# Patient Record
Sex: MALE | Race: BLACK OR AFRICAN AMERICAN | NOT HISPANIC OR LATINO | Employment: FULL TIME | URBAN - METROPOLITAN AREA
[De-identification: names, ages, dates, MRNs, and addresses within clinical notes are randomized per-mention and may not be internally consistent; named-entity substitution may affect disease eponyms.]

---

## 2023-11-02 ENCOUNTER — TELEPHONE (OUTPATIENT)
Dept: PRIMARY CARE | Facility: CLINIC | Age: 63
End: 2023-11-02
Payer: COMMERCIAL

## 2023-11-03 DIAGNOSIS — F41.9 ANXIETY: Primary | ICD-10-CM

## 2023-11-27 ENCOUNTER — APPOINTMENT (OUTPATIENT)
Dept: BEHAVIORAL HEALTH | Facility: CLINIC | Age: 63
End: 2023-11-27
Payer: COMMERCIAL

## 2023-12-18 ENCOUNTER — OFFICE VISIT (OUTPATIENT)
Dept: BEHAVIORAL HEALTH | Facility: CLINIC | Age: 63
End: 2023-12-18
Payer: COMMERCIAL

## 2023-12-18 DIAGNOSIS — F43.23 ADJUSTMENT DISORDER WITH MIXED ANXIETY AND DEPRESSED MOOD: ICD-10-CM

## 2023-12-18 DIAGNOSIS — F41.9 ANXIETY: ICD-10-CM

## 2023-12-18 PROBLEM — H53.8 BLURRING OF VISUAL IMAGE: Status: ACTIVE | Noted: 2023-12-18

## 2023-12-18 PROBLEM — H52.223 REGULAR ASTIGMATISM OF BOTH EYES WITH PRESBYOPIA: Status: ACTIVE | Noted: 2023-12-18

## 2023-12-18 PROBLEM — I10 ESSENTIAL HYPERTENSION: Status: ACTIVE | Noted: 2023-12-18

## 2023-12-18 PROBLEM — H52.4 REGULAR ASTIGMATISM OF BOTH EYES WITH PRESBYOPIA: Status: ACTIVE | Noted: 2023-12-18

## 2023-12-18 PROBLEM — H25.13 CATARACT, NUCLEAR SCLEROTIC, BOTH EYES: Status: ACTIVE | Noted: 2023-12-18

## 2023-12-18 PROBLEM — M46.1 SACROILIITIS (CMS-HCC): Status: ACTIVE | Noted: 2023-12-18

## 2023-12-18 PROBLEM — K29.70 HELICOBACTER POSITIVE GASTRITIS: Status: ACTIVE | Noted: 2023-12-18

## 2023-12-18 PROBLEM — K62.89 RECTAL MASS: Status: ACTIVE | Noted: 2023-12-18

## 2023-12-18 PROBLEM — K40.90 RIGHT GROIN HERNIA: Status: ACTIVE | Noted: 2023-12-18

## 2023-12-18 PROBLEM — J02.9 ACUTE PHARYNGITIS: Status: ACTIVE | Noted: 2023-12-18

## 2023-12-18 PROBLEM — H15.102 EPISCLERITIS OF LEFT EYE: Status: ACTIVE | Noted: 2023-12-18

## 2023-12-18 PROBLEM — N52.9 MALE ERECTILE DISORDER OF ORGANIC ORIGIN: Status: ACTIVE | Noted: 2023-12-18

## 2023-12-18 PROBLEM — H25.012 CORTICAL AGE-RELATED CATARACT OF LEFT EYE: Status: ACTIVE | Noted: 2023-12-18

## 2023-12-18 PROBLEM — B96.81 HELICOBACTER POSITIVE GASTRITIS: Status: ACTIVE | Noted: 2023-12-18

## 2023-12-18 PROBLEM — L02.211 ABDOMINAL WALL ABSCESS: Status: ACTIVE | Noted: 2023-12-18

## 2023-12-18 PROBLEM — R10.9 ABDOMINAL PAIN: Status: ACTIVE | Noted: 2023-12-18

## 2023-12-18 PROBLEM — H53.9 CHANGE IN VISION: Status: ACTIVE | Noted: 2023-12-18

## 2023-12-18 PROBLEM — R91.8 LUNG NODULES: Status: ACTIVE | Noted: 2023-12-18

## 2023-12-18 PROBLEM — M54.50 LOW BACK PAIN: Status: ACTIVE | Noted: 2023-12-18

## 2023-12-18 PROBLEM — M47.816 LUMBAR SPONDYLOSIS: Status: ACTIVE | Noted: 2023-12-18

## 2023-12-18 PROBLEM — N34.2 URETHRITIS: Status: ACTIVE | Noted: 2023-12-18

## 2023-12-18 PROBLEM — K40.20 BILATERAL INGUINAL HERNIA: Status: ACTIVE | Noted: 2023-12-18

## 2023-12-18 PROBLEM — H00.014 HORDEOLUM EXTERNUM OF LEFT UPPER EYELID: Status: ACTIVE | Noted: 2023-12-18

## 2023-12-18 PROBLEM — K52.9 GASTROENTERITIS: Status: ACTIVE | Noted: 2023-12-18

## 2023-12-18 PROBLEM — M10.9 GOUT OF RIGHT FOOT: Status: ACTIVE | Noted: 2023-12-18

## 2023-12-18 PROCEDURE — 99205 OFFICE O/P NEW HI 60 MIN: CPT | Performed by: PSYCHIATRY & NEUROLOGY

## 2023-12-18 RX ORDER — LOPERAMIDE HYDROCHLORIDE 2 MG/1
CAPSULE ORAL
COMMUNITY
Start: 2023-08-11 | End: 2024-01-03 | Stop reason: ENTERED-IN-ERROR

## 2023-12-18 RX ORDER — OFLOXACIN 3 MG/ML
SOLUTION AURICULAR (OTIC)
COMMUNITY
Start: 2023-03-21 | End: 2024-01-03 | Stop reason: ENTERED-IN-ERROR

## 2023-12-18 RX ORDER — BENZONATATE 100 MG/1
CAPSULE ORAL
COMMUNITY
Start: 2023-07-28 | End: 2024-01-03 | Stop reason: ENTERED-IN-ERROR

## 2023-12-18 RX ORDER — ALBUTEROL SULFATE 90 UG/1
AEROSOL, METERED RESPIRATORY (INHALATION)
COMMUNITY
Start: 2023-07-28 | End: 2024-01-03 | Stop reason: ENTERED-IN-ERROR

## 2023-12-18 RX ORDER — AZITHROMYCIN 250 MG/1
TABLET, FILM COATED ORAL
COMMUNITY
Start: 2023-06-27 | End: 2023-12-18 | Stop reason: WASHOUT

## 2023-12-18 RX ORDER — DICYCLOMINE HYDROCHLORIDE 20 MG/1
TABLET ORAL
COMMUNITY
Start: 2023-03-14 | End: 2024-01-03 | Stop reason: ENTERED-IN-ERROR

## 2023-12-18 RX ORDER — CARBAMIDE PEROXIDE 6.5 %
DROPS OTIC (EAR)
COMMUNITY
Start: 2023-03-19 | End: 2024-01-03 | Stop reason: ENTERED-IN-ERROR

## 2023-12-18 RX ORDER — DOXYCYCLINE HYCLATE 100 MG
TABLET ORAL
COMMUNITY
Start: 2023-07-28 | End: 2023-12-18 | Stop reason: WASHOUT

## 2023-12-18 RX ORDER — OMEPRAZOLE 40 MG/1
40 CAPSULE, DELAYED RELEASE ORAL DAILY
COMMUNITY
Start: 2023-03-14 | End: 2024-01-03 | Stop reason: ENTERED-IN-ERROR

## 2023-12-18 RX ORDER — PREDNISONE 20 MG/1
TABLET ORAL
COMMUNITY
Start: 2023-07-28 | End: 2024-01-03 | Stop reason: ENTERED-IN-ERROR

## 2023-12-18 RX ORDER — MOXIFLOXACIN 5 MG/ML
SOLUTION/ DROPS OPHTHALMIC
Status: ON HOLD | COMMUNITY
Start: 2021-10-11 | End: 2024-01-03 | Stop reason: ALTCHOICE

## 2023-12-18 SDOH — ECONOMIC STABILITY: TRANSPORTATION INSECURITY
IN THE PAST 12 MONTHS, HAS LACK OF TRANSPORTATION KEPT YOU FROM MEETINGS, WORK, OR FROM GETTING THINGS NEEDED FOR DAILY LIVING?: NO

## 2023-12-18 SDOH — ECONOMIC STABILITY: HOUSING INSECURITY
IN THE LAST 12 MONTHS, WAS THERE A TIME WHEN YOU DID NOT HAVE A STEADY PLACE TO SLEEP OR SLEPT IN A SHELTER (INCLUDING NOW)?: NO

## 2023-12-18 SDOH — ECONOMIC STABILITY: GENERAL
WHICH OF THE FOLLOWING WOULD YOU LIKE TO GET CONNECTED TO IN ORDER TO RECEIVE A DISCOUNT OR FOR FREE? (CHOOSE ALL THAT APPLY): NONE OF THESE

## 2023-12-18 SDOH — ECONOMIC STABILITY: FOOD INSECURITY: WITHIN THE PAST 12 MONTHS, YOU WORRIED THAT YOUR FOOD WOULD RUN OUT BEFORE YOU GOT MONEY TO BUY MORE.: NEVER TRUE

## 2023-12-18 SDOH — ECONOMIC STABILITY: INCOME INSECURITY: IN THE LAST 12 MONTHS, WAS THERE A TIME WHEN YOU WERE NOT ABLE TO PAY THE MORTGAGE OR RENT ON TIME?: NO

## 2023-12-18 SDOH — HEALTH STABILITY: PHYSICAL HEALTH: ON AVERAGE, HOW MANY MINUTES DO YOU ENGAGE IN EXERCISE AT THIS LEVEL?: 30 MIN

## 2023-12-18 SDOH — ECONOMIC STABILITY: TRANSPORTATION INSECURITY
IN THE PAST 12 MONTHS, HAS THE LACK OF TRANSPORTATION KEPT YOU FROM MEDICAL APPOINTMENTS OR FROM GETTING MEDICATIONS?: NO

## 2023-12-18 SDOH — ECONOMIC STABILITY: FOOD INSECURITY: WITHIN THE PAST 12 MONTHS, THE FOOD YOU BOUGHT JUST DIDN'T LAST AND YOU DIDN'T HAVE MONEY TO GET MORE.: NEVER TRUE

## 2023-12-18 SDOH — ECONOMIC STABILITY: GENERAL
WHICH OF THE FOLLOWING DO YOU KNOW HOW TO USE AND HAVE ACCESS TO EVERY DAY? (CHOOSE ALL THAT APPLY): DESKTOP COMPUTER, LAPTOP COMPUTER, OR TABLET WITH BROADBAND INTERNET CONNECTION

## 2023-12-18 SDOH — HEALTH STABILITY: PHYSICAL HEALTH: ON AVERAGE, HOW MANY DAYS PER WEEK DO YOU ENGAGE IN MODERATE TO STRENUOUS EXERCISE (LIKE A BRISK WALK)?: 7 DAYS

## 2023-12-18 SDOH — ECONOMIC STABILITY: HOUSING INSECURITY: IN THE LAST 12 MONTHS, HOW MANY PLACES HAVE YOU LIVED?: 1

## 2023-12-18 ASSESSMENT — SOCIAL DETERMINANTS OF HEALTH (SDOH)
WITHIN THE LAST YEAR, HAVE YOU BEEN KICKED, HIT, SLAPPED, OR OTHERWISE PHYSICALLY HURT BY YOUR PARTNER OR EX-PARTNER?: NO
HOW OFTEN DO YOU ATTENT MEETINGS OF THE CLUB OR ORGANIZATION YOU BELONG TO?: 1 TO 4 TIMES PER YEAR
WITHIN THE LAST YEAR, HAVE TO BEEN RAPED OR FORCED TO HAVE ANY KIND OF SEXUAL ACTIVITY BY YOUR PARTNER OR EX-PARTNER?: NO
HOW HARD IS IT FOR YOU TO PAY FOR THE VERY BASICS LIKE FOOD, HOUSING, MEDICAL CARE, AND HEATING?: SOMEWHAT HARD
ARE YOU MARRIED, WIDOWED, DIVORCED, SEPARATED, NEVER MARRIED, OR LIVING WITH A PARTNER?: LIVING WITH PARTNER
DO YOU BELONG TO ANY CLUBS OR ORGANIZATIONS SUCH AS CHURCH GROUPS UNIONS, FRATERNAL OR ATHLETIC GROUPS, OR SCHOOL GROUPS?: YES
WITHIN THE LAST YEAR, HAVE YOU BEEN AFRAID OF YOUR PARTNER OR EX-PARTNER?: NO
IN A TYPICAL WEEK, HOW MANY TIMES DO YOU TALK ON THE PHONE WITH FAMILY, FRIENDS, OR NEIGHBORS?: MORE THAN THREE TIMES A WEEK
WITHIN THE LAST YEAR, HAVE YOU BEEN HUMILIATED OR EMOTIONALLY ABUSED IN OTHER WAYS BY YOUR PARTNER OR EX-PARTNER?: NO
HOW OFTEN DO YOU GET TOGETHER WITH FRIENDS OR RELATIVES?: MORE THAN THREE TIMES A WEEK
HOW OFTEN DO YOU GET TOGETHER WITH FRIENDS OR RELATIVES?: MORE THAN THREE TIMES A WEEK
HOW OFTEN DO YOU ATTEND CHURCH OR RELIGIOUS SERVICES?: 1 TO 4 TIMES PER YEAR
ARE YOU MARRIED, WIDOWED, DIVORCED, SEPARATED, NEVER MARRIED, OR LIVING WITH A PARTNER?: LIVING WITH PARTNER
IN THE PAST 12 MONTHS, HAS THE ELECTRIC, GAS, OIL, OR WATER COMPANY THREATENED TO SHUT OFF SERVICE IN YOUR HOME?: NO
IN A TYPICAL WEEK, HOW MANY TIMES DO YOU TALK ON THE PHONE WITH FAMILY, FRIENDS, OR NEIGHBORS?: MORE THAN THREE TIMES A WEEK

## 2023-12-18 ASSESSMENT — LIFESTYLE VARIABLES
AUDIT-C TOTAL SCORE: 0
HOW OFTEN DO YOU HAVE A DRINK CONTAINING ALCOHOL: NEVER
HOW MANY STANDARD DRINKS CONTAINING ALCOHOL DO YOU HAVE ON A TYPICAL DAY: PATIENT DOES NOT DRINK
HOW OFTEN DO YOU HAVE SIX OR MORE DRINKS ON ONE OCCASION: NEVER
SKIP TO QUESTIONS 9-10: 1

## 2023-12-18 ASSESSMENT — PATIENT HEALTH QUESTIONNAIRE - PHQ9
2. FEELING DOWN, DEPRESSED OR HOPELESS: NOT AT ALL
1. LITTLE INTEREST OR PLEASURE IN DOING THINGS: NOT AT ALL
SUM OF ALL RESPONSES TO PHQ9 QUESTIONS 1 & 2: 0

## 2023-12-18 ASSESSMENT — SLIT LAMP EXAM - LIDS
COMMENTS: GOOD POSITION
COMMENTS: GOOD POSITION

## 2023-12-18 ASSESSMENT — ENCOUNTER SYMPTOMS
DYSPHORIC MOOD: 1
NEUROLOGICAL NEGATIVE: 1
NERVOUS/ANXIOUS: 1

## 2023-12-18 ASSESSMENT — CUP TO DISC RATIO
OD_RATIO: .3
OS_RATIO: .3

## 2023-12-18 ASSESSMENT — EXTERNAL EXAM - LEFT EYE: OS_EXAM: NORMAL

## 2023-12-18 ASSESSMENT — EXTERNAL EXAM - RIGHT EYE: OD_EXAM: NORMAL
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
## 2023-12-18 NOTE — PROGRESS NOTES
Combined form of age-related cataract, right eyeH25.811  Combined form of age-related cataract, left eyeH25.812  -Mildly visually significant, but no impact on ADL`s - vision stable compared to last visit. Monitor for now. F/u 1 year to re-evaluate -- recheck refraction, glare/BAT, dilation. Plan for Lenstar/Pentacam/OCT macula only if cataract(s) visually significant and if patient would like to proceed with cataract surgery. Advised to call/return sooner if any change in vision before next appt.     OaovdverwH39.00  FlrevhybkcfX26.209  QomahfjtweZ94.4  -New Rx given per patient request - optional to fill.         No history of intraocular surgery/refractive surgery.   No FH of AMD/glaucoma

## 2023-12-19 ENCOUNTER — OFFICE VISIT (OUTPATIENT)
Dept: PRIMARY CARE | Facility: CLINIC | Age: 63
End: 2023-12-19
Payer: COMMERCIAL

## 2023-12-19 ENCOUNTER — OFFICE VISIT (OUTPATIENT)
Dept: OPHTHALMOLOGY | Facility: CLINIC | Age: 63
End: 2023-12-19
Payer: COMMERCIAL

## 2023-12-19 VITALS
WEIGHT: 142 LBS | DIASTOLIC BLOOD PRESSURE: 76 MMHG | HEIGHT: 65 IN | BODY MASS INDEX: 23.66 KG/M2 | HEART RATE: 77 BPM | SYSTOLIC BLOOD PRESSURE: 152 MMHG

## 2023-12-19 DIAGNOSIS — F41.9 ANXIETY: Primary | ICD-10-CM

## 2023-12-19 DIAGNOSIS — H52.03 HYPEROPIA, BILATERAL: ICD-10-CM

## 2023-12-19 DIAGNOSIS — H25.812 COMBINED FORM OF AGE-RELATED CATARACT, LEFT EYE: ICD-10-CM

## 2023-12-19 DIAGNOSIS — H52.4 PRESBYOPIA: ICD-10-CM

## 2023-12-19 DIAGNOSIS — M54.50 LOW BACK PAIN, UNSPECIFIED BACK PAIN LATERALITY, UNSPECIFIED CHRONICITY, UNSPECIFIED WHETHER SCIATICA PRESENT: ICD-10-CM

## 2023-12-19 DIAGNOSIS — H52.203 ASTIGMATISM OF BOTH EYES, UNSPECIFIED TYPE: ICD-10-CM

## 2023-12-19 DIAGNOSIS — H25.811 COMBINED FORM OF AGE-RELATED CATARACT, RIGHT EYE: Primary | ICD-10-CM

## 2023-12-19 PROCEDURE — 99213 OFFICE O/P EST LOW 20 MIN: CPT | Performed by: FAMILY MEDICINE

## 2023-12-19 PROCEDURE — 92014 COMPRE OPH EXAM EST PT 1/>: CPT | Performed by: OPHTHALMOLOGY

## 2023-12-19 PROCEDURE — 3077F SYST BP >= 140 MM HG: CPT | Performed by: FAMILY MEDICINE

## 2023-12-19 PROCEDURE — 92015 DETERMINE REFRACTIVE STATE: CPT | Performed by: OPHTHALMOLOGY

## 2023-12-19 PROCEDURE — 3078F DIAST BP <80 MM HG: CPT | Performed by: FAMILY MEDICINE

## 2023-12-19 RX ORDER — IBUPROFEN 800 MG/1
800 TABLET ORAL 3 TIMES DAILY PRN
Qty: 60 TABLET | Refills: 0 | Status: SHIPPED | OUTPATIENT
Start: 2023-12-19 | End: 2024-01-05 | Stop reason: HOSPADM

## 2023-12-19 ASSESSMENT — VISUAL ACUITY
OD_BAT_MED: 20/20
OS_BAT_MED: 20/25
METHOD: SNELLEN - LINEAR
CORRECTION_TYPE: GLASSES
OS_CC: 20/25
OD_CC: 20/20

## 2023-12-19 ASSESSMENT — REFRACTION_MANIFEST
OS_ADD: +2.50
OD_SPHERE: +2.25
OS_AXIS: 080
OD_CYLINDER: -1.25
OS_CYLINDER: -1.50
OD_ADD: +2.50
OS_SPHERE: +2.00
OD_AXIS: 095

## 2023-12-19 ASSESSMENT — REFRACTION_WEARINGRX
OD_SPHERE: +2.25
OS_CYLINDER: -1.50
OD_ADD: +2.50
OS_SPHERE: +2.00
OD_AXIS: 095
OS_AXIS: 080
OS_ADD: +2.50
OD_CYLINDER: -1.25

## 2023-12-19 ASSESSMENT — ENCOUNTER SYMPTOMS
HEMATOLOGIC/LYMPHATIC NEGATIVE: 0
GASTROINTESTINAL NEGATIVE: 0
RESPIRATORY NEGATIVE: 0
ALLERGIC/IMMUNOLOGIC NEGATIVE: 0
PSYCHIATRIC NEGATIVE: 0
CARDIOVASCULAR NEGATIVE: 0
EYES NEGATIVE: 1
ENDOCRINE NEGATIVE: 0
NEUROLOGICAL NEGATIVE: 0
MUSCULOSKELETAL NEGATIVE: 0
CONSTITUTIONAL NEGATIVE: 0

## 2023-12-19 ASSESSMENT — TONOMETRY
OS_IOP_MMHG: 13
OD_IOP_MMHG: 13
IOP_METHOD: GOLDMANN APPLANATION
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
## 2024-01-02 ENCOUNTER — HOSPITAL ENCOUNTER (OUTPATIENT)
Facility: HOSPITAL | Age: 64
Setting detail: OBSERVATION
Discharge: HOME | End: 2024-01-05
Attending: EMERGENCY MEDICINE | Admitting: INTERNAL MEDICINE
Payer: COMMERCIAL

## 2024-01-02 ENCOUNTER — APPOINTMENT (OUTPATIENT)
Dept: RADIOLOGY | Facility: HOSPITAL | Age: 64
End: 2024-01-02
Payer: COMMERCIAL

## 2024-01-02 DIAGNOSIS — R10.84 GENERALIZED ABDOMINAL PAIN: Primary | ICD-10-CM

## 2024-01-02 DIAGNOSIS — N48.1 BALANITIS: ICD-10-CM

## 2024-01-02 DIAGNOSIS — R11.10 INTRACTABLE VOMITING: ICD-10-CM

## 2024-01-02 DIAGNOSIS — K52.9 ENTERITIS: ICD-10-CM

## 2024-01-02 DIAGNOSIS — I10 PRIMARY HYPERTENSION: ICD-10-CM

## 2024-01-02 DIAGNOSIS — R11.2 NAUSEA AND VOMITING, UNSPECIFIED VOMITING TYPE: ICD-10-CM

## 2024-01-02 LAB
ALBUMIN SERPL BCP-MCNC: 4.3 G/DL (ref 3.4–5)
ALP SERPL-CCNC: 102 U/L (ref 33–136)
ALT SERPL W P-5'-P-CCNC: 14 U/L (ref 10–52)
ANION GAP BLDV CALCULATED.4IONS-SCNC: 12 MMOL/L (ref 10–25)
ANION GAP SERPL CALC-SCNC: 14 MMOL/L (ref 10–20)
APPEARANCE UR: CLEAR
AST SERPL W P-5'-P-CCNC: 16 U/L (ref 9–39)
BASE EXCESS BLDV CALC-SCNC: 0.7 MMOL/L (ref -2–3)
BASOPHILS # BLD AUTO: 0.05 X10*3/UL (ref 0–0.1)
BASOPHILS NFR BLD AUTO: 0.4 %
BILIRUB DIRECT SERPL-MCNC: 0.2 MG/DL (ref 0–0.3)
BILIRUB SERPL-MCNC: 1 MG/DL (ref 0–1.2)
BILIRUB UR STRIP.AUTO-MCNC: NEGATIVE MG/DL
BODY TEMPERATURE: 37 DEGREES CELSIUS
BUN SERPL-MCNC: 11 MG/DL (ref 6–23)
CA-I BLDV-SCNC: 1.21 MMOL/L (ref 1.1–1.33)
CALCIUM SERPL-MCNC: 9.4 MG/DL (ref 8.6–10.3)
CHLORIDE BLDV-SCNC: 102 MMOL/L (ref 98–107)
CHLORIDE SERPL-SCNC: 102 MMOL/L (ref 98–107)
CO2 SERPL-SCNC: 24 MMOL/L (ref 21–32)
COLOR UR: YELLOW
CREAT SERPL-MCNC: 0.92 MG/DL (ref 0.5–1.3)
EOSINOPHIL # BLD AUTO: 0.15 X10*3/UL (ref 0–0.7)
EOSINOPHIL NFR BLD AUTO: 1.2 %
ERYTHROCYTE [DISTWIDTH] IN BLOOD BY AUTOMATED COUNT: 14.1 % (ref 11.5–14.5)
FLUAV RNA RESP QL NAA+PROBE: NOT DETECTED
FLUBV RNA RESP QL NAA+PROBE: NOT DETECTED
GFR SERPL CREATININE-BSD FRML MDRD: >90 ML/MIN/1.73M*2
GLUCOSE BLDV-MCNC: 91 MG/DL (ref 74–99)
GLUCOSE SERPL-MCNC: 81 MG/DL (ref 74–99)
GLUCOSE UR STRIP.AUTO-MCNC: NEGATIVE MG/DL
HCO3 BLDV-SCNC: 25.4 MMOL/L (ref 22–26)
HCT VFR BLD AUTO: 41.9 % (ref 41–52)
HCT VFR BLD EST: 43 % (ref 41–52)
HGB BLD-MCNC: 13.9 G/DL (ref 13.5–17.5)
HGB BLDV-MCNC: 14.4 G/DL (ref 13.5–17.5)
IMM GRANULOCYTES # BLD AUTO: 0.03 X10*3/UL (ref 0–0.7)
IMM GRANULOCYTES NFR BLD AUTO: 0.2 % (ref 0–0.9)
INHALED O2 CONCENTRATION: 21 %
KETONES UR STRIP.AUTO-MCNC: ABNORMAL MG/DL
LACTATE BLDV-SCNC: 1.1 MMOL/L (ref 0.4–2)
LEUKOCYTE ESTERASE UR QL STRIP.AUTO: NEGATIVE
LIPASE SERPL-CCNC: 4 U/L (ref 9–82)
LYMPHOCYTES # BLD AUTO: 1.92 X10*3/UL (ref 1.2–4.8)
LYMPHOCYTES NFR BLD AUTO: 15.5 %
MCH RBC QN AUTO: 30.9 PG (ref 26–34)
MCHC RBC AUTO-ENTMCNC: 33.2 G/DL (ref 32–36)
MCV RBC AUTO: 93 FL (ref 80–100)
MONOCYTES # BLD AUTO: 1.54 X10*3/UL (ref 0.1–1)
MONOCYTES NFR BLD AUTO: 12.4 %
MUCOUS THREADS #/AREA URNS AUTO: NORMAL /LPF
NEUTROPHILS # BLD AUTO: 8.68 X10*3/UL (ref 1.2–7.7)
NEUTROPHILS NFR BLD AUTO: 70.3 %
NITRITE UR QL STRIP.AUTO: NEGATIVE
NRBC BLD-RTO: 0 /100 WBCS (ref 0–0)
OXYHGB MFR BLDV: 70.9 % (ref 45–75)
PCO2 BLDV: 40 MM HG (ref 41–51)
PH BLDV: 7.41 PH (ref 7.33–7.43)
PH UR STRIP.AUTO: 5 [PH]
PLATELET # BLD AUTO: 386 X10*3/UL (ref 150–450)
PO2 BLDV: 48 MM HG (ref 35–45)
POTASSIUM BLDV-SCNC: 4.1 MMOL/L (ref 3.5–5.3)
POTASSIUM SERPL-SCNC: 3.9 MMOL/L (ref 3.5–5.3)
PROT SERPL-MCNC: 7.6 G/DL (ref 6.4–8.2)
PROT UR STRIP.AUTO-MCNC: NEGATIVE MG/DL
RBC # BLD AUTO: 4.5 X10*6/UL (ref 4.5–5.9)
RBC # UR STRIP.AUTO: ABNORMAL /UL
RBC #/AREA URNS AUTO: NORMAL /HPF
RSV RNA RESP QL NAA+PROBE: NOT DETECTED
SAO2 % BLDV: 74 % (ref 45–75)
SARS-COV-2 RNA RESP QL NAA+PROBE: NOT DETECTED
SODIUM BLDV-SCNC: 135 MMOL/L (ref 136–145)
SODIUM SERPL-SCNC: 136 MMOL/L (ref 136–145)
SP GR UR STRIP.AUTO: >1.06
UROBILINOGEN UR STRIP.AUTO-MCNC: <2 MG/DL
WBC # BLD AUTO: 12.4 X10*3/UL (ref 4.4–11.3)
WBC #/AREA URNS AUTO: NORMAL /HPF

## 2024-01-02 PROCEDURE — 99285 EMERGENCY DEPT VISIT HI MDM: CPT | Performed by: EMERGENCY MEDICINE

## 2024-01-02 PROCEDURE — 80053 COMPREHEN METABOLIC PANEL: CPT | Performed by: EMERGENCY MEDICINE

## 2024-01-02 PROCEDURE — 87637 SARSCOV2&INF A&B&RSV AMP PRB: CPT | Performed by: EMERGENCY MEDICINE

## 2024-01-02 PROCEDURE — 74177 CT ABD & PELVIS W/CONTRAST: CPT | Performed by: RADIOLOGY

## 2024-01-02 PROCEDURE — 2550000001 HC RX 255 CONTRASTS: Performed by: EMERGENCY MEDICINE

## 2024-01-02 PROCEDURE — 85025 COMPLETE CBC W/AUTO DIFF WBC: CPT | Performed by: EMERGENCY MEDICINE

## 2024-01-02 PROCEDURE — 74177 CT ABD & PELVIS W/CONTRAST: CPT

## 2024-01-02 PROCEDURE — 80307 DRUG TEST PRSMV CHEM ANLYZR: CPT | Performed by: EMERGENCY MEDICINE

## 2024-01-02 PROCEDURE — 84075 ASSAY ALKALINE PHOSPHATASE: CPT | Performed by: EMERGENCY MEDICINE

## 2024-01-02 PROCEDURE — 82248 BILIRUBIN DIRECT: CPT | Performed by: EMERGENCY MEDICINE

## 2024-01-02 PROCEDURE — 83690 ASSAY OF LIPASE: CPT | Performed by: EMERGENCY MEDICINE

## 2024-01-02 PROCEDURE — 84132 ASSAY OF SERUM POTASSIUM: CPT | Performed by: EMERGENCY MEDICINE

## 2024-01-02 PROCEDURE — 36415 COLL VENOUS BLD VENIPUNCTURE: CPT | Performed by: EMERGENCY MEDICINE

## 2024-01-02 PROCEDURE — 96375 TX/PRO/DX INJ NEW DRUG ADDON: CPT

## 2024-01-02 PROCEDURE — 2500000004 HC RX 250 GENERAL PHARMACY W/ HCPCS (ALT 636 FOR OP/ED): Performed by: EMERGENCY MEDICINE

## 2024-01-02 PROCEDURE — 71045 X-RAY EXAM CHEST 1 VIEW: CPT

## 2024-01-02 PROCEDURE — 81001 URINALYSIS AUTO W/SCOPE: CPT | Mod: 59 | Performed by: EMERGENCY MEDICINE

## 2024-01-02 PROCEDURE — 96361 HYDRATE IV INFUSION ADD-ON: CPT

## 2024-01-02 RX ORDER — KETOROLAC TROMETHAMINE 30 MG/ML
30 INJECTION, SOLUTION INTRAMUSCULAR; INTRAVENOUS ONCE
Status: COMPLETED | OUTPATIENT
Start: 2024-01-02 | End: 2024-01-02

## 2024-01-02 RX ORDER — ONDANSETRON HYDROCHLORIDE 2 MG/ML
4 INJECTION, SOLUTION INTRAVENOUS ONCE
Status: COMPLETED | OUTPATIENT
Start: 2024-01-02 | End: 2024-01-02

## 2024-01-02 RX ORDER — ACETAMINOPHEN 325 MG/1
975 TABLET ORAL ONCE
Status: COMPLETED | OUTPATIENT
Start: 2024-01-02 | End: 2024-01-02

## 2024-01-02 RX ADMIN — ACETAMINOPHEN 975 MG: 325 TABLET ORAL at 16:10

## 2024-01-02 RX ADMIN — ONDANSETRON 4 MG: 2 INJECTION INTRAMUSCULAR; INTRAVENOUS at 16:04

## 2024-01-02 RX ADMIN — IOHEXOL 75 ML: 350 INJECTION, SOLUTION INTRAVENOUS at 19:42

## 2024-01-02 RX ADMIN — SODIUM CHLORIDE 1000 ML: 9 INJECTION, SOLUTION INTRAVENOUS at 16:05

## 2024-01-02 RX ADMIN — KETOROLAC TROMETHAMINE 30 MG: 30 INJECTION, SOLUTION INTRAMUSCULAR; INTRAVENOUS at 21:44

## 2024-01-02 ASSESSMENT — COLUMBIA-SUICIDE SEVERITY RATING SCALE - C-SSRS
1. IN THE PAST MONTH, HAVE YOU WISHED YOU WERE DEAD OR WISHED YOU COULD GO TO SLEEP AND NOT WAKE UP?: NO
2. HAVE YOU ACTUALLY HAD ANY THOUGHTS OF KILLING YOURSELF?: NO
6. HAVE YOU EVER DONE ANYTHING, STARTED TO DO ANYTHING, OR PREPARED TO DO ANYTHING TO END YOUR LIFE?: NO

## 2024-01-02 ASSESSMENT — PAIN SCALES - GENERAL
PAINLEVEL_OUTOF10: 6
PAINLEVEL_OUTOF10: 10 - WORST POSSIBLE PAIN
PAINLEVEL_OUTOF10: 9
PAINLEVEL_OUTOF10: 9

## 2024-01-02 ASSESSMENT — PAIN - FUNCTIONAL ASSESSMENT
PAIN_FUNCTIONAL_ASSESSMENT: 0-10
PAIN_FUNCTIONAL_ASSESSMENT: 0-10

## 2024-01-02 NOTE — ED TRIAGE NOTES
Pt arrived via EMS from  with chief c/o of flu like symptoms, including abd pain. Pt reports mid abd pain, nausea, vomiting, diarrhea, body aches, congestion. Pt received EKG and vital signs at  and was sent here. He was hypertensive on arrival.

## 2024-01-03 PROBLEM — R11.2 NAUSEA AND VOMITING: Status: ACTIVE | Noted: 2024-01-03

## 2024-01-03 LAB
AMPHETAMINES UR QL SCN: ABNORMAL
ANION GAP SERPL CALC-SCNC: 16 MMOL/L (ref 10–20)
BARBITURATES UR QL SCN: ABNORMAL
BENZODIAZ UR QL SCN: ABNORMAL
BUN SERPL-MCNC: 13 MG/DL (ref 6–23)
BZE UR QL SCN: ABNORMAL
CALCIUM SERPL-MCNC: 8.6 MG/DL (ref 8.6–10.3)
CANNABINOIDS UR QL SCN: ABNORMAL
CHLORIDE SERPL-SCNC: 104 MMOL/L (ref 98–107)
CO2 SERPL-SCNC: 20 MMOL/L (ref 21–32)
CREAT SERPL-MCNC: 0.69 MG/DL (ref 0.5–1.3)
ERYTHROCYTE [DISTWIDTH] IN BLOOD BY AUTOMATED COUNT: 14.1 % (ref 11.5–14.5)
EST. AVERAGE GLUCOSE BLD GHB EST-MCNC: 103 MG/DL
FENTANYL+NORFENTANYL UR QL SCN: ABNORMAL
GFR SERPL CREATININE-BSD FRML MDRD: >90 ML/MIN/1.73M*2
GLUCOSE SERPL-MCNC: 94 MG/DL (ref 74–99)
HBA1C MFR BLD: 5.2 %
HCT VFR BLD AUTO: 44.9 % (ref 41–52)
HGB BLD-MCNC: 14.6 G/DL (ref 13.5–17.5)
MAGNESIUM SERPL-MCNC: 2 MG/DL (ref 1.6–2.4)
MCH RBC QN AUTO: 31.1 PG (ref 26–34)
MCHC RBC AUTO-ENTMCNC: 32.5 G/DL (ref 32–36)
MCV RBC AUTO: 96 FL (ref 80–100)
NRBC BLD-RTO: 0 /100 WBCS (ref 0–0)
OPIATES UR QL SCN: ABNORMAL
OXYCODONE+OXYMORPHONE UR QL SCN: ABNORMAL
PCP UR QL SCN: ABNORMAL
PLATELET # BLD AUTO: 227 X10*3/UL (ref 150–450)
POTASSIUM SERPL-SCNC: 5 MMOL/L (ref 3.5–5.3)
RBC # BLD AUTO: 4.7 X10*6/UL (ref 4.5–5.9)
SODIUM SERPL-SCNC: 135 MMOL/L (ref 136–145)
WBC # BLD AUTO: 13.1 X10*3/UL (ref 4.4–11.3)

## 2024-01-03 PROCEDURE — 2500000001 HC RX 250 WO HCPCS SELF ADMINISTERED DRUGS (ALT 637 FOR MEDICARE OP): Performed by: PHYSICIAN ASSISTANT

## 2024-01-03 PROCEDURE — 99222 1ST HOSP IP/OBS MODERATE 55: CPT | Performed by: PHYSICIAN ASSISTANT

## 2024-01-03 PROCEDURE — 2500000004 HC RX 250 GENERAL PHARMACY W/ HCPCS (ALT 636 FOR OP/ED): Performed by: PHYSICIAN ASSISTANT

## 2024-01-03 PROCEDURE — 83036 HEMOGLOBIN GLYCOSYLATED A1C: CPT | Mod: AHULAB | Performed by: PHYSICIAN ASSISTANT

## 2024-01-03 PROCEDURE — 99221 1ST HOSP IP/OBS SF/LOW 40: CPT | Performed by: NURSE PRACTITIONER

## 2024-01-03 PROCEDURE — C9113 INJ PANTOPRAZOLE SODIUM, VIA: HCPCS | Performed by: PHYSICIAN ASSISTANT

## 2024-01-03 PROCEDURE — 96366 THER/PROPH/DIAG IV INF ADDON: CPT

## 2024-01-03 PROCEDURE — G0378 HOSPITAL OBSERVATION PER HR: HCPCS

## 2024-01-03 PROCEDURE — 96372 THER/PROPH/DIAG INJ SC/IM: CPT

## 2024-01-03 PROCEDURE — 96365 THER/PROPH/DIAG IV INF INIT: CPT | Mod: 59

## 2024-01-03 PROCEDURE — 80048 BASIC METABOLIC PNL TOTAL CA: CPT | Performed by: PHYSICIAN ASSISTANT

## 2024-01-03 PROCEDURE — 96375 TX/PRO/DX INJ NEW DRUG ADDON: CPT

## 2024-01-03 PROCEDURE — 83735 ASSAY OF MAGNESIUM: CPT | Performed by: PHYSICIAN ASSISTANT

## 2024-01-03 PROCEDURE — 36415 COLL VENOUS BLD VENIPUNCTURE: CPT | Performed by: PHYSICIAN ASSISTANT

## 2024-01-03 PROCEDURE — 96376 TX/PRO/DX INJ SAME DRUG ADON: CPT

## 2024-01-03 PROCEDURE — 85027 COMPLETE CBC AUTOMATED: CPT | Performed by: PHYSICIAN ASSISTANT

## 2024-01-03 PROCEDURE — 96368 THER/DIAG CONCURRENT INF: CPT

## 2024-01-03 PROCEDURE — 99223 1ST HOSP IP/OBS HIGH 75: CPT | Performed by: PHYSICIAN ASSISTANT

## 2024-01-03 PROCEDURE — 2500000004 HC RX 250 GENERAL PHARMACY W/ HCPCS (ALT 636 FOR OP/ED): Performed by: EMERGENCY MEDICINE

## 2024-01-03 RX ORDER — DEXTROSE 50 % IN WATER (D50W) INTRAVENOUS SYRINGE
25
Status: DISCONTINUED | OUTPATIENT
Start: 2024-01-03 | End: 2024-01-05 | Stop reason: HOSPADM

## 2024-01-03 RX ORDER — DEXTROSE MONOHYDRATE 100 MG/ML
0.3 INJECTION, SOLUTION INTRAVENOUS ONCE AS NEEDED
Status: DISCONTINUED | OUTPATIENT
Start: 2024-01-03 | End: 2024-01-05 | Stop reason: HOSPADM

## 2024-01-03 RX ORDER — DICYCLOMINE HYDROCHLORIDE 10 MG/1
10 CAPSULE ORAL 3 TIMES DAILY
Status: DISCONTINUED | OUTPATIENT
Start: 2024-01-03 | End: 2024-01-05 | Stop reason: HOSPADM

## 2024-01-03 RX ORDER — METOCLOPRAMIDE HYDROCHLORIDE 5 MG/ML
10 INJECTION INTRAMUSCULAR; INTRAVENOUS ONCE
Status: COMPLETED | OUTPATIENT
Start: 2024-01-03 | End: 2024-01-03

## 2024-01-03 RX ORDER — FAMOTIDINE 10 MG/ML
20 INJECTION INTRAVENOUS EVERY 12 HOURS SCHEDULED
Status: DISCONTINUED | OUTPATIENT
Start: 2024-01-03 | End: 2024-01-05 | Stop reason: HOSPADM

## 2024-01-03 RX ORDER — ACETAMINOPHEN 650 MG/1
650 SUPPOSITORY RECTAL EVERY 4 HOURS PRN
Status: DISCONTINUED | OUTPATIENT
Start: 2024-01-03 | End: 2024-01-05 | Stop reason: HOSPADM

## 2024-01-03 RX ORDER — PANTOPRAZOLE SODIUM 40 MG/10ML
40 INJECTION, POWDER, LYOPHILIZED, FOR SOLUTION INTRAVENOUS
Status: DISCONTINUED | OUTPATIENT
Start: 2024-01-03 | End: 2024-01-03

## 2024-01-03 RX ORDER — PROCHLORPERAZINE EDISYLATE 5 MG/ML
10 INJECTION INTRAMUSCULAR; INTRAVENOUS EVERY 6 HOURS PRN
Status: DISCONTINUED | OUTPATIENT
Start: 2024-01-03 | End: 2024-01-05 | Stop reason: HOSPADM

## 2024-01-03 RX ORDER — PROCHLORPERAZINE 25 MG/1
25 SUPPOSITORY RECTAL EVERY 12 HOURS PRN
Status: DISCONTINUED | OUTPATIENT
Start: 2024-01-03 | End: 2024-01-05 | Stop reason: HOSPADM

## 2024-01-03 RX ORDER — KETOROLAC TROMETHAMINE 30 MG/ML
15 INJECTION, SOLUTION INTRAMUSCULAR; INTRAVENOUS EVERY 6 HOURS PRN
Status: DISCONTINUED | OUTPATIENT
Start: 2024-01-03 | End: 2024-01-05 | Stop reason: HOSPADM

## 2024-01-03 RX ORDER — TALC
3 POWDER (GRAM) TOPICAL
Status: DISCONTINUED | OUTPATIENT
Start: 2024-01-03 | End: 2024-01-05 | Stop reason: HOSPADM

## 2024-01-03 RX ORDER — DOCUSATE SODIUM 100 MG/1
100 CAPSULE, LIQUID FILLED ORAL 2 TIMES DAILY PRN
Status: DISCONTINUED | OUTPATIENT
Start: 2024-01-03 | End: 2024-01-05 | Stop reason: HOSPADM

## 2024-01-03 RX ORDER — LOSARTAN POTASSIUM 25 MG/1
25 TABLET ORAL DAILY
Status: DISCONTINUED | OUTPATIENT
Start: 2024-01-03 | End: 2024-01-05 | Stop reason: HOSPADM

## 2024-01-03 RX ORDER — ACETAMINOPHEN 325 MG/1
950 TABLET ORAL EVERY 6 HOURS PRN
Status: DISCONTINUED | OUTPATIENT
Start: 2024-01-03 | End: 2024-01-03 | Stop reason: SDUPTHER

## 2024-01-03 RX ORDER — ALUMINUM HYDROXIDE, MAGNESIUM HYDROXIDE, AND SIMETHICONE 1200; 120; 1200 MG/30ML; MG/30ML; MG/30ML
10 SUSPENSION ORAL 4 TIMES DAILY PRN
Status: DISCONTINUED | OUTPATIENT
Start: 2024-01-03 | End: 2024-01-05 | Stop reason: HOSPADM

## 2024-01-03 RX ORDER — CIPROFLOXACIN 2 MG/ML
400 INJECTION, SOLUTION INTRAVENOUS EVERY 12 HOURS
Status: DISCONTINUED | OUTPATIENT
Start: 2024-01-03 | End: 2024-01-05

## 2024-01-03 RX ORDER — METRONIDAZOLE 500 MG/100ML
500 INJECTION, SOLUTION INTRAVENOUS EVERY 8 HOURS
Status: DISCONTINUED | OUTPATIENT
Start: 2024-01-03 | End: 2024-01-05

## 2024-01-03 RX ORDER — ALBUTEROL SULFATE 0.83 MG/ML
2.5 SOLUTION RESPIRATORY (INHALATION) EVERY 4 HOURS PRN
Status: DISCONTINUED | OUTPATIENT
Start: 2024-01-03 | End: 2024-01-05 | Stop reason: HOSPADM

## 2024-01-03 RX ORDER — PANTOPRAZOLE SODIUM 40 MG/10ML
40 INJECTION, POWDER, LYOPHILIZED, FOR SOLUTION INTRAVENOUS
Status: DISCONTINUED | OUTPATIENT
Start: 2024-01-03 | End: 2024-01-05 | Stop reason: HOSPADM

## 2024-01-03 RX ORDER — DEXTROSE 50 % IN WATER (D50W) INTRAVENOUS SYRINGE
25
Status: DISCONTINUED | OUTPATIENT
Start: 2024-01-03 | End: 2024-01-03

## 2024-01-03 RX ORDER — PROCHLORPERAZINE MALEATE 5 MG
10 TABLET ORAL EVERY 6 HOURS PRN
Status: DISCONTINUED | OUTPATIENT
Start: 2024-01-03 | End: 2024-01-05 | Stop reason: HOSPADM

## 2024-01-03 RX ORDER — ENOXAPARIN SODIUM 100 MG/ML
40 INJECTION SUBCUTANEOUS EVERY 24 HOURS
Status: DISCONTINUED | OUTPATIENT
Start: 2024-01-03 | End: 2024-01-05 | Stop reason: HOSPADM

## 2024-01-03 RX ORDER — POLYETHYLENE GLYCOL 3350 17 G/17G
17 POWDER, FOR SOLUTION ORAL DAILY
Status: DISCONTINUED | OUTPATIENT
Start: 2024-01-03 | End: 2024-01-05 | Stop reason: HOSPADM

## 2024-01-03 RX ORDER — PANTOPRAZOLE SODIUM 40 MG/1
40 TABLET, DELAYED RELEASE ORAL
Status: DISCONTINUED | OUTPATIENT
Start: 2024-01-03 | End: 2024-01-05 | Stop reason: HOSPADM

## 2024-01-03 RX ORDER — PANTOPRAZOLE SODIUM 40 MG/1
40 TABLET, DELAYED RELEASE ORAL
Status: DISCONTINUED | OUTPATIENT
Start: 2024-01-03 | End: 2024-01-03

## 2024-01-03 RX ORDER — AMLODIPINE BESYLATE 5 MG/1
5 TABLET ORAL DAILY
Status: DISCONTINUED | OUTPATIENT
Start: 2024-01-03 | End: 2024-01-05 | Stop reason: HOSPADM

## 2024-01-03 RX ORDER — ACETAMINOPHEN 325 MG/1
650 TABLET ORAL EVERY 4 HOURS PRN
Status: DISCONTINUED | OUTPATIENT
Start: 2024-01-03 | End: 2024-01-05 | Stop reason: HOSPADM

## 2024-01-03 RX ORDER — ONDANSETRON 4 MG/1
4 TABLET, ORALLY DISINTEGRATING ORAL EVERY 8 HOURS PRN
Status: DISCONTINUED | OUTPATIENT
Start: 2024-01-03 | End: 2024-01-05 | Stop reason: HOSPADM

## 2024-01-03 RX ORDER — BISMUTH SUBSALICYLATE 262 MG/1
524 TABLET ORAL 4 TIMES DAILY
COMMUNITY
Start: 2021-03-17 | End: 2024-01-03 | Stop reason: ENTERED-IN-ERROR

## 2024-01-03 RX ORDER — DEXTROSE MONOHYDRATE 100 MG/ML
0.3 INJECTION, SOLUTION INTRAVENOUS ONCE AS NEEDED
Status: DISCONTINUED | OUTPATIENT
Start: 2024-01-03 | End: 2024-01-03

## 2024-01-03 RX ORDER — HYDRALAZINE HYDROCHLORIDE 20 MG/ML
5 INJECTION INTRAMUSCULAR; INTRAVENOUS EVERY 8 HOURS PRN
Status: DISCONTINUED | OUTPATIENT
Start: 2024-01-03 | End: 2024-01-05 | Stop reason: HOSPADM

## 2024-01-03 RX ORDER — ONDANSETRON HYDROCHLORIDE 2 MG/ML
4 INJECTION, SOLUTION INTRAVENOUS EVERY 8 HOURS PRN
Status: DISCONTINUED | OUTPATIENT
Start: 2024-01-03 | End: 2024-01-05 | Stop reason: HOSPADM

## 2024-01-03 RX ORDER — ACETAMINOPHEN 160 MG/5ML
650 SOLUTION ORAL EVERY 4 HOURS PRN
Status: DISCONTINUED | OUTPATIENT
Start: 2024-01-03 | End: 2024-01-05 | Stop reason: HOSPADM

## 2024-01-03 RX ADMIN — Medication 3 MG: at 19:00

## 2024-01-03 RX ADMIN — DICYCLOMINE HYDROCHLORIDE 10 MG: 10 CAPSULE ORAL at 08:30

## 2024-01-03 RX ADMIN — CIPROFLOXACIN 400 MG: 400 INJECTION, SOLUTION INTRAVENOUS at 08:31

## 2024-01-03 RX ADMIN — KETOROLAC TROMETHAMINE 15 MG: 30 INJECTION, SOLUTION INTRAMUSCULAR; INTRAVENOUS at 05:45

## 2024-01-03 RX ADMIN — AMLODIPINE BESYLATE 5 MG: 5 TABLET ORAL at 08:30

## 2024-01-03 RX ADMIN — HYDROMORPHONE HYDROCHLORIDE 0.2 MG: 0.2 INJECTION, SOLUTION INTRAMUSCULAR; INTRAVENOUS; SUBCUTANEOUS at 11:01

## 2024-01-03 RX ADMIN — ONDANSETRON 4 MG: 2 INJECTION INTRAMUSCULAR; INTRAVENOUS at 05:45

## 2024-01-03 RX ADMIN — LOSARTAN POTASSIUM 25 MG: 50 TABLET, FILM COATED ORAL at 18:50

## 2024-01-03 RX ADMIN — SODIUM CHLORIDE 500 ML: 9 INJECTION, SOLUTION INTRAVENOUS at 08:31

## 2024-01-03 RX ADMIN — PANTOPRAZOLE SODIUM 40 MG: 40 TABLET, DELAYED RELEASE ORAL at 08:30

## 2024-01-03 RX ADMIN — HYDROMORPHONE HYDROCHLORIDE 0.2 MG: 0.2 INJECTION, SOLUTION INTRAMUSCULAR; INTRAVENOUS; SUBCUTANEOUS at 14:10

## 2024-01-03 RX ADMIN — CIPROFLOXACIN 400 MG: 400 INJECTION, SOLUTION INTRAVENOUS at 23:11

## 2024-01-03 RX ADMIN — HYDROMORPHONE HYDROCHLORIDE 0.2 MG: 0.2 INJECTION, SOLUTION INTRAMUSCULAR; INTRAVENOUS; SUBCUTANEOUS at 22:48

## 2024-01-03 RX ADMIN — PANTOPRAZOLE SODIUM 40 MG: 40 INJECTION, POWDER, FOR SOLUTION INTRAVENOUS at 06:50

## 2024-01-03 RX ADMIN — METRONIDAZOLE 500 MG: 500 INJECTION, SOLUTION INTRAVENOUS at 17:40

## 2024-01-03 RX ADMIN — PANTOPRAZOLE SODIUM 40 MG: 40 TABLET, DELAYED RELEASE ORAL at 17:40

## 2024-01-03 RX ADMIN — METOCLOPRAMIDE 10 MG: 5 INJECTION, SOLUTION INTRAMUSCULAR; INTRAVENOUS at 00:49

## 2024-01-03 RX ADMIN — FAMOTIDINE 20 MG: 10 INJECTION, SOLUTION INTRAVENOUS at 08:31

## 2024-01-03 RX ADMIN — FAMOTIDINE 20 MG: 10 INJECTION, SOLUTION INTRAVENOUS at 22:48

## 2024-01-03 RX ADMIN — METRONIDAZOLE 500 MG: 500 INJECTION, SOLUTION INTRAVENOUS at 08:31

## 2024-01-03 RX ADMIN — ENOXAPARIN SODIUM 40 MG: 40 INJECTION SUBCUTANEOUS at 08:31

## 2024-01-03 RX ADMIN — DICYCLOMINE HYDROCHLORIDE 10 MG: 10 CAPSULE ORAL at 14:09

## 2024-01-03 RX ADMIN — SODIUM CHLORIDE 1000 ML: 9 INJECTION, SOLUTION INTRAVENOUS at 00:53

## 2024-01-03 RX ADMIN — POLYETHYLENE GLYCOL 3350 17 G: 17 POWDER, FOR SOLUTION ORAL at 08:30

## 2024-01-03 RX ADMIN — HYDROMORPHONE HYDROCHLORIDE 0.2 MG: 0.2 INJECTION, SOLUTION INTRAMUSCULAR; INTRAVENOUS; SUBCUTANEOUS at 17:40

## 2024-01-03 RX ADMIN — DICYCLOMINE HYDROCHLORIDE 10 MG: 10 CAPSULE ORAL at 22:48

## 2024-01-03 SDOH — SOCIAL STABILITY: SOCIAL INSECURITY: WERE YOU ABLE TO COMPLETE ALL THE BEHAVIORAL HEALTH SCREENINGS?: YES

## 2024-01-03 SDOH — SOCIAL STABILITY: SOCIAL INSECURITY: DO YOU FEEL ANYONE HAS EXPLOITED OR TAKEN ADVANTAGE OF YOU FINANCIALLY OR OF YOUR PERSONAL PROPERTY?: NO

## 2024-01-03 SDOH — SOCIAL STABILITY: SOCIAL INSECURITY: ARE YOU OR HAVE YOU BEEN THREATENED OR ABUSED PHYSICALLY, EMOTIONALLY, OR SEXUALLY BY ANYONE?: NO

## 2024-01-03 SDOH — SOCIAL STABILITY: SOCIAL INSECURITY: HAVE YOU HAD THOUGHTS OF HARMING ANYONE ELSE?: NO

## 2024-01-03 SDOH — SOCIAL STABILITY: SOCIAL INSECURITY: ABUSE: ADULT

## 2024-01-03 SDOH — SOCIAL STABILITY: SOCIAL INSECURITY: DOES ANYONE TRY TO KEEP YOU FROM HAVING/CONTACTING OTHER FRIENDS OR DOING THINGS OUTSIDE YOUR HOME?: NO

## 2024-01-03 SDOH — SOCIAL STABILITY: SOCIAL INSECURITY: DO YOU FEEL UNSAFE GOING BACK TO THE PLACE WHERE YOU ARE LIVING?: NO

## 2024-01-03 SDOH — SOCIAL STABILITY: SOCIAL INSECURITY: ARE THERE ANY APPARENT SIGNS OF INJURIES/BEHAVIORS THAT COULD BE RELATED TO ABUSE/NEGLECT?: NO

## 2024-01-03 SDOH — SOCIAL STABILITY: SOCIAL INSECURITY: HAS ANYONE EVER THREATENED TO HURT YOUR FAMILY OR YOUR PETS?: NO

## 2024-01-03 ASSESSMENT — PATIENT HEALTH QUESTIONNAIRE - PHQ9
1. LITTLE INTEREST OR PLEASURE IN DOING THINGS: NOT AT ALL
2. FEELING DOWN, DEPRESSED OR HOPELESS: NOT AT ALL
SUM OF ALL RESPONSES TO PHQ9 QUESTIONS 1 & 2: 0

## 2024-01-03 ASSESSMENT — PAIN DESCRIPTION - LOCATION
LOCATION: ABDOMEN
LOCATION: ABDOMEN

## 2024-01-03 ASSESSMENT — ENCOUNTER SYMPTOMS
COUGH: 1
DIZZINESS: 0
CONSTIPATION: 0
PALPITATIONS: 0
SHORTNESS OF BREATH: 0
FATIGUE: 1
WHEEZING: 0
CHILLS: 0
NAUSEA: 1
DYSURIA: 0
APPETITE CHANGE: 1
BLOOD IN STOOL: 0
FEVER: 0
DIARRHEA: 1
VOMITING: 1
TROUBLE SWALLOWING: 0
ABDOMINAL PAIN: 1
ANAL BLEEDING: 0

## 2024-01-03 ASSESSMENT — ACTIVITIES OF DAILY LIVING (ADL)
ADEQUATE_TO_COMPLETE_ADL: YES
BATHING: INDEPENDENT
PATIENT'S MEMORY ADEQUATE TO SAFELY COMPLETE DAILY ACTIVITIES?: YES
GROOMING: INDEPENDENT
FEEDING YOURSELF: INDEPENDENT
HEARING - LEFT EAR: FUNCTIONAL
LACK_OF_TRANSPORTATION: NO
DRESSING YOURSELF: INDEPENDENT
WALKS IN HOME: INDEPENDENT
JUDGMENT_ADEQUATE_SAFELY_COMPLETE_DAILY_ACTIVITIES: YES
TOILETING: INDEPENDENT
HEARING - RIGHT EAR: FUNCTIONAL

## 2024-01-03 ASSESSMENT — COGNITIVE AND FUNCTIONAL STATUS - GENERAL
MOBILITY SCORE: 24
DAILY ACTIVITIY SCORE: 24
PATIENT BASELINE BEDBOUND: NO

## 2024-01-03 ASSESSMENT — PAIN - FUNCTIONAL ASSESSMENT
PAIN_FUNCTIONAL_ASSESSMENT: 0-10

## 2024-01-03 ASSESSMENT — LIFESTYLE VARIABLES
HOW OFTEN DO YOU HAVE A DRINK CONTAINING ALCOHOL: NEVER
SKIP TO QUESTIONS 9-10: 1
AUDIT-C TOTAL SCORE: 0
HOW MANY STANDARD DRINKS CONTAINING ALCOHOL DO YOU HAVE ON A TYPICAL DAY: PATIENT DOES NOT DRINK
AUDIT-C TOTAL SCORE: 0
HOW OFTEN DO YOU HAVE 6 OR MORE DRINKS ON ONE OCCASION: NEVER

## 2024-01-03 ASSESSMENT — PAIN SCALES - GENERAL
PAINLEVEL_OUTOF10: 5 - MODERATE PAIN
PAINLEVEL_OUTOF10: 7
PAINLEVEL_OUTOF10: 7
PAINLEVEL_OUTOF10: 10 - WORST POSSIBLE PAIN
PAINLEVEL_OUTOF10: 8
PAINLEVEL_OUTOF10: 7
PAINLEVEL_OUTOF10: 7
PAINLEVEL_OUTOF10: 5 - MODERATE PAIN

## 2024-01-03 NOTE — PROGRESS NOTES
Pharmacy Medication History Review    Matthieu Solis is a 63 y.o. male admitted for Nausea and vomiting. Pharmacy reviewed the patient's iykmu-da-kxnxexxku medications and allergies for accuracy.    The list below reflectives the updated PTA list. Please review each medication in order reconciliation for additional clarification and justification.  Prior to Admission Medications   Prescriptions Last Dose Informant Patient Reported? Taking?                                 ibuprofen 800 mg tablet   No No   Sig: Take 1 tablet (800 mg) by mouth 3 times a day as needed for mild pain (1 - 3) (pain).                       multivitamin with minerals tablet   Yes No   Sig: Take 1 tablet by mouth once daily. Animal marcus vitamins      Yes No         Yes No                   Facility-Administered Medications: None           The list below reflectives the updated allergy list. Please review each documented allergy for additional clarification and justification.  Allergies  Reviewed by Meg Ambriz RN on 1/2/2024        Severity Reactions Comments    Cinnamon Not Specified Unknown Cinnamon    Penicillins Not Specified Unknown             Below are additional concerns with the patient's PTA list.  Patient verified all medications and doses.    Milla Mcconnell CPhT

## 2024-01-03 NOTE — PROGRESS NOTES
Transitional Care Coordination Progress Note:  Plan per Medical/Surgical team: treatment of abd pain, N/V with IV ATB, IV flagyl, toradol, reglan, zofran, compazine, protonix, NPO, IV fluids, GI consult  Status: observation  Payor source: [facility]  Discharge disposition: Home with sign other  Potential Barriers: /117 to 151/92  ADOD: 1/4/2024  GLO Verde RN, BSN Transitional Care Coordinator ED# [phone]      01/03/24 0805   Discharge Planning   Living Arrangements Spouse/significant other   Support Systems Spouse/significant other   Assistance Needed GI work up   Type of Residence Private residence   Number of Stairs to Enter Residence 6   Number of Stairs Within Residence 14   Home or Post Acute Services None   Patient expects to be discharged to: Home with sign other   Does the patient need discharge transport arranged? Yes   RoundTrip coordination needed? Yes   Has discharge transport been arranged? No   Financial Resource Strain   How hard is it for you to pay for the very basics like food, housing, medical care, and heating? Not hard   Housing Stability   In the last 12 months, was there a time when you were not able to pay the mortgage or rent on time? N   In the last 12 months, how many places have you lived? 1   In the last 12 months, was there a time when you did not have a steady place to sleep or slept in a shelter (including now)? N   Transportation Needs   In the past 12 months, has lack of transportation kept you from medical appointments or from getting medications? no   In the past 12 months, has lack of transportation kept you from meetings, work, or from getting things needed for daily living? No

## 2024-01-03 NOTE — CONSULTS
Reason For Consult  Nausea and vomiting     History Of Present Illness  Matthieu Solis is a 63 y.o. male presenting with c/o abdominal pain that started 48 hours ago. It is constant pain , associated with nausea, vomiting and  non bloody diarrhea,  as well as generalized bodyaches, myalgias; denies sick contacts, travel, antibiotics. He doesn't; remember what he ate prior to onset of his symptoms. His PMH include HTN, h pylori gastritis, gout, chronic pain, emphysema, diverticulosis, current tobacco use. Work up in ED showed  CBC with leukocytosis, chemistry is unremarkable LFTs and lipase are normal; UA was positive cannabis.  CT a/p shows ileus/enteritis pattern, chest x-ray negative; influenza, RSV and COVID-negative. He stated that he had similar symptoms in the past and wants a complete work up.   He was treated for H pylori gastritis in 2021, not confirmed if it was eradicated.    Colonoscopy 2/19/21  - The distal rectum and anal verge are normal on                          retroflexion view.                         - Diverticulosis in the sigmoid colon, in the                          descending colon and in the transverse colon.                         - Four 2 to 4 mm polyps in the rectum, removed with a                          cold snare. Resected and retrieved. Pathology: hyperplastic polyps  Colonoscopy 9/23/20    - Hemorrhoids found on perianal exam.                         - Multiple 4 to 6 mm polyps in the rectum, in the                          sigmoid colon and in the descending colon, removed                          with a cold biopsy forceps. Resected and retrieved. Path: hyperplastic polyps  EGD 9/23/20   - Z-line irregular, 38 cm from the incisors.                         - Gastritis. Biopsied.                         - Normal examined duodenum. Path + H pylori  Past Medical History  He has a past medical history of Cataract, Dorsalgia, unspecified (11/12/2015), and Pain in leg, unspecified  (11/12/2015).    Surgical History  He has a past surgical history that includes Other surgical history (11/12/2015).     Social History  He reports that he has been smoking cigarettes. He has been smoking an average of 1 pack per day. He has never used smokeless tobacco. He reports that he does not drink alcohol and does not use drugs.    Family History  Family History   Problem Relation Name Age of Onset    Other (CVA) Mother      Hypertension Mother      Other (CVA) Father      Hypertension Father     Denies any known GI malignancies or IBD     Allergies  Cinnamon and Penicillins    Review of Systems  10 systems reviewed and negative other than HPI      Physical Exam  Physical Exam  Constitutional:       General: He is not in acute distress.     Appearance: Normal appearance. He is not ill-appearing, toxic-appearing or diaphoretic.   HENT:      Head: Normocephalic and atraumatic.      Nose: Nose normal.      Mouth/Throat:      Mouth: Mucous membranes are moist.      Pharynx: Oropharynx is clear.   Cardiovascular:      Rate and Rhythm: Normal rate and regular rhythm.      Pulses: Normal pulses.      Heart sounds: Normal heart sounds.   Pulmonary:      Effort: Pulmonary effort is normal.      Breath sounds: Normal breath sounds.   Abdominal:      General: Bowel sounds are normal. There is no distension.      Palpations: Abdomen is soft. There is no mass.      Tenderness: There is abdominal tenderness. There is guarding. There is no rebound.      Hernia: No hernia is present.      Comments: The pain appears out of proportion   Musculoskeletal:         General: Normal range of motion.      Cervical back: Neck supple.   Skin:     General: Skin is warm and dry.   Neurological:      General: No focal deficit present.      Mental Status: He is alert and oriented to person, place, and time.   Psychiatric:         Mood and Affect: Mood normal.         Behavior: Behavior normal.            Last Recorded Vitals  Blood  "pressure (!) 155/105, pulse 63, temperature 36.2 °C (97.2 °F), temperature source Temporal, resp. rate 16, height 1.651 m (5' 5\"), weight 68 kg (150 lb), SpO2 98 %.    Relevant Results  Scheduled medications  amLODIPine, 5 mg, oral, Daily  ciprofloxacin, 400 mg, intravenous, q12h  dicyclomine, 10 mg, oral, TID  enoxaparin, 40 mg, subcutaneous, q24h  famotidine, 20 mg, intravenous, q12h MIKEY  melatonin, 3 mg, oral, Daily  metroNIDAZOLE, 500 mg, intravenous, q8h  pantoprazole, 40 mg, oral, BID AC   Or  pantoprazole, 40 mg, intravenous, BID AC  polyethylene glycol, 17 g, oral, Daily      Continuous medications     PRN medications  PRN medications: acetaminophen **OR** acetaminophen **OR** acetaminophen, albuterol, alum-mag hydroxide-simeth, dextrose 10 % in water (D10W), dextrose, docusate sodium, glucagon, HYDROmorphone, HYDROmorphone, [Held by provider] ketorolac, ondansetron ODT **OR** ondansetron, prochlorperazine **OR** prochlorperazine **OR** prochlorperazine  CT abdomen pelvis w IV contrast    Result Date: 1/2/2024  Interpreted By:  Sarah Hernandez, STUDY: CT ABDOMEN PELVIS W IV CONTRAST;  1/2/2024 7:53 pm   INDICATION: Signs/Symptoms:abd pain vomiting.   COMPARISON: 03/13/2023   ACCESSION NUMBER(S): [id number]   ORDERING CLINICIAN: THONG CASTILLO   TECHNIQUE: CT of the abdomen and pelvis was performed.  Standard contiguous axial images were obtained at 3 mm slice thickness through the abdomen and pelvis. Coronal and sagittal reconstructions at 3 mm slice thickness were performed.   90   ml of contrast Omnipaque 350 were administered intravenously without immediate complication.   FINDINGS: LOWER CHEST: The visualized bases are clear bilaterally.     ABDOMEN:   LIVER: Subcentimeter small low-attenuation focus within the dome of the right lobe which may represent a small cyst or hemangioma but too small to fully characterize. This is stable since prior. No suspicious liver lesions are seen. Liver measures " approximately 16 cm in craniocaudal dimension.   BILE DUCTS: Biliary system is nondilated.   GALLBLADDER: Varicosities of gallbladder wall are suspected. No evidence for radiodense gallbladder calculi or pericholecystic free fluid/fat stranding is seen.   PANCREAS: No focal lesions. No peripancreatic fat stranding.   SPLEEN: The spleen is normal in size. No focal abnormalities are seen.   ADRENAL GLANDS: Mild prominence of the left adrenal gland but no focal masses are seen. The right adrenal gland within normal limits.   KIDNEYS AND URETERS: No hydronephrosis or renal masses. No perinephric stranding. No radiodense renal calculi.   PELVIS:   BLADDER: The urinary bladder is decompressed therefore evaluation is limited. No evidence for urinary bladder calculi.   REPRODUCTIVE ORGANS: No free fluid or masses in the pelvis.   BOWEL: The small and large bowel are nondilated. Normal caliber fluid-filled loops of small bowel within the central abdomen. Air-fluid levels within the transverse colon. Findings nonspecific in etiology and may represent an ileus type pattern such as related to enteritis. The appendix is not positively identified, however, no findings to suggest acute appendicitis is seen.  Scattered diffuse colonic diverticula but no CT evidence for acute diverticulitis.   VESSELS: Diffuse wall calcification of the aorta. No aneurysm or dissection.   PERITONEUM/RETROPERITONEUM/LYMPH NODES: No retroperitoneal masses are seen.  No lymphadenopathy.   BONES AND ABDOMINAL WALL: There is no evidence for destructive lytic or blastic bone lesions identified.  Tiny fat containing umbilical hernia similar to prior.       1.  Normal caliber fluid-filled loops of small bowel and colon as above with few air-fluid levels suggestive of an ileus type pattern such as related to enteritis. Recommend clinical correlation and follow-up to document resolution. No evidence for bowel obstruction or free air. 2. Colonic diverticulosis  diffusely but no CT evidence for acute diverticulitis. 3. Diffuse atherosclerotic disease of the aorta and its main branches particularly the origin of celiac trunk.. No CT evidence for aortic aneurysm or dissection. 4. Additional detailed nonacute findings as above.     Signed by: Sarah Hernandez 1/2/2024 8:42 PM Dictation workstation:   [id number]    XR chest 1 view    Result Date: 1/2/2024  Interpreted By:  Barbara Sue, STUDY: XR CHEST 1 VIEW;  1/2/2024 3:55 pm   INDICATION: Signs/Symptoms:fever cough.   COMPARISON: 7/28/2023   ACCESSION NUMBER(S): [id number]   ORDERING CLINICIAN: THONG CASTILLO   TECHNIQUE: Portable AP upright   FINDINGS: The cardiac silhouette is normal in size. Aorta is atherosclerotic. Lungs are clear. No pleural effusions are noted. Osseous structures appear grossly intact.       No acute radiographic abnormality   MACRO: None.   Signed by: Barbara Sue 1/2/2024 4:39 PM Dictation workstation:   [id number]     Results for orders placed or performed during the hospital encounter of 01/02/24 (from the past 24 hour(s))   CBC and Auto Differential   Result Value Ref Range    WBC 12.4 (H) 4.4 - 11.3 x10*3/uL    nRBC 0.0 0.0 - 0.0 /100 WBCs    RBC 4.50 4.50 - 5.90 x10*6/uL    Hemoglobin 13.9 13.5 - 17.5 g/dL    Hematocrit 41.9 41.0 - 52.0 %    MCV 93 80 - 100 fL    MCH 30.9 26.0 - 34.0 pg    MCHC 33.2 32.0 - 36.0 g/dL    RDW 14.1 11.5 - 14.5 %    Platelets 386 150 - 450 x10*3/uL    Neutrophils % 70.3 40.0 - 80.0 %    Immature Granulocytes %, Automated 0.2 0.0 - 0.9 %    Lymphocytes % 15.5 13.0 - 44.0 %    Monocytes % 12.4 2.0 - 10.0 %    Eosinophils % 1.2 0.0 - 6.0 %    Basophils % 0.4 0.0 - 2.0 %    Neutrophils Absolute 8.68 (H) 1.20 - 7.70 x10*3/uL    Immature Granulocytes Absolute, Automated 0.03 0.00 - 0.70 x10*3/uL    Lymphocytes Absolute 1.92 1.20 - 4.80 x10*3/uL    Monocytes Absolute 1.54 (H) 0.10 - 1.00 x10*3/uL    Eosinophils Absolute 0.15 0.00 - 0.70 x10*3/uL    Basophils  Absolute 0.05 0.00 - 0.10 x10*3/uL   Basic metabolic panel   Result Value Ref Range    Glucose 81 74 - 99 mg/dL    Sodium 136 136 - 145 mmol/L    Potassium 3.9 3.5 - 5.3 mmol/L    Chloride 102 98 - 107 mmol/L    Bicarbonate 24 21 - 32 mmol/L    Anion Gap 14 10 - 20 mmol/L    Urea Nitrogen 11 6 - 23 mg/dL    Creatinine 0.92 0.50 - 1.30 mg/dL    eGFR >90 >60 mL/min/1.73m*2    Calcium 9.4 8.6 - 10.3 mg/dL   Hepatic function panel   Result Value Ref Range    Albumin 4.3 3.4 - 5.0 g/dL    Bilirubin, Total 1.0 0.0 - 1.2 mg/dL    Bilirubin, Direct 0.2 0.0 - 0.3 mg/dL    Alkaline Phosphatase 102 33 - 136 U/L    ALT 14 10 - 52 U/L    AST 16 9 - 39 U/L    Total Protein 7.6 6.4 - 8.2 g/dL   Lipase   Result Value Ref Range    Lipase 4 (L) 9 - 82 U/L   Blood Gas Venous Full Panel   Result Value Ref Range    POCT pH, Venous 7.41 7.33 - 7.43 pH    POCT pCO2, Venous 40 (L) 41 - 51 mm Hg    POCT pO2, Venous 48 (H) 35 - 45 mm Hg    POCT SO2, Venous 74 45 - 75 %    POCT Oxy Hemoglobin, Venous 70.9 45.0 - 75.0 %    POCT Hematocrit Calculated, Venous 43.0 41.0 - 52.0 %    POCT Sodium, Venous 135 (L) 136 - 145 mmol/L    POCT Potassium, Venous 4.1 3.5 - 5.3 mmol/L    POCT Chloride, Venous 102 98 - 107 mmol/L    POCT Ionized Calicum, Venous 1.21 1.10 - 1.33 mmol/L    POCT Glucose, Venous 91 74 - 99 mg/dL    POCT Lactate, Venous 1.1 0.4 - 2.0 mmol/L    POCT Base Excess, Venous 0.7 -2.0 - 3.0 mmol/L    POCT HCO3 Calculated, Venous 25.4 22.0 - 26.0 mmol/L    POCT Hemoglobin, Venous 14.4 13.5 - 17.5 g/dL    POCT Anion Gap, Venous 12.0 10.0 - 25.0 mmol/L    Patient Temperature 37.0 degrees Celsius    FiO2 21 %   Sars-CoV-2 PCR, Symptomatic   Result Value Ref Range    Coronavirus 2019, PCR Not Detected Not Detected   Influenza A, and B PCR   Result Value Ref Range    Flu A Result Not Detected Not Detected    Flu B Result Not Detected Not Detected   RSV PCR   Result Value Ref Range    RSV PCR Not Detected Not Detected   Urinalysis with Reflex  Culture and Microscopic   Result Value Ref Range    Color, Urine Yellow Straw, Yellow    Appearance, Urine Clear Clear    Specific Gravity, Urine >1.060 (A) 1.005 - 1.035    pH, Urine 5.0 5.0, 5.5, 6.0, 6.5, 7.0, 7.5, 8.0    Protein, Urine NEGATIVE NEGATIVE mg/dL    Glucose, Urine NEGATIVE NEGATIVE mg/dL    Blood, Urine SMALL (1+) (A) NEGATIVE    Ketones, Urine 80 (2+) (A) NEGATIVE mg/dL    Bilirubin, Urine NEGATIVE NEGATIVE    Urobilinogen, Urine <2.0 <2.0 mg/dL    Nitrite, Urine NEGATIVE NEGATIVE    Leukocyte Esterase, Urine NEGATIVE NEGATIVE   Drug Screen, Urine   Result Value Ref Range    Amphetamine Screen, Urine Presumptive Negative Presumptive Negative    Barbiturate Screen, Urine Presumptive Negative Presumptive Negative    Benzodiazepines Screen, Urine Presumptive Negative Presumptive Negative    Cannabinoid Screen, Urine Presumptive Positive (A) Presumptive Negative    Cocaine Metabolite Screen, Urine Presumptive Negative Presumptive Negative    Fentanyl Screen, Urine Presumptive Negative Presumptive Negative    Opiate Screen, Urine Presumptive Negative Presumptive Negative    Oxycodone Screen, Urine Presumptive Negative Presumptive Negative    PCP Screen, Urine Presumptive Negative Presumptive Negative   Urinalysis Microscopic   Result Value Ref Range    WBC, Urine NONE 1-5, NONE /HPF    RBC, Urine 1-2 NONE, 1-2, 3-5 /HPF    Mucus, Urine 1+ Reference range not established. /LPF   CBC   Result Value Ref Range    WBC 13.1 (H) 4.4 - 11.3 x10*3/uL    nRBC 0.0 0.0 - 0.0 /100 WBCs    RBC 4.70 4.50 - 5.90 x10*6/uL    Hemoglobin 14.6 13.5 - 17.5 g/dL    Hematocrit 44.9 41.0 - 52.0 %    MCV 96 80 - 100 fL    MCH 31.1 26.0 - 34.0 pg    MCHC 32.5 32.0 - 36.0 g/dL    RDW 14.1 11.5 - 14.5 %    Platelets 227 150 - 450 x10*3/uL   Basic metabolic panel   Result Value Ref Range    Glucose 94 74 - 99 mg/dL    Sodium 135 (L) 136 - 145 mmol/L    Potassium 5.0 3.5 - 5.3 mmol/L    Chloride 104 98 - 107 mmol/L    Bicarbonate 20  (L) 21 - 32 mmol/L    Anion Gap 16 10 - 20 mmol/L    Urea Nitrogen 13 6 - 23 mg/dL    Creatinine 0.69 0.50 - 1.30 mg/dL    eGFR >90 >60 mL/min/1.73m*2    Calcium 8.6 8.6 - 10.3 mg/dL   Magnesium   Result Value Ref Range    Magnesium 2.00 1.60 - 2.40 mg/dL              Assessment/Plan   64 yo M with h/o HTN, h pylori gastritis, gout, chronic pain, emphysema, diverticulosis, current tobacco use, presented with 48 hour h/o abdominal pain, nausea, vomiting, diarrhea, CT evidence of enteritis/ileus. Suspect acute viral gastroenteritis, gastritis, less likely IBD    - supportive care with antiemetics and analgesia as needed  - IV hydration and electrolyte management per primary  - stool studies ordered to r/o c diff and other pathogens  - advance diet as tolerated      I spent 35 minutes in the professional and overall care of this patient.      Shaneka Peck, APRN-CNP

## 2024-01-03 NOTE — PROGRESS NOTES
01/03/24 0805   [facility] Disability Status   Are you deaf or do you have serious difficulty hearing? N   Are you blind or do you have serious difficulty seeing, even when wearing glasses? N   Because of a physical, mental, or emotional condition, do you have serious difficulty concentrating, remembering, or making decisions? (5 years old or older) N   Do you have serious difficulty walking or climbing stairs? N   Do you have serious difficulty dressing or bathing? N   Because of a physical, mental, or emotional condition, do you have serious difficulty doing errands alone such as visiting the doctor? N

## 2024-01-03 NOTE — H&P
History Of Present Illness  Matthieu Solis is a 63 y.o. male PMHx HTN, H. Pylori s/p treatment, tobacco abuse, anxiety presenting with nausea/vomiting, abdominal pain. Patient reports symptoms started ~ 72 hours ago. He is in discomfort which limites history. He reports myalgias and cough, following by generalized abdominal pain. Multiple episodes emesis, most recent around 9 am after taking his morning meds. Was unable to tolerate PO at home d/t symptoms. Reports diarrhea which has not been black or frankly bloody. Is passing gas. Denies dysphagia, odynophagia. Denies reflux, heartburn, increased eructation. Denies fever or chills. Denies chest pain sob. Denies recent travel or antibiotic use. Reports he has had similar episodes similar to this previously. Denies ETOH use, does smoke cigarettes.    ED workup: CT a/p with normal caliber fluid-filled loops of small bowel and colon as above with few air-fluid levels suggestive of an ileus type pattern such as related to enteritis. Recommend clinical correlation and follow-up to document resolution. No evidence for bowel obstruction or free air; CXR no acute cardiopulmonary abnormality; WBC with leukocytosis 13.1, Hgb 14.6, no thrombocytopenia; CMP sable liver/kidney function, Na 135, Bicarb 20; lipase 4; Flu/Covid/RSV noted; UA spec grav > 1.6, negative markers infection; UDS + cannabis    Colonoscopy 2021 diverticula and multiple ployps     Past Medical History  Past Medical History:   Diagnosis Date    Cataract     Dorsalgia, unspecified 11/12/2015    Acute back pain    Pain in leg, unspecified 11/12/2015    Leg pain       Surgical History  Past Surgical History:   Procedure Laterality Date    OTHER SURGICAL HISTORY  11/12/2015    Incisional Hernia Repair        Social History  He reports that he has been smoking cigarettes. He has been smoking an average of 1 pack per day. He has never used smokeless tobacco. He reports that he does not drink alcohol and does not  use drugs.    Family History  Family History   Problem Relation Name Age of Onset    Other (CVA) Mother      Hypertension Mother      Other (CVA) Father      Hypertension Father          Allergies  Cinnamon and Penicillins    Review of Systems   Constitutional:  Positive for appetite change and fatigue. Negative for chills and fever.   HENT:  Negative for congestion and trouble swallowing.    Eyes:  Negative for visual disturbance.   Respiratory:  Positive for cough. Negative for shortness of breath and wheezing.    Cardiovascular:  Negative for chest pain and palpitations.   Gastrointestinal:  Positive for abdominal pain, diarrhea, nausea and vomiting. Negative for anal bleeding, blood in stool and constipation.   Genitourinary:  Negative for dysuria.   Neurological:  Negative for dizziness.        Physical Exam  Constitutional:       General: He is not in acute distress.     Appearance: He is not toxic-appearing.   HENT:      Head: Normocephalic and atraumatic.      Right Ear: External ear normal.      Left Ear: External ear normal.      Nose: Nose normal. No congestion.      Mouth/Throat:      Mouth: Mucous membranes are moist.      Pharynx: Oropharynx is clear.   Eyes:      General:         Right eye: No discharge.         Left eye: No discharge.      Conjunctiva/sclera: Conjunctivae normal.      Pupils: Pupils are equal, round, and reactive to light.   Cardiovascular:      Rate and Rhythm: Normal rate and regular rhythm.      Heart sounds: No murmur heard.     No gallop.   Pulmonary:      Effort: Pulmonary effort is normal.      Breath sounds: No wheezing or rales.   Abdominal:      General: Abdomen is flat. Bowel sounds are normal.      Palpations: Abdomen is soft.      Tenderness: There is abdominal tenderness (TTP over abdomen generally without localization, guarding or rigidity). There is no guarding or rebound.   Musculoskeletal:         General: No swelling or tenderness. Normal range of motion.       "Right lower leg: No edema.      Left lower leg: No edema.   Skin:     General: Skin is warm and dry.      Findings: No erythema or rash.   Neurological:      General: No focal deficit present.      Mental Status: He is alert.      Cranial Nerves: No cranial nerve deficit.      Motor: No weakness.   Psychiatric:         Mood and Affect: Mood normal.         Thought Content: Thought content normal.          Last Recorded Vitals  Blood pressure (!) 151/92, pulse 75, temperature 36.2 °C (97.2 °F), temperature source Temporal, resp. rate 18, height 1.651 m (5' 5\"), weight 68 kg (150 lb), SpO2 99 %.    Relevant Results      Scheduled medications  amLODIPine, 5 mg, oral, Daily  ciprofloxacin, 400 mg, intravenous, q12h  dicyclomine, 10 mg, oral, TID  enoxaparin, 40 mg, subcutaneous, q24h  famotidine, 20 mg, intravenous, q12h MIKEY  melatonin, 3 mg, oral, Daily  metroNIDAZOLE, 500 mg, intravenous, q8h  pantoprazole, 40 mg, oral, BID AC   Or  pantoprazole, 40 mg, intravenous, BID AC  polyethylene glycol, 17 g, oral, Daily      Continuous medications     PRN medications  PRN medications: acetaminophen **OR** acetaminophen **OR** acetaminophen, albuterol, alum-mag hydroxide-simeth, dextrose 10 % in water (D10W), dextrose, docusate sodium, glucagon, HYDROmorphone, [Held by provider] ketorolac, ondansetron ODT **OR** ondansetron, prochlorperazine **OR** prochlorperazine **OR** prochlorperazine  Results for orders placed or performed during the hospital encounter of 01/02/24 (from the past 24 hour(s))   CBC and Auto Differential   Result Value Ref Range    WBC 12.4 (H) 4.4 - 11.3 x10*3/uL    nRBC 0.0 0.0 - 0.0 /100 WBCs    RBC 4.50 4.50 - 5.90 x10*6/uL    Hemoglobin 13.9 13.5 - 17.5 g/dL    Hematocrit 41.9 41.0 - 52.0 %    MCV 93 80 - 100 fL    MCH 30.9 26.0 - 34.0 pg    MCHC 33.2 32.0 - 36.0 g/dL    RDW 14.1 11.5 - 14.5 %    Platelets 386 150 - 450 x10*3/uL    Neutrophils % 70.3 40.0 - 80.0 %    Immature Granulocytes %, Automated " 0.2 0.0 - 0.9 %    Lymphocytes % 15.5 13.0 - 44.0 %    Monocytes % 12.4 2.0 - 10.0 %    Eosinophils % 1.2 0.0 - 6.0 %    Basophils % 0.4 0.0 - 2.0 %    Neutrophils Absolute 8.68 (H) 1.20 - 7.70 x10*3/uL    Immature Granulocytes Absolute, Automated 0.03 0.00 - 0.70 x10*3/uL    Lymphocytes Absolute 1.92 1.20 - 4.80 x10*3/uL    Monocytes Absolute 1.54 (H) 0.10 - 1.00 x10*3/uL    Eosinophils Absolute 0.15 0.00 - 0.70 x10*3/uL    Basophils Absolute 0.05 0.00 - 0.10 x10*3/uL   Basic metabolic panel   Result Value Ref Range    Glucose 81 74 - 99 mg/dL    Sodium 136 136 - 145 mmol/L    Potassium 3.9 3.5 - 5.3 mmol/L    Chloride 102 98 - 107 mmol/L    Bicarbonate 24 21 - 32 mmol/L    Anion Gap 14 10 - 20 mmol/L    Urea Nitrogen 11 6 - 23 mg/dL    Creatinine 0.92 0.50 - 1.30 mg/dL    eGFR >90 >60 mL/min/1.73m*2    Calcium 9.4 8.6 - 10.3 mg/dL   Hepatic function panel   Result Value Ref Range    Albumin 4.3 3.4 - 5.0 g/dL    Bilirubin, Total 1.0 0.0 - 1.2 mg/dL    Bilirubin, Direct 0.2 0.0 - 0.3 mg/dL    Alkaline Phosphatase 102 33 - 136 U/L    ALT 14 10 - 52 U/L    AST 16 9 - 39 U/L    Total Protein 7.6 6.4 - 8.2 g/dL   Lipase   Result Value Ref Range    Lipase 4 (L) 9 - 82 U/L   Blood Gas Venous Full Panel   Result Value Ref Range    POCT pH, Venous 7.41 7.33 - 7.43 pH    POCT pCO2, Venous 40 (L) 41 - 51 mm Hg    POCT pO2, Venous 48 (H) 35 - 45 mm Hg    POCT SO2, Venous 74 45 - 75 %    POCT Oxy Hemoglobin, Venous 70.9 45.0 - 75.0 %    POCT Hematocrit Calculated, Venous 43.0 41.0 - 52.0 %    POCT Sodium, Venous 135 (L) 136 - 145 mmol/L    POCT Potassium, Venous 4.1 3.5 - 5.3 mmol/L    POCT Chloride, Venous 102 98 - 107 mmol/L    POCT Ionized Calicum, Venous 1.21 1.10 - 1.33 mmol/L    POCT Glucose, Venous 91 74 - 99 mg/dL    POCT Lactate, Venous 1.1 0.4 - 2.0 mmol/L    POCT Base Excess, Venous 0.7 -2.0 - 3.0 mmol/L    POCT HCO3 Calculated, Venous 25.4 22.0 - 26.0 mmol/L    POCT Hemoglobin, Venous 14.4 13.5 - 17.5 g/dL    POCT  Anion Gap, Venous 12.0 10.0 - 25.0 mmol/L    Patient Temperature 37.0 degrees Celsius    FiO2 21 %   Sars-CoV-2 PCR, Symptomatic   Result Value Ref Range    Coronavirus 2019, PCR Not Detected Not Detected   Influenza A, and B PCR   Result Value Ref Range    Flu A Result Not Detected Not Detected    Flu B Result Not Detected Not Detected   RSV PCR   Result Value Ref Range    RSV PCR Not Detected Not Detected   Urinalysis with Reflex Culture and Microscopic   Result Value Ref Range    Color, Urine Yellow Straw, Yellow    Appearance, Urine Clear Clear    Specific Gravity, Urine >1.060 (A) 1.005 - 1.035    pH, Urine 5.0 5.0, 5.5, 6.0, 6.5, 7.0, 7.5, 8.0    Protein, Urine NEGATIVE NEGATIVE mg/dL    Glucose, Urine NEGATIVE NEGATIVE mg/dL    Blood, Urine SMALL (1+) (A) NEGATIVE    Ketones, Urine 80 (2+) (A) NEGATIVE mg/dL    Bilirubin, Urine NEGATIVE NEGATIVE    Urobilinogen, Urine <2.0 <2.0 mg/dL    Nitrite, Urine NEGATIVE NEGATIVE    Leukocyte Esterase, Urine NEGATIVE NEGATIVE   Drug Screen, Urine   Result Value Ref Range    Amphetamine Screen, Urine Presumptive Negative Presumptive Negative    Barbiturate Screen, Urine Presumptive Negative Presumptive Negative    Benzodiazepines Screen, Urine Presumptive Negative Presumptive Negative    Cannabinoid Screen, Urine Presumptive Positive (A) Presumptive Negative    Cocaine Metabolite Screen, Urine Presumptive Negative Presumptive Negative    Fentanyl Screen, Urine Presumptive Negative Presumptive Negative    Opiate Screen, Urine Presumptive Negative Presumptive Negative    Oxycodone Screen, Urine Presumptive Negative Presumptive Negative    PCP Screen, Urine Presumptive Negative Presumptive Negative   Urinalysis Microscopic   Result Value Ref Range    WBC, Urine NONE 1-5, NONE /HPF    RBC, Urine 1-2 NONE, 1-2, 3-5 /HPF    Mucus, Urine 1+ Reference range not established. /LPF   CBC   Result Value Ref Range    WBC 13.1 (H) 4.4 - 11.3 x10*3/uL    nRBC 0.0 0.0 - 0.0 /100 WBCs     RBC 4.70 4.50 - 5.90 x10*6/uL    Hemoglobin 14.6 13.5 - 17.5 g/dL    Hematocrit 44.9 41.0 - 52.0 %    MCV 96 80 - 100 fL    MCH 31.1 26.0 - 34.0 pg    MCHC 32.5 32.0 - 36.0 g/dL    RDW 14.1 11.5 - 14.5 %    Platelets 227 150 - 450 x10*3/uL   Basic metabolic panel   Result Value Ref Range    Glucose 94 74 - 99 mg/dL    Sodium 135 (L) 136 - 145 mmol/L    Potassium 5.0 3.5 - 5.3 mmol/L    Chloride 104 98 - 107 mmol/L    Bicarbonate 20 (L) 21 - 32 mmol/L    Anion Gap 16 10 - 20 mmol/L    Urea Nitrogen 13 6 - 23 mg/dL    Creatinine 0.69 0.50 - 1.30 mg/dL    eGFR >90 >60 mL/min/1.73m*2    Calcium 8.6 8.6 - 10.3 mg/dL   Magnesium   Result Value Ref Range    Magnesium 2.00 1.60 - 2.40 mg/dL     Results for orders placed or performed during the hospital encounter of 01/02/24 (from the past 24 hour(s))   CBC and Auto Differential   Result Value Ref Range    WBC 12.4 (H) 4.4 - 11.3 x10*3/uL    nRBC 0.0 0.0 - 0.0 /100 WBCs    RBC 4.50 4.50 - 5.90 x10*6/uL    Hemoglobin 13.9 13.5 - 17.5 g/dL    Hematocrit 41.9 41.0 - 52.0 %    MCV 93 80 - 100 fL    MCH 30.9 26.0 - 34.0 pg    MCHC 33.2 32.0 - 36.0 g/dL    RDW 14.1 11.5 - 14.5 %    Platelets 386 150 - 450 x10*3/uL    Neutrophils % 70.3 40.0 - 80.0 %    Immature Granulocytes %, Automated 0.2 0.0 - 0.9 %    Lymphocytes % 15.5 13.0 - 44.0 %    Monocytes % 12.4 2.0 - 10.0 %    Eosinophils % 1.2 0.0 - 6.0 %    Basophils % 0.4 0.0 - 2.0 %    Neutrophils Absolute 8.68 (H) 1.20 - 7.70 x10*3/uL    Immature Granulocytes Absolute, Automated 0.03 0.00 - 0.70 x10*3/uL    Lymphocytes Absolute 1.92 1.20 - 4.80 x10*3/uL    Monocytes Absolute 1.54 (H) 0.10 - 1.00 x10*3/uL    Eosinophils Absolute 0.15 0.00 - 0.70 x10*3/uL    Basophils Absolute 0.05 0.00 - 0.10 x10*3/uL   Basic metabolic panel   Result Value Ref Range    Glucose 81 74 - 99 mg/dL    Sodium 136 136 - 145 mmol/L    Potassium 3.9 3.5 - 5.3 mmol/L    Chloride 102 98 - 107 mmol/L    Bicarbonate 24 21 - 32 mmol/L    Anion Gap 14 10 - 20  mmol/L    Urea Nitrogen 11 6 - 23 mg/dL    Creatinine 0.92 0.50 - 1.30 mg/dL    eGFR >90 >60 mL/min/1.73m*2    Calcium 9.4 8.6 - 10.3 mg/dL   Hepatic function panel   Result Value Ref Range    Albumin 4.3 3.4 - 5.0 g/dL    Bilirubin, Total 1.0 0.0 - 1.2 mg/dL    Bilirubin, Direct 0.2 0.0 - 0.3 mg/dL    Alkaline Phosphatase 102 33 - 136 U/L    ALT 14 10 - 52 U/L    AST 16 9 - 39 U/L    Total Protein 7.6 6.4 - 8.2 g/dL   Lipase   Result Value Ref Range    Lipase 4 (L) 9 - 82 U/L   Blood Gas Venous Full Panel   Result Value Ref Range    POCT pH, Venous 7.41 7.33 - 7.43 pH    POCT pCO2, Venous 40 (L) 41 - 51 mm Hg    POCT pO2, Venous 48 (H) 35 - 45 mm Hg    POCT SO2, Venous 74 45 - 75 %    POCT Oxy Hemoglobin, Venous 70.9 45.0 - 75.0 %    POCT Hematocrit Calculated, Venous 43.0 41.0 - 52.0 %    POCT Sodium, Venous 135 (L) 136 - 145 mmol/L    POCT Potassium, Venous 4.1 3.5 - 5.3 mmol/L    POCT Chloride, Venous 102 98 - 107 mmol/L    POCT Ionized Calicum, Venous 1.21 1.10 - 1.33 mmol/L    POCT Glucose, Venous 91 74 - 99 mg/dL    POCT Lactate, Venous 1.1 0.4 - 2.0 mmol/L    POCT Base Excess, Venous 0.7 -2.0 - 3.0 mmol/L    POCT HCO3 Calculated, Venous 25.4 22.0 - 26.0 mmol/L    POCT Hemoglobin, Venous 14.4 13.5 - 17.5 g/dL    POCT Anion Gap, Venous 12.0 10.0 - 25.0 mmol/L    Patient Temperature 37.0 degrees Celsius    FiO2 21 %   Sars-CoV-2 PCR, Symptomatic   Result Value Ref Range    Coronavirus 2019, PCR Not Detected Not Detected   Influenza A, and B PCR   Result Value Ref Range    Flu A Result Not Detected Not Detected    Flu B Result Not Detected Not Detected   RSV PCR   Result Value Ref Range    RSV PCR Not Detected Not Detected   Urinalysis with Reflex Culture and Microscopic   Result Value Ref Range    Color, Urine Yellow Straw, Yellow    Appearance, Urine Clear Clear    Specific Gravity, Urine >1.060 (A) 1.005 - 1.035    pH, Urine 5.0 5.0, 5.5, 6.0, 6.5, 7.0, 7.5, 8.0    Protein, Urine NEGATIVE NEGATIVE mg/dL     Glucose, Urine NEGATIVE NEGATIVE mg/dL    Blood, Urine SMALL (1+) (A) NEGATIVE    Ketones, Urine 80 (2+) (A) NEGATIVE mg/dL    Bilirubin, Urine NEGATIVE NEGATIVE    Urobilinogen, Urine <2.0 <2.0 mg/dL    Nitrite, Urine NEGATIVE NEGATIVE    Leukocyte Esterase, Urine NEGATIVE NEGATIVE   Drug Screen, Urine   Result Value Ref Range    Amphetamine Screen, Urine Presumptive Negative Presumptive Negative    Barbiturate Screen, Urine Presumptive Negative Presumptive Negative    Benzodiazepines Screen, Urine Presumptive Negative Presumptive Negative    Cannabinoid Screen, Urine Presumptive Positive (A) Presumptive Negative    Cocaine Metabolite Screen, Urine Presumptive Negative Presumptive Negative    Fentanyl Screen, Urine Presumptive Negative Presumptive Negative    Opiate Screen, Urine Presumptive Negative Presumptive Negative    Oxycodone Screen, Urine Presumptive Negative Presumptive Negative    PCP Screen, Urine Presumptive Negative Presumptive Negative   Urinalysis Microscopic   Result Value Ref Range    WBC, Urine NONE 1-5, NONE /HPF    RBC, Urine 1-2 NONE, 1-2, 3-5 /HPF    Mucus, Urine 1+ Reference range not established. /LPF   CBC   Result Value Ref Range    WBC 13.1 (H) 4.4 - 11.3 x10*3/uL    nRBC 0.0 0.0 - 0.0 /100 WBCs    RBC 4.70 4.50 - 5.90 x10*6/uL    Hemoglobin 14.6 13.5 - 17.5 g/dL    Hematocrit 44.9 41.0 - 52.0 %    MCV 96 80 - 100 fL    MCH 31.1 26.0 - 34.0 pg    MCHC 32.5 32.0 - 36.0 g/dL    RDW 14.1 11.5 - 14.5 %    Platelets 227 150 - 450 x10*3/uL   Basic metabolic panel   Result Value Ref Range    Glucose 94 74 - 99 mg/dL    Sodium 135 (L) 136 - 145 mmol/L    Potassium 5.0 3.5 - 5.3 mmol/L    Chloride 104 98 - 107 mmol/L    Bicarbonate 20 (L) 21 - 32 mmol/L    Anion Gap 16 10 - 20 mmol/L    Urea Nitrogen 13 6 - 23 mg/dL    Creatinine 0.69 0.50 - 1.30 mg/dL    eGFR >90 >60 mL/min/1.73m*2    Calcium 8.6 8.6 - 10.3 mg/dL   Magnesium   Result Value Ref Range    Magnesium 2.00 1.60 - 2.40 mg/dL     CT  abdomen pelvis w IV contrast    Result Date: 1/2/2024  Interpreted By:  Sarah Hernandez, STUDY: CT ABDOMEN PELVIS W IV CONTRAST;  1/2/2024 7:53 pm   INDICATION: Signs/Symptoms:abd pain vomiting.   COMPARISON: 03/13/2023   ACCESSION NUMBER(S): [id number]   ORDERING CLINICIAN: THONG CASTILLO   TECHNIQUE: CT of the abdomen and pelvis was performed.  Standard contiguous axial images were obtained at 3 mm slice thickness through the abdomen and pelvis. Coronal and sagittal reconstructions at 3 mm slice thickness were performed.   90   ml of contrast Omnipaque 350 were administered intravenously without immediate complication.   FINDINGS: LOWER CHEST: The visualized bases are clear bilaterally.     ABDOMEN:   LIVER: Subcentimeter small low-attenuation focus within the dome of the right lobe which may represent a small cyst or hemangioma but too small to fully characterize. This is stable since prior. No suspicious liver lesions are seen. Liver measures approximately 16 cm in craniocaudal dimension.   BILE DUCTS: Biliary system is nondilated.   GALLBLADDER: Varicosities of gallbladder wall are suspected. No evidence for radiodense gallbladder calculi or pericholecystic free fluid/fat stranding is seen.   PANCREAS: No focal lesions. No peripancreatic fat stranding.   SPLEEN: The spleen is normal in size. No focal abnormalities are seen.   ADRENAL GLANDS: Mild prominence of the left adrenal gland but no focal masses are seen. The right adrenal gland within normal limits.   KIDNEYS AND URETERS: No hydronephrosis or renal masses. No perinephric stranding. No radiodense renal calculi.   PELVIS:   BLADDER: The urinary bladder is decompressed therefore evaluation is limited. No evidence for urinary bladder calculi.   REPRODUCTIVE ORGANS: No free fluid or masses in the pelvis.   BOWEL: The small and large bowel are nondilated. Normal caliber fluid-filled loops of small bowel within the central abdomen. Air-fluid levels within the  transverse colon. Findings nonspecific in etiology and may represent an ileus type pattern such as related to enteritis. The appendix is not positively identified, however, no findings to suggest acute appendicitis is seen.  Scattered diffuse colonic diverticula but no CT evidence for acute diverticulitis.   VESSELS: Diffuse wall calcification of the aorta. No aneurysm or dissection.   PERITONEUM/RETROPERITONEUM/LYMPH NODES: No retroperitoneal masses are seen.  No lymphadenopathy.   BONES AND ABDOMINAL WALL: There is no evidence for destructive lytic or blastic bone lesions identified.  Tiny fat containing umbilical hernia similar to prior.       1.  Normal caliber fluid-filled loops of small bowel and colon as above with few air-fluid levels suggestive of an ileus type pattern such as related to enteritis. Recommend clinical correlation and follow-up to document resolution. No evidence for bowel obstruction or free air. 2. Colonic diverticulosis diffusely but no CT evidence for acute diverticulitis. 3. Diffuse atherosclerotic disease of the aorta and its main branches particularly the origin of celiac trunk.. No CT evidence for aortic aneurysm or dissection. 4. Additional detailed nonacute findings as above.     Signed by: Sarah Hernandez 1/2/2024 8:42 PM Dictation workstation:   [id number]    XR chest 1 view    Result Date: 1/2/2024  Interpreted By:  Barbara Sue, STUDY: XR CHEST 1 VIEW;  1/2/2024 3:55 pm   INDICATION: Signs/Symptoms:fever cough.   COMPARISON: 7/28/2023   ACCESSION NUMBER(S): [id number]   ORDERING CLINICIAN: THONG CASTILLO   TECHNIQUE: Portable AP upright   FINDINGS: The cardiac silhouette is normal in size. Aorta is atherosclerotic. Lungs are clear. No pleural effusions are noted. Osseous structures appear grossly intact.       No acute radiographic abnormality   MACRO: None.   Signed by: Barbara Sue 1/2/2024 4:39 PM Dictation workstation:   [id number]          Assessment/Plan    Principal Problem:    Nausea and vomiting  Matthieu Solis is a 63 y.o. male PMHx HTN, H. Pylori s/p treatment, tobacco abuse, anxiety presenting with nausea/vomiting, abdominal pain.  Matthieu Solis is a 63 y.o. male PMHx HTN, H. Pylori s/p treatment, tobacco abuse, anxiety presenting with nausea/vomiting, abdominal pain. Patient reports symptoms started ~ 72 hours ago. He is in discomfort which limites history. He reports myalgias and cough, following by generalized abdominal pain. Multiple episodes emesis, most recent around 9 am after taking his morning meds. Was unable to tolerate PO at home d/t symptoms. Reports diarrhea which has not been black or frankly bloody. Is passing gas. Denies fever or chills. Denies chest pain sob. Denies recent travel or antibiotic use. Reports he has had similar episodes similar to this previously.    ED workup: CT a/p with normal caliber fluid-filled loops of small bowel and colon as above with few air-fluid levels suggestive of an ileus type pattern such as related to enteritis. Recommend clinical correlation and follow-up to document resolution. No evidence for bowel obstruction or free air; CXR no acute cardiopulmonary abnormality; WBC with leukocytosis 13.1, Hgb 14.6, no thrombocytopenia; CMP sable liver/kidney function, Na 135, Bicarb 20; lipase 4; Flu/Covid/RSV noted; UA spec grav > 1.6, negative markers infection; UDS + cannabis    Colonoscopy 2021 diverticula and multiple ployps    Nausea vomiting   Enteritis, infectious vs inflammatory  + Cannabis use  Leukocytosis   Hx H. Pylori  Daily NSAID use  -Infectious vs inflammatory enteritis vs gastritis iso of daily NSAID use/hx H pylori vs cyclic vomiting iso of + cannabis on drug screen  -start IV abx cipro/flagyl iso of leukocytosis    -patient with PCN allergy  -stool pathogen panel including c diff  -occult stool pending   -IVF  -IV PPI BID, Pepcid BID  -antiemetics as needed  -considering recurrent symptoms, GI  consult pending     HTN  -was on hydrochlorothiazide at some point, which was apparently discontinued  -start amlodipine 5 mg  -consider adding second agent if needed    DVT Ppx:  -lovenox           I spent 60 minutes in the professional and overall care of this patient.      Mook Flores PA-C

## 2024-01-03 NOTE — ED PROVIDER NOTES
HPI   Chief Complaint   Patient presents with   • Abdominal Pain       HPI: []  63year-old  male history of hypertension history tobacco use today comes in with multiple complaints.  He states for the last 48 hours he has had generalized bodyaches, myalgias, intractable vomiting and diarrhea.  As per abdominal pain.  Has a cough nonproductive.  No headache vision changes.  No trauma no falls no hematemesis melena or hematochezia hemoptysis no recent travel hospitalization or antibiotic use.    Past history: Hypertension  Social: Patient is a smoker denies tobacco alcohol occasional marijuana use.      REVIEW OF SYSTEMS:    GENERAL.: No weight loss, fatigue, anorexia, insomnia, positive for fever.  Positive for chills    EYES: No vision loss, double vision, drainage, eye pain.    ENT: No pharyngitis, dry mouth.    CARDIOPULMONARY: No chest pain, palpitations, syncope, near syncope. No shortness of breath, cough, hemoptysis.    GI: Positive for abdominal pain, change in bowel habits, melena, hematemesis, hematochezia, nausea, positive for vomiting, positive for diarrhea.    : No discharge, dysuria, frequency, urgency, hematuria.    MS: No limb pain, joint pain, joint swelling.    SKIN: No rashes.    PSYCH: No depression, anxiety, suicidality, homicidality.    Review of systems is otherwise negative unless stated above or in history of present illness.  Social history, family history, allergies reviewed.  PHYSICAL EXAM:    GENERAL: Vitals noted, severe distress. Alert and oriented  x 3. Non-toxic.  Poor historian for some reason very angry    EENT: TMs clear. Posterior oropharynx unremarkable. No meningismus. No LAD.     NECK: Supple. Nontender. No midline tenderness.     CARDIAC: Regular, rate, rhythm. No murmurs rubs or gallops. No JVD    PULMONARY: Lungs clear bilaterally with good aeration. No wheezes rales or rhonchi. No respiratory distress.  No tachypnea stridor or retractions able to speak in full  sentences    ABDOMEN: Soft, nonsurgical.  Diffuse tenderness and guarding no rebound, no inguinal hernias no CVA tenderness no peritoneal signs. Normoactive bowel sounds. No pulsatile masses.     EXTREMITIES: No peripheral edema. Negative Homans bilaterally, no cords.  2+ bounding possible perfused    SKIN: No rash. Intact.     NEURO: No focal neurologic deficits, NIH score of 0. Cranial nerves normal as tested from II through XII.     MEDICAL DECISION MAKING:  CBC shows microcytosis 2000, chemistry LFTs lactate and lipase is normal UA shows 2+ ketones no infection drug screen positive for cannabis.  Chest x-ray negative.  Influenza COVID RSV negative.  Bowel Shows Enteritis/Ileus Pattern.    Treatment in ED: Arrival IV established, given 2 L of IV fluids intravenous ketorolac Zofran and Reglan    ED course: Repeat assessment still symptomatic still unable to tolerate p.o. keeps vomiting with intractable abdominal pain.    Impression: Acute gastroenteritis    Plan/MDM: 63-year-old male history of hypertension presents with 2 days history of fever chills body aches myalgias intractable abdominal pain vomiting and diarrhea.  Labs are reassuring exam has diffuse guarding but no peritoneal signs low suspicion for appendicitis diverticulitis free air bowel obstruction or any other catastrophic pathology patient unfortunately remains symptomatic in the ED with intractable pain and vomiting will be hospitalized for symptom: Control and further care.                          Rohit Coma Scale Score: 15                  Patient History   Past Medical History:   Diagnosis Date   • Cataract    • Dorsalgia, unspecified 11/12/2015    Acute back pain   • Pain in leg, unspecified 11/12/2015    Leg pain     Past Surgical History:   Procedure Laterality Date   • OTHER SURGICAL HISTORY  11/12/2015    Incisional Hernia Repair     Family History   Problem Relation Name Age of Onset   • Other (CVA) Mother     • Hypertension Mother      • Other (CVA) Father     • Hypertension Father       Social History     Tobacco Use   • Smoking status: Every Day     Packs/day: 1     Types: Cigarettes   • Smokeless tobacco: Never   Vaping Use   • Vaping Use: Never used   Substance Use Topics   • Alcohol use: Never   • Drug use: Never       Physical Exam   ED Triage Vitals   Temp Heart Rate Resp BP   01/02/24 1532 01/02/24 1532 01/02/24 1532 01/02/24 1532   36.2 °C (97.2 °F) 84 18 (!) 197/117      SpO2 Temp Source Heart Rate Source Patient Position   01/02/24 1532 01/02/24 1532 01/02/24 2148 --   98 % Temporal Monitor       BP Location FiO2 (%)     -- --             Physical Exam    ED Course & [facility]   ED Course as of 01/03/24 0205 Wed Jan 03, 2024   0133 Patient CBC shows my leukocytosis, chemistry is unremarkable LFTs and lipase are normal urinalysis shows ketones urine resting positive cannabis.  Abdominal CAT scan shows ileus/enteritis pattern, chest x-ray negative, influenza RSV and COVID-negative.  Patient received IV fluids intravenous ketorolac, Zofran and Reglan remains symptomatic continues to have intractable abdominal pain and emesis patient will be hospitalized for further care. [MT]      ED Course User Index  [MT] Tiburcio Joya MD         Diagnoses as of 01/03/24 0205   Generalized abdominal pain   Enteritis   Intractable vomiting       Medical Decision Making      Procedure  Procedures     Tiburcio Joya MD  01/03/24 0139       Tiburcio Joya MD  01/03/24 0205       Tiburcio Joya MD  01/03/24 0205

## 2024-01-03 NOTE — PROGRESS NOTES
Home with sign other     01/03/24 7349   Current Planned Discharge Disposition   Current Planned Discharge Disposition Home

## 2024-01-04 LAB
ANION GAP SERPL CALC-SCNC: 16 MMOL/L (ref 10–20)
BUN SERPL-MCNC: 13 MG/DL (ref 6–23)
CALCIUM SERPL-MCNC: 9 MG/DL (ref 8.6–10.3)
CHLORIDE SERPL-SCNC: 101 MMOL/L (ref 98–107)
CO2 SERPL-SCNC: 22 MMOL/L (ref 21–32)
CREAT SERPL-MCNC: 0.87 MG/DL (ref 0.5–1.3)
ERYTHROCYTE [DISTWIDTH] IN BLOOD BY AUTOMATED COUNT: 13.8 % (ref 11.5–14.5)
GFR SERPL CREATININE-BSD FRML MDRD: >90 ML/MIN/1.73M*2
GLUCOSE SERPL-MCNC: 73 MG/DL (ref 74–99)
HCT VFR BLD AUTO: 41.1 % (ref 41–52)
HGB BLD-MCNC: 13.6 G/DL (ref 13.5–17.5)
LACTATE SERPL-SCNC: 0.8 MMOL/L (ref 0.4–2)
MCH RBC QN AUTO: 31.1 PG (ref 26–34)
MCHC RBC AUTO-ENTMCNC: 33.1 G/DL (ref 32–36)
MCV RBC AUTO: 94 FL (ref 80–100)
NRBC BLD-RTO: 0 /100 WBCS (ref 0–0)
PLATELET # BLD AUTO: 379 X10*3/UL (ref 150–450)
POTASSIUM SERPL-SCNC: 3.8 MMOL/L (ref 3.5–5.3)
RBC # BLD AUTO: 4.38 X10*6/UL (ref 4.5–5.9)
SODIUM SERPL-SCNC: 135 MMOL/L (ref 136–145)
WBC # BLD AUTO: 13.7 X10*3/UL (ref 4.4–11.3)

## 2024-01-04 PROCEDURE — 83605 ASSAY OF LACTIC ACID: CPT | Performed by: PHYSICIAN ASSISTANT

## 2024-01-04 PROCEDURE — 96376 TX/PRO/DX INJ SAME DRUG ADON: CPT

## 2024-01-04 PROCEDURE — 85027 COMPLETE CBC AUTOMATED: CPT | Performed by: PHYSICIAN ASSISTANT

## 2024-01-04 PROCEDURE — 2500000004 HC RX 250 GENERAL PHARMACY W/ HCPCS (ALT 636 FOR OP/ED): Performed by: PHYSICIAN ASSISTANT

## 2024-01-04 PROCEDURE — 36415 COLL VENOUS BLD VENIPUNCTURE: CPT | Performed by: PHYSICIAN ASSISTANT

## 2024-01-04 PROCEDURE — S4991 NICOTINE PATCH NONLEGEND: HCPCS | Performed by: PHYSICIAN ASSISTANT

## 2024-01-04 PROCEDURE — 2500000001 HC RX 250 WO HCPCS SELF ADMINISTERED DRUGS (ALT 637 FOR MEDICARE OP): Performed by: PHYSICIAN ASSISTANT

## 2024-01-04 PROCEDURE — C9113 INJ PANTOPRAZOLE SODIUM, VIA: HCPCS | Performed by: PHYSICIAN ASSISTANT

## 2024-01-04 PROCEDURE — 99232 SBSQ HOSP IP/OBS MODERATE 35: CPT | Performed by: NURSE PRACTITIONER

## 2024-01-04 PROCEDURE — 87449 NOS EACH ORGANISM AG IA: CPT | Performed by: INTERNAL MEDICINE

## 2024-01-04 PROCEDURE — 87506 IADNA-DNA/RNA PROBE TQ 6-11: CPT | Mod: AHULAB | Performed by: PHYSICIAN ASSISTANT

## 2024-01-04 PROCEDURE — 80048 BASIC METABOLIC PNL TOTAL CA: CPT | Performed by: PHYSICIAN ASSISTANT

## 2024-01-04 PROCEDURE — 87493 C DIFF AMPLIFIED PROBE: CPT | Mod: AHULAB | Performed by: PHYSICIAN ASSISTANT

## 2024-01-04 PROCEDURE — 99233 SBSQ HOSP IP/OBS HIGH 50: CPT | Performed by: PHYSICIAN ASSISTANT

## 2024-01-04 PROCEDURE — G0378 HOSPITAL OBSERVATION PER HR: HCPCS

## 2024-01-04 PROCEDURE — 2500000002 HC RX 250 W HCPCS SELF ADMINISTERED DRUGS (ALT 637 FOR MEDICARE OP, ALT 636 FOR OP/ED): Performed by: PHYSICIAN ASSISTANT

## 2024-01-04 RX ORDER — GUAIFENESIN 600 MG/1
600 TABLET, EXTENDED RELEASE ORAL 2 TIMES DAILY PRN
Status: DISCONTINUED | OUTPATIENT
Start: 2024-01-04 | End: 2024-01-05 | Stop reason: HOSPADM

## 2024-01-04 RX ORDER — BUTALBITAL, ACETAMINOPHEN AND CAFFEINE 50; 325; 40 MG/1; MG/1; MG/1
1 TABLET ORAL EVERY 4 HOURS PRN
Status: DISCONTINUED | OUTPATIENT
Start: 2024-01-04 | End: 2024-01-05 | Stop reason: HOSPADM

## 2024-01-04 RX ORDER — IBUPROFEN 200 MG
1 TABLET ORAL DAILY
Status: DISCONTINUED | OUTPATIENT
Start: 2024-01-04 | End: 2024-01-05 | Stop reason: HOSPADM

## 2024-01-04 RX ADMIN — LOSARTAN POTASSIUM 25 MG: 50 TABLET, FILM COATED ORAL at 14:22

## 2024-01-04 RX ADMIN — FAMOTIDINE 20 MG: 10 INJECTION, SOLUTION INTRAVENOUS at 20:06

## 2024-01-04 RX ADMIN — AMLODIPINE BESYLATE 5 MG: 5 TABLET ORAL at 11:08

## 2024-01-04 RX ADMIN — GUAIFENESIN 600 MG: 600 TABLET, EXTENDED RELEASE ORAL at 22:38

## 2024-01-04 RX ADMIN — CIPROFLOXACIN 400 MG: 400 INJECTION, SOLUTION INTRAVENOUS at 20:06

## 2024-01-04 RX ADMIN — DICYCLOMINE HYDROCHLORIDE 10 MG: 10 CAPSULE ORAL at 14:23

## 2024-01-04 RX ADMIN — ACETAMINOPHEN 650 MG: 325 TABLET ORAL at 09:11

## 2024-01-04 RX ADMIN — METRONIDAZOLE 500 MG: 500 INJECTION, SOLUTION INTRAVENOUS at 09:11

## 2024-01-04 RX ADMIN — PANTOPRAZOLE SODIUM 40 MG: 40 INJECTION, POWDER, FOR SOLUTION INTRAVENOUS at 06:42

## 2024-01-04 RX ADMIN — DICYCLOMINE HYDROCHLORIDE 10 MG: 10 CAPSULE ORAL at 20:06

## 2024-01-04 RX ADMIN — NICOTINE 1 PATCH: 14 PATCH, EXTENDED RELEASE TRANSDERMAL at 14:27

## 2024-01-04 RX ADMIN — PANTOPRAZOLE SODIUM 40 MG: 40 TABLET, DELAYED RELEASE ORAL at 18:21

## 2024-01-04 RX ADMIN — METRONIDAZOLE 500 MG: 500 INJECTION, SOLUTION INTRAVENOUS at 00:26

## 2024-01-04 RX ADMIN — METRONIDAZOLE 500 MG: 500 INJECTION, SOLUTION INTRAVENOUS at 18:18

## 2024-01-04 RX ADMIN — CIPROFLOXACIN 400 MG: 400 INJECTION, SOLUTION INTRAVENOUS at 11:08

## 2024-01-04 RX ADMIN — Medication 3 MG: at 20:06

## 2024-01-04 RX ADMIN — FAMOTIDINE 20 MG: 10 INJECTION, SOLUTION INTRAVENOUS at 14:22

## 2024-01-04 RX ADMIN — HYDROMORPHONE HYDROCHLORIDE 0.2 MG: 0.2 INJECTION, SOLUTION INTRAMUSCULAR; INTRAVENOUS; SUBCUTANEOUS at 20:06

## 2024-01-04 ASSESSMENT — PAIN - FUNCTIONAL ASSESSMENT
PAIN_FUNCTIONAL_ASSESSMENT: 0-10

## 2024-01-04 ASSESSMENT — COGNITIVE AND FUNCTIONAL STATUS - GENERAL
MOBILITY SCORE: 24
DAILY ACTIVITIY SCORE: 24

## 2024-01-04 ASSESSMENT — PAIN SCALES - GENERAL
PAINLEVEL_OUTOF10: 3
PAINLEVEL_OUTOF10: 7

## 2024-01-04 ASSESSMENT — PAIN DESCRIPTION - LOCATION: LOCATION: ABDOMEN

## 2024-01-04 NOTE — PROGRESS NOTES
Discussed patient during IDR- possible discharge today if patient is able to tolerate PO intake.  GI has cleared patient.   Will continue to follow for discharge planning needs.

## 2024-01-04 NOTE — CARE PLAN
The patient's goals for the shift include    Problem: Pain - Adult  Goal: Verbalizes/displays adequate comfort level or baseline comfort level  Outcome: Progressing     Problem: Safety - Adult  Goal: Free from fall injury  Outcome: Met     Problem: Discharge Planning  Goal: Discharge to home or other facility with appropriate resources  Outcome: Progressing     Problem: Pain  Goal: Turns in bed with improved pain control throughout the shift  Outcome: Progressing       The clinical goals for the shift include Pt will remain free from falls.    Over the shift, the patient did not make progress toward the following goals. Barriers to progression include acute abd pain and discomfort     Problem: Pain  Goal: Takes deep breaths with improved pain control throughout the shift  Outcome: Not Progressing

## 2024-01-04 NOTE — PROGRESS NOTES
Matthieu Solis is a 63 y.o. male on day 0 of admission presenting with Nausea and vomiting.    Subjective   Patient sitting up on side of bed, NAD.  Generally reports symptoms are much improved. Abdominal pain improved.  Denies nausea.vomiting overnight.  BM this morning, not diarrhea. Not bloody.       Objective     Physical Exam  Constitutional:       General: He is not in acute distress.     Appearance: He is not toxic-appearing.   HENT:      Head: Normocephalic and atraumatic.      Right Ear: External ear normal.      Left Ear: External ear normal.      Nose: Nose normal. No congestion.      Mouth/Throat:      Mouth: Mucous membranes are moist.      Pharynx: Oropharynx is clear.   Eyes:      General:         Right eye: No discharge.         Left eye: No discharge.      Conjunctiva/sclera: Conjunctivae normal.      Pupils: Pupils are equal, round, and reactive to light.   Cardiovascular:      Rate and Rhythm: Normal rate and regular rhythm.      Heart sounds: No murmur heard.     No gallop.   Pulmonary:      Effort: Pulmonary effort is normal.      Breath sounds: No wheezing or rales.   Abdominal:      General: Abdomen is flat. Bowel sounds are normal.      Palpations: Abdomen is soft.      Tenderness: There is abdominal tenderness (Mild generalzed TTP without guarding or rigidty, pain does not localize). There is no guarding or rebound.   Musculoskeletal:         General: No swelling or tenderness. Normal range of motion.      Right lower leg: No edema.      Left lower leg: No edema.   Skin:     General: Skin is warm and dry.      Findings: No erythema or rash.   Neurological:      General: No focal deficit present.      Mental Status: He is alert.      Cranial Nerves: No cranial nerve deficit.      Motor: No weakness.   Psychiatric:         Mood and Affect: Mood normal.         Thought Content: Thought content normal.         Last Recorded Vitals  Blood pressure 151/78, pulse 71, temperature 37.3 °C (99.1 °F),  "temperature source Temporal, resp. rate 18, height 1.651 m (5' 5\"), weight 68 kg (150 lb), SpO2 96 %.  Intake/Output last 3 Shifts:  I/O last 3 completed shifts:  In: 700 (10.3 mL/kg) [IV Piggyback:700]  Out: - (0 mL/kg)   Weight: 68 kg     Relevant Results              Scheduled medications  amLODIPine, 5 mg, oral, Daily  ciprofloxacin, 400 mg, intravenous, q12h  dicyclomine, 10 mg, oral, TID  enoxaparin, 40 mg, subcutaneous, q24h  famotidine, 20 mg, intravenous, q12h MIKEY  influenza, 0.5 mL, intramuscular, During hospitalization  losartan, 25 mg, oral, Daily  melatonin, 3 mg, oral, Daily  metroNIDAZOLE, 500 mg, intravenous, q8h  nicotine, 1 patch, transdermal, Daily  pantoprazole, 40 mg, oral, BID AC   Or  pantoprazole, 40 mg, intravenous, BID AC  polyethylene glycol, 17 g, oral, Daily  sodium chloride, 500 mL, intravenous, Once      Continuous medications     PRN medications  PRN medications: acetaminophen **OR** acetaminophen **OR** acetaminophen, albuterol, alum-mag hydroxide-simeth, dextrose 10 % in water (D10W), dextrose, docusate sodium, glucagon, hydrALAZINE, HYDROmorphone, HYDROmorphone, [Held by provider] ketorolac, ondansetron ODT **OR** ondansetron, prochlorperazine **OR** prochlorperazine **OR** prochlorperazine  Results for orders placed or performed during the hospital encounter of 01/02/24 (from the past 24 hour(s))   Basic metabolic panel   Result Value Ref Range    Glucose 73 (L) 74 - 99 mg/dL    Sodium 135 (L) 136 - 145 mmol/L    Potassium 3.8 3.5 - 5.3 mmol/L    Chloride 101 98 - 107 mmol/L    Bicarbonate 22 21 - 32 mmol/L    Anion Gap 16 10 - 20 mmol/L    Urea Nitrogen 13 6 - 23 mg/dL    Creatinine 0.87 0.50 - 1.30 mg/dL    eGFR >90 >60 mL/min/1.73m*2    Calcium 9.0 8.6 - 10.3 mg/dL   CBC   Result Value Ref Range    WBC 13.7 (H) 4.4 - 11.3 x10*3/uL    nRBC 0.0 0.0 - 0.0 /100 WBCs    RBC 4.38 (L) 4.50 - 5.90 x10*6/uL    Hemoglobin 13.6 13.5 - 17.5 g/dL    Hematocrit 41.1 41.0 - 52.0 %    MCV 94 " 80 - 100 fL    MCH 31.1 26.0 - 34.0 pg    MCHC 33.1 32.0 - 36.0 g/dL    RDW 13.8 11.5 - 14.5 %    Platelets 379 150 - 450 x10*3/uL   Lactate   Result Value Ref Range    Lactate 0.8 0.4 - 2.0 mmol/L     CT abdomen pelvis w IV contrast    Result Date: 1/2/2024  Interpreted By:  Sarah Hernandez, STUDY: CT ABDOMEN PELVIS W IV CONTRAST;  1/2/2024 7:53 pm   INDICATION: Signs/Symptoms:abd pain vomiting.   COMPARISON: 03/13/2023   ACCESSION NUMBER(S): [id number]   ORDERING CLINICIAN: THONG CASTILLO   TECHNIQUE: CT of the abdomen and pelvis was performed.  Standard contiguous axial images were obtained at 3 mm slice thickness through the abdomen and pelvis. Coronal and sagittal reconstructions at 3 mm slice thickness were performed.   90   ml of contrast Omnipaque 350 were administered intravenously without immediate complication.   FINDINGS: LOWER CHEST: The visualized bases are clear bilaterally.     ABDOMEN:   LIVER: Subcentimeter small low-attenuation focus within the dome of the right lobe which may represent a small cyst or hemangioma but too small to fully characterize. This is stable since prior. No suspicious liver lesions are seen. Liver measures approximately 16 cm in craniocaudal dimension.   BILE DUCTS: Biliary system is nondilated.   GALLBLADDER: Varicosities of gallbladder wall are suspected. No evidence for radiodense gallbladder calculi or pericholecystic free fluid/fat stranding is seen.   PANCREAS: No focal lesions. No peripancreatic fat stranding.   SPLEEN: The spleen is normal in size. No focal abnormalities are seen.   ADRENAL GLANDS: Mild prominence of the left adrenal gland but no focal masses are seen. The right adrenal gland within normal limits.   KIDNEYS AND URETERS: No hydronephrosis or renal masses. No perinephric stranding. No radiodense renal calculi.   PELVIS:   BLADDER: The urinary bladder is decompressed therefore evaluation is limited. No evidence for urinary bladder calculi.    REPRODUCTIVE ORGANS: No free fluid or masses in the pelvis.   BOWEL: The small and large bowel are nondilated. Normal caliber fluid-filled loops of small bowel within the central abdomen. Air-fluid levels within the transverse colon. Findings nonspecific in etiology and may represent an ileus type pattern such as related to enteritis. The appendix is not positively identified, however, no findings to suggest acute appendicitis is seen.  Scattered diffuse colonic diverticula but no CT evidence for acute diverticulitis.   VESSELS: Diffuse wall calcification of the aorta. No aneurysm or dissection.   PERITONEUM/RETROPERITONEUM/LYMPH NODES: No retroperitoneal masses are seen.  No lymphadenopathy.   BONES AND ABDOMINAL WALL: There is no evidence for destructive lytic or blastic bone lesions identified.  Tiny fat containing umbilical hernia similar to prior.       1.  Normal caliber fluid-filled loops of small bowel and colon as above with few air-fluid levels suggestive of an ileus type pattern such as related to enteritis. Recommend clinical correlation and follow-up to document resolution. No evidence for bowel obstruction or free air. 2. Colonic diverticulosis diffusely but no CT evidence for acute diverticulitis. 3. Diffuse atherosclerotic disease of the aorta and its main branches particularly the origin of celiac trunk.. No CT evidence for aortic aneurysm or dissection. 4. Additional detailed nonacute findings as above.     Signed by: Sarah Hernandez 1/2/2024 8:42 PM Dictation workstation:   [id number]    XR chest 1 view    Result Date: 1/2/2024  Interpreted By:  Barbara Sue, STUDY: XR CHEST 1 VIEW;  1/2/2024 3:55 pm   INDICATION: Signs/Symptoms:fever cough.   COMPARISON: 7/28/2023   ACCESSION NUMBER(S): [id number]   ORDERING CLINICIAN: THONG CASTILLO   TECHNIQUE: Portable AP upright   FINDINGS: The cardiac silhouette is normal in size. Aorta is atherosclerotic. Lungs are clear. No pleural effusions are  noted. Osseous structures appear grossly intact.       No acute radiographic abnormality   MACRO: None.   Signed by: Barbara Sue 1/2/2024 4:39 PM Dictation workstation:   [id number]                  Assessment/Plan   Principal Problem:    Nausea and vomiting  Matthieu Solis is a 63 y.o. male PMHx HTN, H. Pylori s/p treatment, tobacco abuse, anxiety presenting with nausea/vomiting, abdominal pain.  Matthieu Solis is a 63 y.o. male PMHx HTN, H. Pylori s/p treatment, tobacco abuse, anxiety presenting with nausea/vomiting, abdominal pain. Patient reports symptoms started ~ 72 hours ago. He is in discomfort which limites history. He reports myalgias and cough, following by generalized abdominal pain. Multiple episodes emesis, most recent around 9 am after taking his morning meds. Was unable to tolerate PO at home d/t symptoms. Reports diarrhea which has not been black or frankly bloody. Is passing gas. Denies fever or chills. Denies chest pain sob. Denies recent travel or antibiotic use. Reports he has had similar episodes similar to this previously.     ED workup: CT a/p with normal caliber fluid-filled loops of small bowel and colon as above with few air-fluid levels suggestive of an ileus type pattern such as related to enteritis. Recommend clinical correlation and follow-up to document resolution. No evidence for bowel obstruction or free air; CXR no acute cardiopulmonary abnormality; WBC with leukocytosis 13.1, Hgb 14.6, no thrombocytopenia; CMP sable liver/kidney function, Na 135, Bicarb 20; lipase 4; Flu/Covid/RSV noted; UA spec grav > 1.6, negative markers infection; UDS + cannabis     Colonoscopy 2021 diverticula and multiple ployps     Nausea vomiting   Enteritis, infectious vs inflammatory  + Cannabis use  Leukocytosis   Hx H. Pylori  Daily NSAID use  -Infectious vs inflammatory enteritis vs gastritis iso of daily NSAID use/hx H pylori vs cyclic vomiting iso of + cannabis on drug screen  -start IV abx  cipro/flagyl iso of leukocytosis    -patient with PCN allergy  -stool pathogen panel including c diff  -occult stool pending   -IVF  -IV PPI BID, Pepcid BID  -antiemetics as needed  -GI consult appreciated    -IVF    -ADAT    -discharge without abx     HTN  -was on hydrochlorothiazide at some point, which was apparently discontinued  -start amlodipine 5 mg  -consider adding second agent if needed     DVT Ppx:  -lovenox         I spent 35 minutes in the professional and overall care of this patient.      JAQUI SpearsC

## 2024-01-04 NOTE — PROGRESS NOTES
GI Daily Progress Note    Assessment/Plan:    64 yo M with h/o HTN, h pylori gastritis, gout, chronic pain, emphysema, diverticulosis, current tobacco use, presented with 48 hour h/o abdominal pain, nausea, vomiting, diarrhea, CT evidence of enteritis/ileus. Suspect acute viral gastroenteritis, gastritis, less likely IBD     - supportive care with antiemetics and analgesia as needed  - IV hydration and electrolyte management per primary  - advance diet as tolerated  - stool studies if diarrhea recurs. Stool for h pylori antigen ordered  - if pt tolerates diet, ok to discharge from GI stand point. He can follow up with GI as outpatient  - do not recommend antibiotics on discharged   GI will sign off, please call if any questions or further assistance needed       LOS: 0 days     Matthieu Solis is a 63 y.o. male who was admitted with Nausea and vomiting. He reports his symptoms are improving with treatment. Tolerated clear liquid without increase in pain, no diarrhea, nausea or vomiting.     Subjective:    Patient expresses he is feeling much better, still has abd pain, but it has improved.  Patient denies nausea or vomiting or diarrhea    Objective:    Vital signs in last 24 hours:  Temp:  [36.8 °C (98.2 °F)-37.3 °C (99.1 °F)] 37.3 °C (99.1 °F)  Heart Rate:  [63-75] 71  Resp:  [18] 18  BP: (125-153)/(65-89) 151/78    Intake/Output last 3 shifts:  I/O last 3 completed shifts:  In: 700 (10.3 mL/kg) [IV Piggyback:700]  Out: - (0 mL/kg)   Weight: 68 kg   Intake/Output this shift:  No intake/output data recorded.    Physical Exam  Vitals reviewed.   Constitutional:       Appearance: Normal appearance.   HENT:      Head: Normocephalic.      Nose: Nose normal.      Mouth/Throat:      Mouth: Mucous membranes are moist.      Pharynx: Oropharynx is clear.   Eyes:      Conjunctiva/sclera: Conjunctivae normal.   Cardiovascular:      Rate and Rhythm: Normal rate and regular rhythm.      Heart sounds: Normal heart sounds.    Pulmonary:      Effort: Pulmonary effort is normal.      Breath sounds: Normal breath sounds.   Abdominal:      General: Bowel sounds are normal. There is no distension.      Palpations: Abdomen is soft. There is no mass.      Tenderness: There is abdominal tenderness. There is no guarding or rebound.      Hernia: No hernia is present.   Musculoskeletal:      Cervical back: Normal range of motion and neck supple.   Skin:     General: Skin is warm and dry.   Neurological:      General: No focal deficit present.      Mental Status: He is alert and oriented to person, place, and time.   Psychiatric:         Mood and Affect: Mood normal.         Behavior: Behavior normal.          Results for orders placed or performed during the hospital encounter of 01/02/24 (from the past 24 hour(s))   Basic metabolic panel   Result Value Ref Range    Glucose 73 (L) 74 - 99 mg/dL    Sodium 135 (L) 136 - 145 mmol/L    Potassium 3.8 3.5 - 5.3 mmol/L    Chloride 101 98 - 107 mmol/L    Bicarbonate 22 21 - 32 mmol/L    Anion Gap 16 10 - 20 mmol/L    Urea Nitrogen 13 6 - 23 mg/dL    Creatinine 0.87 0.50 - 1.30 mg/dL    eGFR >90 >60 mL/min/1.73m*2    Calcium 9.0 8.6 - 10.3 mg/dL   CBC   Result Value Ref Range    WBC 13.7 (H) 4.4 - 11.3 x10*3/uL    nRBC 0.0 0.0 - 0.0 /100 WBCs    RBC 4.38 (L) 4.50 - 5.90 x10*6/uL    Hemoglobin 13.6 13.5 - 17.5 g/dL    Hematocrit 41.1 41.0 - 52.0 %    MCV 94 80 - 100 fL    MCH 31.1 26.0 - 34.0 pg    MCHC 33.1 32.0 - 36.0 g/dL    RDW 13.8 11.5 - 14.5 %    Platelets 379 150 - 450 x10*3/uL   Lactate   Result Value Ref Range    Lactate 0.8 0.4 - 2.0 mmol/L

## 2024-01-05 VITALS
TEMPERATURE: 97.9 F | HEART RATE: 71 BPM | BODY MASS INDEX: 24.99 KG/M2 | RESPIRATION RATE: 17 BRPM | WEIGHT: 150 LBS | OXYGEN SATURATION: 99 % | HEIGHT: 65 IN | DIASTOLIC BLOOD PRESSURE: 81 MMHG | SYSTOLIC BLOOD PRESSURE: 168 MMHG

## 2024-01-05 LAB
ANION GAP SERPL CALC-SCNC: 14 MMOL/L (ref 10–20)
BUN SERPL-MCNC: 7 MG/DL (ref 6–23)
C DIF TOX TCDA+TCDB STL QL NAA+PROBE: NOT DETECTED
CALCIUM SERPL-MCNC: 8.7 MG/DL (ref 8.6–10.3)
CHLORIDE SERPL-SCNC: 104 MMOL/L (ref 98–107)
CO2 SERPL-SCNC: 24 MMOL/L (ref 21–32)
CREAT SERPL-MCNC: 0.86 MG/DL (ref 0.5–1.3)
ERYTHROCYTE [DISTWIDTH] IN BLOOD BY AUTOMATED COUNT: 13.7 % (ref 11.5–14.5)
GFR SERPL CREATININE-BSD FRML MDRD: >90 ML/MIN/1.73M*2
GLUCOSE SERPL-MCNC: 88 MG/DL (ref 74–99)
HCT VFR BLD AUTO: 41.2 % (ref 41–52)
HGB BLD-MCNC: 13.7 G/DL (ref 13.5–17.5)
MCH RBC QN AUTO: 30.4 PG (ref 26–34)
MCHC RBC AUTO-ENTMCNC: 33.3 G/DL (ref 32–36)
MCV RBC AUTO: 92 FL (ref 80–100)
NRBC BLD-RTO: 0 /100 WBCS (ref 0–0)
PLATELET # BLD AUTO: 383 X10*3/UL (ref 150–450)
POTASSIUM SERPL-SCNC: 3.5 MMOL/L (ref 3.5–5.3)
RBC # BLD AUTO: 4.5 X10*6/UL (ref 4.5–5.9)
SODIUM SERPL-SCNC: 138 MMOL/L (ref 136–145)
WBC # BLD AUTO: 9.7 X10*3/UL (ref 4.4–11.3)

## 2024-01-05 PROCEDURE — 99231 SBSQ HOSP IP/OBS SF/LOW 25: CPT | Performed by: PHYSICIAN ASSISTANT

## 2024-01-05 PROCEDURE — 96376 TX/PRO/DX INJ SAME DRUG ADON: CPT

## 2024-01-05 PROCEDURE — 82374 ASSAY BLOOD CARBON DIOXIDE: CPT | Performed by: PHYSICIAN ASSISTANT

## 2024-01-05 PROCEDURE — 82565 ASSAY OF CREATININE: CPT | Performed by: PHYSICIAN ASSISTANT

## 2024-01-05 PROCEDURE — 2500000001 HC RX 250 WO HCPCS SELF ADMINISTERED DRUGS (ALT 637 FOR MEDICARE OP): Performed by: PHYSICIAN ASSISTANT

## 2024-01-05 PROCEDURE — 36415 COLL VENOUS BLD VENIPUNCTURE: CPT | Performed by: PHYSICIAN ASSISTANT

## 2024-01-05 PROCEDURE — 2500000004 HC RX 250 GENERAL PHARMACY W/ HCPCS (ALT 636 FOR OP/ED): Performed by: PHYSICIAN ASSISTANT

## 2024-01-05 PROCEDURE — 85027 COMPLETE CBC AUTOMATED: CPT | Performed by: PHYSICIAN ASSISTANT

## 2024-01-05 PROCEDURE — 96366 THER/PROPH/DIAG IV INF ADDON: CPT

## 2024-01-05 PROCEDURE — G0378 HOSPITAL OBSERVATION PER HR: HCPCS

## 2024-01-05 RX ORDER — CLOTRIMAZOLE AND BETAMETHASONE DIPROPIONATE 10; .64 MG/G; MG/G
1 CREAM TOPICAL 2 TIMES DAILY
Status: DISCONTINUED | OUTPATIENT
Start: 2024-01-05 | End: 2024-01-05 | Stop reason: HOSPADM

## 2024-01-05 RX ORDER — AMLODIPINE BESYLATE 5 MG/1
5 TABLET ORAL DAILY
Qty: 30 TABLET | Refills: 0 | Status: SHIPPED | OUTPATIENT
Start: 2024-01-06 | End: 2024-02-07

## 2024-01-05 RX ORDER — LOSARTAN POTASSIUM 25 MG/1
25 TABLET ORAL DAILY
Qty: 30 TABLET | Refills: 0 | Status: SHIPPED | OUTPATIENT
Start: 2024-01-06 | End: 2024-02-07

## 2024-01-05 RX ORDER — CLOTRIMAZOLE AND BETAMETHASONE DIPROPIONATE 10; .64 MG/G; MG/G
1 CREAM TOPICAL 2 TIMES DAILY
Qty: 10 G | Refills: 0 | Status: SHIPPED | OUTPATIENT
Start: 2024-01-05 | End: 2024-01-12

## 2024-01-05 RX ORDER — ONDANSETRON 4 MG/1
4 TABLET, ORALLY DISINTEGRATING ORAL EVERY 8 HOURS PRN
Qty: 20 TABLET | Refills: 0 | Status: SHIPPED | OUTPATIENT
Start: 2024-01-05 | End: 2024-02-07

## 2024-01-05 RX ADMIN — ENOXAPARIN SODIUM 40 MG: 40 INJECTION SUBCUTANEOUS at 09:59

## 2024-01-05 RX ADMIN — METRONIDAZOLE 500 MG: 500 INJECTION, SOLUTION INTRAVENOUS at 01:00

## 2024-01-05 RX ADMIN — PANTOPRAZOLE SODIUM 40 MG: 40 TABLET, DELAYED RELEASE ORAL at 06:01

## 2024-01-05 RX ADMIN — HYDROMORPHONE HYDROCHLORIDE 0.2 MG: 0.2 INJECTION, SOLUTION INTRAMUSCULAR; INTRAVENOUS; SUBCUTANEOUS at 05:59

## 2024-01-05 RX ADMIN — DICYCLOMINE HYDROCHLORIDE 10 MG: 10 CAPSULE ORAL at 09:59

## 2024-01-05 RX ADMIN — AMLODIPINE BESYLATE 5 MG: 5 TABLET ORAL at 09:59

## 2024-01-05 RX ADMIN — LOSARTAN POTASSIUM 25 MG: 50 TABLET, FILM COATED ORAL at 09:59

## 2024-01-05 RX ADMIN — FAMOTIDINE 20 MG: 10 INJECTION, SOLUTION INTRAVENOUS at 10:02

## 2024-01-05 ASSESSMENT — PAIN SCALES - GENERAL
PAINLEVEL_OUTOF10: 7
PAINLEVEL_OUTOF10: 3
PAINLEVEL_OUTOF10: 5 - MODERATE PAIN
PAINLEVEL_OUTOF10: 0 - NO PAIN

## 2024-01-05 ASSESSMENT — COGNITIVE AND FUNCTIONAL STATUS - GENERAL
MOBILITY SCORE: 24
DAILY ACTIVITIY SCORE: 24

## 2024-01-05 ASSESSMENT — PAIN - FUNCTIONAL ASSESSMENT
PAIN_FUNCTIONAL_ASSESSMENT: 0-10

## 2024-01-05 NOTE — DISCHARGE SUMMARY
Discharge Diagnosis  Nausea and vomiting    Issues Requiring Follow-Up  N/V  Abdominal pain  Hypertension     Discharge Meds     Your medication list        START taking these medications        Instructions Last Dose Given Next Dose Due   amLODIPine 5 mg tablet  Commonly known as: Norvasc  Start taking on: January 6, 2024      Take 1 tablet (5 mg) by mouth once daily. Do not start before January 6, 2024.       clotrimazole-betamethasone cream  Commonly known as: Lotrisone      Apply 1 Application topically 2 times a day for 7 days.       losartan 25 mg tablet  Commonly known as: Cozaar  Start taking on: January 6, 2024      Take 1 tablet (25 mg) by mouth once daily. Do not start before January 6, 2024.       ondansetron ODT 4 mg disintegrating tablet  Commonly known as: Zofran-ODT      Take 1 tablet (4 mg) by mouth every 8 hours if needed for nausea or vomiting.              ASK your doctor about these medications        Instructions Last Dose Given Next Dose Due   ibuprofen 800 mg tablet      Take 1 tablet (800 mg) by mouth 3 times a day as needed for mild pain (1 - 3) (pain).                 Where to Get Your Medications        These medications were sent to [facility]/pharmacy #8 - [place] [address] AT [place] [address], [facility] [address]      Phone: [phone]   amLODIPine 5 mg tablet  clotrimazole-betamethasone cream  losartan 25 mg tablet  ondansetron ODT 4 mg disintegrating tablet         Test Results Pending At Discharge  Pending Labs       Order Current Status    Extra Urine Gray Tube Collected (01/02/24 4657)    H. pylori antigen, stool In process    Stool Pathogen Panel, PCR In process    Urinalysis with Reflex Culture and Microscopic In process            Hospital Course   Matthieu Solis is a 63 y.o. male PMHx HTN, H. Pylori s/p treatment, tobacco abuse, anxiety presenting with nausea/vomiting, abdominal pain.  Matthieu REES Will is a 63 y.o. male PMHx  HTN, H. Pylori s/p treatment, tobacco abuse, anxiety presenting with nausea/vomiting, abdominal pain. Patient reports symptoms started ~ 72 hours ago. He is in discomfort which limites history. He reports myalgias and cough, following by generalized abdominal pain. Multiple episodes emesis, most recent around 9 am after taking his morning meds. Was unable to tolerate PO at home d/t symptoms. Reports diarrhea which has not been black or frankly bloody. Is passing gas. Denies fever or chills. Denies chest pain sob. Denies recent travel or antibiotic use. Reports he has had similar episodes similar to this previously.     ED workup: CT a/p with normal caliber fluid-filled loops of small bowel and colon as above with few air-fluid levels suggestive of an ileus type pattern such as related to enteritis. Recommend clinical correlation and follow-up to document resolution. No evidence for bowel obstruction or free air; CXR no acute cardiopulmonary abnormality; WBC with leukocytosis 13.1, Hgb 14.6, no thrombocytopenia; CMP sable liver/kidney function, Na 135, Bicarb 20; lipase 4; Flu/Covid/RSV noted; UA spec grav > 1.6, negative markers infection; UDS + cannabis     Colonoscopy 2021 diverticula and multiple ployps    Patient remained HDS stable throughout hospital course. Symptoms improved with antiemetics and IVF. Leukocytosis resolved with IV abx. Tolerating PO without complication. Patient was evaluated by GI and found stable and appropriate for discharge. Blood pressured were elevated throughout hospital course. Will start amlodipine 5 mg and losartan 25 mg daily. Zofran TID as needed. Lotrisone BID x 7 days for balanitis. Patient to follow up with GI DR. Oreilly as discussed. Follow up with PCP in 5-7 days.      Nausea vomiting   Enteritis, infectious vs inflammatory  + Cannabis use  Leukocytosis   Hx H. Pylori  Daily NSAID use  -Infectious vs inflammatory enteritis vs gastritis iso of daily NSAID use/hx H pylori vs  cyclic vomiting iso of + cannabis on drug screen  -start IV abx cipro/flagyl iso of leukocytosis    -patient with PCN allergy  -stool pathogen panel including c diff  -occult stool pending   -IVF  -IV PPI BID, Pepcid BID  -antiemetics as needed  -GI consult appreciated    -IVF    -ADAT    -discharge without abx     HTN  -was on hydrochlorothiazide at some point, which was apparently discontinued  -start amlodipine 5 mg  -add losartan 25 mg    Balanitis  -trial lotrisone, follow up OP with PCP     DVT Ppx:  -lovenox    Pertinent Physical Exam At Time of Discharge  Physical Exam  Constitutional:       General: He is not in acute distress.     Appearance: He is not toxic-appearing.   HENT:      Head: Normocephalic and atraumatic.      Right Ear: External ear normal.      Left Ear: External ear normal.      Nose: Nose normal. No congestion.      Mouth/Throat:      Mouth: Mucous membranes are moist.      Pharynx: Oropharynx is clear.   Eyes:      General:         Right eye: No discharge.         Left eye: No discharge.      Conjunctiva/sclera: Conjunctivae normal.      Pupils: Pupils are equal, round, and reactive to light.   Cardiovascular:      Rate and Rhythm: Normal rate and regular rhythm.      Heart sounds: No murmur heard.     No gallop.   Pulmonary:      Effort: Pulmonary effort is normal.      Breath sounds: No wheezing or rales.   Abdominal:      General: Abdomen is flat. Bowel sounds are normal.      Palpations: Abdomen is soft.      Tenderness: There is no abdominal tenderness. There is no guarding or rebound.   Musculoskeletal:         General: No swelling or tenderness. Normal range of motion.      Right lower leg: No edema.      Left lower leg: No edema.   Skin:     General: Skin is warm and dry.      Findings: No erythema or rash.   Neurological:      General: No focal deficit present.      Mental Status: He is alert.      Cranial Nerves: No cranial nerve deficit.      Motor: No weakness.   Psychiatric:          Mood and Affect: Mood normal.         Thought Content: Thought content normal.         Outpatient Follow-Up  Future Appointments   Date Time Provider Department Center   1/23/2024  2:00 PM Claudio Maldonado DO [id number] [facility]   2/12/2024  4:30 PM Titi Fuller MD PhD [id number] [facility]   12/24/2024  1:00 PM Inna Lau MD [id number] [facility]         Mook Flores PA-C

## 2024-01-05 NOTE — NURSING NOTE
Discharge instructions and home going medications reviewed using the teach back method with patient. Patient's health related risk factors discussed as well.  Patient educated to look for any worsening signs and symptoms and educated to seek medical attention if experiencing any medical emergency.  Patient made aware to follow up with outpatient clinics as scheduled.  Patient discharged via private vehicle with all personal belongings, accompanied by self in stable condition with VS WNL and able to ambulate independently without devices.  Written discharge instructions were given and patient verbalized understanding of instructions, and all questions were addressed.

## 2024-01-05 NOTE — CARE PLAN
The patient's goals for the shift include      The clinical goals for the shift include Patient will remain safe and free from falls this shift.    Over the shift, the patient did make progress toward the following goals. No barriers to progression noted this shift. Patient will be discharged home.

## 2024-01-05 NOTE — CARE PLAN
The patient's goals for the shift include      The clinical goals for the shift include Pt will remain free from falls.    Over the shift, the patient make progress toward the following goals amd indep.   Able to eat 20-30% of solid food tray without vomiting.

## 2024-01-05 NOTE — DISCHARGE INSTRUCTIONS
The following medications have been sent to the pharmacy for you:  Zofran 4 mg to be taken every 8 hours as needed for nausea or vomiting  Amlodipine 5 mg to be taken daily for high BP  Losartan 25 mg to be taken daily for high BP  Lotrisone to be applied twice daily x 7 days to area of concern   Resume home meds as prescribed  Please avoid fried, fatty, spicy foods  Follow up with GI Dr. Oreilly at the number above  Follow up with PCP in 5-7 days

## 2024-01-06 LAB

## 2024-01-07 LAB — H PYLORI AG STL QL IA: NEGATIVE

## 2024-01-08 ENCOUNTER — APPOINTMENT (OUTPATIENT)
Dept: RADIOLOGY | Facility: HOSPITAL | Age: 64
End: 2024-01-08
Payer: COMMERCIAL

## 2024-01-08 ENCOUNTER — HOSPITAL ENCOUNTER (EMERGENCY)
Facility: HOSPITAL | Age: 64
Discharge: HOME | End: 2024-01-08
Attending: EMERGENCY MEDICINE
Payer: COMMERCIAL

## 2024-01-08 VITALS
HEART RATE: 72 BPM | SYSTOLIC BLOOD PRESSURE: 165 MMHG | HEIGHT: 65 IN | TEMPERATURE: 98 F | DIASTOLIC BLOOD PRESSURE: 75 MMHG | OXYGEN SATURATION: 94 % | WEIGHT: 142 LBS | RESPIRATION RATE: 16 BRPM | BODY MASS INDEX: 23.66 KG/M2

## 2024-01-08 DIAGNOSIS — R10.84 ABDOMINAL PAIN, GENERALIZED: Primary | ICD-10-CM

## 2024-01-08 DIAGNOSIS — R10.84 GENERALIZED ABDOMINAL PAIN: ICD-10-CM

## 2024-01-08 DIAGNOSIS — R11.2 NAUSEA AND VOMITING, UNSPECIFIED VOMITING TYPE: ICD-10-CM

## 2024-01-08 DIAGNOSIS — K29.70 GASTRITIS WITHOUT BLEEDING, UNSPECIFIED CHRONICITY, UNSPECIFIED GASTRITIS TYPE: ICD-10-CM

## 2024-01-08 LAB
ALBUMIN SERPL BCP-MCNC: 4.5 G/DL (ref 3.4–5)
ALP SERPL-CCNC: 94 U/L (ref 33–136)
ALT SERPL W P-5'-P-CCNC: 51 U/L (ref 10–52)
AMPHETAMINES UR QL SCN: ABNORMAL
ANION GAP SERPL CALC-SCNC: 12 MMOL/L (ref 10–20)
APPEARANCE UR: CLEAR
AST SERPL W P-5'-P-CCNC: 40 U/L (ref 9–39)
BARBITURATES UR QL SCN: ABNORMAL
BASOPHILS # BLD AUTO: 0.07 X10*3/UL (ref 0–0.1)
BASOPHILS NFR BLD AUTO: 0.5 %
BENZODIAZ UR QL SCN: ABNORMAL
BILIRUB SERPL-MCNC: 0.8 MG/DL (ref 0–1.2)
BILIRUB UR STRIP.AUTO-MCNC: NEGATIVE MG/DL
BUN SERPL-MCNC: 9 MG/DL (ref 6–23)
BZE UR QL SCN: ABNORMAL
CALCIUM SERPL-MCNC: 10.2 MG/DL (ref 8.6–10.3)
CANNABINOIDS UR QL SCN: ABNORMAL
CARDIAC TROPONIN I PNL SERPL HS: 16 NG/L (ref 0–20)
CHLORIDE SERPL-SCNC: 97 MMOL/L (ref 98–107)
CO2 SERPL-SCNC: 30 MMOL/L (ref 21–32)
COLOR UR: ABNORMAL
CREAT SERPL-MCNC: 0.8 MG/DL (ref 0.5–1.3)
EOSINOPHIL # BLD AUTO: 0.04 X10*3/UL (ref 0–0.7)
EOSINOPHIL NFR BLD AUTO: 0.3 %
ERYTHROCYTE [DISTWIDTH] IN BLOOD BY AUTOMATED COUNT: 14.1 % (ref 11.5–14.5)
FENTANYL+NORFENTANYL UR QL SCN: ABNORMAL
FLUAV RNA RESP QL NAA+PROBE: NOT DETECTED
FLUBV RNA RESP QL NAA+PROBE: NOT DETECTED
GFR SERPL CREATININE-BSD FRML MDRD: >90 ML/MIN/1.73M*2
GLUCOSE SERPL-MCNC: 122 MG/DL (ref 74–99)
GLUCOSE UR STRIP.AUTO-MCNC: NEGATIVE MG/DL
HCT VFR BLD AUTO: 45.7 % (ref 41–52)
HGB BLD-MCNC: 15 G/DL (ref 13.5–17.5)
HOLD SPECIMEN: NORMAL
HOLD SPECIMEN: NORMAL
IMM GRANULOCYTES # BLD AUTO: 0.07 X10*3/UL (ref 0–0.7)
IMM GRANULOCYTES NFR BLD AUTO: 0.5 % (ref 0–0.9)
KETONES UR STRIP.AUTO-MCNC: ABNORMAL MG/DL
LACTATE SERPL-SCNC: 1 MMOL/L (ref 0.4–2)
LEUKOCYTE ESTERASE UR QL STRIP.AUTO: NEGATIVE
LIPASE SERPL-CCNC: 8 U/L (ref 9–82)
LYMPHOCYTES # BLD AUTO: 1.69 X10*3/UL (ref 1.2–4.8)
LYMPHOCYTES NFR BLD AUTO: 13.1 %
MCH RBC QN AUTO: 29.9 PG (ref 26–34)
MCHC RBC AUTO-ENTMCNC: 32.8 G/DL (ref 32–36)
MCV RBC AUTO: 91 FL (ref 80–100)
MONOCYTES # BLD AUTO: 0.87 X10*3/UL (ref 0.1–1)
MONOCYTES NFR BLD AUTO: 6.7 %
NEUTROPHILS # BLD AUTO: 10.17 X10*3/UL (ref 1.2–7.7)
NEUTROPHILS NFR BLD AUTO: 78.9 %
NITRITE UR QL STRIP.AUTO: NEGATIVE
NRBC BLD-RTO: 0 /100 WBCS (ref 0–0)
OPIATES UR QL SCN: ABNORMAL
OXYCODONE+OXYMORPHONE UR QL SCN: ABNORMAL
PCP UR QL SCN: ABNORMAL
PH UR STRIP.AUTO: 8 [PH]
PLATELET # BLD AUTO: 466 X10*3/UL (ref 150–450)
POTASSIUM SERPL-SCNC: 4 MMOL/L (ref 3.5–5.3)
PROT SERPL-MCNC: 8.2 G/DL (ref 6.4–8.2)
PROT UR STRIP.AUTO-MCNC: NEGATIVE MG/DL
RBC # BLD AUTO: 5.01 X10*6/UL (ref 4.5–5.9)
RBC # UR STRIP.AUTO: ABNORMAL /UL
RBC #/AREA URNS AUTO: NORMAL /HPF
SARS-COV-2 RNA RESP QL NAA+PROBE: NOT DETECTED
SODIUM SERPL-SCNC: 135 MMOL/L (ref 136–145)
SP GR UR STRIP.AUTO: 1.03
UROBILINOGEN UR STRIP.AUTO-MCNC: <2 MG/DL
WBC # BLD AUTO: 12.9 X10*3/UL (ref 4.4–11.3)
WBC #/AREA URNS AUTO: NORMAL /HPF

## 2024-01-08 PROCEDURE — 81001 URINALYSIS AUTO W/SCOPE: CPT | Mod: 59 | Performed by: EMERGENCY MEDICINE

## 2024-01-08 PROCEDURE — 74177 CT ABD & PELVIS W/CONTRAST: CPT

## 2024-01-08 PROCEDURE — 96361 HYDRATE IV INFUSION ADD-ON: CPT

## 2024-01-08 PROCEDURE — 84075 ASSAY ALKALINE PHOSPHATASE: CPT | Performed by: EMERGENCY MEDICINE

## 2024-01-08 PROCEDURE — C9113 INJ PANTOPRAZOLE SODIUM, VIA: HCPCS | Performed by: EMERGENCY MEDICINE

## 2024-01-08 PROCEDURE — 96375 TX/PRO/DX INJ NEW DRUG ADDON: CPT

## 2024-01-08 PROCEDURE — 74177 CT ABD & PELVIS W/CONTRAST: CPT | Performed by: STUDENT IN AN ORGANIZED HEALTH CARE EDUCATION/TRAINING PROGRAM

## 2024-01-08 PROCEDURE — 99284 EMERGENCY DEPT VISIT MOD MDM: CPT | Performed by: EMERGENCY MEDICINE

## 2024-01-08 PROCEDURE — 96374 THER/PROPH/DIAG INJ IV PUSH: CPT | Mod: 59

## 2024-01-08 PROCEDURE — 83605 ASSAY OF LACTIC ACID: CPT | Performed by: EMERGENCY MEDICINE

## 2024-01-08 PROCEDURE — 80307 DRUG TEST PRSMV CHEM ANLYZR: CPT | Performed by: EMERGENCY MEDICINE

## 2024-01-08 PROCEDURE — 85025 COMPLETE CBC W/AUTO DIFF WBC: CPT | Performed by: EMERGENCY MEDICINE

## 2024-01-08 PROCEDURE — 2500000004 HC RX 250 GENERAL PHARMACY W/ HCPCS (ALT 636 FOR OP/ED): Performed by: EMERGENCY MEDICINE

## 2024-01-08 PROCEDURE — 84484 ASSAY OF TROPONIN QUANT: CPT | Performed by: EMERGENCY MEDICINE

## 2024-01-08 PROCEDURE — 83690 ASSAY OF LIPASE: CPT | Performed by: EMERGENCY MEDICINE

## 2024-01-08 PROCEDURE — 36415 COLL VENOUS BLD VENIPUNCTURE: CPT | Performed by: EMERGENCY MEDICINE

## 2024-01-08 PROCEDURE — 87636 SARSCOV2 & INF A&B AMP PRB: CPT | Performed by: EMERGENCY MEDICINE

## 2024-01-08 PROCEDURE — 2550000001 HC RX 255 CONTRASTS: Performed by: EMERGENCY MEDICINE

## 2024-01-08 RX ORDER — MORPHINE SULFATE 4 MG/ML
4 INJECTION, SOLUTION INTRAMUSCULAR; INTRAVENOUS ONCE
Status: COMPLETED | OUTPATIENT
Start: 2024-01-08 | End: 2024-01-08

## 2024-01-08 RX ORDER — METOCLOPRAMIDE 10 MG/1
10 TABLET ORAL EVERY 6 HOURS
Qty: 28 TABLET | Refills: 0 | Status: SHIPPED | OUTPATIENT
Start: 2024-01-08 | End: 2024-02-07

## 2024-01-08 RX ORDER — PANTOPRAZOLE SODIUM 20 MG/1
20 TABLET, DELAYED RELEASE ORAL DAILY
Qty: 20 TABLET | Refills: 0 | Status: SHIPPED | OUTPATIENT
Start: 2024-01-08 | End: 2024-02-07

## 2024-01-08 RX ORDER — HALOPERIDOL 5 MG/ML
5 INJECTION INTRAMUSCULAR ONCE
Status: DISCONTINUED | OUTPATIENT
Start: 2024-01-08 | End: 2024-01-08

## 2024-01-08 RX ORDER — ONDANSETRON HYDROCHLORIDE 2 MG/ML
4 INJECTION, SOLUTION INTRAVENOUS ONCE
Status: COMPLETED | OUTPATIENT
Start: 2024-01-08 | End: 2024-01-08

## 2024-01-08 RX ORDER — PANTOPRAZOLE SODIUM 40 MG/10ML
40 INJECTION, POWDER, LYOPHILIZED, FOR SOLUTION INTRAVENOUS ONCE
Status: COMPLETED | OUTPATIENT
Start: 2024-01-08 | End: 2024-01-08

## 2024-01-08 RX ORDER — HALOPERIDOL 5 MG/ML
5 INJECTION INTRAMUSCULAR ONCE
Status: COMPLETED | OUTPATIENT
Start: 2024-01-08 | End: 2024-01-08

## 2024-01-08 RX ADMIN — SODIUM CHLORIDE 1000 ML: 9 INJECTION, SOLUTION INTRAVENOUS at 05:19

## 2024-01-08 RX ADMIN — HALOPERIDOL LACTATE 5 MG: 5 INJECTION, SOLUTION INTRAMUSCULAR at 08:33

## 2024-01-08 RX ADMIN — PANTOPRAZOLE SODIUM 40 MG: 40 INJECTION, POWDER, FOR SOLUTION INTRAVENOUS at 08:29

## 2024-01-08 RX ADMIN — IOHEXOL 75 ML: 350 INJECTION, SOLUTION INTRAVENOUS at 06:00

## 2024-01-08 RX ADMIN — MORPHINE SULFATE 4 MG: 4 INJECTION, SOLUTION INTRAMUSCULAR; INTRAVENOUS at 05:19

## 2024-01-08 RX ADMIN — ONDANSETRON 4 MG: 2 INJECTION INTRAMUSCULAR; INTRAVENOUS at 05:19

## 2024-01-08 ASSESSMENT — PAIN SCALES - GENERAL
PAINLEVEL_OUTOF10: 8
PAINLEVEL_OUTOF10: 10 - WORST POSSIBLE PAIN

## 2024-01-08 ASSESSMENT — COLUMBIA-SUICIDE SEVERITY RATING SCALE - C-SSRS
6. HAVE YOU EVER DONE ANYTHING, STARTED TO DO ANYTHING, OR PREPARED TO DO ANYTHING TO END YOUR LIFE?: NO
2. HAVE YOU ACTUALLY HAD ANY THOUGHTS OF KILLING YOURSELF?: NO
1. IN THE PAST MONTH, HAVE YOU WISHED YOU WERE DEAD OR WISHED YOU COULD GO TO SLEEP AND NOT WAKE UP?: NO

## 2024-01-08 ASSESSMENT — PAIN - FUNCTIONAL ASSESSMENT
PAIN_FUNCTIONAL_ASSESSMENT: 0-10
PAIN_FUNCTIONAL_ASSESSMENT: 0-10

## 2024-01-08 ASSESSMENT — PAIN DESCRIPTION - LOCATION: LOCATION: ABDOMEN

## 2024-01-08 NOTE — ED TRIAGE NOTES
Pt arrives to ED with c/o nausea, vomiting and diffuse abd pain. States he was recently admitted and discharged home on Friday for same complaints. Does not know what his diagnosis was. States he was not sent home with medications. States he was better on Friday but sx came back yesterday morning. Endorses 4 episodes of non-bilious vomiting. Pt continues to fall asleep during triage, denies etoh use or cannabis use, states was unable to sleep and that is why he so tired.

## 2024-01-08 NOTE — ED PROVIDER NOTES
HPI   Chief Complaint   Patient presents with    Abdominal Pain    Nausea    Vomiting         History limited by: Uncooperative.    The patient presents for epigastric and generalized abdominal pain.  He was recently admitted for this.  He has had 3 ER visits in the last 2 weeks for similar pain issues.  No prior visits for pain at this time.  When asked about his history he says please look at my chart.                  [place] Coma Scale Score: 15                  Patient History   Past Medical History:   Diagnosis Date    Cataract     Dorsalgia, unspecified 11/12/2015    Acute back pain    Pain in leg, unspecified 11/12/2015    Leg pain     Past Surgical History:   Procedure Laterality Date    OTHER SURGICAL HISTORY  11/12/2015    Incisional Hernia Repair     Family History   Problem Relation Name Age of Onset    Other (CVA) Mother      Hypertension Mother      Other (CVA) Father      Hypertension Father       Social History     Tobacco Use    Smoking status: Every Day     Packs/day: 1     Types: Cigarettes    Smokeless tobacco: Never   Vaping Use    Vaping Use: Never used   Substance Use Topics    Alcohol use: Never    Drug use: Never       Physical Exam   ED Triage Vitals [01/08/24 0427]   Temp Heart Rate Resp BP   36.7 °C (98 °F) 76 16 167/81      SpO2 Temp Source Heart Rate Source Patient Position   98 % Oral -- --      BP Location FiO2 (%)     -- --       Physical Exam  Vitals and nursing note reviewed.   Constitutional:       General: He is not in acute distress.     Appearance: He is well-developed.   HENT:      Head: Normocephalic and atraumatic.   Eyes:      Conjunctiva/sclera: Conjunctivae normal.   Cardiovascular:      Rate and Rhythm: Normal rate and regular rhythm.      Heart sounds: No murmur heard.  Pulmonary:      Effort: Pulmonary effort is normal. No respiratory distress.      Breath sounds: Normal breath sounds.   Abdominal:      Palpations: Abdomen is soft.      Tenderness: There is  generalized abdominal tenderness and tenderness in the epigastric area. There is guarding. There is no rebound.   Musculoskeletal:         General: No swelling.      Cervical back: Neck supple.   Skin:     General: Skin is warm and dry.      Capillary Refill: Capillary refill takes less than 2 seconds.   Neurological:      Mental Status: He is alert.   Psychiatric:         Mood and Affect: Mood normal.         ED Course & MDM   ED Course as of 01/09/24 2254 Mon Jan 08, 2024   0757 CT abdomen pelvis w IV contrast [KA]   1349 Patient CBC with shows a microcytosis 12,000, chemistries unremarkable, lipase normal lactate normal troponin negative UA negative urine drug screen positive for cannabis, abdominal CT scan shows some element of gastritis, patient was given IV fluids and intravenous Protonix, IM haloperidol, repeat eval feeling better, will be discharged home with a PPI, Reglan, advised to refrain from cannabis, advised outpatient follow with gastroenterology with strict return precaution. [MT]      ED Course User Index  [KA] Elsy Moreno PA-C  [MT] Tiburcio Joya MD         Diagnoses as of 01/09/24 2254   Abdominal pain, generalized   Generalized abdominal pain   Nausea and vomiting, unspecified vomiting type   Gastritis without bleeding, unspecified chronicity, unspecified gastritis type       Medical Decision Making  Patient says he has pain all over but primarily in his epigastrium.  CT scan pending at time of signout to Dr. Joya.    EKG interpreted by myself.  Normal sinus rhythm at a rate of 82 bpm.  Normal intervals.  Normal axis.  No signs of acute ischemia.      Procedure  Procedures     Karlo Aguilar MD  01/08/24 0600       Karlo Aguilar MD  01/09/24 2254

## 2024-01-08 NOTE — Clinical Note
Matthieu Solis was seen and treated in our emergency department on 1/8/2024.  He may return to work on 01/12/2024.       If you have any questions or concerns, please don't hesitate to call.      Karlo Aguilar MD

## 2024-01-23 ENCOUNTER — OFFICE VISIT (OUTPATIENT)
Dept: PRIMARY CARE | Facility: CLINIC | Age: 64
End: 2024-01-23
Payer: COMMERCIAL

## 2024-01-23 VITALS
BODY MASS INDEX: 22.82 KG/M2 | DIASTOLIC BLOOD PRESSURE: 76 MMHG | HEART RATE: 98 BPM | SYSTOLIC BLOOD PRESSURE: 159 MMHG | WEIGHT: 137 LBS | HEIGHT: 65 IN

## 2024-01-23 DIAGNOSIS — Z00.00 WELL ADULT EXAM: Primary | ICD-10-CM

## 2024-01-23 PROCEDURE — 3077F SYST BP >= 140 MM HG: CPT | Performed by: FAMILY MEDICINE

## 2024-01-23 PROCEDURE — 3078F DIAST BP <80 MM HG: CPT | Performed by: FAMILY MEDICINE
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
## 2024-02-03 ENCOUNTER — LAB (OUTPATIENT)
Dept: LAB | Facility: LAB | Age: 64
End: 2024-02-03
Payer: COMMERCIAL

## 2024-02-03 DIAGNOSIS — Z00.00 WELL ADULT EXAM: ICD-10-CM

## 2024-02-03 LAB
ALBUMIN SERPL BCP-MCNC: 4 G/DL (ref 3.4–5)
ALP SERPL-CCNC: 92 U/L (ref 33–136)
ALT SERPL W P-5'-P-CCNC: 16 U/L (ref 10–52)
ANION GAP SERPL CALC-SCNC: 10 MMOL/L (ref 10–20)
AST SERPL W P-5'-P-CCNC: 14 U/L (ref 9–39)
BILIRUB SERPL-MCNC: 0.4 MG/DL (ref 0–1.2)
BUN SERPL-MCNC: 15 MG/DL (ref 6–23)
CALCIUM SERPL-MCNC: 9.6 MG/DL (ref 8.6–10.6)
CHLORIDE SERPL-SCNC: 105 MMOL/L (ref 98–107)
CHOLEST SERPL-MCNC: 159 MG/DL (ref 0–199)
CHOLESTEROL/HDL RATIO: 3.1
CO2 SERPL-SCNC: 26 MMOL/L (ref 21–32)
CREAT SERPL-MCNC: 0.82 MG/DL (ref 0.5–1.3)
EGFRCR SERPLBLD CKD-EPI 2021: >90 ML/MIN/1.73M*2
ERYTHROCYTE [DISTWIDTH] IN BLOOD BY AUTOMATED COUNT: 14 % (ref 11.5–14.5)
GLUCOSE SERPL-MCNC: 88 MG/DL (ref 74–99)
HCT VFR BLD AUTO: 36.3 % (ref 41–52)
HDLC SERPL-MCNC: 51 MG/DL
HGB BLD-MCNC: 11.5 G/DL (ref 13.5–17.5)
HIV 1+2 AB+HIV1 P24 AG SERPL QL IA: NONREACTIVE
LDLC SERPL CALC-MCNC: 100 MG/DL
MCH RBC QN AUTO: 30.4 PG (ref 26–34)
MCHC RBC AUTO-ENTMCNC: 31.7 G/DL (ref 32–36)
MCV RBC AUTO: 96 FL (ref 80–100)
NON HDL CHOLESTEROL: 108 MG/DL (ref 0–149)
NRBC BLD-RTO: 0 /100 WBCS (ref 0–0)
PLATELET # BLD AUTO: 378 X10*3/UL (ref 150–450)
POTASSIUM SERPL-SCNC: 4.2 MMOL/L (ref 3.5–5.3)
PROT SERPL-MCNC: 6.8 G/DL (ref 6.4–8.2)
PSA SERPL-MCNC: 4.37 NG/ML
RBC # BLD AUTO: 3.78 X10*6/UL (ref 4.5–5.9)
SODIUM SERPL-SCNC: 137 MMOL/L (ref 136–145)
TRIGL SERPL-MCNC: 39 MG/DL (ref 0–149)
TSH SERPL-ACNC: 0.34 MIU/L (ref 0.44–3.98)
VLDL: 8 MG/DL (ref 0–40)
WBC # BLD AUTO: 8.2 X10*3/UL (ref 4.4–11.3)

## 2024-02-03 PROCEDURE — 85027 COMPLETE CBC AUTOMATED: CPT

## 2024-02-03 PROCEDURE — 87389 HIV-1 AG W/HIV-1&-2 AB AG IA: CPT

## 2024-02-03 PROCEDURE — 87591 N.GONORRHOEAE DNA AMP PROB: CPT

## 2024-02-03 PROCEDURE — 36415 COLL VENOUS BLD VENIPUNCTURE: CPT

## 2024-02-03 PROCEDURE — 80061 LIPID PANEL: CPT

## 2024-02-03 PROCEDURE — 80053 COMPREHEN METABOLIC PANEL: CPT

## 2024-02-03 PROCEDURE — 87800 DETECT AGNT MULT DNA DIREC: CPT

## 2024-02-03 PROCEDURE — 84153 ASSAY OF PSA TOTAL: CPT

## 2024-02-03 PROCEDURE — 84443 ASSAY THYROID STIM HORMONE: CPT

## 2024-02-03 PROCEDURE — 87491 CHLMYD TRACH DNA AMP PROBE: CPT

## 2024-02-04 LAB
C TRACH RRNA SPEC QL NAA+PROBE: NEGATIVE
N GONORRHOEA DNA SPEC QL PROBE+SIG AMP: NEGATIVE

## 2024-02-07 ENCOUNTER — OFFICE VISIT (OUTPATIENT)
Dept: GASTROENTEROLOGY | Facility: CLINIC | Age: 64
End: 2024-02-07
Payer: COMMERCIAL

## 2024-02-07 VITALS
BODY MASS INDEX: 23.02 KG/M2 | DIASTOLIC BLOOD PRESSURE: 80 MMHG | TEMPERATURE: 99.5 F | WEIGHT: 138.34 LBS | RESPIRATION RATE: 18 BRPM | HEART RATE: 91 BPM | SYSTOLIC BLOOD PRESSURE: 147 MMHG | OXYGEN SATURATION: 94 %

## 2024-02-07 DIAGNOSIS — R93.3 ABNORMAL CT SCAN, ESOPHAGUS: Primary | ICD-10-CM

## 2024-02-07 DIAGNOSIS — Z72.0 TOBACCO USE: ICD-10-CM

## 2024-02-07 DIAGNOSIS — R19.7 NAUSEA VOMITING AND DIARRHEA: ICD-10-CM

## 2024-02-07 DIAGNOSIS — Z80.0 FAMILY HISTORY OF COLON CANCER: ICD-10-CM

## 2024-02-07 DIAGNOSIS — R11.2 NAUSEA VOMITING AND DIARRHEA: ICD-10-CM

## 2024-02-07 DIAGNOSIS — R10.9 ABDOMINAL PAIN, UNSPECIFIED ABDOMINAL LOCATION: ICD-10-CM

## 2024-02-07 DIAGNOSIS — R93.3 ABNORMAL CT SCAN, STOMACH: ICD-10-CM

## 2024-02-07 PROCEDURE — 3079F DIAST BP 80-89 MM HG: CPT | Performed by: NURSE PRACTITIONER

## 2024-02-07 PROCEDURE — 99204 OFFICE O/P NEW MOD 45 MIN: CPT | Performed by: NURSE PRACTITIONER

## 2024-02-07 PROCEDURE — 3077F SYST BP >= 140 MM HG: CPT | Performed by: NURSE PRACTITIONER

## 2024-02-07 ASSESSMENT — ENCOUNTER SYMPTOMS
HALLUCINATIONS: 0
ARTHRALGIAS: 0
EYE PAIN: 0
NUMBNESS: 0
SORE THROAT: 0
FLANK PAIN: 0
FATIGUE: 0
MYALGIAS: 0
AGITATION: 0
DIAPHORESIS: 0
NERVOUS/ANXIOUS: 0
JOINT SWELLING: 0
BACK PAIN: 0
WEAKNESS: 0
HEADACHES: 0
SHORTNESS OF BREATH: 0
WHEEZING: 0
LIGHT-HEADEDNESS: 0
COUGH: 0
DYSURIA: 0
ADENOPATHY: 0
FEVER: 0
CHILLS: 0
HEMATURIA: 0
PALPITATIONS: 0
PHOTOPHOBIA: 0
FREQUENCY: 0
DIZZINESS: 0

## 2024-02-07 NOTE — PROGRESS NOTES
"Subjective   Patient ID: Matthieu Solis is a 63 y.o. male who presents for ED follow up.     This is a 63 year old AAM with history of tobacco/marijuana use, H pylori gastritis, GERD, gout, and HTN  who is presenting to the GI clinic for an initial visit.     History per pt and review of EMR    Pt states he is not taking any prescription medications at this time.     Of note, was previously following with Dr. Weiss with  GI and was treated for H pylori gastritis.     Also of note, pt history somewhat vague and does not corroborate with recent ED notes.     Briefly, on 1/2/24 he was admitted to [facility] through the ED for what he describes as abdominal pain, nausea/vomiting, and diarrhea which started several days before admission. CT A/P at that time noted findings possibly representing enteritis. Seen by GI where presentation was felt to be secondary to viral gastroenteritis. Cdiff PCR, stool pathogen panel, and H pylori stool antigen at that time was negative. Also tested negative for COVID-19, influenza, and RSV.     On 1/8/24 he was again seen in the [facility] ED for what he describes as abdominal pain, nausea/vomiting, and diarrhea. CT A/P at that time noted evidence of wall thickening of the distal pylorus and esophagus. He was referred to GI.     The above stated GI complaints have resolved.      Denies unintentional weight loss,  anorexia, heartburn, regurgitation, early satiety, odynophagia, dysphagia, constipation, hematemesis, hematochezia, or melena.     Past medical history:   See above     Past surgical history:   Laparoscopic bilateral inguinal hernia repair with mesh (2015 )     Family history:   Brother- colon cancer     Social history:   Smokes 1/2-1/3 PPD of cigarettes   Smokes marijuana a few times per week   Denies current use of alcohol; \"drank a lot\" when he was younger    (this is his third wife); admits to recent extramarital affairs with 2 women   Works third shift (tool and " die)     EGD 2020 [facility]: H pylori gastritis   Colonoscopy 2020 [facility]: hyperplastic polyps removed and hemorrhoids     Colonoscopy 2021 [facility]: 4 small hyperplastic polyps removed from the rectum and diverticulosis         Review of Systems   Constitutional:  Negative for chills, diaphoresis, fatigue and fever.   HENT:  Negative for congestion, ear pain, hearing loss, sneezing and sore throat.    Eyes:  Negative for photophobia, pain and visual disturbance.   Respiratory:  Negative for cough, shortness of breath and wheezing.    Cardiovascular:  Negative for chest pain, palpitations and leg swelling.   Endocrine: Negative for cold intolerance and heat intolerance.   Genitourinary:  Negative for dysuria, flank pain, frequency and hematuria.   Musculoskeletal:  Negative for arthralgias, back pain, gait problem, joint swelling and myalgias.   Skin:  Negative for rash.   Neurological:  Negative for dizziness, syncope, weakness, light-headedness, numbness and headaches.   Hematological:  Negative for adenopathy.   Psychiatric/Behavioral:  Negative for agitation and hallucinations. The patient is not nervous/anxious.        /80 (BP Location: Left arm, Patient Position: Sitting, BP Cuff Size: Adult)   Pulse 91   Temp 37.5 °C (99.5 °F) (Temporal)   Resp 18   Wt 62.8 kg (138 lb 5.4 oz)   SpO2 94%   BMI 23.02 kg/m²      Objective     Allergies   Allergen Reactions    Cinnamon Unknown     Cinnamon    Penicillins Unknown     No current outpatient medications on file.     No current facility-administered medications for this visit.      Physical Exam  Constitutional:       General: He is not in acute distress.     Appearance: Normal appearance.   HENT:      Head: Normocephalic and atraumatic.   Eyes:      Conjunctiva/sclera: Conjunctivae normal.   Cardiovascular:      Rate and Rhythm: Normal rate and regular rhythm.      Heart sounds: No murmur heard.     No gallop.   Pulmonary:      Effort: Pulmonary effort is normal.       Breath sounds: Normal breath sounds.   Abdominal:      General: Bowel sounds are normal. There is no distension.      Tenderness: There is no abdominal tenderness. There is no guarding.   Musculoskeletal:         General: No swelling or deformity. Normal range of motion.      Cervical back: Normal range of motion. No rigidity.   Skin:     General: Skin is warm and dry.      Coloration: Skin is not jaundiced.      Findings: No lesion or rash.   Neurological:      General: No focal deficit present.      Mental Status: He is alert and oriented to person, place, and time.   Psychiatric:         Mood and Affect: Mood normal.         Assessment/Plan   Problem List Items Addressed This Visit       Abdominal pain     Other Visit Diagnoses       Abnormal CT scan, esophagus    -  Primary    Relevant Orders    EGD    Abnormal CT scan, stomach        Relevant Orders    EGD    Nausea vomiting and diarrhea        Tobacco use        Family history of colon cancer               Esophageal/gastric wall thickening on CT: consider esophagitis, gastritis, PUD, and malignancy with the latter less likely. H pylori gastritis  less likely given recent negative H pylori stool antigen.   - will proceed with EGD    2. Acute abdominal pain, nausea/vomiting, diarrhea: resolved. Suspect viral enteritis given recent CT findings and negative stool studies.     3. Colorectal cancer screening:  - recommend screening colonoscopy in 2026     4. Family history of colon cancer in [place]:  - recommend colonoscopies at least every 5 years while screening    5. Tobacco use:  - recommend tobacco cessation     6. Follow up:  - will be based upon the above

## 2024-02-07 NOTE — PATIENT INSTRUCTIONS
Thanks for coming to the GI clinic.     I would like you to get an EGD. Please call [phone] to schedule.     I recommend a colonoscopy in 2026 given your family history of colon cancer.     Try to quit using tobacco for your overall health.     Follow up will be based upon the above.

## 2024-02-12 ENCOUNTER — TELEMEDICINE (OUTPATIENT)
Dept: BEHAVIORAL HEALTH | Facility: CLINIC | Age: 64
End: 2024-02-12
Payer: COMMERCIAL

## 2024-02-12 DIAGNOSIS — F43.10 PTSD (POST-TRAUMATIC STRESS DISORDER): ICD-10-CM

## 2024-02-12 DIAGNOSIS — F41.9 ANXIETY: ICD-10-CM

## 2024-02-12 PROCEDURE — 99213 OFFICE O/P EST LOW 20 MIN: CPT | Performed by: PSYCHIATRY & NEUROLOGY

## 2024-02-12 ASSESSMENT — ENCOUNTER SYMPTOMS
NEUROLOGICAL NEGATIVE: 1
DYSPHORIC MOOD: 1
NERVOUS/ANXIOUS: 1

## 2024-02-12 NOTE — PROGRESS NOTES
Subjective   Patient ID: Matthieu Solis is a 63 y.o. male who presents for No chief complaint on file. Hopefully, doing better.     The patient engaged in a telehealth session via Epic audio visual or phone with this provider practicing within the [facility]. The identity of the patient was verified by their date of birth and last four digits of their social security number. The provider demonstrated that confidentially was preserved at their location. The patient was informed that they were responsible for ensuring confidentially was secured at their location. The patient's location was documented for emergency purposes. The patient was informed of the necessary steps that would occur if an emergency was to occur or technology failed during session.     The patient is alert, fully oriented, language is intact, and recent and remote memory intact. The patient denies any suicidal or homicidal ideation or plans. The patient presents with anxiety and depressive features without manic or psychotic symptoms. Thought is logical and clear. Judgment and insight are limited. The patient continues to have relationship issues and financial stresses. The patient reports that he has a number of relationships.     The patient reports that he has substantial personal and work stresses.         Review of Systems   Neurological: Negative.         The patient is alert, fully oriented, language is intact, and recent and remote memory intact. The patient denies any suicidal or homicidal ideation or plans. The patient presents with anxiety and depressive features without manic or psychotic symptoms. Thought is logical and clear. Judgment and insight are limited. The patient continues to have relationship issues and financial stresses. The patient reports that he has a number of relationships.      Psychiatric/Behavioral:  Positive for dysphoric mood. The patient is nervous/anxious.         The patient is alert, fully oriented,  language is intact, and recent and remote memory intact. The patient denies any suicidal or homicidal ideation or plans. The patient presents with anxiety and depressive features without manic or psychotic symptoms. Thought is logical and clear. Judgment and insight are limited. The patient continues to have relationship issues and financial stresses. The patient reports that he has a number of relationships.      All other systems reviewed and are negative.      Objective   Physical Exam  Neurological:      General: No focal deficit present.      Mental Status: He is alert and oriented to person, place, and time. Mental status is at baseline.      Comments: The patient is alert, fully oriented, language is intact, and recent and remote memory intact. The patient denies any suicidal or homicidal ideation or plans. The patient presents with anxiety and depressive features without manic or psychotic symptoms. Thought is logical and clear. Judgment and insight are limited. The patient continues to have relationship issues and financial stresses. The patient reports that he has a number of relationships.      Psychiatric:      Comments: The patient is alert, fully oriented, language is intact, and recent and remote memory intact. The patient denies any suicidal or homicidal ideation or plans. The patient presents with anxiety and depressive features without manic or psychotic symptoms. Thought is logical and clear. Judgment and insight are limited. The patient continues to have relationship issues and financial stresses. The patient reports that he has a number of relationships.            Lab Review:       Assessment/Plan   We are recommending you start with psychotherapy with psychology as discussed relative to your concerns about managing stress and relationship issues. You can return after working in therapy if needed as discussed as may be indicated for further consideration for possible psychopharmacotherapy if  indicated. I have provided a referral to psychology as discussed at your previous appointment and again today and you have agreed with this plan.

## 2024-02-15 DIAGNOSIS — R79.89 ABNORMAL TSH: Primary | ICD-10-CM

## 2024-02-15 DIAGNOSIS — R97.20 ELEVATED PSA: ICD-10-CM

## 2024-03-20 ENCOUNTER — HOSPITAL ENCOUNTER (OUTPATIENT)
Dept: RADIOLOGY | Facility: CLINIC | Age: 64
Discharge: HOME | End: 2024-03-20
Payer: COMMERCIAL

## 2024-03-20 ENCOUNTER — OFFICE VISIT (OUTPATIENT)
Dept: ORTHOPEDIC SURGERY | Facility: CLINIC | Age: 64
End: 2024-03-20
Payer: COMMERCIAL

## 2024-03-20 DIAGNOSIS — M79.644 PAIN OF RIGHT MIDDLE FINGER: ICD-10-CM

## 2024-03-20 DIAGNOSIS — S62.652B: Primary | ICD-10-CM

## 2024-03-20 PROCEDURE — 26720 TREAT FINGER FRACTURE EACH: CPT | Performed by: ORTHOPAEDIC SURGERY

## 2024-03-20 PROCEDURE — 73140 X-RAY EXAM OF FINGER(S): CPT | Mod: RIGHT SIDE | Performed by: RADIOLOGY

## 2024-03-20 PROCEDURE — 73140 X-RAY EXAM OF FINGER(S): CPT | Mod: RT

## 2024-03-20 RX ORDER — DOXYCYCLINE 100 MG/1
100 CAPSULE ORAL 2 TIMES DAILY
Qty: 20 CAPSULE | Refills: 0 | Status: SHIPPED | OUTPATIENT
Start: 2024-03-20 | End: 2024-04-04

## 2024-03-20 NOTE — PROGRESS NOTES
Subjective    Patient ID: Matthieu Solis is a 63 y.o. male.    Chief Complaint: Pain of the Right Middle Finger     Last Surgery: No surgery found  Last Surgery Date: No surgery found    SHAYNE  Is a 63-year-old right-hand-dominant male who comes in after sustaining an injury to his right middle finger 1 day earlier.  The injury occurred at work.  He had sustained a fracture at the middle phalanx of his middle finger.  He also sustained a very superficial abrasion.  He was seen at urgent care.  He was then referred here.  Patient denies numbness and paresthesias.    Objective   Ortho Exam  Patient is in no acute distress.  Exam of his right hand and wrist reveals he has a superficial abrasion overlying the right middle finger middle phalanx.  He has appropriate function of his EDC.  FDS and FDP also function.  He is tender patient directly over the middle phalanx.  He has moderate swelling.  There is no evidence of infection.    Image Results:  XR fingers right 2+ views  Narrative: STUDY:  Finger Radiographs; 3- at 12:22 PM  INDICATION:  Pain.  COMPARISON:  None Available.  ACCESSION NUMBER(S):  [id number]  ORDERING CLINICIAN:  CONCHA RIVERA  TECHNIQUE:  Three view(s) of the right third  finger.  FINDINGS:    There is a fracture the distal aspect of the middle phalanx of the  right third finger.  There is mild dorsal angulation of the fracture  fragment.  Impression: Fracture the middle phalanx of the right third finger.  Signed by Aurelio Flores MD      Assessment/Plan   Encounter Diagnoses:  Nondisplaced fracture of middle phalanx of right middle finger, initial encounter for open fracture    Orders Placed This Encounter    doxycycline (Vibramycin) 100 mg capsule     Patient has a mildly displaced right middle finger middle phalanx fracture.  I explained the patient this can be treated conservatively.  He was prescribed doxycycline because of the abrasion.  He otherwise will wear a static splint.  He will  follow-up in 3 weeks with x-rays of his right middle finger.

## 2024-03-20 NOTE — LETTER
March 20, 2024     Patient: Matthieu Solis   YOB: 1960   Date of Visit: 3/20/2024       To Whom It May Concern:    It is my medical opinion that Matthieu Solis should remain out of work until 3/22/24 .    If you have any questions or concerns, please don't hesitate to call.         Sincerely,        Ovidio Barros MD    CC: No Recipients

## 2024-04-03 ENCOUNTER — ANESTHESIA EVENT (OUTPATIENT)
Dept: GASTROENTEROLOGY | Facility: HOSPITAL | Age: 64
End: 2024-04-03
Payer: COMMERCIAL

## 2024-04-03 RX ORDER — SODIUM CHLORIDE, SODIUM LACTATE, POTASSIUM CHLORIDE, CALCIUM CHLORIDE 600; 310; 30; 20 MG/100ML; MG/100ML; MG/100ML; MG/100ML
20 INJECTION, SOLUTION INTRAVENOUS CONTINUOUS
Status: CANCELLED | OUTPATIENT
Start: 2024-04-03

## 2024-04-03 ASSESSMENT — ENCOUNTER SYMPTOMS
DIARRHEA: 0
NAUSEA: 0
ABDOMINAL PAIN: 0
RECTAL PAIN: 0
VOMITING: 0
ABDOMINAL DISTENTION: 0
TROUBLE SWALLOWING: 0
FEVER: 0
UNEXPECTED WEIGHT CHANGE: 0
ANAL BLEEDING: 0
SHORTNESS OF BREATH: 0
CHILLS: 0
CONSTIPATION: 0
COLOR CHANGE: 0
BLOOD IN STOOL: 0

## 2024-04-03 NOTE — H&P
History Of Present Illness  Matthieu Solis is a 63 y.o. male what had been having symptoms of acute abdominal pain, N/V and diarrhea for which he got a CT scan that showed a fluid distended stomach with suggestion of thickening of the distal pylorus without associated stranding.There was also small hiatal hernia with mild circumferential thickening suggestive of reflux esophagitis. No evidence of bowel obstruction. He has h/o h.pylori gastritis with most recent stool antigen negative. Acute abdominal pain and symptoms resolved, so was thought due to viral enteritis. He is referred for EGD for follow-up.      Past Medical History  Past Medical History:   Diagnosis Date    Cataract     Dorsalgia, unspecified 11/12/2015    Acute back pain    Pain in leg, unspecified 11/12/2015    Leg pain       Surgical History  Past Surgical History:   Procedure Laterality Date    OTHER SURGICAL HISTORY  11/12/2015    Incisional Hernia Repair        Social History  He reports that he has been smoking cigarettes. He has been smoking an average of 1 pack per day. He has never used smokeless tobacco. He reports that he does not drink alcohol and does not use drugs.    Family History  Family History   Problem Relation Name Age of Onset    Other (CVA) Mother      Hypertension Mother      Other (CVA) Father      Hypertension Father          Allergies  Cinnamon and Penicillins    Review of Systems   Constitutional:  Negative for chills, fever and unexpected weight change.   HENT:  Negative for trouble swallowing.    Respiratory:  Negative for shortness of breath.    Cardiovascular:  Negative for chest pain.   Gastrointestinal:  Negative for abdominal distention, abdominal pain, anal bleeding, blood in stool, constipation, diarrhea, nausea, rectal pain and vomiting.   Skin:  Negative for color change.          Physical Exam  Vitals reviewed.   Constitutional:       General: He is awake.      Appearance: Normal appearance.   HENT:      Head:  Normocephalic and atraumatic.      Nose: Nose normal.      Mouth/Throat:      Mouth: Mucous membranes are moist.   Eyes:      Pupils: Pupils are equal, round, and reactive to light.   Cardiovascular:      Rate and Rhythm: Normal rate.   Pulmonary:      Effort: Pulmonary effort is normal.   Neurological:      Mental Status: He is alert and oriented to person, place, and time. Mental status is at baseline.   Psychiatric:         Attention and Perception: Attention and perception normal.         Mood and Affect: Mood normal.         Behavior: Behavior normal.            Last Recorded Vitals  Blood pressure 161/88, pulse 74, resp. rate 21, weight 65.5 kg (144 lb 6.4 oz).    Relevant Results  Reviewed chart       Assessment/Plan   Proceed with EGD         Katherine Warner MD

## 2024-04-03 NOTE — ANESTHESIA PREPROCEDURE EVALUATION
Patient: Matthieu Solis    Procedure Information       Date/Time: 04/04/24 0730    Scheduled providers: Katherine Warner MD; Zoltan Pérez MD; DHEERAJ Hunt    Procedure: EGD    Location: [facility]            Relevant Problems   Cardiac   (+) Essential hypertension      Pulmonary   (+) Lung nodules      Neuro   (+) Anxiety      Musculoskeletal   (+) Lumbar spondylosis      ID   (+) Helicobacter positive gastritis       Clinical information reviewed:    Allergies                 Past Medical History:   Diagnosis Date    Cataract     GERD (gastroesophageal reflux disease)     Gout     HTN (hypertension)       Past Surgical History:   Procedure Laterality Date    COLONOSCOPY      ESOPHAGOGASTRODUODENOSCOPY      HERNIA REPAIR  2015    Inguinal     Social History     Tobacco Use    Smoking status: Every Day     Packs/day: 1     Types: Cigarettes    Smokeless tobacco: Never   Vaping Use    Vaping Use: Never used   Substance Use Topics    Alcohol use: Never    Drug use: Never      Current Outpatient Medications   Medication Instructions    doxycycline (VIBRAMYCIN) 100 mg, oral, 2 times daily, Take with at least 8 ounces (large glass) of water, do not lie down for 30 minutes after      Allergies   Allergen Reactions    Cinnamon Unknown     Cinnamon    Penicillins Unknown        Chemistry    Lab Results   Component Value Date/Time     02/03/2024 0904    K 4.2 02/03/2024 0904     02/03/2024 0904    CO2 26 02/03/2024 0904    BUN 15 02/03/2024 0904    CREATININE 0.82 02/03/2024 0904    Lab Results   Component Value Date/Time    CALCIUM 9.6 02/03/2024 0904    ALKPHOS 92 02/03/2024 0904    AST 14 02/03/2024 0904    ALT 16 02/03/2024 0904    BILITOT 0.4 02/03/2024 0904          Lab Results   Component Value Date    HGBA1C 5.2 01/03/2024     Lab Results   Component Value Date/Time    WBC 8.2 02/03/2024 0904    HGB 11.5 (L) 02/03/2024 0904    HCT 36.3 (L) 02/03/2024 0904     02/03/2024  "0904     Lab Results   Component Value Date/Time    PROTIME 13.4 (H) 09/21/2020 2005    INR 1.1 09/21/2020 2005     No results found for: \"ABORH\"  No results found for this or any previous visit (from the past 4464 hour(s)).  No results found for this or any previous visit from the past 1095 days.       Visit Vitals  /88   Pulse 74   Resp 21   Wt 65.5 kg (144 lb 6.4 oz)   BMI 24.03 kg/m²   Smoking Status Every Day   BSA 1.73 m²     NPO/Void Status  Carbohydrate Drink Given Prior to Surgery? : N  Date of Last Liquid: 04/04/24  Time of Last Liquid: 0600  Date of Last Solid: 04/03/24  Time of Last Solid: 2230  Last Intake Type: Clear fluids         Physical Exam    Airway  Mallampati: III  TM distance: >3 FB  Neck ROM: full     Cardiovascular   Rhythm: regular  Rate: normal     Dental - normal exam     Pulmonary   Breath sounds clear to auscultation     Abdominal - normal exam              Anesthesia Plan    History of general anesthesia?: yes  History of complications of general anesthesia?: no    ASA 2     MAC   (With standard ASA monitoring.)  intravenous induction   Anesthetic plan and risks discussed with patient.    Plan discussed with CRNA and CAA.      "

## 2024-04-04 ENCOUNTER — HOSPITAL ENCOUNTER (OUTPATIENT)
Dept: GASTROENTEROLOGY | Facility: HOSPITAL | Age: 64
Setting detail: OUTPATIENT SURGERY
Discharge: HOME | End: 2024-04-04
Payer: COMMERCIAL

## 2024-04-04 ENCOUNTER — ANESTHESIA (OUTPATIENT)
Dept: GASTROENTEROLOGY | Facility: HOSPITAL | Age: 64
End: 2024-04-04
Payer: COMMERCIAL

## 2024-04-04 VITALS
RESPIRATION RATE: 16 BRPM | DIASTOLIC BLOOD PRESSURE: 80 MMHG | WEIGHT: 144.4 LBS | OXYGEN SATURATION: 96 % | SYSTOLIC BLOOD PRESSURE: 167 MMHG | HEART RATE: 85 BPM | TEMPERATURE: 97.7 F | BODY MASS INDEX: 24.03 KG/M2

## 2024-04-04 DIAGNOSIS — R93.3 ABNORMAL CT SCAN, ESOPHAGUS: ICD-10-CM

## 2024-04-04 DIAGNOSIS — R93.3 ABNORMAL CT SCAN, STOMACH: ICD-10-CM

## 2024-04-04 PROCEDURE — 87900 PHENOTYPE INFECT AGENT DRUG: CPT | Performed by: INTERNAL MEDICINE

## 2024-04-04 PROCEDURE — 88305 TISSUE EXAM BY PATHOLOGIST: CPT | Performed by: PATHOLOGY

## 2024-04-04 PROCEDURE — 3700000001 HC GENERAL ANESTHESIA TIME - INITIAL BASE CHARGE

## 2024-04-04 PROCEDURE — 7100000009 HC PHASE TWO TIME - INITIAL BASE CHARGE

## 2024-04-04 PROCEDURE — 88342 IMHCHEM/IMCYTCHM 1ST ANTB: CPT | Performed by: PATHOLOGY

## 2024-04-04 PROCEDURE — A43239 PR EDG TRANSORAL BIOPSY SINGLE/MULTIPLE: Performed by: ANESTHESIOLOGY

## 2024-04-04 PROCEDURE — A43239 PR EDG TRANSORAL BIOPSY SINGLE/MULTIPLE: Performed by: NURSE ANESTHETIST, CERTIFIED REGISTERED

## 2024-04-04 PROCEDURE — 7100000010 HC PHASE TWO TIME - EACH INCREMENTAL 1 MINUTE

## 2024-04-04 PROCEDURE — 3700000002 HC GENERAL ANESTHESIA TIME - EACH INCREMENTAL 1 MINUTE

## 2024-04-04 PROCEDURE — 2500000004 HC RX 250 GENERAL PHARMACY W/ HCPCS (ALT 636 FOR OP/ED): Performed by: NURSE ANESTHETIST, CERTIFIED REGISTERED

## 2024-04-04 PROCEDURE — 43239 EGD BIOPSY SINGLE/MULTIPLE: CPT | Performed by: INTERNAL MEDICINE

## 2024-04-04 PROCEDURE — 0753T DGTZ GLS MCRSCP SLD LEVEL IV: CPT | Mod: TC,AHULAB | Performed by: INTERNAL MEDICINE

## 2024-04-04 RX ORDER — FENTANYL CITRATE 50 UG/ML
INJECTION, SOLUTION INTRAMUSCULAR; INTRAVENOUS AS NEEDED
Status: DISCONTINUED | OUTPATIENT
Start: 2024-04-04 | End: 2024-04-04

## 2024-04-04 RX ORDER — MIDAZOLAM HYDROCHLORIDE 1 MG/ML
INJECTION INTRAMUSCULAR; INTRAVENOUS AS NEEDED
Status: DISCONTINUED | OUTPATIENT
Start: 2024-04-04 | End: 2024-04-04

## 2024-04-04 RX ORDER — PROPOFOL 10 MG/ML
INJECTION, EMULSION INTRAVENOUS AS NEEDED
Status: DISCONTINUED | OUTPATIENT
Start: 2024-04-04 | End: 2024-04-04

## 2024-04-04 RX ORDER — FLUMAZENIL 0.1 MG/ML
0.2 INJECTION INTRAVENOUS ONCE AS NEEDED
Status: DISCONTINUED | OUTPATIENT
Start: 2024-04-04 | End: 2024-04-05 | Stop reason: HOSPADM

## 2024-04-04 RX ORDER — NALOXONE HYDROCHLORIDE 1 MG/ML
0.2 INJECTION INTRAMUSCULAR; INTRAVENOUS; SUBCUTANEOUS EVERY 5 MIN PRN
Status: DISCONTINUED | OUTPATIENT
Start: 2024-04-04 | End: 2024-04-05 | Stop reason: HOSPADM

## 2024-04-04 RX ORDER — ONDANSETRON HYDROCHLORIDE 2 MG/ML
4 INJECTION, SOLUTION INTRAVENOUS ONCE AS NEEDED
Status: DISCONTINUED | OUTPATIENT
Start: 2024-04-04 | End: 2024-04-05 | Stop reason: HOSPADM

## 2024-04-04 RX ADMIN — SODIUM CHLORIDE, POTASSIUM CHLORIDE, SODIUM LACTATE AND CALCIUM CHLORIDE: 600; 310; 30; 20 INJECTION, SOLUTION INTRAVENOUS at 07:48

## 2024-04-04 RX ADMIN — MIDAZOLAM HYDROCHLORIDE 2 MG: 1 INJECTION INTRAMUSCULAR; INTRAVENOUS at 07:49

## 2024-04-04 RX ADMIN — FENTANYL CITRATE 100 MCG: 50 INJECTION, SOLUTION INTRAMUSCULAR; INTRAVENOUS at 07:52

## 2024-04-04 RX ADMIN — PROPOFOL 60 MG: 10 INJECTION, EMULSION INTRAVENOUS at 07:53

## 2024-04-04 RX ADMIN — PROPOFOL 20 MG: 10 INJECTION, EMULSION INTRAVENOUS at 07:55

## 2024-04-04 ASSESSMENT — PAIN SCALES - GENERAL
PAINLEVEL_OUTOF10: 0 - NO PAIN

## 2024-04-04 ASSESSMENT — PAIN - FUNCTIONAL ASSESSMENT
PAIN_FUNCTIONAL_ASSESSMENT: UNABLE TO SELF-REPORT
PAIN_FUNCTIONAL_ASSESSMENT: 0-10
PAIN_FUNCTIONAL_ASSESSMENT: UNABLE TO SELF-REPORT
PAIN_FUNCTIONAL_ASSESSMENT: 0-10

## 2024-04-04 NOTE — PERIOPERATIVE NURSING NOTE
0802 Pt to [facility] on nasal cannula. Bedside report give to this rn by or rn and aa.    0805 Pt family brought back to bedside.    0810 Dr Warner at bedside for update.     0815 Oxygen removed from pt. Pt on room air at this time.     0826 Pt sipping ginger ale at this time.     0845 Pt assisted with dressing with help of family. IV removed dressing applied. Discharge instructions provided to pt and family. Educated on medications, effects of anesthesia, and homecoming care. Pt and family verbalizing understanding of all instructions provided at this time. All questions and concerns answered. Pt given contact information for provider. Pt ambulated around room with steady gait without assistance.

## 2024-04-04 NOTE — DISCHARGE INSTRUCTIONS
Patient Instructions after an endoscopy      The anesthetics, sedatives or narcotics which were given to you today will be acting in your body for the next 24 hours, so you might feel a little sleepy or groggy.  This feeling should slowly wear off. Carefully read and follow the instructions.     You received sedation today:  - Do not drive or operate any machinery or power tools of any kind.   - No alcoholic beverages today, not even beer or wine.  - Do not make any important decisions or sign any legal documents.  - No over the counter medications that contain alcohol or that may cause drowsiness.  - Do not make any important decisions or sign any legal documents.    While it is common to experience mild to moderate abdominal distention, gas, or belching after your procedure, if any of these symptoms occur following discharge from the GI Lab or within one week of having your procedure, call the [facility] to be advised whether a visit to your nearest Urgent Care or Emergency Department is indicated.  Take this paper with you if you go.     - If you develop an allergic reaction to the medications that were given during your procedure such as difficulty breathing, rash, hives, severe nausea, vomiting or lightheadedness.- If you experience chest pain, shortness of breath, severe abdominal pain, fevers and chills.    -If you develop signs and symptoms of bleeding such as blood in your spit, if your stools turn black, tarry, or bloody    - If you have not urinated within 8 hours following your procedure.- If your IV site becomes painful, red, inflamed, or looks infected.

## 2024-04-04 NOTE — ANESTHESIA POSTPROCEDURE EVALUATION
Patient: Matthieu Solis    Procedure Summary       Date: 04/04/24 Room / Location: [facility]    Anesthesia Start: 0728 Anesthesia Stop: 0804    Procedure: EGD Diagnosis:       Abnormal CT scan, esophagus      Abnormal CT scan, stomach    Scheduled Providers: Katherine Warner MD; Zoltan Pérez MD; PAT Hunt-LYNDON; Cherry Senior RN Responsible Provider: Zoltan Pérez MD    Anesthesia Type: MAC ASA Status: 2            Anesthesia Type: MAC    Vitals Value Taken Time   /80 04/04/24 0845   Temp 36.5 °C (97.7 °F) 04/04/24 0845   Pulse 85 04/04/24 0845   Resp 16 04/04/24 0845   SpO2 96 % 04/04/24 0845       Anesthesia Post Evaluation    Patient location during evaluation: PACU  Patient participation: complete - patient participated  Level of consciousness: awake and alert  Pain management: adequate  Airway patency: patent  Cardiovascular status: acceptable and hemodynamically stable  Respiratory status: acceptable, spontaneous ventilation and nonlabored ventilation  Hydration status: acceptable  Postoperative Nausea and Vomiting: none        There were no known notable events for this encounter.

## 2024-04-05 PROBLEM — S62.652B: Status: ACTIVE | Noted: 2024-04-05

## 2024-04-10 ENCOUNTER — OFFICE VISIT (OUTPATIENT)
Dept: ORTHOPEDIC SURGERY | Facility: CLINIC | Age: 64
End: 2024-04-10
Payer: COMMERCIAL

## 2024-04-10 ENCOUNTER — HOSPITAL ENCOUNTER (OUTPATIENT)
Dept: RADIOLOGY | Facility: CLINIC | Age: 64
Discharge: HOME | End: 2024-04-10
Payer: COMMERCIAL

## 2024-04-10 VITALS — WEIGHT: 144.4 LBS | HEIGHT: 65 IN | BODY MASS INDEX: 24.06 KG/M2

## 2024-04-10 DIAGNOSIS — S62.652B: Primary | ICD-10-CM

## 2024-04-10 DIAGNOSIS — S62.652B: ICD-10-CM

## 2024-04-10 PROCEDURE — 73140 X-RAY EXAM OF FINGER(S): CPT | Mod: RT

## 2024-04-10 PROCEDURE — 99024 POSTOP FOLLOW-UP VISIT: CPT | Performed by: ORTHOPAEDIC SURGERY

## 2024-04-10 PROCEDURE — 73140 X-RAY EXAM OF FINGER(S): CPT | Mod: RIGHT SIDE | Performed by: RADIOLOGY

## 2024-04-10 NOTE — PROGRESS NOTES
Subjective    Patient ID: Matthieu Solis is a 63 y.o. male.    Chief Complaint: Follow-up of the Right Middle Finger     Last Surgery: No surgery found  Last Surgery Date: No surgery found    HPI  Patient comes in for follow-up of his right middle finger middle phalanx fracture.  This has been treated conservatively.  The patient states he has been continuing to work since the injury.    Objective   Ortho Exam  Patient is in no acute distress.  Exam of his right middle finger reveals his previous superficial laceration site has healed well.  There is no evidence of infection.  He still has moderate swelling.  He has appropriate function of his FDS, FDP and EDC.  He does have difficulty forming a tight fist secondary to both pain and swelling.  He still has mild tenderness directly over the fracture site.    Image Results:  X-rays of his right middle finger were personally reviewed today.  On the PA view he is maintained adequate alignment.  On the lateral view the fracture has gone into more extension compared to previous visit but is still in adequate alignment to be treated conservatively.    Assessment/Plan   Encounter Diagnoses:  Nondisplaced fracture of middle phalanx of right middle finger, initial encounter for open fracture    Orders Placed This Encounter    XR fingers right 2+ views     Patient will continue with conservative management of his right middle finger middle phalanx fracture.  The patient does have a splint he uses when at work.  He otherwise will follow-up in 3 weeks with x-rays of his right middle finger.

## 2024-04-11 LAB
LAB AP ASR DISCLAIMER: NORMAL
LABORATORY COMMENT REPORT: NORMAL
PATH REPORT.COMMENTS IMP SPEC: NORMAL
PATH REPORT.FINAL DX SPEC: NORMAL
PATH REPORT.GROSS SPEC: NORMAL
PATH REPORT.TOTAL CANCER: NORMAL

## 2024-04-23 ENCOUNTER — OFFICE VISIT (OUTPATIENT)
Dept: PRIMARY CARE | Facility: CLINIC | Age: 64
End: 2024-04-23
Payer: COMMERCIAL

## 2024-04-23 VITALS
HEIGHT: 65 IN | HEART RATE: 86 BPM | SYSTOLIC BLOOD PRESSURE: 147 MMHG | WEIGHT: 144 LBS | BODY MASS INDEX: 23.99 KG/M2 | DIASTOLIC BLOOD PRESSURE: 84 MMHG

## 2024-04-23 DIAGNOSIS — M54.50 LOW BACK PAIN, UNSPECIFIED BACK PAIN LATERALITY, UNSPECIFIED CHRONICITY, UNSPECIFIED WHETHER SCIATICA PRESENT: ICD-10-CM

## 2024-04-23 DIAGNOSIS — L98.9 LEG LESION: Primary | ICD-10-CM

## 2024-04-23 PROCEDURE — 99213 OFFICE O/P EST LOW 20 MIN: CPT | Performed by: FAMILY MEDICINE

## 2024-04-23 PROCEDURE — 3077F SYST BP >= 140 MM HG: CPT | Performed by: FAMILY MEDICINE

## 2024-04-23 PROCEDURE — 4004F PT TOBACCO SCREEN RCVD TLK: CPT | Performed by: FAMILY MEDICINE

## 2024-04-23 PROCEDURE — 3079F DIAST BP 80-89 MM HG: CPT | Performed by: FAMILY MEDICINE

## 2024-04-23 RX ORDER — IBUPROFEN 800 MG/1
800 TABLET ORAL 3 TIMES DAILY PRN
Qty: 60 TABLET | Refills: 0 | Status: SHIPPED | OUTPATIENT
Start: 2024-04-23 | End: 2024-06-22

## 2024-04-23 ASSESSMENT — ENCOUNTER SYMPTOMS
DEPRESSION: 0
OCCASIONAL FEELINGS OF UNSTEADINESS: 0
LOSS OF SENSATION IN FEET: 0
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
## 2024-04-24 LAB
ELECTRONICALLY SIGNED BY: NORMAL
H. PYLORI DRUG SUSCEPTIBILITY RESULTS: NORMAL

## 2024-06-24 ENCOUNTER — TELEPHONE (OUTPATIENT)
Dept: PRIMARY CARE | Facility: CLINIC | Age: 64
End: 2024-06-24

## 2024-06-24 NOTE — TELEPHONE ENCOUNTER
Pt is requesting a call due to heart surg he is having today.     Okay to speak with ellie lechuga (spouse) [phone]

## 2024-06-28 ENCOUNTER — PATIENT OUTREACH (OUTPATIENT)
Dept: PRIMARY CARE | Facility: CLINIC | Age: 64
End: 2024-06-28
Payer: COMMERCIAL

## 2024-06-28 DIAGNOSIS — R63.4 WEIGHT LOSS: Primary | ICD-10-CM
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
## 2024-06-29 ENCOUNTER — TELEPHONE (OUTPATIENT)
Dept: HOME HEALTH SERVICES | Facility: HOME HEALTH | Age: 64
End: 2024-06-29
Payer: COMMERCIAL

## 2024-06-29 NOTE — TELEPHONE ENCOUNTER
Hello,      Pt must be HOMEBOUND if services will be covered by CMS. Please amend orders and resubmit if patient is indeed homebound. If not, the patient will need to go outpatient for services. Thank you.      Miguel Solano LPN  [facility]  [facility]  [address] [place], [place] [id number] [phone]

## 2024-07-01 ENCOUNTER — HOME HEALTH ADMISSION (OUTPATIENT)
Dept: HOME HEALTH SERVICES | Facility: HOME HEALTH | Age: 64
End: 2024-07-01
Payer: COMMERCIAL

## 2024-07-01 ENCOUNTER — DOCUMENTATION (OUTPATIENT)
Dept: HOME HEALTH SERVICES | Facility: HOME HEALTH | Age: 64
End: 2024-07-01
Payer: COMMERCIAL

## 2024-07-01 NOTE — HH CARE COORDINATION
Home Care received a referral for Nursing. Unfortunately, we are unable to accept and process the referral at this time.    Patients, please reach out to the referring provider or your PCP to assist in obtaining an alternative home care agency and/or guidance to meet your needs.    Providers, please reach out to  Home Care with any questions regarding the declined referral.

## 2024-07-03 NOTE — TELEPHONE ENCOUNTER
Wife calling to speak to you concerning husbands condition after being admitted in the hospital. Wife states a blood clot was found in L foot and  is experiencing stomach pain. Wife states he needs something other than tylenolol. Please call to discuss.

## 2024-07-05 RX ORDER — ATORVASTATIN CALCIUM 80 MG/1
80 TABLET, FILM COATED ORAL
COMMUNITY
Start: 2024-06-27 | End: 2024-09-25

## 2024-07-05 RX ORDER — LOPERAMIDE HYDROCHLORIDE 2 MG/1
CAPSULE ORAL
COMMUNITY
Start: 2023-08-11 | End: 2024-07-12 | Stop reason: ALTCHOICE

## 2024-07-05 RX ORDER — AZITHROMYCIN 250 MG/1
TABLET, FILM COATED ORAL
COMMUNITY
Start: 2023-06-27 | End: 2024-07-12 | Stop reason: ALTCHOICE

## 2024-07-05 RX ORDER — BENZONATATE 100 MG/1
CAPSULE ORAL
COMMUNITY
Start: 2023-07-28 | End: 2024-07-12 | Stop reason: ALTCHOICE

## 2024-07-05 RX ORDER — LISINOPRIL 10 MG/1
1 TABLET ORAL NIGHTLY
COMMUNITY
Start: 2024-06-27 | End: 2024-09-25

## 2024-07-05 RX ORDER — ASPIRIN 81 MG
TABLET, DELAYED RELEASE (ENTERIC COATED) ORAL
COMMUNITY
Start: 2023-03-19 | End: 2024-07-12 | Stop reason: ALTCHOICE

## 2024-07-08 ENCOUNTER — APPOINTMENT (OUTPATIENT)
Dept: PRIMARY CARE | Facility: CLINIC | Age: 64
End: 2024-07-08
Payer: COMMERCIAL

## 2024-07-08 VITALS — HEIGHT: 65 IN | BODY MASS INDEX: 23.99 KG/M2 | WEIGHT: 144 LBS

## 2024-07-08 DIAGNOSIS — I50.43 ACUTE ON CHRONIC COMBINED SYSTOLIC AND DIASTOLIC HEART FAILURE (MULTI): Primary | ICD-10-CM

## 2024-07-08 PROBLEM — I42.8 NICM (NONISCHEMIC CARDIOMYOPATHY) (MULTI): Status: ACTIVE | Noted: 2024-06-25

## 2024-07-08 PROBLEM — F17.200 NICOTINE USE DISORDER: Status: ACTIVE | Noted: 2024-06-26

## 2024-07-08 PROBLEM — J96.02 ACUTE RESPIRATORY FAILURE WITH HYPERCAPNIA (MULTI): Status: ACTIVE | Noted: 2024-06-19

## 2024-07-08 PROBLEM — I47.29 NONSUSTAINED VENTRICULAR TACHYCARDIA (MULTI): Status: ACTIVE | Noted: 2024-06-22

## 2024-07-08 PROBLEM — I21.A1 TYPE 2 MYOCARDIAL INFARCTION (MULTI): Status: ACTIVE | Noted: 2024-06-22

## 2024-07-08 PROCEDURE — 99495 TRANSJ CARE MGMT MOD F2F 14D: CPT | Performed by: FAMILY MEDICINE

## 2024-07-08 PROCEDURE — 4004F PT TOBACCO SCREEN RCVD TLK: CPT | Performed by: FAMILY MEDICINE

## 2024-07-08 RX ORDER — OFLOXACIN 3 MG/ML
SOLUTION AURICULAR (OTIC)
COMMUNITY
Start: 2023-03-19 | End: 2024-07-12 | Stop reason: ALTCHOICE

## 2024-07-08 RX ORDER — SPIRONOLACTONE 25 MG/1
12.5 TABLET ORAL
COMMUNITY
Start: 2024-06-27 | End: 2024-09-25

## 2024-07-08 RX ORDER — IPRATROPIUM BROMIDE AND ALBUTEROL SULFATE 2.5; .5 MG/3ML; MG/3ML
3 SOLUTION RESPIRATORY (INHALATION) 4 TIMES DAILY
COMMUNITY
End: 2024-07-12 | Stop reason: ALTCHOICE

## 2024-07-08 RX ORDER — CARVEDILOL 6.25 MG/1
1 TABLET ORAL
COMMUNITY
Start: 2024-06-27 | End: 2024-09-25

## 2024-07-08 RX ORDER — IBUPROFEN 200 MG
TABLET ORAL
COMMUNITY
Start: 2024-06-27

## 2024-07-08 RX ORDER — ALBUTEROL SULFATE 90 UG/1
AEROSOL, METERED RESPIRATORY (INHALATION)
COMMUNITY
Start: 2023-07-28 | End: 2024-07-12 | Stop reason: ALTCHOICE

## 2024-07-08 RX ORDER — FUROSEMIDE 20 MG/1
TABLET ORAL
COMMUNITY
Start: 2024-06-27

## 2024-07-08 ASSESSMENT — ENCOUNTER SYMPTOMS
DEPRESSION: 0
LOSS OF SENSATION IN FEET: 0
OCCASIONAL FEELINGS OF UNSTEADINESS: 0
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
## 2024-07-11 ENCOUNTER — PATIENT OUTREACH (OUTPATIENT)
Dept: PRIMARY CARE | Facility: CLINIC | Age: 64
End: 2024-07-11
Payer: COMMERCIAL

## 2024-07-11 NOTE — PROGRESS NOTES
Call regarding appt. with PCP on 7-8-2024 (virtual) after hospitalization.  At time of outreach call the patient feels as if their condition has improved since last visit. Patient had cardiology fu today and a fu a fu pcp appt tomorrow.   Reviewed the PCP appointment with the pt and addressed any questions or concerns.

## 2024-07-12 ENCOUNTER — APPOINTMENT (OUTPATIENT)
Dept: PRIMARY CARE | Facility: CLINIC | Age: 64
End: 2024-07-12
Payer: COMMERCIAL

## 2024-07-12 ENCOUNTER — TELEPHONE (OUTPATIENT)
Dept: PRIMARY CARE | Facility: CLINIC | Age: 64
End: 2024-07-12

## 2024-07-12 VITALS
HEART RATE: 70 BPM | WEIGHT: 128 LBS | SYSTOLIC BLOOD PRESSURE: 99 MMHG | BODY MASS INDEX: 21.33 KG/M2 | DIASTOLIC BLOOD PRESSURE: 50 MMHG | HEIGHT: 65 IN

## 2024-07-12 DIAGNOSIS — I82.422 THROMBOSIS OF LEFT ILIAC VEIN (MULTI): ICD-10-CM

## 2024-07-12 DIAGNOSIS — I50.43 ACUTE ON CHRONIC COMBINED SYSTOLIC AND DIASTOLIC HEART FAILURE (MULTI): Primary | ICD-10-CM

## 2024-07-12 DIAGNOSIS — R45.86 MOOD CHANGES: ICD-10-CM

## 2024-07-12 PROCEDURE — 4004F PT TOBACCO SCREEN RCVD TLK: CPT | Performed by: FAMILY MEDICINE

## 2024-07-12 PROCEDURE — 99214 OFFICE O/P EST MOD 30 MIN: CPT | Performed by: FAMILY MEDICINE

## 2024-07-12 PROCEDURE — 3078F DIAST BP <80 MM HG: CPT | Performed by: FAMILY MEDICINE

## 2024-07-12 PROCEDURE — 3074F SYST BP LT 130 MM HG: CPT | Performed by: FAMILY MEDICINE

## 2024-07-12 RX ORDER — DAPAGLIFLOZIN 10 MG/1
1 TABLET, FILM COATED ORAL
COMMUNITY
Start: 2024-07-11 | End: 2024-07-12 | Stop reason: ALTCHOICE

## 2024-07-12 RX ORDER — SACUBITRIL AND VALSARTAN 49; 51 MG/1; MG/1
1 TABLET, FILM COATED ORAL 2 TIMES DAILY
COMMUNITY
Start: 2024-07-11

## 2024-07-12 ASSESSMENT — ENCOUNTER SYMPTOMS
DEPRESSION: 0
LOSS OF SENSATION IN FEET: 0
OCCASIONAL FEELINGS OF UNSTEADINESS: 0

## 2024-07-12 NOTE — TELEPHONE ENCOUNTER
Nunu Sharp from [employer] ([phone]) has some questions about paperwork . Please call.    Called left msg
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
## 2024-07-16 ENCOUNTER — DOCUMENTATION (OUTPATIENT)
Dept: GASTROENTEROLOGY | Facility: CLINIC | Age: 64
End: 2024-07-16
Payer: COMMERCIAL

## 2024-07-16 DIAGNOSIS — K29.70 HELICOBACTER PYLORI GASTRITIS: Primary | ICD-10-CM

## 2024-07-16 DIAGNOSIS — B96.81 HELICOBACTER PYLORI GASTRITIS: Primary | ICD-10-CM

## 2024-07-16 RX ORDER — TETRACYCLINE HYDROCHLORIDE 500 MG/1
500 CAPSULE ORAL 4 TIMES DAILY
Qty: 56 CAPSULE | Refills: 0 | Status: SHIPPED | OUTPATIENT
Start: 2024-07-16 | End: 2024-07-30

## 2024-07-16 RX ORDER — BISMUTH SUBSALICYLATE 262 MG/1
524 TABLET ORAL 4 TIMES DAILY
Qty: 112 TABLET | Refills: 0 | Status: SHIPPED | OUTPATIENT
Start: 2024-07-16 | End: 2024-07-30

## 2024-07-16 RX ORDER — OMEPRAZOLE 20 MG/1
20 CAPSULE, DELAYED RELEASE ORAL
Qty: 28 CAPSULE | Refills: 0 | Status: SHIPPED | OUTPATIENT
Start: 2024-07-16 | End: 2024-07-30

## 2024-07-16 RX ORDER — METRONIDAZOLE 500 MG/1
500 TABLET ORAL 4 TIMES DAILY
Qty: 56 TABLET | Refills: 0 | Status: SHIPPED | OUTPATIENT
Start: 2024-07-16 | End: 2024-07-30

## 2024-07-16 NOTE — PROGRESS NOTES
Finally was able to speak with pt via telephone this morning regarding 4/4/24 EGD with biopsy which noted LA grade esophagitis, a 3 cm type I hiatal hernia, a small gastric ulcer, duodenitis, and H pylori gastritis. Interestedly, H pylori stool antigen 1/4/24 was negative. Notes indicate he was treated with bismuth based quadruple therapy (including with tetracycline) in the past per Dr. Weiss, but he cannot say with certainly if he missed doses of this regimen.     Of note, last month he was  hospitalized through [facility] for dilated cardiomyopathy.     PLAN:  - treat H pylori with bismuth, metronidazole, tetracycline, and omeprazole for 14 days per antibiotic susceptibility genotype assay   - advise pt to purchase OTC bismuth 2 tablets 4 times a day for 14 days if his insurance does not cover   - discuss with pt the importance of compliance with the above regimen   - obtain H pylori breath test 6-8 weeks post treatment to check for eradication

## 2024-07-19 ENCOUNTER — OFFICE VISIT (OUTPATIENT)
Dept: DERMATOLOGY | Facility: CLINIC | Age: 64
End: 2024-07-19
Payer: COMMERCIAL

## 2024-07-19 DIAGNOSIS — L25.9 CONTACT DERMATITIS, UNSPECIFIED CONTACT DERMATITIS TYPE, UNSPECIFIED TRIGGER: ICD-10-CM

## 2024-07-19 DIAGNOSIS — R21 RASH AND OTHER NONSPECIFIC SKIN ERUPTION: Primary | ICD-10-CM

## 2024-07-19 PROCEDURE — 99204 OFFICE O/P NEW MOD 45 MIN: CPT | Performed by: DERMATOLOGY

## 2024-07-19 PROCEDURE — 11104 PUNCH BX SKIN SINGLE LESION: CPT | Performed by: DERMATOLOGY

## 2024-07-19 RX ORDER — TRIAMCINOLONE ACETONIDE 1 MG/G
CREAM TOPICAL
Qty: 80 G | Refills: 0 | Status: SHIPPED | OUTPATIENT
Start: 2024-07-19

## 2024-07-21 NOTE — PROGRESS NOTES
Subjective     Matthieu Solis is a 64 y.o. male who presents for the following: Rash.  He reports he has been breaking out with red, itchy bumps, mainly on his forearms and legs, for the past 2 years.  He notes many of the areas now just like dark marks, but he continues to breakout with the rash.  He also states he was in heart failure 3 weeks ago and now has to wear a life vest with a monitor and the areas underneath the vest are very red and itchy.  He denies any other new, changing, or concerning skin lesions; no bleeding, itching, or burning lesions.      Review of Systems:  No other skin or systemic complaints other than what is documented elsewhere in the note.    The following portions of the chart were reviewed this encounter and updated as appropriate:       Skin Cancer History  No skin cancer on file.    Specialty Problems    None      Past Dermatologic / Past Relevant Medical History:    - history of heart failure  - no h/o eczema, psoriasis, or lichen planus    Family History:    No family history of eczema, psoriasis, or lichen planus    Social History:    The patient states he works as a     Allergies:  Cinnamon, Doxycycline, and Penicillins    Current Medications / CAM's:    Current Outpatient Medications:     atorvastatin (Lipitor) 80 mg tablet, Take 1 tablet (80 mg) by mouth once daily., Disp: , Rfl:     bismuth subsalicylate (Pepto Bismol) 262 mg chewable tablet, Chew 2 tablets (524 mg) 4 times a day for 14 days., Disp: 112 tablet, Rfl: 0    carvedilol (Coreg) 6.25 mg tablet, Take 1 tablet (6.25 mg) by mouth 2 times daily (morning and late afternoon)., Disp: , Rfl:     empagliflozin (Jardiance) 10 mg, Take 1 tablet (10 mg) by mouth once daily., Disp: , Rfl:     furosemide (Lasix) 20 mg tablet, Take 1 tablet daily for 3 days, if your weight goes up by 2 pounds or more within a day or 2, Disp: , Rfl:     lisinopril 10 mg tablet, Take 1 tablet (10 mg) by mouth once daily at  bedtime., Disp: , Rfl:     metroNIDAZOLE (Flagyl) 500 mg tablet, Take 1 tablet (500 mg) by mouth 4 times a day for 14 days., Disp: 56 tablet, Rfl: 0    nicotine (Nicoderm CQ) 14 mg/24 hr patch, APPLY 1 PATCH AS DIRECTED EVERY 24 HOURS., Disp: , Rfl:     omeprazole (PriLOSEC) 20 mg DR capsule, Take 1 capsule (20 mg) by mouth 2 times a day before meals for 14 days. To be taken 30-60 minutes prior to breakfast and dinner. Do not crush or chew., Disp: 28 capsule, Rfl: 0    sacubitriL-valsartan (Entresto) 49-51 mg tablet, Take 1 tablet by mouth twice a day., Disp: , Rfl:     spironolactone (Aldactone) 25 mg tablet, Take 0.5 tablets (12.5 mg) by mouth once daily., Disp: , Rfl:     tetracycline 500 mg capsule, Take 1 capsule (500 mg) by mouth 4 times a day for 14 days., Disp: 56 capsule, Rfl: 0    triamcinolone (Kenalog) 0.1 % cream, Apply twice daily to affected areas of back (avoid face, groin, body folds) for 2 weeks, Disp: 80 g, Rfl: 0     Objective   Well appearing patient in no apparent distress; mood and affect are within normal limits.    A full examination was performed including scalp, face, eyes, ears, nose, lips, neck, chest, axillae, abdomen, back, bilateral upper extremities, and bilateral lower extremities. All findings within normal limits unless otherwise noted below.    Assessment/Plan   1. Rash and other nonspecific skin eruption  Right Ventral Mid-Forearm  On his bilateral forearms and wrists, mainly ventral forearms and wrists, and legs, there are many hyperpigmented macules as well as several erythematous to purplish and hyperpigmented, slightly scaly, thin papules    Papular eruption - extremities, mainly ventral forearms and wrists and legs.  The clinical differential diagnosis for these lesions includes possibly lichen planus versus postinflammatory hyperpigmentation versus eczema.  The nature of each of these conditions and management options, including possible biopsy for further diagnostic  evaluation, were discussed extensively with the patient today.  After discussing the risks and benefits of various management options, the patient expressed understanding and wishes to undergo biopsy today.  Thus, at this time, I recommend punch biopsy of a representative lesion on the patient's right ventral mid forearm in the office today for further diagnostic evaluation.  We agreed we would defer treatment for this condition until biopsy results are available.  The patient expressed understanding, is in agreement with this plan, and wishes to proceed with the biopsy today.    Lesion biopsy - Right Ventral Mid-Forearm  Type of biopsy: punch    Informed consent: discussed and consent obtained    Timeout: patient name, date of birth, surgical site, and procedure verified    Procedure prep:  Patient was prepped and draped  Anesthesia: the lesion was anesthetized in a standard fashion    Anesthetic:  1% lidocaine w/ epinephrine 1-100,000 local infiltration  Punch size:  4 mm  Suture size:  4-0  Suture type: Prolene (polypropylene)    Suture removal (days):  14  Hemostasis achieved with: suture    Outcome: patient tolerated procedure well    Post-procedure details: sterile dressing applied and wound care instructions given    Dressing type: bandage and petrolatum      Staff Communication: Dermatology Local Anesthesia: 1 % Lidocaine / Epinephrine - Amount:0.5ml - Right Ventral Mid-Forearm    Specimen 1 - Dermatopathology- DERM LAB  Differential Diagnosis: LP v PIH v eczema  Check Margins Yes/No?:    Comments:    Dermpath Lab: Routine Histopathology (formalin-fixed tissue)    2. Contact dermatitis, unspecified contact dermatitis type, unspecified trigger  Right Upper Back  On his chest and back underneath his vest, there are fairly well-demarcated erythematous, slightly scaly, thin plaques    Contact Dermatitis, possibly allergic contact dermatitis - chest and back underneath vest.  The potentially chronic and  intermittently flaring nature of this condition and treatment options were discussed extensively with the patient today.  At this time, once the vest has come off and his testing is completed and his cardiologist approves the use of a topical steroid on these areas or before then if approved by his cardiologist, I recommend topical steroid therapy with Triamcinolone 0.1% cream, which the patient was instructed to apply twice daily to the affected areas of his chest and back (avoid face, groin, body folds) for 2 weeks.  The risks, benefits, and side effects of this medication, including possible skin atrophy with overuse of topical steroids, were discussed.  The patient expressed understanding and is in agreement with this plan.    triamcinolone (Kenalog) 0.1 % cream - Right Upper Back  Apply twice daily to affected areas of back (avoid face, groin, body folds) for 2 weeks

## 2024-07-23 LAB
LABORATORY COMMENT REPORT: NORMAL
PATH REPORT.FINAL DX SPEC: NORMAL
PATH REPORT.GROSS SPEC: NORMAL
PATH REPORT.RELEVANT HX SPEC: NORMAL
PATH REPORT.TOTAL CANCER: NORMAL

## 2024-07-24 ENCOUNTER — TELEPHONE (OUTPATIENT)
Dept: GASTROENTEROLOGY | Facility: HOSPITAL | Age: 64
End: 2024-07-24

## 2024-07-24 ENCOUNTER — APPOINTMENT (OUTPATIENT)
Dept: UROLOGY | Facility: CLINIC | Age: 64
End: 2024-07-24
Payer: COMMERCIAL

## 2024-07-24 NOTE — TELEPHONE ENCOUNTER
Medication is tearing him apart. Has been able to eat since Friday. Medication is too strong.  [phone]

## 2024-07-25 ENCOUNTER — DOCUMENTATION (OUTPATIENT)
Dept: GASTROENTEROLOGY | Facility: CLINIC | Age: 64
End: 2024-07-25

## 2024-07-25 ENCOUNTER — OFFICE VISIT (OUTPATIENT)
Dept: UROLOGY | Facility: CLINIC | Age: 64
End: 2024-07-25
Payer: COMMERCIAL

## 2024-07-25 VITALS
TEMPERATURE: 97 F | BODY MASS INDEX: 21.3 KG/M2 | WEIGHT: 128 LBS | SYSTOLIC BLOOD PRESSURE: 156 MMHG | HEART RATE: 82 BPM | DIASTOLIC BLOOD PRESSURE: 78 MMHG

## 2024-07-25 DIAGNOSIS — R35.0 URINARY FREQUENCY: Primary | ICD-10-CM

## 2024-07-25 DIAGNOSIS — K29.70 HELICOBACTER PYLORI GASTRITIS: Primary | ICD-10-CM

## 2024-07-25 DIAGNOSIS — R97.20 ELEVATED PSA: ICD-10-CM

## 2024-07-25 DIAGNOSIS — B96.81 HELICOBACTER PYLORI GASTRITIS: Primary | ICD-10-CM

## 2024-07-25 LAB
APPEARANCE UR: CLEAR
BILIRUB UR STRIP.AUTO-MCNC: NEGATIVE MG/DL
CAOX CRY #/AREA UR COMP ASSIST: ABNORMAL /HPF
COLOR UR: NORMAL
GLUCOSE UR STRIP.AUTO-MCNC: NORMAL MG/DL
KETONES UR STRIP.AUTO-MCNC: NEGATIVE MG/DL
LEUKOCYTE ESTERASE UR QL STRIP.AUTO: NEGATIVE
MUCOUS THREADS #/AREA URNS AUTO: ABNORMAL /LPF
NITRITE UR QL STRIP.AUTO: NEGATIVE
PH UR STRIP.AUTO: 5.5 [PH]
POC APPEARANCE, URINE: CLEAR
POC BILIRUBIN, URINE: NEGATIVE
POC BLOOD, URINE: ABNORMAL
POC COLOR, URINE: YELLOW
POC GLUCOSE, URINE: NEGATIVE MG/DL
POC KETONES, URINE: NEGATIVE MG/DL
POC LEUKOCYTES, URINE: NEGATIVE
POC NITRITE,URINE: NEGATIVE
POC PH, URINE: 6 PH
POC PROTEIN, URINE: ABNORMAL MG/DL
POC SPECIFIC GRAVITY, URINE: 1.02
POC UROBILINOGEN, URINE: 0.2 EU/DL
PROT UR STRIP.AUTO-MCNC: NORMAL MG/DL
RBC # UR STRIP.AUTO: NEGATIVE /UL
RBC #/AREA URNS AUTO: ABNORMAL /HPF
SP GR UR STRIP.AUTO: 1.02
UROBILINOGEN UR STRIP.AUTO-MCNC: NORMAL MG/DL
WBC #/AREA URNS AUTO: ABNORMAL /HPF

## 2024-07-25 PROCEDURE — 99204 OFFICE O/P NEW MOD 45 MIN: CPT | Performed by: NURSE PRACTITIONER

## 2024-07-25 PROCEDURE — 3077F SYST BP >= 140 MM HG: CPT | Performed by: NURSE PRACTITIONER

## 2024-07-25 PROCEDURE — 81002 URINALYSIS NONAUTO W/O SCOPE: CPT | Performed by: NURSE PRACTITIONER

## 2024-07-25 PROCEDURE — 51798 US URINE CAPACITY MEASURE: CPT | Performed by: NURSE PRACTITIONER

## 2024-07-25 PROCEDURE — 3078F DIAST BP <80 MM HG: CPT | Performed by: NURSE PRACTITIONER

## 2024-07-25 PROCEDURE — 81001 URINALYSIS AUTO W/SCOPE: CPT

## 2024-07-25 NOTE — PROGRESS NOTES
"Returned pt's phone call regarding intolerance of bismuth-based quadruple therapy for H Pylori gastritis (bismuth, metronidazole, tetracycline, and omeprazole). Reports  tolerating \"100%\" of the regimen on day 1, \"75%\" of the regimen on day 2,  and \"25% of the regimen on day 3. He subsequently stopped the regimen.     Endorses abdominal pain, diarrhea, runny nose, and the feeling of \"needles\" throughout his whole body while on the regimen (these symptoms resolved once he stopped the regimen).     Below are the regimens recommended per the antibiotic susceptibility genotype assay from his 4/4/24 EGD.   -Bismuth quadruple therapy: bismuth + tetracycline + metronidazole + PPI for 14 days (preferred)  -High dose dual therapy: amoxicillin 1 g tid + double-dose PPI bid for 14 days  -Rifabutin triple therapy: rifabutin + amoxicillin + PPI for 10 days (not preferred)      Given his penicillin allergy, I'm not sure what he could take to treat his H pylori.     Will refer to ID for any potential recommendations.       "

## 2024-07-25 NOTE — PROGRESS NOTES
07/25/24   [id number]    Elevated PSA     Subjective      HPI Matthieu Solis is a 64 y.o. male who presents for elevated PSA. Recently hospitalized multiple times, respiratory failure, recovering; concentration and memory loss;     Here for elevated PSA noted earlier in year  Brother had prostate cancer, he was 22 years older than patient, not sure if he passed from the cancer  Nobody else prostate cancer;     DTF 5-6 x NTF 0 x,no urgency, or leakage  No issues w urine stream    PVR 0 ml, UA trace heme today  No gross hematuria, no UTIs    7/1/24 creatinine 0/95, GFR 90  2/3/24 PSA 4.37  1/2/24 UA w small heme, no micro, no culture  9/17/20 UA negative    7/1/24 CT angio abd/pel w and wo IV: Kidneys Normal appearance.  Bladder is unremarkable.     6/19/24 CT abd/pel w IV: Kidneys: No mass, calculus or hydronephrosis. Rincon catheter within nondistended urinary bladder.     Retrieved from discharge summary [facility] 6/24/24  1.  Acute respiratory failure due to severe congestive heart failure  2.  Acute on chronic systolic and diastolic heart failure  3.  Cardiac arrhythmia, he had a short run of ventricular tachycardia  4.  Nicotine dependence  5.  Hypertension  6.  Demand ischemia with type II myocardial infarction  7.  Previous history of cocaine and alcohol abuse, now sober for many years    Past Surgical History:   Procedure Laterality Date    COLONOSCOPY      ESOPHAGOGASTRODUODENOSCOPY      HERNIA REPAIR  2015    Inguinal     Family History   Problem Relation Name Age of Onset    Other (CVA) Mother      Hypertension Mother      Other (CVA) Father      Hypertension Father       Social History     Tobacco Use    Smoking status: Every Day     Current packs/day: 1.00     Average packs/day: 1 pack/day for 50.6 years (50.6 ttl pk-yrs)     Types: Cigarettes     Start date: 1974    Smokeless tobacco: Never   Vaping Use    Vaping status: Never Used   Substance Use Topics    Alcohol use: Never    Drug use: Never          Objective     There were no vitals taken for this visit.   General: Appears comfortable and in no apparent distress, well nourished, wearing external defibrillator;   Head: Normocephalic, atraumatic  Neck: trachea midline  Respiratory: respirations unlabored, no wheezes, and no use of accessory muscles  Cardiovascular: at rest no dyspnea, well perfused  Skin: no visible rashes or lesions  Neurologic: grossly intact, oriented to person, place, and time  Psychiatric: mood and affect appropriate  Musculoskeletal: in chair for appt. no difficulty w upper body movement    Physical Exam  Genitourinary:     Comments: TEJAS normal, smooth, non-tender, no nodules or masses palpated      Assessment/Plan   Problem List Items Addressed This Visit    None    No orders of the defined types were placed in this encounter.     Elevated PSA 4.37 February 2024, unable to come in with work, Then developed heart/respiratory issues   Brother w history prostate cancer    Repeat PSA w total and free ordered  Arrange appt. W Urologist, discussed if PSA elevated  Maybe consider prostate MRI, patient prefer no surgery; Wearing external defibrillator while trying to strengthen heart    TEJAS normal today, No urinary symptoms  Will call with results when available  Nurse line [phone]       Follow up Urologist Dr. Zuniga, elevated PSA  Repeat PSA prior to visit  Marylou Oakley, APRN-CNP  Lab Results   Component Value Date    GLUCOSE 88 02/03/2024    CALCIUM 9.6 02/03/2024     02/03/2024    K 4.2 02/03/2024    CO2 26 02/03/2024     02/03/2024    BUN 15 02/03/2024    CREATININE 0.82 02/03/2024

## 2024-07-25 NOTE — PATIENT INSTRUCTIONS
Elevated PSA 4.37 February 2024, unable to come in with work,   Then developed heart/respiratory issues   Brother w history prostate cancer    Repeat PSA w total and free ordered  Arrange appt. W Urologist, discussed if PSA elevated  Maybe consider prostate MRI, patient prefer no surgery  Wearing external defibrillator while trying to strengthen heart    TEJAS normal today, No urinary symptoms  Will call with results when available  Nurse line [phone]

## 2024-08-01 ENCOUNTER — APPOINTMENT (OUTPATIENT)
Dept: DERMATOLOGY | Facility: CLINIC | Age: 64
End: 2024-08-01
Payer: COMMERCIAL

## 2024-08-01 DIAGNOSIS — L85.3 XEROSIS CUTIS: ICD-10-CM

## 2024-08-01 DIAGNOSIS — L72.11 PILAR CYST: ICD-10-CM

## 2024-08-01 DIAGNOSIS — R21 RASH AND OTHER NONSPECIFIC SKIN ERUPTION: Primary | ICD-10-CM

## 2024-08-01 PROCEDURE — 99214 OFFICE O/P EST MOD 30 MIN: CPT | Performed by: DERMATOLOGY

## 2024-08-01 NOTE — PROGRESS NOTES
"Subjective     Matthieu Solis is a 64 y.o. male who presents for the following: Follow-up.  He reports he has been breaking out with red, itchy bumps, mainly on his forearms and legs, for over 2 years.  He notes many of the areas now just look like dark marks, but he continues to breakout with the rash.    Punch biopsy of a representative lesion on his right ventral mid forearm performed at his last visit in our office on 7/19/24 revealed \"pigment incontinence with rare dyskeratosis,\" the findings of which could be seen in ashy dermatosis (erythema dyschromicum perstans), a connective tissue disease, such as acute cutaneous lupus erythematosus, subacute cutaneous LE, and dermatomyositis, or atrophic lichen planus.    Today, the patient states the biopsy site healed well.  Since his last visit, he states he has not noticed any new lesions or areas of involvement.  However, he states the lesions have not resolved.  He also notes a bump on the back left side of his scalp, which has been present for approximately 10 years.  He states it has become more raised over that time and sometimes hurts, but it does not itch, bleed, or drain.  He denies any other new, changing, or concerning skin lesions since his last visit; no bleeding, itching, or burning lesions.      Review of Systems:  No other skin or systemic complaints other than what is documented elsewhere in the note.    The following portions of the chart were reviewed this encounter and updated as appropriate:       Skin Cancer History  No skin cancer on file.    Specialty Problems    None      Past Dermatologic / Past Relevant Medical History:    - history of heart failure  - no h/o eczema, psoriasis, lichen planus, or skin cancer    Family History:    Niece - lupus  No family history of eczema, psoriasis, lichen planus, or skin cancer    Social History:    The patient states he works as a     Allergies:  Cinnamon, Doxycycline, and " Penicillins    Current Medications / CAM's:    Current Outpatient Medications:     atorvastatin (Lipitor) 80 mg tablet, Take 1 tablet (80 mg) by mouth once daily., Disp: , Rfl:     carvedilol (Coreg) 6.25 mg tablet, Take 1 tablet (6.25 mg) by mouth 2 times daily (morning and late afternoon)., Disp: , Rfl:     empagliflozin (Jardiance) 10 mg, Take 1 tablet (10 mg) by mouth once daily., Disp: , Rfl:     furosemide (Lasix) 20 mg tablet, Take 1 tablet daily for 3 days, if your weight goes up by 2 pounds or more within a day or 2, Disp: , Rfl:     lisinopril 10 mg tablet, Take 1 tablet (10 mg) by mouth once daily at bedtime., Disp: , Rfl:     nicotine (Nicoderm CQ) 14 mg/24 hr patch, APPLY 1 PATCH AS DIRECTED EVERY 24 HOURS., Disp: , Rfl:     omeprazole (PriLOSEC) 20 mg DR capsule, Take 1 capsule (20 mg) by mouth 2 times a day before meals for 14 days. To be taken 30-60 minutes prior to breakfast and dinner. Do not crush or chew., Disp: 28 capsule, Rfl: 0    sacubitriL-valsartan (Entresto) 49-51 mg tablet, Take 1 tablet by mouth twice a day., Disp: , Rfl:     spironolactone (Aldactone) 25 mg tablet, Take 0.5 tablets (12.5 mg) by mouth once daily., Disp: , Rfl:     triamcinolone (Kenalog) 0.1 % cream, Apply twice daily to affected areas of back (avoid face, groin, body folds) for 2 weeks, Disp: 80 g, Rfl: 0     Objective   Well appearing patient in no apparent distress; mood and affect are within normal limits.    A full examination was performed including scalp, face, eyes, ears, nose, lips, neck, chest, axillae, abdomen, back, bilateral upper extremities, and bilateral lower extremities. All findings within normal limits unless otherwise noted below.    Assessment/Plan   1. Rash and other nonspecific skin eruption  Right Ventral Mid-Forearm  On his bilateral forearms and wrists, mainly his ventral forearms and wrists, and legs, there are many hyperpigmented macules as well as several erythematous to purplish and  hyperpigmented, slightly scaly, thin papules    Papular eruption - extremities, mainly ventral forearms and wrists and legs.  Based on the patient's history, recent exam and repeat exam today, and the histologic findings in his recent biopsy, the favored clinico-pathologic differential diagnosis for these lesions includes possibly most likely post-inflammatory hyperpigmentation, possibly secondary to atrophic lichen planus versus ashy dermatosis versus a connective tissue disease, such as cutaneous lupus erythematosus versus less likely dermatomyositis.  The nature of each of these conditions, including the potentially chronic and intermittently flaring nature of a possible connective tissue disease, and management options, including possible laboratory work-up, were discussed extensively with the patient in the office today.    After discussing the risks and benefits of various management options, the patient expressed understanding and wishes to undergo laboratory work-up for a possible underlying CT disease.  Thus, I recommend he have an JENNIFER with DANYELLE panel as well as CK and Aldolase drawn, which we will follow-up.  In the meantime, while we await lab results, I recommend empiric topical steroid therapy with Triamcinolone 0.1% cream, which the patient was instructed to apply twice daily to the affected areas of his extremities (avoid face, groin, body folds) for the next 2 weeks.  The risks, benefits, and side effects of this medication, including possible skin atrophy with its overuse, were discussed.  If he develops a recurrent flare of this condition at any time in the future, he was instructed to call our office to schedule a return visit for re-evaluation and possible repeat biopsy if indicated at that time.  He expressed understanding and is in agreement with this plan.  Of note, his suture was removed in the office today as well.    JENNIFER + DANYELLE Panel - Right Ventral Mid-Forearm    CK - Right Ventral  Mid-Forearm    Aldolase - Right Ventral Mid-Forearm    2. Pilar cyst  Scalp  On the patient's left posterior scalp, there is a 3 cm round, pink to flesh-colored, slightly mobile, non-tender, smooth, subcutaneous, cystic-appearing nodule    Pilar Cyst - left posterior scalp.  The benign nature of this cystic lesion was discussed with the patient today and reassurance provided.  No treatment is medically indicated for this lesion at this time.  However, given the history the patient provides of frequent associated symptoms, the patient wishes to have the lesion removed if possible.  Thus, I discussed the possibility of undergoing surgical excision of the cyst for definitive removal.  After discussing the risks and benefits of the procedure, the patient expressed understanding and wishes to have the cyst excised.  Thus, the patient was provided a referral today to our Dermatologic surgery unit for further evaluation and consideration of cyst excision.  The patient expressed understanding, is in agreement with this plan, and will anticipate a phone call from our surgery unit within the next 1-2 weeks to schedule the surgery.    Referral to Dermatology - Mohs Surgery - Scalp    3. Xerosis cutis  Diffuse generalized dry, scaly skin.    Xerosis.  I emphasized the importance of dry, sensitive skin care, including the use of a mild soap, such as Dove, and frequent and aggressive moisturization, at least twice daily and immediately following showers or baths, with recommended over-the-counter moisturizing creams, such as Eucerin, Cetaphil, Cerave, or Aveeno, or Vaseline or Aquaphor ointments.

## 2024-08-16 DIAGNOSIS — I42.8 NICM (NONISCHEMIC CARDIOMYOPATHY) (MULTI): Primary | ICD-10-CM

## 2024-08-19 ENCOUNTER — PATIENT OUTREACH (OUTPATIENT)
Dept: CARE COORDINATION | Facility: CLINIC | Age: 64
End: 2024-08-19
Payer: COMMERCIAL

## 2024-08-20 ENCOUNTER — PATIENT OUTREACH (OUTPATIENT)
Dept: CARE COORDINATION | Facility: CLINIC | Age: 64
End: 2024-08-20

## 2024-08-20 NOTE — PROGRESS NOTES
INITIAL NUTRITION EDUCATION VISIT    Reason for Nutrition Visit:  Pt is a 64 y.o. male being seen Virtual, as phone only referred for  heart healthy diet.    Past Medical Hx:  Patient Active Problem List   Diagnosis    Abdominal pain    Acute pharyngitis    Anxiety    Blurring of visual image    Cataract, nuclear sclerotic, both eyes    Change in vision    Cortical age-related cataract of left eye    Episcleritis of left eye    Essential hypertension    Gastroenteritis    Gout of right foot    Helicobacter positive gastritis    Hordeolum externum of left upper eyelid    Astigmatism of both eyes    Low back pain    Lumbar spondylosis    Lung nodules    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 organic origin    Rectal mass    Urethritis    Abdominal wall abscess    Sacroiliitis (CMS-HCC)    Right groin hernia    Bilateral inguinal hernia    Combined form of age-related cataract, right eye    Combined form of age-related cataract, left eye    Hyperopia, bilateral    Presbyopia    Nausea and vomiting    Well adult exam    Nondisplaced fracture of middle phalanx of right middle finger, initial encounter for open fracture    Acute on chronic combined systolic and diastolic heart failure (Multi)    Acute respiratory failure with hypercapnia (Multi)    NICM (nonischemic cardiomyopathy) (Multi)    Nicotine use disorder    Nonsustained ventricular tachycardia (Multi)    Type 2 myocardial infarction (Multi)    Alcoholism (Multi)    Hearing loss    Leg pain    Sciatica        Current Medications:   Current Outpatient Medications on File Prior to Visit   Medication Sig Dispense Refill    atorvastatin (Lipitor) 80 mg tablet Take 1 tablet (80 mg) by mouth once daily.      carvedilol (Coreg) 6.25 mg tablet Take 1 tablet (6.25 mg) by mouth 2 times daily (morning and late afternoon).      empagliflozin (Jardiance) 10 mg Take 1 tablet (10 mg) by mouth once daily.      furosemide (Lasix) 20 mg tablet Take 1 tablet daily for 3 days, if your weight  "goes up by 2 pounds or more within a day or 2      lisinopril 10 mg tablet Take 1 tablet (10 mg) by mouth once daily at bedtime.      nicotine (Nicoderm CQ) 14 mg/24 hr patch APPLY 1 PATCH AS DIRECTED EVERY 24 HOURS.      omeprazole (PriLOSEC) 20 mg DR capsule Take 1 capsule (20 mg) by mouth 2 times a day before meals for 14 days. To be taken 30-60 minutes prior to breakfast and dinner. Do not crush or chew. 28 capsule 0    sacubitriL-valsartan (Entresto) 49-51 mg tablet Take 1 tablet by mouth twice a day.      spironolactone (Aldactone) 25 mg tablet Take 0.5 tablets (12.5 mg) by mouth once daily.      triamcinolone (Kenalog) 0.1 % cream Apply twice daily to affected areas of back (avoid face, groin, body folds) for 2 weeks 80 g 0     No current facility-administered medications on file prior to visit.       Food & Nutrition Related History:  Dietary Considerations:  2,000mg salt/heart health diet.  Food Allergies: cinnamon.  Intolerance: None  Appetite: Good  GI Symptoms : None  Swallowing Difficulty: No problems with swallowing  Chewing no problems chewing  Food Preparation: Patient  Cooking Skills/Barriers: None reported  Grocery Shopping: Patient and Partner/Spouse  No difficulty affording food  Supplements: None.      Sleep duration/quality : 5-6 hours and disrupted sleep    24 Diet Recall:  Meal 1: Bowl and a half of corn pops, sausage, egg, and toast  Meal 2: Fast-food  Meal 3: Hamburger and 's salad    *1,000 gm salt/meal    Snacks:  RD rec'd: adding in 1-2 snacks between meals as appetite comes back.  Beverages: coffee in the AM, 2-3 cups tea, 16-18 oz pop/day.  Veggies: potatoes, yams, salads  Eating out:  Frequently  Alcohol Intake: None. Since leaving hospital cutting down on tobacco use.  Self-Identified Challenges: Medication has led to bm's.  \"I like heavy foods.\"    Physical Activity:   Do you exercise regularly?: Yes. 5+ times/week.  Types of Activities: Walking, Strength Based " "Training  Duration: 30-60 minutes Frequency: daily    Labs:  Lab Results   Component Value Date    HGBA1C 5.1 06/19/2024    HGBA1C 5.2 01/03/2024     02/03/2024    K 4.2 02/03/2024     02/03/2024    CO2 26 02/03/2024    BUN 15 02/03/2024    CREATININE 0.82 02/03/2024    CALCIUM 9.6 02/03/2024    ALBUMIN 4.0 02/03/2024    PROT 6.8 02/03/2024    BILITOT 0.4 02/03/2024    ALKPHOS 92 02/03/2024    ALT 16 02/03/2024    AST 14 02/03/2024    GLUCOSE 88 02/03/2024     Lab Results   Component Value Date    CHOL 159 02/03/2024    LDLCALC 100 (H) 02/03/2024    TRIG 39 02/03/2024    HDL 51.0 02/03/2024        Comments:     CMP:    Lipid panel:    Vitamin D:    A1C:      Anthropometrics:   Ht Readings from Last 1 Encounters:   07/12/24 1.651 m (5' 5\")      BMI Readings from Last 1 Encounters:   07/25/24 21.30 kg/m²      Wt Readings from Last 10 Encounters:   07/25/24 58.1 kg (128 lb)   07/12/24 58.1 kg (128 lb)   07/08/24 65.3 kg (144 lb)   04/23/24 65.3 kg (144 lb)   04/10/24 65.5 kg (144 lb 6.4 oz)   04/04/24 65.5 kg (144 lb 6.4 oz)   02/07/24 62.8 kg (138 lb 5.4 oz)   01/23/24 62.1 kg (137 lb)   01/08/24 64.4 kg (142 lb)   01/02/24 68 kg (150 lb)      Weight change:    Significant Weight Change: Yes  *Lost 10# in hospital- has gained 5# since leaving and is currently building up his strength again.     Visit Summary   Notes he has a stomach ulcer currently.     RD will send information about amounts and sources of fiber/day. Deferring from going back to work and is planning on getting an engineering degree- may start this Fall if able.    RD discussed health benefits of unsaturated fats and dietary sources rec'd oily fish 2 times/week. Discussed dietary fiber sources in additional.    Nutrition Diagnosis:  Diagnosis Statement 1:  Diagnosis Status: New  Diagnosis : Food and nutrition related knowledge deficit related to uncertainty how to apply nutrition information as evidenced by conditions associated with a " chronic condition (DM, HTN, CAD, ED, DE, GI, etc.), elevated lipid panel and/or BP, excessive Na intake compared to recommendations, and frequent, excessive fast food or restaurant intake    Nutrition Interventions:  Provided nutrition education on the following topic(s): Complex Carbohydrates, DASH, Decreased Sodium , Exercise, Heart-Healthy Types of Fat, Increased Fiber, and Limited Added Sugars using ACONutritionCounseling: Goal Setting  Referred pt to Coordination of Care: None  Discussed with pt? No  Provided handouts on:   Rule of 3 (food groups), Fiber-rich foods, PUFA/MUFA sources    Nutrition Goals:  Nutrition Goals : Reduce LDL level   Nutrition Goals: Sugary Drinks: decrease  Nutrition Goals : Decrease sodium intake  Increase fiber to 38g/day    Readiness to Change : Excellent  Level of Understanding : Excellent  Anticipated Compliant : Excellent    Follow up Plan  Will follow-up x 1 mo

## 2024-08-22 ENCOUNTER — TELEPHONE (OUTPATIENT)
Dept: PRIMARY CARE | Facility: CLINIC | Age: 64
End: 2024-08-22
Payer: COMMERCIAL

## 2024-08-27 ENCOUNTER — APPOINTMENT (OUTPATIENT)
Dept: INFECTIOUS DISEASES | Facility: CLINIC | Age: 64
End: 2024-08-27
Payer: COMMERCIAL

## 2024-08-27 VITALS
DIASTOLIC BLOOD PRESSURE: 67 MMHG | HEIGHT: 65 IN | TEMPERATURE: 97.9 F | BODY MASS INDEX: 22.16 KG/M2 | SYSTOLIC BLOOD PRESSURE: 130 MMHG | HEART RATE: 77 BPM | WEIGHT: 133 LBS

## 2024-08-27 DIAGNOSIS — B96.81 HELICOBACTER PYLORI GASTRITIS: Primary | ICD-10-CM

## 2024-08-27 DIAGNOSIS — K29.70 HELICOBACTER PYLORI GASTRITIS: Primary | ICD-10-CM

## 2024-08-27 PROCEDURE — 4004F PT TOBACCO SCREEN RCVD TLK: CPT | Performed by: INTERNAL MEDICINE

## 2024-08-27 PROCEDURE — 3008F BODY MASS INDEX DOCD: CPT | Performed by: INTERNAL MEDICINE

## 2024-08-27 PROCEDURE — 3075F SYST BP GE 130 - 139MM HG: CPT | Performed by: INTERNAL MEDICINE

## 2024-08-27 PROCEDURE — 99204 OFFICE O/P NEW MOD 45 MIN: CPT | Performed by: INTERNAL MEDICINE

## 2024-08-27 PROCEDURE — 3078F DIAST BP <80 MM HG: CPT | Performed by: INTERNAL MEDICINE

## 2024-08-27 RX ORDER — TINIDAZOLE 500 MG/1
500 TABLET ORAL 2 TIMES DAILY
Qty: 28 TABLET | Refills: 0 | Status: SHIPPED | OUTPATIENT
Start: 2024-08-27 | End: 2024-09-10

## 2024-08-27 RX ORDER — LEVOFLOXACIN 500 MG/1
500 TABLET, FILM COATED ORAL DAILY
Qty: 14 TABLET | Refills: 0 | Status: SHIPPED | OUTPATIENT
Start: 2024-08-27 | End: 2024-09-10

## 2024-08-27 RX ORDER — OMEPRAZOLE 20 MG/1
20 CAPSULE, DELAYED RELEASE ORAL
Qty: 28 CAPSULE | Refills: 0 | Status: SHIPPED | OUTPATIENT
Start: 2024-08-27 | End: 2024-09-10

## 2024-08-27 RX ORDER — RIFABUTIN 150 MG/1
150 CAPSULE ORAL DAILY
Qty: 14 CAPSULE | Refills: 0 | Status: SHIPPED | OUTPATIENT
Start: 2024-08-27 | End: 2024-09-10

## 2024-08-27 NOTE — LETTER
08/27/24    PAT Gonzales-MICHAEL  [address]  Department Of Medicine-Gastroenterology  [facility] [address]      Dear PAT Elkins-CNP,    Thank you for referring your patient, Matthieu Solis, to receive care in my office. I have enclosed a summary of the care provided to Matthieu on 08/27/24.    Please contact me with any questions you may have regarding the visit.    Sincerely,         Geovanni Mckenna MD  [address]  [facility] [address]  [facility] [address]    CC: No Recipients

## 2024-08-27 NOTE — LETTER
08/27/24    Claudio Maldonado DO  [address]  [facility] [address]  [facility] [address]      Dear Dr. Claudio Maldonado DO,    I am writing to confirm that your patient, Matthieu Solis, received care in my office on 08/27/24. I have enclosed a summary of the care provided to Matthieu for your reference.    Please contact me with any questions you may have regarding the visit.    Sincerely,         Geovanni Mckenna MD  [address] [facility] [address]  [facility] [address]    CC: No Recipients

## 2024-08-27 NOTE — PROGRESS NOTES
Prior to seeing the patient we reviewed his chart in detail.  Including family history social history past surgical history notes pathology imaging and labs.  Patient referred from gastroenterology for infectious disease input and to salvage therapy for H. pylori.  He had an endoscopy in April that demonstrated H. pylori gastritis with antibiotic   In July he was treated with standard therapy with bismuth, metronidazole, tetracycline, and omeprazole for 14 days.  He was unable to tolerate this due to stomach cramping and pain.  When seen today patient could not identify which of the 4 medications was causing the problem.  Patient is allergic to amoxicillin with throat swelling.  Based upon genotype assay from his April 2024 EGD options include the 4 drug therapy as above as well as high-dose amoxicillin, levofloxacin, tinidazole or rifabutin.  Symptoms include stomach burning and discomfort.    On exam he has no acute distress.  Abdominal exam revealed some epigastric tenderness to deep palpation.  Otherwise abdomen soft with no organomegaly    Based upon patient's drug intolerances and allergies we are somewhat limited.  We reviewed the susceptibility data in detail.  We want to give him a regimen of omeprazole levofloxacin rifabutin and Tindazole.  We went over this with the patient.  I gave him our contact information with any concerns or questions.  He will call or message us with any intolerance to this regimen I will see him back in 3 to 4 weeks

## 2024-08-28 ENCOUNTER — OFFICE VISIT (OUTPATIENT)
Dept: CARDIOLOGY | Facility: CLINIC | Age: 64
End: 2024-08-28
Payer: COMMERCIAL

## 2024-08-28 VITALS
HEART RATE: 94 BPM | BODY MASS INDEX: 22.08 KG/M2 | OXYGEN SATURATION: 98 % | SYSTOLIC BLOOD PRESSURE: 158 MMHG | HEIGHT: 65 IN | DIASTOLIC BLOOD PRESSURE: 60 MMHG | WEIGHT: 132.5 LBS

## 2024-08-28 DIAGNOSIS — I42.8 NICM (NONISCHEMIC CARDIOMYOPATHY) (MULTI): ICD-10-CM

## 2024-08-28 PROCEDURE — 99214 OFFICE O/P EST MOD 30 MIN: CPT | Performed by: INTERNAL MEDICINE

## 2024-08-28 PROCEDURE — 3078F DIAST BP <80 MM HG: CPT | Performed by: INTERNAL MEDICINE

## 2024-08-28 PROCEDURE — 99204 OFFICE O/P NEW MOD 45 MIN: CPT | Performed by: INTERNAL MEDICINE

## 2024-08-28 PROCEDURE — 3008F BODY MASS INDEX DOCD: CPT | Performed by: INTERNAL MEDICINE

## 2024-08-28 PROCEDURE — 3075F SYST BP GE 130 - 139MM HG: CPT | Performed by: INTERNAL MEDICINE

## 2024-08-28 ASSESSMENT — ENCOUNTER SYMPTOMS
LOSS OF SENSATION IN FEET: 1
DEPRESSION: 0
OCCASIONAL FEELINGS OF UNSTEADINESS: 0

## 2024-08-28 ASSESSMENT — PAIN SCALES - GENERAL: PAINLEVEL: 0-NO PAIN

## 2024-08-29 RX ORDER — CARVEDILOL 12.5 MG/1
12.5 TABLET ORAL
Qty: 60 TABLET | Refills: 11 | Status: SHIPPED | OUTPATIENT
Start: 2024-08-29 | End: 2025-08-29

## 2024-09-01 NOTE — PROGRESS NOTES
Primary Care Physician: Claudio Maldonado DO  Date of Visit: 08/28/2024  1:30 PM EDT  Location of visit: [facility]     Chief Complaint:   Chief Complaint   Patient presents with    New Patient Visit     HPI / Summary:   Matthieu Solis is a 64 y.o. male presents for new patient    ROS    Medical History:   He has a past medical history of Cataract, GERD (gastroesophageal reflux disease), Gout, and HTN (hypertension).  Surgical Hx:   He has a past surgical history that includes Colonoscopy; Esophagogastroduodenoscopy; and Hernia repair (2015).   Social Hx:   He reports that he has been smoking cigarettes. He started smoking about 50 years ago. He has a 50.7 pack-year smoking history. He has never used smokeless tobacco. He reports that he does not drink alcohol and does not use drugs.  Family Hx:   His family history includes CVA in his father and mother; Hypertension in his father and mother.   Allergies:  Allergies   Allergen Reactions    Cinnamon Unknown     Cinnamon    Doxycycline Swelling    Penicillins Unknown     Outpatient Medications:  Current Outpatient Medications   Medication Instructions    atorvastatin (LIPITOR) 80 mg, oral, Daily RT    carvedilol (COREG) 12.5 mg, oral, 2 times daily (morning and late afternoon)    empagliflozin (JARDIANCE) 10 mg, oral, Daily RT    empagliflozin (JARDIANCE) 10 mg, oral, Daily, Pt has coupon    furosemide (Lasix) 20 mg tablet Take 1 tablet daily for 3 days, if your weight goes up by 2 pounds or more within a day or 2    levoFLOXacin (LEVAQUIN) 500 mg, oral, Daily    nicotine (Nicoderm CQ) 14 mg/24 hr patch APPLY 1 PATCH AS DIRECTED EVERY 24 HOURS.    omeprazole (PRILOSEC) 20 mg, oral, 2 times daily before meals, To be taken 30-60 minutes prior to breakfast and dinner. Do not crush or chew.    rifabutin (MYCOBUTIN) 150 mg, oral, Daily    sacubitriL-valsartan (Entresto) 49-51 mg tablet 1 tablet, oral, 2 times daily    sacubitriL-valsartan (Entresto)  "49-51 mg tablet 1 tablet, oral, 2 times daily    spironolactone (ALDACTONE) 12.5 mg, oral, Daily RT    tinidazole (TINDAMAX) 500 mg, oral, 2 times daily, Salvatge therapy for H pylori    triamcinolone (Kenalog) 0.1 % cream Apply twice daily to affected areas of back (avoid face, groin, body folds) for 2 weeks     Physical Exam:  Vitals:    08/28/24 1340 08/28/24 1446   BP: 131/73 158/60   BP Location: Right arm    Patient Position: Sitting    BP Cuff Size: Adult    Pulse: 92 94   SpO2: 98%    Weight: 60.1 kg (132 lb 8 oz)    Height: 1.651 m (5' 5\")      Wt Readings from Last 5 Encounters:   08/28/24 60.1 kg (132 lb 8 oz)   08/27/24 60.3 kg (133 lb)   07/25/24 58.1 kg (128 lb)   07/12/24 58.1 kg (128 lb)   07/08/24 65.3 kg (144 lb)     Physical Exam  JVP not elevated. Carotid impulses are 2+ without overlying bruit.   Chest exhibits fair to good air movement with completely clear breath sounds.   The cardiac rhythm is regular with no premature beats.   Normal S1 and S2. No gallop, murmur or rub, or click.   Abdomen is soft and benign without focal tenderness.   With no lower leg edema. The pedal pulses are intact.     Last Labs:  Office Visit on 07/25/2024   Component Date Value    POC Color, Urine 07/25/2024 Yellow     POC Appearance, Urine 07/25/2024 Clear     POC Glucose, Urine 07/25/2024 NEGATIVE     POC Bilirubin, Urine 07/25/2024 NEGATIVE     POC Ketones, Urine 07/25/2024 NEGATIVE     POC Specific Gravity, Ur* 07/25/2024 1.025     POC Blood, Urine 07/25/2024 TRACE-Intact (A)     POC PH, Urine 07/25/2024 6.0     POC Protein, Urine 07/25/2024 TRACE (A)     POC Urobilinogen, Urine 07/25/2024 0.2     Poc Nitrite, Urine 07/25/2024 NEGATIVE     POC Leukocytes, Urine 07/25/2024 NEGATIVE     Color, Urine 07/25/2024 Light-Yellow     Appearance, Urine 07/25/2024 Clear     Specific Gravity, Urine 07/25/2024 1.022     pH, Urine 07/25/2024 5.5     Protein, Urine 07/25/2024 10 (TRACE)     Glucose, Urine 07/25/2024 Normal     " Blood, Urine 07/25/2024 NEGATIVE     Ketones, Urine 07/25/2024 NEGATIVE     Bilirubin, Urine 07/25/2024 NEGATIVE     Urobilinogen, Urine 07/25/2024 Normal     Nitrite, Urine 07/25/2024 NEGATIVE     Leukocyte Esterase, Urine 07/25/2024 NEGATIVE     WBC, Urine 07/25/2024 1-5     RBC, Urine 07/25/2024 1-2     Mucus, Urine 07/25/2024 FEW     Calcium Oxalate Crystals* 07/25/2024 2+ (A)    Office Visit on 07/19/2024   Component Date Value    Case Report 07/19/2024                      Value:Dermatopathology                                  Case: [id number]                                   Authorizing Provider:  Ladarius Turpin MD          Collected:           07/19/2024 1129              Ordering Location:     [facility]       Received:            07/19/2024 1244              Pathologist:           Brent White MD                                                             Specimen:    SKIN, Right Ventral Mid-Forearm                                                            FINAL DIAGNOSIS 07/19/2024                      Value:A: SKIN, RIGHT VENTRAL MID-FOREARM,  PUNCH BIOPSY:  PIGMENT INCONTINENCE WITH RARE DYSKERATOSIS, SEE NOTE.    Note: Microscopic examination reveals a specimen that extends into the mid to deep reticular dermis. There is very mild basal layer vacuolization with rare dyskeratosis and there are superficial melanophages.    These findings could be seen in ashy dermatosis (erythema dyschromicum perstans), a connective tissue disease such as acute cutaneous lupus erythematosus, subacute cutaneous lupus erythematosus, and dermatomyositis or atrophic lichen planus.    ** Electronically signed out by Brent White MD **            07/19/2024                      Value:By the signature on this report, the individual or group listed as making the Final Interpretation/Diagnosis certifies that they have reviewed this case.       Clinical History 07/19/2024                      Value:Encounter Diagnosis:  Rash and other nonspecific skin eruption       [id number]  Collection Comments: Differential Diagnosis: LP v PIH v eczema  Check Margins Yes/No?:    Comments:    Dermpath Lab: Routine Histopathology (formalin-fixed tissue)  Finding Region: Right Forearm - Anterior  Specimen Objective:         Gross Description 07/19/2024                      Value:A:   Received in formalin is a 3 x 3 x 3 mm piece of skin.  It is tan in color.  It is cylindrical in shape.  It was embedded in toto.  The specimen was inked.      The specimen was grossed by Roma Cruz.      Hospital Outpatient Visit on 04/04/2024   Component Date Value    Case Report 04/04/2024                      Value:Surgical Pathology                                Case: [id number]                                  Authorizing Provider:  Katherine Warner MD      Collected:           04/04/2024 0755              Ordering Location:     [facility]    Received:            04/04/2024 1047              Pathologist:           Ngoc Teixeira MD                                                             Specimen:    STOMACH ANTRUM BIOPSY, antrum/ body                                                        FINAL DIAGNOSIS 04/04/2024                      Value:A.  Stomach antrum/body biopsy:                          Gastric oxyntic and antral mucosa with Helicobacter pylori associated                           chronic active gastritis, see comment.      04/04/2024                      Value:By the signature on this report, the individual or group listed as making                           the Final Interpretation/Diagnosis certifies that they have reviewed this                           case.     Comment 04/04/2024                      Value:Immunohistochemical stains (with adequate controls) is positive for                           Helicobacter pylori organisms.  NGS testing for drug susceptibility is                           pending, and would be  "reported separately.    Gross Description 04/04/2024                      Value:Received in formalin, labeled with the patient's name and hospital number                           and \"1\", are multiple fragments of tan, soft tissue aggregating to 2.0 x                           0.2 x 0.2 cm. The specimen is submitted in toto in one cassette.                          AE                                                         Disclaimer 04/04/2024                      Value:One or more of the reagents used to perform assays on this specimen MAY                           have contained components considered to be analyte specific reagents                           (ASR's).  ASR's have not been cleared or approved by the U.S. Food and                           Drug Administration.  These assays were developed and their performance                           characteristics determined by the Department of Pathology at [facility]. The FDA does not require this test to                           go through premarket FDA review. This test is used for clinical purposes.                           It should not be regarded as investigational or for research. This                           laboratory is certified under the Clinical Laboratory Improvement                           Amendments (CLIA) as qualified to perform high complexity clinical                           laboratory testing.  The assays were performed with appropriate positive                           and negative controls which stained appropriately.    H. Pylori Drug Susceptib* 04/04/2024                      Value:H. pylori DNA is Detected.                                                    RESISTANCE ASSOCIATED VARIANTS DETECTED:                          GyrA D91N                          23S [id number]                                                    VARIANTS OF UNCERTAIN SIGNIFICANCE DETECTED:       "                    GyrA V150L                                                                              INTERPRETATION AND POTENTIAL THERAPEUTIC REGIMENS:                          Considering the results of the mutation analysis, the following                           therapeutic regimens should be considered:                                                    -Bismuth quadruple therapy: bismuth + tetracycline + metronidazole + PPI                           for 14 days (preferred)                          -High dose dual therapy: amoxicillin 1 g tid + double-dose PPI bid for 14                           days                          -Rifabutin triple therapy: rifabutin + amoxicillin + PPI for 10 days (not                           preferred)                                                    Regimen Dosing Guide based on regimens in the  Adult H. pylori Treatment                           Guidelines (NOTE: This list includes all H. pylori regimens; use only the                           dosing corresponding to the above specified regimens.)                          -Bismuth quadruple therapy* = Bismuth 300-525 mg QID + Tetracycline 500 mg                           QID + Metronidazole 500 mg QID + PPI BID x 14 days                          *Available co-formulated as Pylera (bismuth subcitrate 140 mg,                           metronidazole 125 mg, tetracycline 125 mg per capsule dosed as 3 capsules                           four times daily; twice daily PPI must be administered separately) x 10                           days                          -Clarithromycin triple therapy** = Clarithromycin 500 mg BID + Amoxicillin                           1 g BID (or Metronidazole 500 mg BID) + PPI BID x 14 days                          **Available co-packaged as Prevpac (1 tablet clarithromycin 500 mg, 2                           capsules amoxicillin 500 mg, and 1 capsule lansoprazole 30 mg dosed twice                            daily)                            -Clarithromycin concomitant therapy = Clarithromycin 500 mg BID +                           Amoxicillin 1g BID + Metronidazole 500 mg BID + PPI BID x 14 days                          -High dose dual therapy = Amoxicillin 1 g TID + double-dose PPI BID x 14                           days                          -Levofloxacin quadruple therapy = Bismuth 300-525 mg QID + Levofloxacin                           500 mg once daily + Amoxicillin 1 g BID (or Metronidazole 500 mg BID) +                           PPI BID x 10-14 days                          -Levofloxacin triple therapy = Levofloxacin 500 mg once daily +                           Amoxicillin 1 g BID + PPI BID x 10-14 days                          -Rifabutin triple therapy*^* = Rifabutin 300 mg once daily + Amoxicillin 1                           g BID  + PPI BID x 10 days                          *^*Available co-formulated as Talicia (rifabutin 12.5 mg, amoxicillin 250                           mg, omeprazole 10 mg per capsule dosed as 4 capsules three times daily)                                                    Proton Pump Inhibitor Options                          All regimens dose proton pump inhibitors twice daily. High dose dual                           therapy uses double the standard PPI dose administered twice daily.                           Individual PPIs may have varying insurance coverage.                          -Lansoprazole 30 mg                          -Omeprazole 20 mg                          -Esomeprazole 20 mg                          -Pantoprazole 40 mg                          -Rabeprazole 20 mg                          -Dexlansoprazole 30 mg                                                    Notes:                          1.Treatment recommendations are guided by mutations detected in hotspot                           regions known to confer resistance to specific antimicrobial  agents,                           including 23S (clarithromycin), 16S (tetracycline), and gyrA                           (levofloxacin) and additionally take into consideration the 2021 AGA                           Clinical Practice Update on Management of Refractory H. pylori [1,2].                           Individual patient characteristics such as known prior failed regimens,                           antibiotic allergies and severe intolerances, risks of fluoroquinolones,                           treatment adherence, and insurance coverage may further influence choice                           of regimen. Vonoprazan-containing regimens are not included due to absence                           of guidelines or consensus statements and limited clinical experience but                           may be considered in individual cases.                          2.Consider referral to allergy for patients with history of penicillin                           allergy if amoxicillin is included in a preferred regimen. Refer to                           Penicillin Allergy Assessment Guidelines for further information.                          3.Doxycycline is not considered equivalent to tetracycline in                           bismuth-based quadruple therapy.                          4.Amoxicillin and metronidazole are considered equivalent in                           clarithromycin triple therapy and levofloxacin quadruple therapy regimens.                          5.Consider shorter 10-day course or avoid levofloxacin-containing regimens                           in patients at high risk for fluoroquinolone toxicity (e.g., prior C.                           difficile infection, CNS abnormalities, known QT prolongation, etc.).                          6.Regimens including rifabutin are generally considered not preferred due                           to higher risk of toxicity. Potential toxicities include orange                            discoloration of body fluids (common), rash, flu-like syndrome, uveitis,                           and cytopenias.                                                                              DISCLAIMER:                          Reference range for RESULT is 'H. pylori DNA is Not Detected.' Reference                           range for VARIANTS DETECTED is 'none'.                          This assay is designed to qualitatively detect targeted                           clinically-relevant nucleotide variants in 16S, 23S, and gyrA genes of                           Helicobacter pylori. Extracted nucleic acid is amplified by PCR, followed                           by next-generation sequencing and alignment and variant calling using                           genome [id number] [3]. A negative result does not rule out the presence                           of bacteria template below the limit of detection of this assay.                          This assay has been used to identify mutations associated with treatment                           failure in a retrospective cohort [3-4].  Decisions on patient care and                           treatment must be based on independent medical judgment of the treating                           physician, taking into account all applicable information concerning the                           patient's condition such as clinical and histopathologic findings, other                           laboratory findings, and patient preferences. This report includes                           information from public sources, including scientific and medical                           literature to better characterize the significance of alterations                           detected.                          This laboratory developed test was developed and its analytical                           performance characteristics have been determined by [facility]                            Laboratory. This test has not been cleared or approved by the FDA;                           however, the FDA has determined that such approval is not necessary. The                           [facility] is certified under the Clinical Laboratory Improvement Amendments of                           1988 (CLIA-88) as qualified to perform high complexity testing.                                                    REFERENCES:                          1.Camila WD et al. [facility] [place] Clinical Guideline: Treatment of Helicobacter pylori                           Infection. Am J Gastroenterol. 112(2):212-239, 2017.                          2.Giovana SC et al. [facility] Clinical Practice Update on the management of                           refractory Helicobacter pylori infection: Expert Review. Gastroenterology.                           160(5):1831-41, 2021.                          3.Denise VEGAS et al. Helicobacter pylori Mutations Detected by                           Next-Generation Sequencing in Formalin-Fixed, Paraffin-Embedded Gastric                           Biopsy Specimens Are Associated with Treatment Failure. J Clin Microbiol.                           57(7):1834-18, 2019.                          4.Merline PITTS et al. Tailored Treatment Based on Helicobacter pylori                           Genetic Markers of Resistance Is Associated With Higher Eradication                           Success. Am J Gastroenterol. 118(2):360-363, 2023                              Electronically signed an* 04/04/2024                      Value:Fer Rocha MD PhD          Assessment/Plan   1.  Nonischemic dilated congestive cardiomyopathy.  This patient was evidently admitted to [facility] in 6/2024 after an apparent near syncopal event at work because of extreme shortness of breath.  An echocardiogram was performed on 6/19/2024 and interpreted as showing a severely reduced LV ejection fraction at 15-20% with mild left atrial  enlargement.  He had had a previous echocardiogram at the [facility] 3/11/2011 that estimated LV ejection fraction at 30-35%.  The patient was transferred to [facility] from 6/24/2024 -6/27/2024.  He underwent cardiac catheterization which demonstrated nonobstructive coronary artery disease and he was placed on a LifeVest.  Patient is currently feeling relatively well walking daily also riding a regular bike for up to 5 mph.  He is taking 6000 steps per day.  He is currently in some occupational training.  Current therapy will be modified by increasing carvedilol from 6.25 mg twice daily to 12.5 mg twice daily.  He will be switched from lisinopril 10 mg daily to Entresto 49/51 mg twice daily and will also begin Jardiance 10 mg daily.  Patient will remain on the Lasix 20 mg daily.  Patient is also on spironolactone 25 mg daily.  He will return in 1 month at which time a repeat echocardiogram will be done and labs rechecked.  He will continue to wear the LifeVest for now.    2.  Hypertension.    3.  Hyperlipidemia.  Continue atorvastatin 80 mg daily.        Orders:  Orders Placed This Encounter   Procedures    Referral to Clinical Pharmacy    Transthoracic Echo (TTE) Complete      Followup Appts:  Future Appointments   Date Time Provider Department Center   9/4/2024 11:15 AM Kathy Hanna MD [id number] [facility]   9/9/2024  1:00 PM Aurelio Zuniga MD [id number] [place]   9/23/2024  1:30 PM [facility] RN 2 [id number] [facility]   9/23/2024  2:00 PM WADE Dean [id number] [facility]   9/23/2024  2:30 PM PAT Pa-CNP [id number] [facility]   9/26/2024 12:00 PM Dunia Espino RD [id number] [facility]   9/27/2024  1:00 PM PHARMACY [facility] [id number] [facility]   10/1/2024  3:30 PM EUC ECHO/STRESS [id number] [place]   10/8/2024  8:00 AM Geovanni Mckenna MD [id number] [facility]   10/23/2024 10:30 AM Claudio Maldonado DO [id number] [facility]   12/23/2024 10:00 AM Marvin KUMAR  MD Ester [id number] [facility]   12/24/2024  1:00 PM Inna Lau MD [id number] [facility]           ____________________________________________________________  Tomás Donaldson MD  [facility]  Assistant Clinical Professor, [facility] [employer] [facility]  [facility]

## 2024-09-04 ENCOUNTER — OFFICE VISIT (OUTPATIENT)
Dept: CARDIOLOGY | Facility: HOSPITAL | Age: 64
End: 2024-09-04
Payer: COMMERCIAL

## 2024-09-04 VITALS
SYSTOLIC BLOOD PRESSURE: 127 MMHG | DIASTOLIC BLOOD PRESSURE: 74 MMHG | BODY MASS INDEX: 21.66 KG/M2 | OXYGEN SATURATION: 97 % | WEIGHT: 130 LBS | HEIGHT: 65 IN | HEART RATE: 87 BPM

## 2024-09-04 DIAGNOSIS — R09.89 WEAK ARTERIAL PULSE: Primary | ICD-10-CM

## 2024-09-04 DIAGNOSIS — I73.9 CLAUDICATION (CMS-HCC): ICD-10-CM

## 2024-09-04 DIAGNOSIS — I82.422 THROMBOSIS OF LEFT ILIAC VEIN (MULTI): ICD-10-CM

## 2024-09-04 PROCEDURE — 99214 OFFICE O/P EST MOD 30 MIN: CPT | Performed by: INTERNAL MEDICINE

## 2024-09-04 PROCEDURE — G2211 COMPLEX E/M VISIT ADD ON: HCPCS | Performed by: INTERNAL MEDICINE

## 2024-09-04 PROCEDURE — 3078F DIAST BP <80 MM HG: CPT | Performed by: INTERNAL MEDICINE

## 2024-09-04 PROCEDURE — 3008F BODY MASS INDEX DOCD: CPT | Performed by: INTERNAL MEDICINE

## 2024-09-04 PROCEDURE — 4004F PT TOBACCO SCREEN RCVD TLK: CPT | Performed by: INTERNAL MEDICINE

## 2024-09-04 PROCEDURE — 3074F SYST BP LT 130 MM HG: CPT | Performed by: INTERNAL MEDICINE

## 2024-09-04 ASSESSMENT — PATIENT HEALTH QUESTIONNAIRE - PHQ9
2. FEELING DOWN, DEPRESSED OR HOPELESS: NOT AT ALL
SUM OF ALL RESPONSES TO PHQ9 QUESTIONS 1 AND 2: 0
1. LITTLE INTEREST OR PLEASURE IN DOING THINGS: NOT AT ALL

## 2024-09-04 NOTE — PROGRESS NOTES
Chief complaint: PAD    Subjective   Patient recently admitted to [facility] for shortness of breath/syncope and was found to have severe dilated cardiomyopathy, further imaging revealed left common iliac/external iliac and SFA occlusion and patient was referred to vascular medicine for further management  On further chart reviewed, it has been noted on CAT scan done in January 2024    HTN, controlled   HLD, on Lipitor 80 started in 6/2024,  in 2/2024   A1c 5.1   Smoker x more than 50 years, 1-1.5 PPD, slowed down since his CM diagnosis  Body mass index is 21.63 kg/m².  He is not on antiplatelets    He likes to exercise, used to walk 7 miles a day  In the last couple of years, he noticed that he is unable to walk without getting severe aching pain, left> right, involving the thigh>calf, he does experience it after less than half a mile but forces himself to continue  He attributed it to sciatica pain  No Rest pain, wounds or discoloration     Review of Systems  As noted above   severe aching pain, left> right, involving the thigh>calf, he does experience it after less than half a mile but forces himself to continue   GERD   Arthritis   Rash, following with Derm  Neuropathy   Chronic back pain   Anxiety   Difficulty gaining weight   No other complaints today     Past Medical History:   Diagnosis Date    Cataract     GERD (gastroesophageal reflux disease)     Gout     HTN (hypertension)      Past Surgical History:   Procedure Laterality Date    COLONOSCOPY      ESOPHAGOGASTRODUODENOSCOPY      HERNIA REPAIR  2015    Inguinal     Social History     Socioeconomic History    Marital status: Significant Other   Tobacco Use    Smoking status: Every Day     Current packs/day: 1.00     Average packs/day: 1 pack/day for 50.7 years (50.7 ttl pk-yrs)     Types: Cigarettes     Start date: 1974    Smokeless tobacco: Never    Tobacco comments:     Trying to quit   Vaping Use    Vaping status: Never Used   Substance  and Sexual Activity    Alcohol use: Never    Drug use: Never    Sexual activity: Defer     Social Determinants of Health     Financial Resource Strain: Low Risk  (1/3/2024)    Overall Financial Resource Strain (CARDIA)     Difficulty of Paying Living Expenses: Not hard at all   Recent Concern: Financial Resource Strain - Medium Risk (12/18/2023)    Overall Financial Resource Strain (CARDIA)     Difficulty of Paying Living Expenses: Somewhat hard   Food Insecurity: Patient Declined (6/20/2024)    [facility], [facility], [facility]    Hunger Vital Sign     Worried About Running Out of Food in the Last Year: Patient declined     Ran Out of Food in the Last Year: Patient declined   Transportation Needs: Patient Declined (6/20/2024)    Received from [facility], [facility], [facility]    PRAPARE - Transportation     Lack of Transportation (Medical): Patient declined     Lack of Transportation (Non-Medical): Patient declined   Physical Activity: Sufficiently Active (12/18/2023)    Exercise Vital Sign     Days of Exercise per Week: 7 days     Minutes of Exercise per Session: 30 min   Stress: Stress Concern Present (2/12/2024)    [employer] [place] [employer] - Occupational Stress Questionnaire     Feeling of Stress : Rather much   Social Connections: Socially Integrated (12/18/2023)    Social Connection and Isolation Panel [NHANES]     Frequency of Communication with Friends and Family: More than three times a week     Frequency of Social Gatherings with Friends and Family: More than three times a week     Attends Taoist Services: 1 to 4 times per year     Active Member of Clubs or Organizations: Yes     Attends Club or Organization Meetings: 1 to 4 times per year     Marital Status: Living with partner   Intimate Partner Violence: Not At Risk (12/18/2023)    Humiliation, Afraid, Rape, and Kick questionnaire     Fear of Current or Ex-Partner: No     Emotionally  "Abused: No     Physically Abused: No     Sexually Abused: No   Housing Stability: Patient Declined (6/20/2024)    [facility], [facility], [facility]    Housing Stability Vital Sign     Unable to Pay for Housing in the Last Year: Patient declined     Unstable Housing in the Last Year: Patient declined      Family History   Problem Relation Name Age of Onset    Other (CVA) Mother      Hypertension Mother      Other (CVA) Father      Hypertension Father        Allergies   Allergen Reactions    Cinnamon Unknown     Cinnamon    Doxycycline Swelling    Penicillins Unknown       Objective   Physical Exam  /74   Pulse 87   Ht 1.651 m (5' 5\")   Wt 59 kg (130 lb)   BMI 21.63 kg/m²      General:  In no acute distress  Neuro: alert and oriented x3  CV:  RRR  Lungs: CTA bilaterally  Abd:  Soft, non-tender   Psych:  Appropriate affect  Upper extremities: No swelling, +2 radial   Lower extremities: No edema.   bilateral DP/PT are dopplerable, left DP is barely audible and monophasic  Skin:  No open ulcers.  Obdulio skin changes     Medications   Current Outpatient Medications   Medication Instructions    atorvastatin (LIPITOR) 80 mg, oral, Daily RT    carvedilol (COREG) 12.5 mg, oral, 2 times daily (morning and late afternoon)    empagliflozin (JARDIANCE) 10 mg, oral, Daily RT    empagliflozin (JARDIANCE) 10 mg, oral, Daily, Pt has coupon    furosemide (Lasix) 20 mg tablet Take 1 tablet daily for 3 days, if your weight goes up by 2 pounds or more within a day or 2    levoFLOXacin (LEVAQUIN) 500 mg, oral, Daily    nicotine (Nicoderm CQ) 14 mg/24 hr patch APPLY 1 PATCH AS DIRECTED EVERY 24 HOURS.    omeprazole (PRILOSEC) 20 mg, oral, 2 times daily before meals, To be taken 30-60 minutes prior to breakfast and dinner. Do not crush or chew.    rifabutin (MYCOBUTIN) 150 mg, oral, Daily    sacubitriL-valsartan (Entresto) 49-51 mg tablet 1 tablet, oral, 2 times daily    sacubitriL-valsartan " (Entresto) 49-51 mg tablet 1 tablet, oral, 2 times daily    spironolactone (ALDACTONE) 12.5 mg, oral, Daily RT    tinidazole (TINDAMAX) 500 mg, oral, 2 times daily, Salvatge therapy for H pylori    triamcinolone (Kenalog) 0.1 % cream Apply twice daily to affected areas of back (avoid face, groin, body folds) for 2 weeks       Lab Review   Recent Labs     02/03/24  0904 01/08/24  0506 01/05/24  0457 01/04/24  0533 01/03/24  0524 01/02/24  1614 03/13/23  1248 09/23/20  0515    135* 138 135* 135* 136 138 139   K 4.2 4.0 3.5 3.8 5.0 3.9 3.8 3.7    97* 104 101 104 102 101 101   CO2 26 30 24 22 20* 24 28 28   ANIONGAP 10 12 14 16 16 14 13 14   BUN 15 9 7 13 13 11 10 9   CREATININE 0.82 0.80 0.86 0.87 0.69 0.92 0.80 0.78   EGFR >90 >90 >90 >90 >90 >90  --   --    MG  --   --   --   --  2.00  --   --  1.76     Recent Labs     02/03/24  0904 01/08/24  0506 01/02/24 1614 03/13/23 1248 09/23/20  0515 09/21/20 2005 09/17/20  1520   ALBUMIN 4.0 4.5 4.3 4.5 4.3 4.6 5.0   ALKPHOS 92 94 102 97  --  81 88   ALT 16 51 14 26  --  19 20   AST 14 40* 16 20  --  17 19   BILITOT 0.4 0.8 1.0 0.8  --  0.7 0.8   LIPASE  --  8* 4* 4*  --  5* 22     Recent Labs     02/03/24  0904 01/08/24  0506 01/05/24 0457 01/04/24  0533 01/03/24  0524 01/02/24  1614 03/13/23  1248 09/23/20  0515   WBC 8.2 12.9* 9.7 13.7* 13.1* 12.4* 9.0 7.3   HGB 11.5* 15.0 13.7 13.6 14.6 13.9 14.9 13.9   HCT 36.3* 45.7 41.2 41.1 44.9 41.9 45.6 40.3*    466* 383 379 227 386 400 411   MCV 96 91 92 94 96 93 95 90     Recent Labs     09/21/20 2005 12/29/18 1953   INR 1.1 1.2*     PTT - No results in last year.    Recent Labs     02/03/24  0904   CHOL 159   HDL 51.0   TRIG 39     Lab Results   Component Value Date    HGBA1C 5.1 06/19/2024     Lab Results   Component Value Date    TSH 0.34 (L) 02/03/2024       Imaging  CTA C/A/P 7/1/24-- images unavailable to review, done at [facility]   . NO EVIDENCE OF ACUTE INTRATHORACIC PATHOLOGY.   . OCCLUDED LEFT COMMON  ILIAC ARTERY, LEFT EXTERNAL ILIAC ARTERY, LEFT   COMMON FEMORAL ARTERY, AND INCLUDED VISUALIZED LEFT SUPERFICIAL FEMORAL   ARTERY.     CT A/P 1/8/24  VESSELS: No aortic aneurysm. There is moderate atherosclerotic plaque  of the aorta and there is chronic appearing occlusion of the left  external iliac artery with reconstitution of flow at the level of the  left common femoral artery, where there is significant stenosis. The  partially visualized superficial femoral artery also appears to be  occluded.    Assessment/Plan   Matthieu Solis is a 64 y.o. male with PMHx of HTN, HLD, NICM/HFrEF, h pylori, inguinal hernia, diverticulosis, GERD, gout, smoker      _ 1/2024 left DANYELLE/EIA/SFA occlusion wit evidence of ASO   _ 2019 R DANYELLE dilation ~1.5 cm, ~1.6 cm on the last imaging done in 7/2024 at [facility]   _ Evidence of ASO of the aorta and branches x years   _ ASO Rfs: HTN, HLD, Smoker (x more than 50 years, 1-1.5 PPD, slowed down since his CM diagnosis)    Plan   -- Monitor for signs/symptoms that require medical attention  -- PVR with exercise   -- Patient should be started on antiplatelets ASAP, will reach out to GI to confirm there is no contraindication  -- Based on the PVR results, will consider vascular surgery evaluation, if surgery is needed, we will have to involve his cardiologist given his cardiac status  Not sure how much benefit he will get from vascular rehab, as he has been pushing himself to walk at least 2 miles/day and still getting severe claudication   -- Target BP <130/80  Continue high-intensity statins, Lipitor 80 mg/day. Target LDL <70  Optimize BG control. Continue follow up with PCP  -- Smoking cessation discussed and highly encouraged  -- Rest as detailed in the patient's instructions    Kathy Hanna MD

## 2024-09-04 NOTE — PATIENT INSTRUCTIONS
-- Smoking cessation highly encourage  -- Exercise as tolerated, ideally walking with goal 30 minutes for minimum 5 days/week  -- Heart healthy diet highly recommended  -- Follow-up in 3 months, earlier if needed

## 2024-09-06 ENCOUNTER — TELEPHONE (OUTPATIENT)
Dept: CARDIOLOGY | Facility: CLINIC | Age: 64
End: 2024-09-06
Payer: COMMERCIAL

## 2024-09-06 DIAGNOSIS — R09.89 WEAK ARTERIAL PULSE: ICD-10-CM

## 2024-09-06 DIAGNOSIS — I73.9 CLAUDICATION (CMS-HCC): Primary | ICD-10-CM

## 2024-09-06 RX ORDER — ASPIRIN 81 MG/1
81 TABLET ORAL DAILY
Qty: 90 TABLET | Refills: 3 | Status: SHIPPED | OUTPATIENT
Start: 2024-09-06 | End: 2025-09-06

## 2024-09-06 NOTE — TELEPHONE ENCOUNTER
Phoned patient and spoke to patient.  Provided patient update per Dr. Kathy Hanna notation regarding aspirin 81mg daily and patient verbalized understanding.

## 2024-09-06 NOTE — TELEPHONE ENCOUNTER
----- Message from Kathy Hanna sent at 9/6/2024  1:46 PM EDT -----  Please let the patient know that I have reached out to his gastroenterology team and there is no contraindication from GI standpoint to start aspirin 81 mg daily    Thanks,   Kathy Hanna MD  ----- Message -----  From: HAILEE Gonzales  Sent: 9/4/2024   4:36 PM EDT  To: Kathy Hanna MD    Hi there. I see no contraindication from a GI perspective.     Have a great day!    Brijesh Browne  ----- Message -----  From: Kathy Hanna MD  Sent: 9/4/2024  12:33 PM EDT  To: HAILEE Gonzales    Hi, hope all is well. Any contraindication to antiplatelets use? he needs to be started on it

## 2024-09-08 NOTE — PROGRESS NOTES
Subjective   Patient ID: Matthieu Solis is a 64 y.o. male who presents for No chief complaint on file..  HPI  PT PRESENTS FOR EVALUATION OF A SLIGHTLY ELEVATED PSA.  NO FAMILY H/O PROSTATE CANCER THAT HE KNOWS OF.    Are you experiencing:  Burning on urination --NO  Pain on urination  --NO  Urinary frequency -- WHEN HE TAKES A WATER PILL   Urinary urgency -- OCC  Urge incontinence -- NO  Enuresis -- NO  Nocturia-- N  Hematuria -- NO  Hesitancy -- NO  Post void fullness -- NO    Review of Systems  General-- No C/O fever or chills  Head-- No C/O Dizziness  Eyes-- NO  C/O blurry or double vision  Ears-- No C/O hearing loss  Neck-- Supple  Chest-- No C/O pain or discomfort  Lungs-- No C/O shortness of breath  Abdomen-- No C/O  pain or discomfort, No nausea or vomiting  Back-- No C/O back pain or discomfort  Extremities-- No C/O swelling or pain    Objective   Physical Exam    General-- well developed, well nourished in NAD  Head-- normal cephalic, atraumatic  Eyes-- PERRL, EOM'S FROM,  no  jaundice  Neck-- Supple, without masses  Chest-- Normal bony structure  Abdomen-- soft, non tender, liver spleen not palpable . No supra pubic masses, SMALL UMBILICAL HERNIA  Back-- no flank masses palpable, no CVA tenderness on palpation or perc;ussion  Lymph nodes-- No inguinal lymphadenopathy noted  Prostate-- 1+, firm, smooth, non tender,without nodules  Testis-- both down, non tender, without masses  Epididymis-- SPERMATOCELE MID PORTION OF THE LEFT EPIDIDYMIS - VERY TENDER TO TOUCH  Scrotum -- no hydrocele noted  Extremities -- Normal muscle mass and tone for the patients age  Neurological-- oriented times three    PSA  2-3-24-- 4.37    URINALYSIS DIPSTICK-- 4+ GLUCOSE    Assessment/Plan   A:  SLIGHTLY ELEVATED PSA WITH A NORMAL FEELING PROSTATE  NO FAMILY H/O PROSTATE CANCER  PATHOPHYSIOLOGY OF THE ABOVE AND OPTIONS OF FURTHER EVALUATION DISCUSSED  ALL QUESTIONS ANSWERED     SPERMATOCELE BODY OF THE LEFT EPIDIDYMIS -- TENDER  TO PALPATION    P:  REPEAT THE PSA  PT TO CALL FOR THE REPORT -- IF HIGHER WILL SCHEDULE A TRUSP BX.  IF LOWER  WILL SEE BACK IN 6 MONTHS FOR A TEJAS AND PSA RE CK   IF A TRUSP BX IS DONE IT NEEDS TO BE DONE UNDER A MAC ANES    ADDENDUM:  REPEAT PSA TODAY -- 7.32  WILL SCHEDULE A TRUSP, BX MAC ANES, OUT PT  WILL NEED TO STOP THE ASPIRIN AT LEAST ONE WEEK PRIOR TO THE BX    Aurelio Zuniga MD 09/08/24 3:18 PM

## 2024-09-09 ENCOUNTER — LAB (OUTPATIENT)
Dept: LAB | Facility: LAB | Age: 64
End: 2024-09-09
Payer: COMMERCIAL

## 2024-09-09 ENCOUNTER — OFFICE VISIT (OUTPATIENT)
Dept: UROLOGY | Facility: CLINIC | Age: 64
End: 2024-09-09
Payer: COMMERCIAL

## 2024-09-09 VITALS
HEART RATE: 86 BPM | RESPIRATION RATE: 16 BRPM | HEIGHT: 65 IN | TEMPERATURE: 98.5 F | WEIGHT: 130 LBS | DIASTOLIC BLOOD PRESSURE: 67 MMHG | SYSTOLIC BLOOD PRESSURE: 123 MMHG | BODY MASS INDEX: 21.66 KG/M2

## 2024-09-09 DIAGNOSIS — R97.20 ELEVATED PSA: ICD-10-CM

## 2024-09-09 DIAGNOSIS — R39.15 URGENCY OF MICTURITION: Primary | ICD-10-CM

## 2024-09-09 DIAGNOSIS — N43.40 SPERMATOCELE: ICD-10-CM

## 2024-09-09 DIAGNOSIS — B96.81 HELICOBACTER PYLORI GASTRITIS: ICD-10-CM

## 2024-09-09 DIAGNOSIS — R35.0 URINARY FREQUENCY: ICD-10-CM

## 2024-09-09 DIAGNOSIS — K29.70 HELICOBACTER PYLORI GASTRITIS: ICD-10-CM

## 2024-09-09 LAB
POC APPEARANCE, URINE: CLEAR
POC BILIRUBIN, URINE: NEGATIVE
POC BLOOD, URINE: NEGATIVE
POC COLOR, URINE: ABNORMAL
POC GLUCOSE, URINE: ABNORMAL MG/DL
POC KETONES, URINE: NEGATIVE MG/DL
POC LEUKOCYTES, URINE: NEGATIVE
POC NITRITE,URINE: NEGATIVE
POC PH, URINE: 6 PH
POC PROTEIN, URINE: NEGATIVE MG/DL
POC SPECIFIC GRAVITY, URINE: 1.02
POC UROBILINOGEN, URINE: 0.2 EU/DL
PSA SERPL-MCNC: 7.32 NG/ML

## 2024-09-09 PROCEDURE — 99214 OFFICE O/P EST MOD 30 MIN: CPT | Performed by: UROLOGY

## 2024-09-09 PROCEDURE — 81003 URINALYSIS AUTO W/O SCOPE: CPT | Mod: QW | Performed by: UROLOGY

## 2024-09-09 PROCEDURE — 36415 COLL VENOUS BLD VENIPUNCTURE: CPT

## 2024-09-09 PROCEDURE — 3008F BODY MASS INDEX DOCD: CPT | Performed by: UROLOGY

## 2024-09-09 PROCEDURE — 84153 ASSAY OF PSA TOTAL: CPT

## 2024-09-09 PROCEDURE — 3078F DIAST BP <80 MM HG: CPT | Performed by: UROLOGY

## 2024-09-09 PROCEDURE — 3074F SYST BP LT 130 MM HG: CPT | Performed by: UROLOGY

## 2024-09-09 RX ORDER — TINIDAZOLE 500 MG/1
500 TABLET ORAL 2 TIMES DAILY
Qty: 8 TABLET | Refills: 0 | Status: SHIPPED | OUTPATIENT
Start: 2024-09-09 | End: 2024-09-13

## 2024-09-09 ASSESSMENT — ENCOUNTER SYMPTOMS
LOSS OF SENSATION IN FEET: 0
DEPRESSION: 0
OCCASIONAL FEELINGS OF UNSTEADINESS: 0

## 2024-09-09 ASSESSMENT — PAIN SCALES - GENERAL: PAINLEVEL: 0-NO PAIN

## 2024-09-09 NOTE — LETTER
September 10, 2024     Claudio Maldonado DO  [id number]  [facility] [address]  [facility] [address]    Patient: Matthieu Solis   YOB: 1960   Date of Visit: 9/9/2024       Dear Dr. Claudio Maldonado DO:    Thank you for referring Matthieu Solis to me for evaluation. Below are my notes for this consultation.  If you have questions, please do not hesitate to call me. I look forward to following your patient along with you.       Sincerely,     Aurelio Zuniga MD      CC: No Recipients  ______________________________________________________________________________________    Subjective  Patient ID: Matthieu Solis is a 64 y.o. male who presents for No chief complaint on file..  HPI  PT PRESENTS FOR EVALUATION OF A SLIGHTLY ELEVATED PSA.  NO FAMILY H/O PROSTATE CANCER THAT HE KNOWS OF.    Are you experiencing:  Burning on urination --NO  Pain on urination  --NO  Urinary frequency -- WHEN HE TAKES A WATER PILL   Urinary urgency -- OCC  Urge incontinence -- NO  Enuresis -- NO  Nocturia-- N  Hematuria -- NO  Hesitancy -- NO  Post void fullness -- NO    Review of Systems  General-- No C/O fever or chills  Head-- No C/O Dizziness  Eyes-- NO  C/O blurry or double vision  Ears-- No C/O hearing loss  Neck-- Supple  Chest-- No C/O pain or discomfort  Lungs-- No C/O shortness of breath  Abdomen-- No C/O  pain or discomfort, No nausea or vomiting  Back-- No C/O back pain or discomfort  Extremities-- No C/O swelling or pain    Objective   Physical Exam    General-- well developed, well nourished in NAD  Head-- normal cephalic, atraumatic  Eyes-- PERRL, EOM'S FROM,  no  jaundice  Neck-- Supple, without masses  Chest-- Normal bony structure  Abdomen-- soft, non tender, liver spleen not palpable . No supra pubic masses, SMALL UMBILICAL HERNIA  Back-- no flank masses palpable, no CVA tenderness on palpation or perc;ussion  Lymph nodes-- No inguinal lymphadenopathy noted  Prostate-- 1+, firm, smooth, non tender,without  nodules  Testis-- both down, non tender, without masses  Epididymis-- SPERMATOCELE MID PORTION OF THE LEFT EPIDIDYMIS - VERY TENDER TO TOUCH  Scrotum -- no hydrocele noted  Extremities -- Normal muscle mass and tone for the patients age  Neurological-- oriented times three    PSA  2-3-24-- 4.37    URINALYSIS DIPSTICK-- 4+ GLUCOSE    Assessment/Plan   A:  SLIGHTLY ELEVATED PSA WITH A NORMAL FEELING PROSTATE  NO FAMILY H/O PROSTATE CANCER  PATHOPHYSIOLOGY OF THE ABOVE AND OPTIONS OF FURTHER EVALUATION DISCUSSED  ALL QUESTIONS ANSWERED     SPERMATOCELE BODY OF THE LEFT EPIDIDYMIS -- TENDER TO PALPATION    P:  REPEAT THE PSA  PT TO CALL FOR THE REPORT -- IF HIGHER WILL SCHEDULE A TRUSP BX.  IF LOWER  WILL SEE BACK IN 6 MONTHS FOR A TEJAS AND PSA RE CK   IF A TRUSP BX IS DONE IT NEEDS TO BE DONE UNDER A MAC ANES    ADDENDUM:  REPEAT PSA TODAY -- 7.32  WILL SCHEDULE A TRUSP, BX MAC ANES, OUT PT  WILL NEED TO STOP THE ASPIRIN AT LEAST ONE WEEK PRIOR TO THE BX    Aurelio Zuniga MD 09/08/24 3:18 PM

## 2024-09-09 NOTE — LETTER
September 10, 2024     Claudio Maldonado DO  [id number]  [facility] [address]  [facility] [address]    Patient: Matthieu Solis   YOB: 1960   Date of Visit: 9/9/2024       Dear Dr. Claudio Maldonado DO:    Thank you for referring Matthieu Solis to me for evaluation. Below are my notes for this consultation.  If you have questions, please do not hesitate to call me. I look forward to following your patient along with you.       Sincerely,     Aurelio Zuniga MD      CC: No Recipients  ______________________________________________________________________________________    Subjective  Patient ID: Matthieu Solis is a 64 y.o. male who presents for No chief complaint on file..  HPI  PT PRESENTS FOR EVALUATION OF A SLIGHTLY ELEVATED PSA.  NO FAMILY H/O PROSTATE CANCER THAT HE KNOWS OF.    Are you experiencing:  Burning on urination --NO  Pain on urination  --NO  Urinary frequency -- WHEN HE TAKES A WATER PILL   Urinary urgency -- OCC  Urge incontinence -- NO  Enuresis -- NO  Nocturia-- N  Hematuria -- NO  Hesitancy -- NO  Post void fullness -- NO    Review of Systems  General-- No C/O fever or chills  Head-- No C/O Dizziness  Eyes-- NO  C/O blurry or double vision  Ears-- No C/O hearing loss  Neck-- Supple  Chest-- No C/O pain or discomfort  Lungs-- No C/O shortness of breath  Abdomen-- No C/O  pain or discomfort, No nausea or vomiting  Back-- No C/O back pain or discomfort  Extremities-- No C/O swelling or pain    Objective   Physical Exam    General-- well developed, well nourished in NAD  Head-- normal cephalic, atraumatic  Eyes-- PERRL, EOM'S FROM,  no  jaundice  Neck-- Supple, without masses  Chest-- Normal bony structure  Abdomen-- soft, non tender, liver spleen not palpable . No supra pubic masses, SMALL UMBILICAL HERNIA  Back-- no flank masses palpable, no CVA tenderness on palpation or perc;ussion  Lymph nodes-- No inguinal lymphadenopathy noted  Prostate-- 1+, firm, smooth, non tender,without  nodules  Testis-- both down, non tender, without masses  Epididymis-- SPERMATOCELE MID PORTION OF THE LEFT EPIDIDYMIS - VERY TENDER TO TOUCH  Scrotum -- no hydrocele noted  Extremities -- Normal muscle mass and tone for the patients age  Neurological-- oriented times three    PSA  2-3-24-- 4.37    URINALYSIS DIPSTICK-- 4+ GLUCOSE    Assessment/Plan   A:  SLIGHTLY ELEVATED PSA WITH A NORMAL FEELING PROSTATE  NO FAMILY H/O PROSTATE CANCER  PATHOPHYSIOLOGY OF THE ABOVE AND OPTIONS OF FURTHER EVALUATION DISCUSSED  ALL QUESTIONS ANSWERED     SPERMATOCELE BODY OF THE LEFT EPIDIDYMIS -- TENDER TO PALPATION    P:  REPEAT THE PSA  PT TO CALL FOR THE REPORT -- IF HIGHER WILL SCHEDULE A TRUSP BX.  IF LOWER  WILL SEE BACK IN 6 MONTHS FOR A TEJAS AND PSA RE CK   IF A TRUSP BX IS DONE IT NEEDS TO BE DONE UNDER A MAC ANES    ADDENDUM:  REPEAT PSA TODAY -- 7.32  WILL SCHEDULE A TRUSP, BX MAC ANES, OUT PT  WILL NEED TO STOP THE ASPIRIN AT LEAST ONE WEEK PRIOR TO THE BX    Aurelio Zuniga MD 09/08/24 3:18 PM

## 2024-09-09 NOTE — LETTER
September 10, 2024     Claudio Maldonado DO  [address]  [facility] [address]  [facility] [address]    Patient: Matthieu Solis   YOB: 1960   Date of Visit: 9/9/2024       Dear Dr. Claudio Maldonado DO:    Thank you for referring Matthieu Solis to me for evaluation. Below are my notes for this consultation.  If you have questions, please do not hesitate to call me. I look forward to following your patient along with you.       Sincerely,     Aurelio Zuniga MD      CC: No Recipients  ______________________________________________________________________________________    Subjective  Patient ID: Matthieu Solis is a 64 y.o. male who presents for No chief complaint on file..  HPI  PT PRESENTS FOR EVALUATION OF A SLIGHTLY ELEVATED PSA.  NO FAMILY H/O PROSTATE CANCER THAT HE KNOWS OF.    Are you experiencing:  Burning on urination --NO  Pain on urination  --NO  Urinary frequency -- WHEN HE TAKES A WATER PILL   Urinary urgency -- OCC  Urge incontinence -- NO  Enuresis -- NO  Nocturia-- N  Hematuria -- NO  Hesitancy -- NO  Post void fullness -- NO    Review of Systems  General-- No C/O fever or chills  Head-- No C/O Dizziness  Eyes-- NO  C/O blurry or double vision  Ears-- No C/O hearing loss  Neck-- Supple  Chest-- No C/O pain or discomfort  Lungs-- No C/O shortness of breath  Abdomen-- No C/O  pain or discomfort, No nausea or vomiting  Back-- No C/O back pain or discomfort  Extremities-- No C/O swelling or pain    Objective   Physical Exam    General-- well developed, well nourished in NAD  Head-- normal cephalic, atraumatic  Eyes-- PERRL, EOM'S FROM,  no  jaundice  Neck-- Supple, without masses  Chest-- Normal bony structure  Abdomen-- soft, non tender, liver spleen not palpable . No supra pubic masses, SMALL UMBILICAL HERNIA  Back-- no flank masses palpable, no CVA tenderness on palpation or perc;ussion  Lymph nodes-- No inguinal lymphadenopathy noted  Prostate-- 1+, firm, smooth, non tender,without  nodules  Testis-- both down, non tender, without masses  Epididymis-- SPERMATOCELE MID PORTION OF THE LEFT EPIDIDYMIS - VERY TENDER TO TOUCH  Scrotum -- no hydrocele noted  Extremities -- Normal muscle mass and tone for the patients age  Neurological-- oriented times three    PSA  2-3-24-- 4.37    URINALYSIS DIPSTICK-- 4+ GLUCOSE    Assessment/Plan   A:  SLIGHTLY ELEVATED PSA WITH A NORMAL FEELING PROSTATE  NO FAMILY H/O PROSTATE CANCER  PATHOPHYSIOLOGY OF THE ABOVE AND OPTIONS OF FURTHER EVALUATION DISCUSSED  ALL QUESTIONS ANSWERED     SPERMATOCELE BODY OF THE LEFT EPIDIDYMIS -- TENDER TO PALPATION    P:  REPEAT THE PSA  PT TO CALL FOR THE REPORT -- IF HIGHER WILL SCHEDULE A TRUSP BX.  IF LOWER  WILL SEE BACK IN 6 MONTHS FOR A TEJAS AND PSA RE CK   IF A TRUSP BX IS DONE IT NEEDS TO BE DONE UNDER A MAC ANES    ADDENDUM:  REPEAT PSA TODAY -- 7.32  WILL SCHEDULE A TRUSP, BX MAC ANES, OUT PT  WILL NEED TO STOP THE ASPIRIN AT LEAST ONE WEEK PRIOR TO THE BX    Aurelio Zuniga MD 09/08/24 3:18 PM

## 2024-09-10 ENCOUNTER — PREP FOR PROCEDURE (OUTPATIENT)
Dept: UROLOGY | Facility: CLINIC | Age: 64
End: 2024-09-10
Payer: COMMERCIAL

## 2024-09-10 DIAGNOSIS — R97.20 ELEVATED PSA: Primary | ICD-10-CM

## 2024-09-10 RX ORDER — CIPROFLOXACIN 500 MG/1
500 TABLET ORAL 2 TIMES DAILY
Qty: 6 TABLET | Refills: 0 | Status: SHIPPED | OUTPATIENT
Start: 2024-09-10 | End: 2024-09-13

## 2024-09-10 RX ORDER — CEFAZOLIN SODIUM 2 G/100ML
2 INJECTION, SOLUTION INTRAVENOUS ONCE
OUTPATIENT
Start: 2024-09-10 | End: 2024-09-10

## 2024-09-10 RX ORDER — SODIUM CHLORIDE, SODIUM LACTATE, POTASSIUM CHLORIDE, CALCIUM CHLORIDE 600; 310; 30; 20 MG/100ML; MG/100ML; MG/100ML; MG/100ML
20 INJECTION, SOLUTION INTRAVENOUS CONTINUOUS
OUTPATIENT
Start: 2024-09-10

## 2024-09-10 NOTE — H&P
History Of Present Illness  Matthieu Solis is a 64 y.o. male presenting with AN ELEVATED PSA.     Past Medical History  He has a past medical history of Cataract, GERD (gastroesophageal reflux disease), Gout, and HTN (hypertension).    Surgical History  He has a past surgical history that includes Colonoscopy; Esophagogastroduodenoscopy; and Hernia repair (2015).     Social History  He reports that he has been smoking cigarettes. He started smoking about 50 years ago. He has a 50.7 pack-year smoking history. He has never used smokeless tobacco. He reports that he does not drink alcohol and does not use drugs.    Family History  Family History   Problem Relation Name Age of Onset    Other (CVA) Mother      Hypertension Mother      Other (CVA) Father      Hypertension Father          Allergies  Cinnamon, Doxycycline, and Penicillins    Review of Systems     Physical Exam     Last Recorded Vitals  There were no vitals taken for this visit.    Relevant Results    No results found for this or any previous visit (from the past 24 hour(s)).    No results found.         Assessment/Plan       A:  PT RESENTS WITH AN ELEVATED PSA  P:   SCHEDULE A TRUSP BX, OUT PT, MAC ANES        I spent  minutes in the professional and overall care of this patient.      Aurelio Zuniga MD

## 2024-09-13 ENCOUNTER — OFFICE VISIT (OUTPATIENT)
Dept: PRIMARY CARE | Facility: CLINIC | Age: 64
End: 2024-09-13
Payer: COMMERCIAL

## 2024-09-13 VITALS
WEIGHT: 130 LBS | BODY MASS INDEX: 21.66 KG/M2 | HEIGHT: 65 IN | SYSTOLIC BLOOD PRESSURE: 123 MMHG | HEART RATE: 80 BPM | DIASTOLIC BLOOD PRESSURE: 67 MMHG

## 2024-09-13 DIAGNOSIS — F17.200 TOBACCO DEPENDENCY: ICD-10-CM

## 2024-09-13 DIAGNOSIS — M54.50 CHRONIC BILATERAL LOW BACK PAIN, UNSPECIFIED WHETHER SCIATICA PRESENT: Primary | ICD-10-CM

## 2024-09-13 DIAGNOSIS — R29.898 LEG WEAKNESS, BILATERAL: ICD-10-CM

## 2024-09-13 DIAGNOSIS — R79.89 LOW TSH LEVEL: ICD-10-CM

## 2024-09-13 DIAGNOSIS — G89.29 CHRONIC BILATERAL LOW BACK PAIN, UNSPECIFIED WHETHER SCIATICA PRESENT: Primary | ICD-10-CM

## 2024-09-13 DIAGNOSIS — I42.8 NICM (NONISCHEMIC CARDIOMYOPATHY) (MULTI): Primary | ICD-10-CM

## 2024-09-13 PROCEDURE — 4004F PT TOBACCO SCREEN RCVD TLK: CPT | Performed by: FAMILY MEDICINE

## 2024-09-13 PROCEDURE — 3074F SYST BP LT 130 MM HG: CPT | Performed by: FAMILY MEDICINE

## 2024-09-13 PROCEDURE — 99214 OFFICE O/P EST MOD 30 MIN: CPT | Performed by: FAMILY MEDICINE

## 2024-09-13 PROCEDURE — 3008F BODY MASS INDEX DOCD: CPT | Performed by: FAMILY MEDICINE

## 2024-09-13 PROCEDURE — 3078F DIAST BP <80 MM HG: CPT | Performed by: FAMILY MEDICINE

## 2024-09-13 RX ORDER — NICOTINE POLACRILEX 4 MG/1
4 LOZENGE ORAL EVERY 2 HOUR PRN
Qty: 100 LOZENGE | Refills: 0 | Status: SHIPPED | OUTPATIENT
Start: 2024-09-13 | End: 2024-10-13

## 2024-09-13 ASSESSMENT — ENCOUNTER SYMPTOMS
LOSS OF SENSATION IN FEET: 0
OCCASIONAL FEELINGS OF UNSTEADINESS: 0
DEPRESSION: 0

## 2024-09-13 NOTE — PROGRESS NOTES
Pt is here for pain in his back, legs and hands.           Chief Complaint:  No chief complaint on file.  History Of Present Illness:   Matthieu Solis is a 64 y.o. male          Matthieu Solis is a 64 y.o. male       Back pain and weakness.  - weakness location: hands/arms; both legs.   - he's trying to get back stronger after his summer 2024 admission for HF.   - exercise bike- about 7.5 miles per day, but he is struggling with just a few miles per day now.   - MRI l-spine 2021.   - agree on new back xrays, physical therapy and follow up another MRI if needed, and then pain referral.   - agreed on PMR referral.     Patient was seen virtually on 7/8/24 after hospital admission for severe dilated cardiomyopathy and acute on chronic systolic heart failure.     Patient is interested in setting up appointments with vascular surgery, psychiatry, and a general internal medicine/geriatric physician.     He is concerned about a blood clot in his left 'foot' (occluded left common iliac artery, left external iliac artery, left common femoral artery, and left superficial femoral artery on CTA abdomen/pelvis 7/1/24). Denies numbness, pain or swelling.   - agreed on vascular referral.     Patient was seen by Dr. Santos in cardiology clinic on 7/11/24 and is waiting on approval from insurance before starting Entresto and Jardiance. He has been smoking 1-2 cigarettes a day since discharge from hospital (smoked 1-1.5 packs/day prior to admission) and thinks it is sustainable but his goal is to stop smoking entirely. Patches have not been effective. Trying to prioritize his heart health as much as possible.     He views the hospitalization as a “horrifying experience,” and he currently “can't figure out who I am.” Spoke with his family and they agree that he should start seeing psych. Has no recollection of the event and is currently studying trigonometry and other mathematic subjects to try and stay mentally sharp. Mentioned the  'bump' on the back of his head from the fall at work.   - agreed on psychiatry referral    Healthcare team:   Cardiologist: Dr. Shane Santos   Psychiatry referral  Vascular referral  -    geriatrics referral.   PMR  Mood therapist, psychiatry.   ID  Endocrinology    PMH: Nonischemic cardiomyopathy, chronic systolic HF, HTN, Cataracts, GERD, Gout, CHF         Allergies:   Penicillin    Surgical History: Hernia repair ()      Social History:  Living situation  Currently lives at home spouse. Two kids that are out of the house.  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 Has to carry a minimum of 25-50 lbs at work. Currently on short-term disability  Re-enrolling in college 2025 to complete Associate's degree  Alcohol  None in over a decade  Illicit Substances  None  Diet  Currently transitioning to solid food after hospitalization.    Tobacco  Packs per day/years/quit date  1-1.5 packs for 50 years.   Currently smoking about 1-2 cigarettes since discharge from hospital.  2024 update: patches make him want to smoke more. Agreed on lozenges.     Family History:  Cardiac  Mother passed of heart failure  Cancer:   Father  of unknown cancer    Immunizations:   Influenza  no  Zoster (Shingrix; 50 or older)  Would like to have one, does not get one     Health Maintenance   Colonoscopy (45-75)  2021: “diverticulosis in the sigmoid colon, four 2-4 mm polyps in the rectum.”  Path: hyperplastic polyps    Mood: “trying to figure out who I am.”      Sleep: 6 hours/night     Sports/Exercise: started PT last week.      Review of Systems (BOLD if positive, delete if not asked):     Constitutional:   - fever   - chills   - night sweats  - unexpected weight change  - changes in sleep     Eyes:   - loss of vision  - double vision  - floaters     Ear/Nose/Throat/Mouth:   - sore throat  - hearing changes  - sinus congestion     Cardiovascular:   - chest pain  - chest heaviness  -  "palpitations  - swelling in ankles     Musculoskeletal:   - bone pain  - muscle pain  - joint pain     Respiratory:   - shortness of breath  - difficulty breathing  - frequent cough  - wheezing     Neurological:   - loss of consciousness  - tremors  - dizzy spells  - numbness   - tingling     Gastrointestinal:   - abdominal pain  - nausea  - vomiting  - constipation  - diarrhea  - bloody stools  - loss of bowel control  - indigestion  - heartburn  - sour taste in throat when waking up     Skin:   - Rash  - lumps or bumps     Endocrine:   - excessive thirst  - feeling too hot  - feeling too cold  - feeling tired  - fatigue     Psychological:   - feelings of depression  - feelings of anxiety.     Sexual:   - sexual health concerns      Psychological:   - feeling generally happy  - feeling safe at home       Last Recorded Vitals:  Vitals:    09/13/24 1013   BP: 123/67   Pulse: 80   Weight: 59 kg (130 lb)   Height: 1.651 m (5' 5\")        Past Medical History:  He has a past medical history of Cataract, GERD (gastroesophageal reflux disease), Gout, and HTN (hypertension).     Past Surgical History:  He has a past surgical history that includes Colonoscopy; Esophagogastroduodenoscopy; and Hernia repair (2015).     Social History:  He reports that he has been smoking cigarettes. He started smoking about 50 years ago. He has a 50.7 pack-year smoking history. He has never used smokeless tobacco. He reports that he does not drink alcohol and does not use drugs.     Family History:  Family History   Problem Relation Name Age of Onset    Other (CVA) Mother      Hypertension Mother      Other (CVA) Father      Hypertension Father       Allergies:  Cinnamon, Doxycycline, and Penicillins     Outpatient Medications:  Current Outpatient Medications   Medication Instructions    aspirin 81 mg, oral, Daily    atorvastatin (LIPITOR) 80 mg, oral, Daily RT    carvedilol (COREG) 12.5 mg, oral, 2 times daily (morning and late afternoon)    " ciprofloxacin (CIPRO) 500 mg, oral, 2 times daily, Begin taking 1 day prior to scheduled PROSTATE BX    empagliflozin (JARDIANCE) 10 mg, oral, Daily RT    empagliflozin (JARDIANCE) 10 mg, oral, Daily, Pt has coupon    furosemide (Lasix) 20 mg tablet Take 1 tablet daily for 3 days, if your weight goes up by 2 pounds or more within a day or 2    nicotine (Nicoderm CQ) 14 mg/24 hr patch APPLY 1 PATCH AS DIRECTED EVERY 24 HOURS.    omeprazole (PRILOSEC) 20 mg, oral, 2 times daily before meals, To be taken 30-60 minutes prior to breakfast and dinner. Do not crush or chew.    sacubitriL-valsartan (Entresto) 49-51 mg tablet 1 tablet, oral, 2 times daily    sacubitriL-valsartan (Entresto) 49-51 mg tablet 1 tablet, oral, 2 times daily    spironolactone (ALDACTONE) 12.5 mg, oral, Daily RT    tinidazole (TINDAMAX) 500 mg, oral, 2 times daily, Salvatge therapy for H pylori<BR>The original script was for 14 days- the patient told me he only got 10 days worth?    triamcinolone (Kenalog) 0.1 % cream Apply twice daily to affected areas of back (avoid face, groin, body folds) for 2 weeks        Physical Exam:  GENERAL: Well developed, well nourished, alert and cooperative, and appears to be in no acute distress. Wearing lifevest.     PSYCH: mood pleasant and appropriate     HEAD: 1 cm diameter lump on the back of his head.     EYES: PERRL, EOMI. vision is grossly intact.     CARDIAC:   RRR.   No murmur.      GAIT: steady    Last Labs:  CBC -  Lab Results   Component Value Date    WBC 8.2 02/03/2024    HGB 11.5 (L) 02/03/2024    HCT 36.3 (L) 02/03/2024    MCV 96 02/03/2024     02/03/2024        CMP -  Lab Results   Component Value Date    CALCIUM 9.6 02/03/2024    PHOS 3.0 09/23/2020    PROT 6.8 02/03/2024    ALBUMIN 4.0 02/03/2024    AST 14 02/03/2024    ALT 16 02/03/2024    ALKPHOS 92 02/03/2024    BILITOT 0.4 02/03/2024        LIPID PANEL -  Lab Results   Component Value Date    CHOL 159 02/03/2024    TRIG 39 02/03/2024     HDL 51.0 02/03/2024    CHHDL 3.1 02/03/2024    VLDL 8 02/03/2024    NHDL 108 02/03/2024           Lab Results   Component Value Date    HGBA1C 5.1 06/19/2024    HGBA1C 5.2 01/03/2024          XR fingers right 2+ views  Narrative: Interpreted By:  Lew Hernandez,   STUDY:  XR FINGERS RIGHT 2+ VIEWS      INDICATION:  Signs/Symptoms:Fracture.      COMPARISON:  March 20      ACCESSION NUMBER(S):  [id number]      ORDERING CLINICIAN:  DIONI CARRIZALES      FINDINGS:  Transverse fracture of the head of the 3rd middle phalanx unchanged  in alignment with dorsal angulation. Small amount of callus. No  additional new findings.      Impression: Healing fracture right 3rd middle phalanx.      Signed by: Lew Hernandez 4/11/2024 7:59 AM  Dictation workstation:   [id number]         Assessment/Plan   Problem List Items Addressed This Visit    None         Impression/Diagnoses  Back pain and b/l leg weakness. Agreed on PT, xrays, PMR referral, and follow up for potential further imaging and possible ordering MRI.     Smoking cessation- nicotine lozenges rx.       Plan   Severe dilated cardiomyopathy with HFrEF  Continue with cardiology.   Referred to internal medicine/geriatrics  Occluded left-sided LE arteries  Continue with vascular surgery   Patient instructed to go to ED if he notices leg swelling, SOB, or signs of stroke.      Endocrinology referral for low TSH.    Follow up 4-5 weeks for your back.        Claudio Maldonado DO

## 2024-09-18 ENCOUNTER — PRE-ADMISSION TESTING (OUTPATIENT)
Dept: PREADMISSION TESTING | Facility: HOSPITAL | Age: 64
End: 2024-09-18
Payer: COMMERCIAL

## 2024-09-18 VITALS
RESPIRATION RATE: 16 BRPM | HEART RATE: 73 BPM | BODY MASS INDEX: 22.41 KG/M2 | DIASTOLIC BLOOD PRESSURE: 75 MMHG | HEIGHT: 65 IN | TEMPERATURE: 96.6 F | WEIGHT: 134.48 LBS | SYSTOLIC BLOOD PRESSURE: 139 MMHG

## 2024-09-18 DIAGNOSIS — R97.20 ELEVATED PSA: ICD-10-CM

## 2024-09-18 DIAGNOSIS — I50.22 CHRONIC SYSTOLIC CONGESTIVE HEART FAILURE: ICD-10-CM

## 2024-09-18 DIAGNOSIS — Z01.818 ENCOUNTER FOR PREADMISSION TESTING: Primary | ICD-10-CM

## 2024-09-18 LAB
APPEARANCE UR: CLEAR
BILIRUB UR STRIP.AUTO-MCNC: NEGATIVE MG/DL
COLOR UR: ABNORMAL
GLUCOSE UR STRIP.AUTO-MCNC: ABNORMAL MG/DL
KETONES UR STRIP.AUTO-MCNC: NEGATIVE MG/DL
LEUKOCYTE ESTERASE UR QL STRIP.AUTO: NEGATIVE
NITRITE UR QL STRIP.AUTO: NEGATIVE
PH UR STRIP.AUTO: 6.5 [PH]
PROT UR STRIP.AUTO-MCNC: NEGATIVE MG/DL
RBC # UR STRIP.AUTO: NEGATIVE /UL
SP GR UR STRIP.AUTO: 1.03
UROBILINOGEN UR STRIP.AUTO-MCNC: NORMAL MG/DL

## 2024-09-18 PROCEDURE — 99204 OFFICE O/P NEW MOD 45 MIN: CPT | Performed by: NURSE PRACTITIONER

## 2024-09-18 PROCEDURE — 81003 URINALYSIS AUTO W/O SCOPE: CPT

## 2024-09-18 PROCEDURE — 93005 ELECTROCARDIOGRAM TRACING: CPT

## 2024-09-18 RX ORDER — NICOTINE POLACRILEX 4 MG/1
4 LOZENGE ORAL EVERY 2 HOUR PRN
COMMUNITY

## 2024-09-18 ASSESSMENT — DUKE ACTIVITY SCORE INDEX (DASI)
CAN YOU DO YARD WORK LIKE RAKING LEAVES, WEEDING OR PUSHING A MOWER: NO
CAN YOU CLIMB A FLIGHT OF STAIRS OR WALK UP A HILL: YES
CAN YOU TAKE CARE OF YOURSELF (EAT, DRESS, BATHE, OR USE TOILET): YES
CAN YOU WALK A BLOCK OR TWO ON LEVEL GROUND: YES
TOTAL_SCORE: 24.2
CAN YOU DO HEAVY WORK AROUND THE HOUSE LIKE SCRUBBING FLOORS OR LIFTING AND MOVING HEAVY FURNITURE: NO
CAN YOU WALK INDOORS, SUCH AS AROUND YOUR HOUSE: YES
CAN YOU PARTICIPATE IN STRENOUS SPORTS LIKE SWIMMING, SINGLES TENNIS, FOOTBALL, BASKETBALL, OR SKIING: NO
CAN YOU RUN A SHORT DISTANCE: NO
DASI METS SCORE: 5.7
CAN YOU DO MODERATE WORK AROUND THE HOUSE LIKE VACUUMING, SWEEPING FLOORS OR CARRYING GROCERIES: YES
CAN YOU PARTICIPATE IN MODERATE RECREATIONAL ACTIVITIES LIKE GOLF, BOWLING, DANCING, DOUBLES TENNIS OR THROWING A BASEBALL OR FOOTBALL: NO
CAN YOU DO LIGHT WORK AROUND THE HOUSE LIKE DUSTING OR WASHING DISHES: YES
CAN YOU HAVE SEXUAL RELATIONS: YES

## 2024-09-18 NOTE — PREPROCEDURE INSTRUCTIONS
Medication List            Accurate as of September 18, 2024  3:00 PM. Always use your most recent med list.                aspirin 81 mg EC tablet  Take 1 tablet (81 mg) by mouth once daily.  Medication Adjustments for Surgery: Take/Use as prescribed     atorvastatin 80 mg tablet  Commonly known as: Lipitor  Medication Adjustments for Surgery: Take/Use as prescribed     carvedilol 12.5 mg tablet  Commonly known as: Coreg  Take 1 tablet (12.5 mg) by mouth 2 times daily (morning and late afternoon).  Medication Adjustments for Surgery: Take/Use as prescribed     empagliflozin 10 mg  Commonly known as: Jardiance  Take 1 tablet (10 mg) by mouth once daily. Pt has coupon  Additional Medication Adjustments for Surgery: Other (Comment)  Notes to patient: Stop full 3 days prior to surgery; take last dose on 9/27     nicotine polacrilex 4 mg lozenge  Commonly known as: Commit  Additional Medication Adjustments for Surgery: Other (Comment)  Notes to patient: Stop 8 hours prior to surgery     omeprazole 20 mg DR capsule  Commonly known as: PriLOSEC  Take 1 capsule (20 mg) by mouth 2 times a day before meals for 14 days. To be taken 30-60 minutes prior to breakfast and dinner. Do not crush or chew.  Medication Adjustments for Surgery: Take/Use as prescribed     sacubitriL-valsartan 49-51 mg tablet  Commonly known as: Entresto  Take 1 tablet by mouth 2 times a day.  Medication Adjustments for Surgery: Take last dose 1 day (24 hours) before surgery     spironolactone 25 mg tablet  Commonly known as: Aldactone  Medication Adjustments for Surgery: Take/Use as prescribed              PRE-OPERATIVE INSTRUCTIONS FOR SURGERY    *Do not eat anything after midnight the night of surgery.  This includes food of any kind (including hard candy, cough drops, mints).   You may have up to 13.5 ounces of clear liquid  until TWO hours prior to your arrival time to the hospital unless ERAS* protocol is ordered for you.  Clear liquids include  water, black tea/coffee, (no milk or cream) apple juice and electrolyte drinks (GATORADE).  You may chew gum until TWO hours prior you your surgery/procedure.     *ERAS protocol: follow as instructed.  DO not drink an additional 13.5 ounces as noted above.        *One of our staff members will call you ONE business day before your surgery, between 11am-2 pm to let you know the time to arrive.  If you have not received a call by 2 pm, call [phone]  *When you arrive at the hospital-->GO TO Registration on the ground floor  *Stop smoking 24 hours prior to surgery.  No Marijuana, CBD Oil or Vaping for 48 hours  *No alcohol 24 hours prior to surgery  *You will need a responsible adult to drive you home  -No acrylic nails or nail polish on at least one fingernail, NO polish on toes for foot surgery  -You may be asked to remove your dentures, partial plate, eyeglasses or contact lenses before going to surgery.  Please bring a case for these items.  -Body piercings need to be removed.  Jewelry and valuables should be left at home.  -Put on loose,  comfortable, clean clothing, that will accommodate bandages      What you may be asked to bring to surgery:  ___Crutches, walker  ___CPAP machine  ___Urine specimen

## 2024-09-18 NOTE — CPM/PAT H&P
-CPM/PAT Evaluation       Name: Matthieu NEGRITA Solis (Matthieu Solis)  /Age: 1960/64 y.o.     In-Person       Chief Complaint: PAT for planned urology surgery    64 yr old male w/PHx of HTN, HF (EF 15-20%, 2024), HLD, PAD, GERD, and elevated PSA referred to PAT for planned Uro navigated fusion prostate biopsy w/Dr Zuniga on 10/1/2024     Patient reports feeling overall well, denies fever, cough or recent infection. Reports remaining otherwise physically active, trying to walk routinely, bike riding at 5 mph and upper body strength training; denies cardiac or respiratory symptoms. Denies past issues with anesthesia.      Followed by PCP (Joel) - last visit 2024  Followed by cardiology (Mendoza) - last visit 2024  Followed by cardiology (BERLIN Cabrera) - patient states no longer with [facility], has switched to  cardiology   Followed by vascular (Jacques) - last visit 2024     Recent cardiology note (Mendoza, 2024):      'Nonischemic dilated congestive cardiomyopathy.  This patient was evidently admitted to [facility] in 2024 after an apparent near syncopal event at work because of extreme shortness of breath.  An echocardiogram was performed on 2024 and interpreted as showing a severely reduced LV ejection fraction at 15-20% with mild left atrial enlargement.  He had had a previous echocardiogram at the [facility] 3/11/2011 that estimated LV ejection fraction at 30-35%.  The patient was transferred to [facility] from 2024 -2024.  He underwent cardiac catheterization which demonstrated nonobstructive coronary artery disease and he was placed on a LifeVest.  Patient is currently feeling relatively well walking daily also riding a regular bike for up to 5 mph.  He is taking 6000 steps per day.  He is currently in some occupational training.  Current therapy will be modified by increasing carvedilol from 6.25 mg twice daily to 12.5 mg twice daily.  He will be  switched from lisinopril 10 mg daily to Entresto 49/51 mg twice daily and will also begin Jardiance 10 mg daily.  Patient will remain on the Lasix 20 mg daily.  Patient is also on spironolactone 25 mg daily.  He will return in 1 month at which time a repeat echocardiogram will be done and labs rechecked.  He will continue to wear the LifeVest for now.'           Past Medical History:   Diagnosis Date    Cataract     GERD (gastroesophageal reflux disease)     Gout     HTN (hypertension)        Past Surgical History:   Procedure Laterality Date    COLONOSCOPY      ESOPHAGOGASTRODUODENOSCOPY      HERNIA REPAIR  2015    Inguinal       Patient Sexual activity questions deferred to the physician.    Family History   Problem Relation Name Age of Onset    Other (CVA) Mother      Hypertension Mother      Other (CVA) Father      Hypertension Father         Allergies   Allergen Reactions    Cinnamon Unknown     Cinnamon    Doxycycline Swelling    Penicillins Unknown       Prior to Admission medications    Medication Sig Start Date End Date Taking? Authorizing Provider   aspirin 81 mg EC tablet Take 1 tablet (81 mg) by mouth once daily. 9/6/24 9/6/25  Kathy Hanna MD   atorvastatin (Lipitor) 80 mg tablet Take 1 tablet (80 mg) by mouth once daily. 6/27/24 9/25/24  Historical Provider, MD   carvedilol (Coreg) 12.5 mg tablet Take 1 tablet (12.5 mg) by mouth 2 times daily (morning and late afternoon). 8/29/24 8/29/25  Tomás Donaldson MD   ciprofloxacin (Cipro) 500 mg tablet Take 1 tablet (500 mg) by mouth 2 times a day for 3 days. Begin taking 1 day prior to scheduled PROSTATE BX 9/10/24 9/13/24  Aurelio Zuniga MD   empagliflozin (Jardiance) 10 mg Take 1 tablet (10 mg) by mouth once daily. 6/27/24 9/25/24  Historical Provider, MD   empagliflozin (Jardiance) 10 mg Take 1 tablet (10 mg) by mouth once daily. Pt has coupon 9/13/24 9/13/25  Tomás Donaldson MD   furosemide (Lasix) 20 mg tablet Take 1 tablet daily for 3 days, if  your weight goes up by 2 pounds or more within a day or 2 6/27/24   Historical Provider, MD   nicotine (Nicoderm CQ) 14 mg/24 hr patch APPLY 1 PATCH AS DIRECTED EVERY 24 HOURS. 6/27/24   Historical Provider, MD   nicotine polacrilex (Commit) 4 mg lozenge Use 1 lozenge (4 mg) in the mouth or throat every 2 hours if needed for smoking cessation. 9/13/24 10/13/24  Claudio Maldonado,    omeprazole (PriLOSEC) 20 mg DR capsule Take 1 capsule (20 mg) by mouth 2 times a day before meals for 14 days. To be taken 30-60 minutes prior to breakfast and dinner. Do not crush or chew. 8/27/24 9/10/24  Geovanni Mckenna MD   sacubitriL-valsartan (Entresto) 49-51 mg tablet Take 1 tablet by mouth twice a day. 7/11/24   Historical Provider, MD   sacubitriL-valsartan (Entresto) 49-51 mg tablet Take 1 tablet by mouth 2 times a day. 8/29/24 9/28/24  Tomás Donaldson MD   spironolactone (Aldactone) 25 mg tablet Take 0.5 tablets (12.5 mg) by mouth once daily. 6/27/24 9/25/24  Historical Provider, MD   tinidazole (Tindamax) 500 mg tablet Take 1 tablet (500 mg) by mouth 2 times a day for 4 days. Salvatge therapy for H pylori  The original script was for 14 days- the patient told me he only got 10 days worth? 9/9/24 9/13/24  Geovanni Mckenna MD   triamcinolone (Kenalog) 0.1 % cream Apply twice daily to affected areas of back (avoid face, groin, body folds) for 2 weeks 7/19/24   Ladarius Turpin MD   empagliflozin (Jardiance) 10 mg Take 1 tablet (10 mg) by mouth once daily for 14 days. Pt has coupon 8/29/24 9/13/24  Tomás Donaldson MD        Review of Systems    Constitutional: no fever, no chills and not feeling poorly.   Eyes: no eyesight problems.   ENT: no hearing loss, no nosebleeds and no sore throat.   Cardiovascular: no chest pain, no palpitations and no extremity edema, cardiac life vest in use   Respiratory: no sob, no wheezing, no cough and no sob w/exertion.   Gastrointestinal: negative for abdominal pain, blood in stools or  changes in bowel habits   Genitourinary: negative for dysuria, incontinence or changes in urinary habits   Musculoskeletal: no arthralgias, ambulates independently.   Integumentary: negative for lesions, rash or itching.   Neurological: negative for confusion, dizziness or fainting.   Psychiatric: not suicidal, no anxiety and no depression.   All other systems have been reviewed and are negative for complaint.     Physical Exam  HENT:      Head: Normocephalic.      Comments: 1 cm raised lump posterior scalp - not new  Eyes:      Pupils: Pupils are equal, round, and reactive to light.   Cardiovascular:      Rate and Rhythm: Normal rate and regular rhythm.      Pulses: Normal pulses.      Comments: Cardiac life vest in use  Pulmonary:      Effort: Pulmonary effort is normal.      Breath sounds: Normal breath sounds.   Abdominal:      General: Bowel sounds are normal.   Musculoskeletal:         General: Normal range of motion.      Cervical back: Normal range of motion.   Skin:     General: Skin is warm and dry.   Neurological:      Mental Status: He is alert and oriented to person, place, and time.   Psychiatric:         Mood and Affect: Mood normal.       PAT AIRWAY:   Airway:     Mallampati::  I    TM distance::  >3 FB    Neck ROM::  Full      Visit Vitals  /75   Pulse 73   Temp 35.9 °C (96.6 °F) (Temporal)   Resp 16       DASI Risk Score      Flowsheet Row Most Recent Value   DASI SCORE 24.2   METS Score (Will be calculated only when all the questions are answered) 5.7          Caprini DVT Assessment    No data to display       Modified Frailty Index    No data to display       CHADS2 Stroke Risk  Current as of 4 hours ago        N/A 3 to 100%: High Risk   2 to < 3%: Medium Risk   0 to < 2%: Low Risk     Last Change: N/A          This score determines the patient's risk of having a stroke if the patient has atrial fibrillation.        This score is not applicable to this patient. Components are not  calculated.          Revised Cardiac Risk Index    No data to display       Apfel Simplified Score    No data to display       Risk Analysis Index Results This Encounter    No data found in the last 1 encounters.       Stop Bang Score      Flowsheet Row Most Recent Value   Do you snore loudly? 1   Do you often feel tired or fatigued after your sleep? 0   Has anyone ever observed you stop breathing in your sleep? 0   Do you have or are you being treated for high blood pressure? 0   Recent BMI (Calculated) 22.4   Is BMI greater than 35 kg/m2? 0=No   Age older than 50 years old? 1=Yes   Is your neck circumference greater than 17 inches (Male) or 16 inches (Female)? 0   Gender - Male 1=Yes   STOP-BANG Total Score 3            Assessment and Plan:     # daily tobacco use - process of quiting, down to 2-4 cigarettes a day  # HTN - Carvedilol, spironolactone  # Heart failure - followed by cardiology, Life vest in use, Jardiance, entresto, carvedilol  # HLD - diet and statin  # DM II - Jardiance - A1c 5.1 (6/19/2024)  # PAD - Followed by vascular w/pending possible antiplatelet therapy  # Elevated PSA - Uro navigated fusion prostate biopsy w/Dr Zuniga on 10/1/2024    Ecg performed today - NSR, nonspecific t wave abnormality  (67 bpm)  Medical hx, Allergies, VS and Labs reviewed  Medications addressed w/pre-op instructions provided    Notified and aware:   Mendoza (cardiology)  Joel (PCP)  Kasey (anesthesia) - requested echo to be done prior to surgery     Will notify Dr. Zuniga of anesthesia recommendation

## 2024-09-19 LAB — HOLD SPECIMEN: NORMAL

## 2024-09-20 ENCOUNTER — PROCEDURE VISIT (OUTPATIENT)
Dept: DERMATOLOGY | Facility: CLINIC | Age: 64
End: 2024-09-20
Payer: COMMERCIAL

## 2024-09-20 ENCOUNTER — TELEPHONE (OUTPATIENT)
Dept: UROLOGY | Facility: CLINIC | Age: 64
End: 2024-09-20

## 2024-09-20 DIAGNOSIS — D48.5 NEOPLASM OF UNCERTAIN BEHAVIOR OF SKIN: ICD-10-CM

## 2024-09-20 LAB
ATRIAL RATE: 67 BPM
P AXIS: 72 DEGREES
P OFFSET: 214 MS
P ONSET: 154 MS
PR INTERVAL: 130 MS
Q ONSET: 219 MS
QRS COUNT: 11 BEATS
QRS DURATION: 94 MS
QT INTERVAL: 386 MS
QTC CALCULATION(BAZETT): 407 MS
QTC FREDERICIA: 400 MS
R AXIS: 33 DEGREES
T AXIS: 46 DEGREES
T OFFSET: 412 MS
VENTRICULAR RATE: 67 BPM

## 2024-09-20 PROCEDURE — 11424 EXC H-F-NK-SP B9+MARG 3.1-4: CPT | Performed by: STUDENT IN AN ORGANIZED HEALTH CARE EDUCATION/TRAINING PROGRAM

## 2024-09-20 PROCEDURE — 12032 INTMD RPR S/A/T/EXT 2.6-7.5: CPT | Performed by: STUDENT IN AN ORGANIZED HEALTH CARE EDUCATION/TRAINING PROGRAM

## 2024-09-20 NOTE — TELEPHONE ENCOUNTER
"Dr. Zuniga, please see paperwork from [facility], patient not cleared for surgery in October.  It is scanned under \"Procedure\"  Thank you, Gwen @ [employer]  "

## 2024-09-20 NOTE — PROGRESS NOTES
Excision Operative Note    Date of Surgery:  9/20/2024  Surgeon:  Marvin Norman MD  Office Location: [address]   [facility] [address]  [facility] [address]  Dept: [phone]  Dept Fax: [phone]  Referring Provider: Ladarius Turpin MD  [address] [place] [address]   [facility], [facility] [address]  [place] [id number]    Subjective   Matthieu Solis is a 64 y.o. male who presents for the following: Excision for neoplasm of uncertain behavior of skin.    According to the patient, the lesion has been present for approximately greater than 1 year at the time of diagnosis.  The lesion is not causing symptoms.  The lesion has not been treated previously.    The patient does not have a pacemaker / defibrillator.  The patient does not have a heart valve / joint replacement.    The patient is on blood thinners.   The patient does not have a history of hepatitis B or C.  The patient does not have a history of HIV.  The patient does not have a history of immunosuppression (e.g. organ transplantation, malignancy, medications)    Is it okay to leave a phone message with results? Y  Who else may we leave results with: (name, relationship) Maria Eugenia Nuñez    Assessment/Plan   Pre-procedure:   Obtained informed consent: written from patient  The surgical site was identified and confirmed with the patient.     Intra-operative:   Audible time out called at : 1:10 PM 09/20/24  by: Cristal Allen MA   Verified patient name, birthdate, site, specimen bottle label & requisition.    The planned procedure(s) was again reviewed with the patient. The risks of bleeding, infection, nerve damage and scarring were reviewed. The patient identity, surgical site, and planned procedure(s) were verified.     Neoplasm of uncertain behavior of skin  Left Posterior Scalp    Skin excision    Lesion length (cm):  4  Lesion width (cm):  3.3  Margin per side (cm):  0  Total excision diameter (cm):  4  Informed consent:  discussed and consent obtained    Timeout: patient name, date of birth, surgical site, and procedure verified    Procedure prep:  Patient prepped in sterile fashion  Anesthesia: the lesion was anesthetized in a standard fashion    Anesthetic:  1% lidocaine w/ epinephrine 1-100,000 local infiltration  Instrument used: #15 blade    Hemostasis achieved with: electrodesiccation    Outcome: patient tolerated procedure well with no complications    Post-procedure details: sterile dressing applied and wound care instructions given    Dressing type: pressure dressing    Additional details:  The possible diagnoses were explained. Although the lesion is likely benign, the patient requests removal of the lesion because of the symptoms it is causing. Excision was discussed with the patient. The risks, benefits and potential adverse effects were reviewed. Discussion included but was not limited to the cure rate, relative cost, wound care requirements, activity restrictions, likely scar outcome and time to heal were reviewed. Alternative options including monitoring the lesion were discussed. The patient elected to proceed with excision.     Skin repair  Complexity:  Intermediate  Final length (cm):  4.4  Informed consent: discussed and consent obtained    Timeout: patient name, date of birth, surgical site, and procedure verified    Procedure prep:  Patient prepped in sterile fashion  Anesthesia: the lesion was anesthetized in a standard fashion    Reason for type of repair: reduce tension to allow closure and preserve normal anatomical and functional relationships    Undermining: edges undermined    Subcutaneous layers (deep stitches):   Suture size:  4-0  Suture type: Vicryl (polyglactin 910)    Stitches:  Buried vertical mattress  Fine/surface layer approximation (top stitches):   Suture size:  5-0  Suture type: fast-absorbing plain gut    Stitches: simple running    Hemostasis achieved with: suture, pressure and  electrodesiccation  Outcome: patient tolerated procedure well with no complications    Post-procedure details: sterile dressing applied and wound care instructions given    Dressing type: pressure dressing      Staff Communication: Dermatology Local Anesthesia: 1 % Lidocaine / Epinephrine - Amount:    Specimen 1 - Dermatopathology- DERM LAB  Differential Diagnosis: Pilar Cyst  Check Margins Yes/No?:  no  Comments:    Dermpath Lab: Routine Histopathology (formalin-fixed tissue)      Intermediate Linear Repair:  Given the location and size of the defect, it was determined that an intermediate layered linear closure was required to restore normal anatomy and function. The repair is an intermediate closure as two layers of sutures were required. The defect was undermined extensively at the level of the subcutaneous plane. Standing cutaneous cones were removed using Burow's triangles. The wound edges were brought into close approximation with buried vertical mattress sutures. The remainder of the wound was then closed with epidermal top sutures.    The final repair measured 4.4 cm    Wound care was discussed, and the patient was given written post-operative wound care instructions.      The patient will follow up with Marvin Norman MD as needed for any post operative problems or concerns, and will follow up with their primary dermatologist as scheduled.      Marvin Norman MD, PGY-5  , Department of Dermatology  Micrographic Surgery and Cutaneous Oncology Fellow  9/20/2024

## 2024-09-23 ENCOUNTER — APPOINTMENT (OUTPATIENT)
Dept: GERIATRIC MEDICINE | Facility: CLINIC | Age: 64
End: 2024-09-23
Payer: COMMERCIAL

## 2024-09-23 ENCOUNTER — OFFICE VISIT (OUTPATIENT)
Dept: GERIATRIC MEDICINE | Facility: CLINIC | Age: 64
End: 2024-09-23
Payer: COMMERCIAL

## 2024-09-23 VITALS
SYSTOLIC BLOOD PRESSURE: 155 MMHG | HEART RATE: 58 BPM | BODY MASS INDEX: 22.83 KG/M2 | DIASTOLIC BLOOD PRESSURE: 85 MMHG | TEMPERATURE: 97.1 F | WEIGHT: 137.2 LBS

## 2024-09-23 DIAGNOSIS — F17.200 NICOTINE USE DISORDER: ICD-10-CM

## 2024-09-23 DIAGNOSIS — R41.89 COGNITIVE CHANGE: Primary | ICD-10-CM

## 2024-09-23 DIAGNOSIS — Z00.00 HEALTHCARE MAINTENANCE: ICD-10-CM

## 2024-09-23 PROCEDURE — 99215 OFFICE O/P EST HI 40 MIN: CPT | Performed by: NURSE PRACTITIONER

## 2024-09-23 PROCEDURE — 90471 IMMUNIZATION ADMIN: CPT | Performed by: NURSE PRACTITIONER

## 2024-09-23 PROCEDURE — 82306 VITAMIN D 25 HYDROXY: CPT | Performed by: NURSE PRACTITIONER

## 2024-09-23 PROCEDURE — 84443 ASSAY THYROID STIM HORMONE: CPT | Performed by: NURSE PRACTITIONER

## 2024-09-23 PROCEDURE — 36415 COLL VENOUS BLD VENIPUNCTURE: CPT | Performed by: NURSE PRACTITIONER

## 2024-09-23 ASSESSMENT — ENCOUNTER SYMPTOMS
SHORTNESS OF BREATH: 0
DIARRHEA: 0
CHILLS: 0
HEADACHES: 0
DIFFICULTY URINATING: 0
APPETITE CHANGE: 1
NERVOUS/ANXIOUS: 1
COUGH: 0
CONSTIPATION: 0
DYSURIA: 0
DYSPHORIC MOOD: 1
OCCASIONAL FEELINGS OF UNSTEADINESS: 0
WHEEZING: 0
BLOOD IN STOOL: 0
DIZZINESS: 0
PALPITATIONS: 0
SLEEP DISTURBANCE: 1
HEMATURIA: 0
LIGHT-HEADEDNESS: 0
UNEXPECTED WEIGHT CHANGE: 1
LOSS OF SENSATION IN FEET: 0
BACK PAIN: 1
TROUBLE SWALLOWING: 0
FATIGUE: 1
ACTIVITY CHANGE: 0
NUMBNESS: 0

## 2024-09-23 ASSESSMENT — MONTREAL COGNITIVE ASSESSMENT (MOCA)
6. READ LIST OF DIGITS [FORWARD/BACKWARD]: 2
10. [FLUENCY] NAME WORDS STARTING WITH DESIGNATED LETTER: 0
VISUOSPATIAL/EXECUTIVE SUBSCORE: 2
7. [VIGILENCE] TAP WHEN HEARING DESIGNATED LETTER: 1
11. FOR EACH PAIR OF WORDS, WHAT CATEGORY DO THEY BELONG TO (OUT OF 2): 2
8. SERIAL SUBTRACTION OF 7S: 3
WHAT LEVEL OF EDUCATION WAS ATTAINED: 0
12. MEMORY INDEX SCORE: 3
9. REPEAT EACH SENTENCE: 1
WHAT IS THE TOTAL SCORE (OUT OF 30): 23
5. MEMORY TRIALS: 0
13. ORIENTATION SUBSCORE: 6
4. NAME EACH OF THE THREE ANIMALS SHOWN: 3

## 2024-09-23 ASSESSMENT — PATIENT HEALTH QUESTIONNAIRE - PHQ9
SUM OF ALL RESPONSES TO PHQ9 QUESTIONS 1 AND 2: 0
1. LITTLE INTEREST OR PLEASURE IN DOING THINGS: NOT AT ALL
2. FEELING DOWN, DEPRESSED OR HOPELESS: NOT AT ALL

## 2024-09-23 ASSESSMENT — GERIATRIC DEPRESSION SCALE SHORT VERSION (GDS-SV)
DO YOU FEEL THAT YOUR LIFE IS EMPTY: YES
ARE YOU BASICALLY SATISFIED WITH YOUR LIFE: NO
HAVE YOU DROPPED MANY OF YOUR ACTIVITIES AND INTERESTS?: NO
DO YOU OFTEN GET BORED: YES

## 2024-09-23 ASSESSMENT — ACTIVITIES OF DAILY LIVING (ADL)
TAKING_MEDICATION: INDEPENDENT
USING_TRANSPORTATION: INDEPENDENT
MANAGING_FINANCES: INDEPENDENT
DRESSING: INDEPENDENT
DOING_HOUSEWORK: INDEPENDENT
EATING: INDEPENDENT
GROCERY_SHOPPING: INDEPENDENT
BATHING: INDEPENDENT
STILL_DRIVING: YES
USING_TELEPHONE: INDEPENDENT
PREPARING_MEALS: INDEPENDENT
PILL_BOX_USED: NO
NEEDS_ASSISTANCE_WITH_FOOD: INDEPENDENT

## 2024-09-23 ASSESSMENT — PAIN SCALES - GENERAL: PAINLEVEL: 0-NO PAIN

## 2024-09-23 NOTE — Clinical Note
Dr. Maldonado, thank you for referring Mr. Solis to [facility]. I saw him and he does have some mild cognitive impairment-have asked him to see neuropsych to better identify it; he did report fatigue, snores and with this HF dx that is new, I wonder if he could have underlying sleep apnea, will defer to you if you want to test (I am leaving system and will not be able to follow up on it). Note is in system. He will come back to see one of my colleagues. Thank you . Hali Dias NP

## 2024-09-23 NOTE — PATIENT INSTRUCTIONS
Thank you for meeting with me today. We discussed the following:     I think neuropsych testing would be helpful to identify if you have cognitive problems and where they are.   Please make an appt with them.     Sleep is needed for your brain to work well  - melatonin 3 mg 1-2 hours before bed  - sleep apnea may be a concern, may need a sleep study    We will do your flu shot today. Go to local pharmacy and they can do your updated covid shot and RSV. You can do both the same day. And when there you can arrange for the shingrix vaccine.   In addition, here are some general guidelines on brain health, pain control and sleep.   General brain health guidelines:  - Make sure your medical conditions are well controlled (e.g., high blood pressure, high cholesterol, diabetes, sleep apnea etc)  - Do not smoke or chew tobacco  - Limiit alcohol use to no more than 1 alcoholic beverage per day   - Address any sensory deficits (e.g., proper glasses for poor eyesight, hearing aids for hearing loss)  - Use a weekly pill box  - Eat a heart healthy diet (fruits, vegetables, lean meats, fatty fish, whole grains. Limit processed foods)  - Exercise or walk. Gradually increase to the goal of 5 days per week, 30 minutes at a time  - Try to get at least 7 hours of quality sleep per night  - Keep yourself mentally active daily by reading, playing cards, doing word searches, puzzles, etc.  - Challenge your brain with new cognitive tasks (new hobby, crafts, take a class, learn a language)  - Stay socially active by being part of a group or organization    General pain control guidelines  - try to stay ahead of pain by taking your medications sooner rather than later  - Tyelnol is generally a safe medication to take for pain. A general dose is 1000 mg every 6-8 hours; do not exceed 3000 mg or 3 doses in a day. Stay away from formulations that have Benadryl or diphenhydramine in them.   - Lidocaine gel or patch may help to relieve pain. 4%  strength is over the counter.  - Voltaren gel 1% may be helpful in relieving pain. It is available over the counter.   - avoid Nonsteroidal Anti-inflammatories (Nsaids) such as Motrin (ibuprofen), Aleve (naproxen) unless specifically recommended by your provider;  these can cause gastrointestinal and kidney problems.   - Use heat or cold as needed to help with pain.   - Distraction can be helpful.     General sleep guidelines  - Avoid over the counter sleep preparations with diphenhydramine or Benadryl. This medicine can cause confusion and increase risk of falls.   - Do not use alcohol to go to sleep.   - Melatonin is generally safe to try, start with 1-3 mg a couple of hours before sleep.   - Avoid all caffeine after 12 noon. Look for hidden sources such as chocolate.   - Try an herbal tea like chamomile or Sleepytime before bed.  - Turn off the TV or set a timer so it goes off.   - Establish a bedtime routine to tell your body it is time to sleep. It can be some relaxing music, reading a book, taking a shower, prayer/meditation, etc.       Please follow up with us in return to clinic: 4-6 weeks for a summary visit with the SW and me. Please plan for a 1 hour visit. We will review findings and a plan of care.

## 2024-09-23 NOTE — PROGRESS NOTES
Patient ID: Matthieu Solis is a 64 y.o. male who presents for geriatric assessment.    Identifying Information                              : 1960           Assessment date: 2024    Patient accompanied by: RAYMOND        History provided by: patient    Symptoms Reported (include length of time): Patient reported he had a cardiac emergency at work on  and ended up in hospitalized in a coma. He shared he has been working with his PCP and specialists since to recover and return to work.     CONCERNS IDENTIFIED BY CLINICAL SUPPORT STAFF (Safety Risks, Health Issues, POA not in Chart, etc)     1. Patient has unloaded guns in the home     2. Patient is in a transitional period, recovering from the health crisis      3. Patient is working toward tobacco cessation, currently 2-4 cigarettes    Social History    Level of Education: some college  Occupation/Work Status: Patient reported he was working full time (40-65 hours weekly) plus part time work before the health crisis     shelter date (if applicable):      On any form of disability? yes If so, explain: Currently on short term disability through employer  Marital Status:  Patient reported a complicated relationship status. He is currently living with his first wife.                        Social History     Socioeconomic History    Marital status: Significant Other   Tobacco Use    Smoking status: Every Day     Current packs/day: 1.00     Average packs/day: 1 pack/day for 50.7 years (50.7 ttl pk-yrs)     Types: Cigarettes     Start date:     Smokeless tobacco: Never    Tobacco comments:     Trying to quit   Vaping Use    Vaping status: Never Used   Substance and Sexual Activity    Alcohol use: Never    Drug use: Never    Sexual activity: Defer     Social Determinants of Health     Financial Resource Strain: Low Risk  (1/3/2024)    Overall Financial Resource Strain (CARDIA)     Difficulty of Paying Living Expenses: Not hard at all   Recent  Concern: Financial Resource Strain - Medium Risk (12/18/2023)    Overall Financial Resource Strain (CARDIA)     Difficulty of Paying Living Expenses: Somewhat hard   Food Insecurity: Patient Declined (6/20/2024)    Received from [facility], [facility], [facility]    Hunger Vital Sign     Worried About Running Out of Food in the Last Year: Patient declined     Ran Out of Food in the Last Year: Patient declined   Transportation Needs: Patient Declined (6/20/2024)    Received from [facility], [facility], [facility]    PRAPARE - Transportation     Lack of Transportation (Medical): Patient declined     Lack of Transportation (Non-Medical): Patient declined   Physical Activity: Sufficiently Active (12/18/2023)    Exercise Vital Sign     Days of Exercise per Week: 7 days     Minutes of Exercise per Session: 30 min   Stress: Stress Concern Present (2/12/2024)    [employer] [place] [employer] - Occupational Stress Questionnaire     Feeling of Stress : Rather much   Social Connections: Socially Integrated (12/18/2023)    Social Connection and Isolation Panel [NHANES]     Frequency of Communication with Friends and Family: More than three times a week     Frequency of Social Gatherings with Friends and Family: More than three times a week     Attends Restoration Services: 1 to 4 times per year     Active Member of Clubs or Organizations: Yes     Attends Club or Organization Meetings: 1 to 4 times per year     Marital Status: Living with partner   Intimate Partner Violence: Not At Risk (12/18/2023)    Humiliation, Afraid, Rape, and Kick questionnaire     Fear of Current or Ex-Partner: No     Emotionally Abused: No     Physically Abused: No     Sexually Abused: No   Housing Stability: Patient Declined (6/20/2024)    Received from [facility], [facility], [facility]    Housing Stability Vital Sign     Unable to Pay for Housing in the Last Year: Patient declined      "Unstable Housing in the Last Year: Patient declined        ENCOUNTER SCREENING RESULTS  MOCA Total Score: 23     GDS was not completed related due to restricted time and awareness that patient is in transition and he is already connected with psychotherapy    Daily Functioning Assessment    ADL Screening  Dressing: Independent  Bathing: Independent  Walks in Home: Independent  Hearing - Right Ear: Functional  Hearing - Left Ear: Functional     IADL's  Using Telephone: Independent  Grocery Shopping: Independent  Preparing Meals: Independent  Doing Housework: Independent  Laundry: Independent  Taking Medication: Independent  Pill Box Used: No  Managing Finances: Independent  Using Transportation: Independent  Still Driving: Yes  Eating: Independent  Needs Assistance With Food: Independent  Difficulty Chewing or Swallowing: No     Safety Concerns  Safety Concerns: Weapons  Safety Concerns Notes:: unloaded handguns 7 weapons      Family History      Biological Mother(status, age at death, cause of death):  at age 60 due to heart failure     Biological Father(status, age at death, cause of death):  at age 97 due to cancer     Extended family members with relevant health history: none noted    Supportive Relationships (Informal Support)     Spouse/partner information (include length of relationship, health status): Patient referred to himself as \"woman dependent\" and explained his first two marriages ended in divorce and third marriage ended in separation. He noted he is currently in a relationship with and living with his first wife.      Children Information (include location, level of engagement): Patient shared he has two adult daughters who live in town, one from his first marriage and one from his third.     Other social supports:     Formal Supports     Engaged community services (Emergency Alert, HHA, MOW, Case Management, Etc.): NA    Safety  Nutrition and Exercise  Current Diet: Well Balanced " Diet  Adequate Fluid Intake: Yes  Caffeine: Yes (2-3 cuos daily)  Appetite:: Good  Food Consistency:: Regular  Liquids Consistency:: Thin  Changes in Weight?: No  Chewing or Swallowing Problems?: No  Exercise Frequency: Regularly    Mental Health 45-60 then part time     Sleep: patient noted his sleep was quite restricted because of work schedule prior to his health crisis and how he is still just able to get 4-5 hours of sleep     Energy: okay     Mood concerns noted by patient/family: NA, patient noted he is now connected with a psychotherapist to assist him with stressors of this transition     Relevant mental health history: RAYMOND     Self-harm/suicidal thoughts, plan: None Reported     Interests/Hobbies/Activities/Daily Routine: Patient noted he worked so much that he didn't have time for interests or hobbies     Alcohol, illicit drug, and tobacco use reported by patient/family: no alcohol or illicit drugs but patient noted he is actively working to stop smoking cigarettes.    Additional Notes or Follow-up Matters: RAYMOND

## 2024-09-23 NOTE — PROGRESS NOTES
Subjective   Mr. Solis 64 y.o. year old male is accompanied by: self   Consult Requested By: patient's primary care physician, Claudio Maldonado,    Reason for consultation or primary concern: New Patient Visit  Memory loss after fall     HPI     Per patient   - hosp over summer for HF, currently on STD   - memory loss after his fall at work; that is when he noted it. No one had mentioned it prior to him.   - concerned about ability to return to work    Family history     Family History   Problem Relation Name Age of Onset    Other (CVA) Mother      Hypertension Mother      Other (CVA) Father      Hypertension Father          Social history   SW notes reviewed; significant for:     1. Patient has unloaded guns in the home     2. Patient is in a transitional period, recovering from the health crisis      3. Patient is working toward tobacco cessation, currently 2-4 cigarettes       Review of Systems   Constitutional:  Positive for appetite change, fatigue and unexpected weight change (wt loss after his hosp). Negative for activity change and chills.   HENT:  Positive for dental problem (needs dental exam and treatment). Negative for hearing loss and trouble swallowing.         + snoring    Eyes:  Positive for visual disturbance (glasses).   Respiratory:  Negative for cough, shortness of breath and wheezing.    Cardiovascular:  Negative for chest pain, palpitations and leg swelling.        Wears the vest    Gastrointestinal:  Negative for blood in stool, constipation and diarrhea.   Endocrine: Negative for cold intolerance and heat intolerance.   Genitourinary:  Negative for difficulty urinating, dysuria and hematuria.   Musculoskeletal:  Positive for back pain (history of back pain). Negative for gait problem.   Skin: Negative.    Neurological:  Negative for dizziness, light-headedness, numbness and headaches.   Psychiatric/Behavioral:  Positive for dysphoric mood (depressed about possibly not being able to go  back to work) and sleep disturbance (night shift worker). The patient is nervous/anxious (re work, says he is already high stress, works means a lot to him).          Current Outpatient Medications:     aspirin 81 mg EC tablet, Take 1 tablet (81 mg) by mouth once daily., Disp: 90 tablet, Rfl: 3    atorvastatin (Lipitor) 80 mg tablet, Take 1 tablet (80 mg) by mouth once daily., Disp: , Rfl:     carvedilol (Coreg) 12.5 mg tablet, Take 1 tablet (12.5 mg) by mouth 2 times daily (morning and late afternoon)., Disp: 60 tablet, Rfl: 11    empagliflozin (Jardiance) 10 mg, Take 1 tablet (10 mg) by mouth once daily. Pt has coupon, Disp: 90 tablet, Rfl: 3    nicotine polacrilex (Commit) 4 mg lozenge, Use 1 lozenge (4 mg) in the mouth or throat every 2 hours if needed for smoking cessation., Disp: , Rfl:     sacubitriL-valsartan (Entresto) 49-51 mg tablet, Take 1 tablet by mouth 2 times a day., Disp: 60 tablet, Rfl: 0    spironolactone (Aldactone) 25 mg tablet, Take 0.5 tablets (12.5 mg) by mouth once daily., Disp: , Rfl:     furosemide (Lasix) 20 mg tablet, Take 1 tablet (20 mg) by mouth once daily as needed (edema)., Disp: , Rfl:     omeprazole (PriLOSEC) 20 mg DR capsule, Take 1 capsule (20 mg) by mouth 2 times a day before meals for 14 days. To be taken 30-60 minutes prior to breakfast and dinner. Do not crush or chew., Disp: 28 capsule, Rfl: 0     Objective   /85 (BP Location: Right arm, Patient Position: Sitting, BP Cuff Size: Adult)   Pulse 58   Temp 36.2 °C (97.1 °F) (Tympanic)   Wt 62.2 kg (137 lb 3.2 oz)   BMI 22.83 kg/m²   Office Visit on 09/23/2024   Component Date Value Ref Range Status    Thyroid Stimulating Hormone 09/23/2024 1.15  0.44 - 3.98 mIU/L Final    Vitamin D, 25-Hydroxy, Total 09/23/2024 20 (L)  30 - 100 ng/mL Final   Procedure Visit on 09/20/2024   Component Date Value Ref Range Status    Case Report 09/20/2024    Final                    Value:Dermatopathology                                   Case: [id number]                                   Authorizing Provider:  Marvin Norman MD      Collected:           09/20/2024 0905              Ordering Location:     [facility]       Received:            09/20/2024 1449              Pathologist:           Brent White MD                                                             Specimen:    SKIN, Left Posterior Scalp                                                                 FINAL DIAGNOSIS 09/20/2024    Final                    Value:SKIN, LEFT POSTERIOR SCALP, EXCISION:  EPIDERMAL INCLUSION CYST.    ** Electronically signed out by Brent White MD **          09/20/2024    Final                    Value:By the signature on this report, the individual or group listed as making the Final Interpretation/Diagnosis certifies that they have reviewed this case.       Clinical History 09/20/2024    Final                    Value:Encounter Diagnosis: Neoplasm of uncertain behavior of skin       [id number]  Collection Comments: Differential Diagnosis: Pilar Cyst  Check Margins Yes/No?:  no  Comments:    Dermpath Lab: Routine Histopathology (formalin-fixed tissue)  Finding Region: Mid Occipital Scalp  Specimen Objective:       Microscopic Description 09/20/2024    Final                    Value:Microscopic analysis shows a cyst with cyst wall composed of keratinizing epithelium with a granular layer.  Laminated keratin fills the cyst cavity.          Gross Description 09/20/2024    Final                    Value:A:   Received in formalin is a 30 x 19 x 12 mm piece of skin.  It is tan in color.  It is ellipsoid in shape.  It was embedded in toto in six blocks. The tips are in block A1.   The specimen was inked.      The specimen was grossed by Roma Cruz.      Pre-Admission Testing on 09/18/2024   Component Date Value Ref Range Status    Color, Urine 09/18/2024 Light-Yellow  Light-Yellow, Yellow, Dark-Yellow Final    Appearance, Urine 09/18/2024  Clear  Clear Final    Specific Gravity, Urine 09/18/2024 1.026  1.005 - 1.035 Final    pH, Urine 09/18/2024 6.5  5.0, 5.5, 6.0, 6.5, 7.0, 7.5, 8.0 Final    Protein, Urine 09/18/2024 NEGATIVE  NEGATIVE, 10 (TRACE), 20 (TRACE) mg/dL Final    Glucose, Urine 09/18/2024 OVER (4+) (A)  Normal mg/dL Final    Blood, Urine 09/18/2024 NEGATIVE  NEGATIVE Final    Ketones, Urine 09/18/2024 NEGATIVE  NEGATIVE mg/dL Final    Bilirubin, Urine 09/18/2024 NEGATIVE  NEGATIVE Final    Urobilinogen, Urine 09/18/2024 Normal  Normal mg/dL Final    Nitrite, Urine 09/18/2024 NEGATIVE  NEGATIVE Final    Leukocyte Esterase, Urine 09/18/2024 NEGATIVE  NEGATIVE Final    Extra Tube 09/18/2024 Hold for add-ons.   Final    Ventricular Rate 09/18/2024 67  BPM Final    Atrial Rate 09/18/2024 67  BPM Final    RI Interval 09/18/2024 130  ms Final    QRS Duration 09/18/2024 94  ms Final    QT Interval 09/18/2024 386  ms Final    QTC Calculation(Bazett) 09/18/2024 407  ms Final    P Axis 09/18/2024 72  degrees Final    R Axis 09/18/2024 33  degrees Final    T Axis 09/18/2024 46  degrees Final    QRS Count 09/18/2024 11  beats Final    Q Onset 09/18/2024 219  ms Final    P Onset 09/18/2024 154  ms Final    P Offset 09/18/2024 214  ms Final    T Offset 09/18/2024 412  ms Final    QTC Fredericia 09/18/2024 400  ms Final   Lab on 09/09/2024   Component Date Value Ref Range Status    Prostate Specific AG 09/09/2024 7.32 (H)  <=4.00 ng/mL Final   Office Visit on 09/09/2024   Component Date Value Ref Range Status    POC Color, Urine 09/09/2024 Orange (A)  Straw, Yellow, Light-Yellow Final    POC Appearance, Urine 09/09/2024 Clear  Clear Final    POC Glucose, Urine 09/09/2024 >=1000 (4+) (A)  NEGATIVE mg/dl Final    POC Bilirubin, Urine 09/09/2024 NEGATIVE  NEGATIVE Final    POC Ketones, Urine 09/09/2024 NEGATIVE  NEGATIVE mg/dl Final    POC Specific Gravity, Urine 09/09/2024 1.020  1.005 - 1.035 Final    POC Blood, Urine 09/09/2024 NEGATIVE  NEGATIVE Final     POC PH, Urine 09/09/2024 6.0  No Reference Range Established PH Final    POC Protein, Urine 09/09/2024 NEGATIVE  NEGATIVE, 30 (1+) mg/dl Final    POC Urobilinogen, Urine 09/09/2024 0.2  0.2, 1.0 EU/DL Final    Poc Nitrite, Urine 09/09/2024 NEGATIVE  NEGATIVE Final    POC Leukocytes, Urine 09/09/2024 NEGATIVE  NEGATIVE Final   Office Visit on 07/25/2024   Component Date Value Ref Range Status    POC Color, Urine 07/25/2024 Yellow  Straw, Yellow, Light-Yellow Final    POC Appearance, Urine 07/25/2024 Clear  Clear Final    POC Glucose, Urine 07/25/2024 NEGATIVE  NEGATIVE mg/dl Final    POC Bilirubin, Urine 07/25/2024 NEGATIVE  NEGATIVE Final    POC Ketones, Urine 07/25/2024 NEGATIVE  NEGATIVE mg/dl Final    POC Specific Gravity, Urine 07/25/2024 1.025  1.005 - 1.035 Final    POC Blood, Urine 07/25/2024 TRACE-Intact (A)  NEGATIVE Final    POC PH, Urine 07/25/2024 6.0  No Reference Range Established PH Final    POC Protein, Urine 07/25/2024 TRACE (A)  NEGATIVE, 30 (1+) mg/dl Final    POC Urobilinogen, Urine 07/25/2024 0.2  0.2, 1.0 EU/DL Final    Poc Nitrite, Urine 07/25/2024 NEGATIVE  NEGATIVE Final    POC Leukocytes, Urine 07/25/2024 NEGATIVE  NEGATIVE Final    Color, Urine 07/25/2024 Light-Yellow  Light-Yellow, Yellow, Dark-Yellow Final    Appearance, Urine 07/25/2024 Clear  Clear Final    Specific Gravity, Urine 07/25/2024 1.022  1.005 - 1.035 Final    pH, Urine 07/25/2024 5.5  5.0, 5.5, 6.0, 6.5, 7.0, 7.5, 8.0 Final    Protein, Urine 07/25/2024 10 (TRACE)  NEGATIVE, 10 (TRACE), 20 (TRACE) mg/dL Final    Glucose, Urine 07/25/2024 Normal  Normal mg/dL Final    Blood, Urine 07/25/2024 NEGATIVE  NEGATIVE Final    Ketones, Urine 07/25/2024 NEGATIVE  NEGATIVE mg/dL Final    Bilirubin, Urine 07/25/2024 NEGATIVE  NEGATIVE Final    Urobilinogen, Urine 07/25/2024 Normal  Normal mg/dL Final    Nitrite, Urine 07/25/2024 NEGATIVE  NEGATIVE Final    Leukocyte Esterase, Urine 07/25/2024 NEGATIVE  NEGATIVE Final    WBC, Urine  07/25/2024 1-5  1-5, NONE /HPF Final    RBC, Urine 07/25/2024 1-2  NONE, 1-2, 3-5 /HPF Final    Mucus, Urine 07/25/2024 FEW  Reference range not established. /LPF Final    Calcium Oxalate Crystals, Urine 07/25/2024 2+ (A)  NONE, 1+ /HPF Final   Office Visit on 07/19/2024   Component Date Value Ref Range Status    Case Report 07/19/2024    Final                    Value:Dermatopathology                                  Case: [id number]                                   Authorizing Provider:  Ladarius Turpin MD          Collected:           07/19/2024 1129              Ordering Location:     [facility]       Received:            07/19/2024 1244              Pathologist:           Brent White MD                                                             Specimen:    SKIN, Right Ventral Mid-Forearm                                                            FINAL DIAGNOSIS 07/19/2024    Final                    Value:A: SKIN, RIGHT VENTRAL MID-FOREARM,  PUNCH BIOPSY:  PIGMENT INCONTINENCE WITH RARE DYSKERATOSIS, SEE NOTE.    Note: Microscopic examination reveals a specimen that extends into the mid to deep reticular dermis. There is very mild basal layer vacuolization with rare dyskeratosis and there are superficial melanophages.    These findings could be seen in ashy dermatosis (erythema dyschromicum perstans), a connective tissue disease such as acute cutaneous lupus erythematosus, subacute cutaneous lupus erythematosus, and dermatomyositis or atrophic lichen planus.    ** Electronically signed out by Brent White MD **            07/19/2024    Final                    Value:By the signature on this report, the individual or group listed as making the Final Interpretation/Diagnosis certifies that they have reviewed this case.       Clinical History 07/19/2024    Final                    Value:Encounter Diagnosis: Rash and other nonspecific skin eruption       [id number]  Collection Comments: Differential  Diagnosis: LP v PIH v eczema  Check Margins Yes/No?:    Comments:    Dermpath Lab: Routine Histopathology (formalin-fixed tissue)  Finding Region: Right Forearm - Anterior  Specimen Objective:         Gross Description 07/19/2024    Final                    Value:A:   Received in formalin is a 3 x 3 x 3 mm piece of skin.  It is tan in color.  It is cylindrical in shape.  It was embedded in toto.  The specimen was inked.      The specimen was grossed by Roma Crzu.           Physical Exam  Constitutional:       General: He is not in acute distress.     Appearance: He is normal weight.   HENT:      Head: Normocephalic and atraumatic.      Nose: Nose normal.      Mouth/Throat:      Mouth: Mucous membranes are moist.      Pharynx: Oropharynx is clear.   Eyes:      Extraocular Movements: Extraocular movements intact.      Conjunctiva/sclera: Conjunctivae normal.      Pupils: Pupils are equal, round, and reactive to light.   Cardiovascular:      Rate and Rhythm: Normal rate and regular rhythm.      Pulses: Normal pulses.      Heart sounds: Normal heart sounds.   Pulmonary:      Effort: Pulmonary effort is normal.      Breath sounds: Normal breath sounds.   Abdominal:      General: Abdomen is flat. Bowel sounds are normal.      Palpations: Abdomen is soft.   Musculoskeletal:         General: Normal range of motion.      Cervical back: Normal range of motion and neck supple.   Skin:     General: Skin is warm and dry.      Capillary Refill: Capillary refill takes less than 2 seconds.   Neurological:      General: No focal deficit present.      Mental Status: He is alert.   Psychiatric:         Mood and Affect: Mood is anxious (mild).         Behavior: Behavior is cooperative.       MoCA: 23 /30 executive function, did not follow line directions, booker cube as if mirror version, clock without arrows on hands, numbers bunched up, able to name 9 words, had difficulty with repeating sentence, delayed recall 3/6.      Assessment/Plan     Cognitive changes/ MCI  - head CT 913234 without concerns, no small vessel ischemia, no volume size changes  - no fam hx of dementia reported  - could be mci related to Alz Dis, but would recommend further neuropsych eval to tease out particulars of the cognitive changes. If MCI due to Alz Dis is suspected, potential candidate for monoclonal therapy.   - could also be worsened by an underlying sleep apnea, despite being normal weight pt does admit to snoring when he sleeps and complains of fatigue, this coupled with his new cardiac diagnosis of heart failure could mean he has underlying apnea. Will defer to PCP to be evaluated.   - may impact his ability to return to work, especially if new information is involved in his job    Non ischemic Cardiomyopathy, systolic HF with ef 18%, NSTEMI  - no CAD per heart cath   - following with [facility]ology   - life vest, says he will not do a defibrillator   - entresto, spironolactone, coreg, asa  - on statin    Nicotine dependence  - slowly weaning himself    HM  - flu shot today , rec covid, shingrix and rsv yet   - c scope 2021    Dispo: mild cognitive impairment, etiology unclear, will send for neuropsych testing, will suggest possible assessment of sleep apnea to PCP, few labs today.     Hali Dias, APRN-CNP

## 2024-09-24 LAB
25(OH)D3 SERPL-MCNC: 20 NG/ML (ref 30–100)
LABORATORY COMMENT REPORT: NORMAL
PATH REPORT.FINAL DX SPEC: NORMAL
PATH REPORT.GROSS SPEC: NORMAL
PATH REPORT.MICROSCOPIC SPEC OTHER STN: NORMAL
PATH REPORT.RELEVANT HX SPEC: NORMAL
PATH REPORT.TOTAL CANCER: NORMAL
TSH SERPL-ACNC: 1.15 MIU/L (ref 0.44–3.98)

## 2024-09-26 ENCOUNTER — APPOINTMENT (OUTPATIENT)
Dept: CARE COORDINATION | Facility: CLINIC | Age: 64
End: 2024-09-26
Payer: COMMERCIAL

## 2024-09-26 NOTE — PROGRESS NOTES
Pharmacist Clinic: Cardiology Management    Matthieu Solis is a 64 y.o. male was referred to Clinical Pharmacy Team for Heart Failure management.     Referring Provider: Tomás Donaldson MD    THIS IS A NEW PATIENT APPOINTMENT. PATIENT WILL BE ESTABLISHING CARE WITH CLINICAL PHARMACY.    Appointment was completed by Matthieu who was reached at primary number.    Allergies Reviewed? Yes    Allergies   Allergen Reactions    Cinnamon Unknown     Cinnamon    Doxycycline Swelling    Penicillins Unknown       Past Medical History:   Diagnosis Date    Cataract     GERD (gastroesophageal reflux disease)     Gout     HTN (hypertension)        Current Outpatient Medications on File Prior to Visit   Medication Sig Dispense Refill    aspirin 81 mg EC tablet Take 1 tablet (81 mg) by mouth once daily. 90 tablet 3    atorvastatin (Lipitor) 80 mg tablet Take 1 tablet (80 mg) by mouth once daily.      carvedilol (Coreg) 12.5 mg tablet Take 1 tablet (12.5 mg) by mouth 2 times daily (morning and late afternoon). 60 tablet 11    empagliflozin (Jardiance) 10 mg Take 1 tablet (10 mg) by mouth once daily. Pt has coupon 90 tablet 3    nicotine polacrilex (Commit) 4 mg lozenge Use 1 lozenge (4 mg) in the mouth or throat every 2 hours if needed for smoking cessation.      omeprazole (PriLOSEC) 20 mg DR capsule Take 1 capsule (20 mg) by mouth 2 times a day before meals for 14 days. To be taken 30-60 minutes prior to breakfast and dinner. Do not crush or chew. 28 capsule 0    sacubitriL-valsartan (Entresto) 49-51 mg tablet Take 1 tablet by mouth 2 times a day. 60 tablet 0    spironolactone (Aldactone) 25 mg tablet Take 0.5 tablets (12.5 mg) by mouth once daily.      furosemide (Lasix) 20 mg tablet Take 1 tablet (20 mg) by mouth once daily as needed (edema).       No current facility-administered medications on file prior to visit.         RELEVANT LAB RESULTS  Lab Results   Component Value Date    BILITOT 0.4 02/03/2024    CALCIUM 9.6  "02/03/2024    CO2 26 02/03/2024     02/03/2024    CREATININE 0.82 02/03/2024    GLUCOSE 88 02/03/2024    ALKPHOS 92 02/03/2024    K 4.2 02/03/2024    PROT 6.8 02/03/2024     02/03/2024    AST 14 02/03/2024    ALT 16 02/03/2024    BUN 15 02/03/2024    ANIONGAP 10 02/03/2024    MG 2.00 01/03/2024    PHOS 3.0 09/23/2020    ALBUMIN 4.0 02/03/2024    LIPASE 8 (L) 01/08/2024    GFRMALE >90 03/13/2023     Lab Results   Component Value Date    TRIG 39 02/03/2024    CHOL 159 02/03/2024    LDLCALC 100 (H) 02/03/2024    HDL 51.0 02/03/2024     No results found for: \"BMCBC\", \"CBCDIF\"     PHARMACEUTICAL ASSESSMENT    MEDICATION RECONCILIATION    Home Pharmacy Reviewed? Yes, describe: [facility]    Added:  - furosemide 20mg daily as needed  Changed:  - none  Removed:  - none    Drug Interactions? No    Medication Documentation Review Audit       Reviewed by Leah Mcdonough RN (Registered Nurse) on 09/23/24 at 1401      Medication Order Taking? Sig Documenting Provider Last Dose Status   aspirin 81 mg EC tablet [id number] Yes Take 1 tablet (81 mg) by mouth once daily. Kathy Hanna MD Taking Active   atorvastatin (Lipitor) 80 mg tablet [id number] Yes Take 1 tablet (80 mg) by mouth once daily. Historical Provider, MD Taking Active   carvedilol (Coreg) 12.5 mg tablet [id number] Yes Take 1 tablet (12.5 mg) by mouth 2 times daily (morning and late afternoon). Tomás Donaldson MD Taking Active     Discontinued 09/18/24 1415   empagliflozin (Jardiance) 10 mg [id number] Yes Take 1 tablet (10 mg) by mouth once daily. Pt has coupon oTmás Donaldson MD Taking Active     Discontinued 09/18/24 1416     Discontinued 09/18/24 1416     Discontinued 09/18/24 1416   nicotine polacrilex (Commit) 4 mg lozenge [id number] Yes Use 1 lozenge (4 mg) in the mouth or throat every 2 hours if needed for smoking cessation. Historical Provider, MD Taking Active   omeprazole (PriLOSEC) 20 mg DR capsule [id number]  Take 1 capsule (20 mg) by " mouth 2 times a day before meals for 14 days. To be taken 30-60 minutes prior to breakfast and dinner. Do not crush or chew. Geovanni Mckenna MD   09/10/24 2359     Discontinued 24 1419   sacubitriL-valsartan (Entresto) 49-51 mg tablet [id number] Yes Take 1 tablet by mouth 2 times a day. Tomás Donaldson MD Taking Active   spironolactone (Aldactone) 25 mg tablet [id number] Yes Take 0.5 tablets (12.5 mg) by mouth once daily. Historical MD Mariya Taking Active     Discontinued 24 1420                    DISEASE MANAGEMENT ASSESSMENT:     CHF ASSESSMENT     Symptom/Staging:  -Most recent ejection fraction: 15-20%  -NYHA Stage: unknown        Guideline-Directed Medical Therapy:  -ARNI: Yes, describe: Entresto 49-51mg twice daily  -Beta Blocker: Yes, describe: carvedilol 12.5mg twice dialy  -MRA: Yes, describe: spironolactone 12.5mg daily  -SGLT2i: Yes, describe: Jardiance 10mg daily    Secondary Prevention:  -The ASCVD Risk score (Senthil KAPOOR, et al., 2019) failed to calculate for the following reasons:    The patient has a prior MI or stroke diagnosis   -Aspirin 81mg? yes  -Statin?: Yes, describe: atorvastatin 80mg  -HTN?: Yes, describe: elevated    CURRENT PHARMACOTHERAPY:    Entresto 49-51mg BID  Carvedilol 12.5mg BID  Spironolactone 25mg 0.5 tablet daily  Jardiance 10mg daily    Affordability: unable to work with low incoming making it hard to afford medications  Adherence/Compliance: reports adherence  Adverse Effects: none reported    Monitoring Weights at Home: Yes  Home Weight Recordings: 137lbs    Past In Office Weight Readings:   Wt Readings from Last 6 Encounters:   24 62.2 kg (137 lb 3.2 oz)   24 61 kg (134 lb 7.7 oz)   24 59 kg (130 lb)   24 59 kg (130 lb)   24 59 kg (130 lb)   24 60.1 kg (132 lb 8 oz)       Monitoring Blood Pressure at Home: Yes  Home BP Recordings:   120s-130s/60s-70s    Past In Office BP Readings:   BP Readings from Last 6  Encounters:   09/23/24 155/85   09/18/24 139/75   09/13/24 123/67   09/09/24 123/67   09/04/24 127/74   08/28/24 158/60       HEALTH MANAGEMENT    Maintaining fluid restriction (<2 L/day): N/A  Edema/swelling: No - only experienced this when he left the hospital and this has subsided.  Shortness of breath: No  Trouble sleeping/lying down: No  Dry/hacking cough: No  Recent Hospitalizations: No    EDUCATION/COUNSELING:   - Counseled patient on MOA, expectations, duration of therapy, contraindications, administration, and monitoring parameters  - Counseled patient on lifestyle modifications that can decrease your risk of having complications (smoking cessation, losing weight, daily weights, vaccines)  - Counseled patient on fluid intake and weight management. Recommended to not consume more than 2 liters of fliuids per day. If they have gained more than 2-3 pounds within a 24 hours period (or 5 pounds in a week), contact their cardiologist  - Answered all patient questions and concerns       DISCUSSION/NOTES:   No adjustments needed to home medication list at this time. Currently having a hard time paying for medications. Plan to titrate GDMT medications once approved for  PAP to cover the cost of future fills.  No current issues with lightheadedness or dizziness. Reports he feels better on current medication regimen than before.    ASSESSMENT AND PLAN:    Assessment/Plan   Problem List Items Addressed This Visit       NICM (nonischemic cardiomyopathy) (Multi)     Currently on 4 GDMT medications. Start titration medication to goal doses once approved for  PAP.  No dose adjustments needed based on kidney and liver function.           Patient Assistance Program (PAP)    Application for program to be submitted for the following medications: Jardiance and Entresto    [facility] Permanent Address: [facility]   Prescription Insurance:   Yes   Members of Household: 1   Files Taxes: Yes       Patient will be email financial  information to pharmacist directly at [email or web address].    Patient verbally reports monthly or yearly income which is less than 400% federal poverty level    Patient aware this process may take up to 6 weeks.     If approved medication must be filled through [facility] and PATIENT WILL BE PICKING UP MEDICATION AT INDICATED  RETAIL PHARMACY.         RECOMMENDATIONS/PLAN    CONTINUE  Entresto 49-51mg BID  Carvedilol 12.5mg BID  Spironolactone 25mg 0.5 tablet daily  Jardiance 10mg daily    Last Appnt with Referring Provider: 8/28/24  Next Appnt with Referring Provider: 10/1/24  Clinical Pharmacist follow up: 11/1/24  Type of Encounter: Virtual    Joaquin Yoder, PharmNEGRITA    Verbal consent to manage patient's drug therapy was obtained from the patient . They were informed they may decline to participate or withdraw from participation in pharmacy services at any time.    Continue all meds under the continuation of care with the referring provider and clinical pharmacy team.

## 2024-09-27 ENCOUNTER — TELEPHONE (OUTPATIENT)
Dept: DERMATOLOGY | Facility: CLINIC | Age: 64
End: 2024-09-27

## 2024-09-27 ENCOUNTER — APPOINTMENT (OUTPATIENT)
Dept: PHARMACY | Facility: HOSPITAL | Age: 64
End: 2024-09-27
Payer: COMMERCIAL

## 2024-09-27 ENCOUNTER — PATIENT MESSAGE (OUTPATIENT)
Dept: CARDIOLOGY | Facility: CLINIC | Age: 64
End: 2024-09-27

## 2024-09-27 DIAGNOSIS — I42.8 NICM (NONISCHEMIC CARDIOMYOPATHY) (MULTI): ICD-10-CM

## 2024-09-27 RX ORDER — SPIRONOLACTONE 25 MG/1
12.5 TABLET ORAL
Qty: 45 TABLET | Refills: 3 | Status: SHIPPED | OUTPATIENT
Start: 2024-09-27 | End: 2025-09-27

## 2024-09-27 RX ORDER — FUROSEMIDE 20 MG/1
20 TABLET ORAL DAILY PRN
COMMUNITY

## 2024-09-27 NOTE — TELEPHONE ENCOUNTER
Called patient back in regards to small amount of bleeding from surgical site. Advised patient to apply firm interrupted pressure for 20 minutes and repeat a second time along with some ice if he is still having bleeding. Patient expressed understanding and will call if he have any issues.

## 2024-09-27 NOTE — ASSESSMENT & PLAN NOTE
Currently on 4 GDMT medications. Start titration medication to goal doses once approved for  PAP.  No dose adjustments needed based on kidney and liver function.

## 2024-09-27 NOTE — Clinical Note
Screening Matthieu for  PAP to cover cost of Entresto and Jardiance reports she should qualify with current income status. Reports it is difficult for him to pay for name brand medications will start titration of GDMT medications once approved.

## 2024-09-29 DIAGNOSIS — I42.8 NICM (NONISCHEMIC CARDIOMYOPATHY) (MULTI): ICD-10-CM

## 2024-09-30 RX ORDER — SACUBITRIL AND VALSARTAN 49; 51 MG/1; MG/1
1 TABLET, FILM COATED ORAL 2 TIMES DAILY
Qty: 60 TABLET | Refills: 5 | Status: SHIPPED | OUTPATIENT
Start: 2024-09-30 | End: 2024-10-04 | Stop reason: ALTCHOICE

## 2024-10-01 ENCOUNTER — HOSPITAL ENCOUNTER (OUTPATIENT)
Dept: CARDIOLOGY | Facility: CLINIC | Age: 64
Discharge: HOME | End: 2024-10-01
Payer: COMMERCIAL

## 2024-10-01 DIAGNOSIS — I42.8 NICM (NONISCHEMIC CARDIOMYOPATHY) (MULTI): ICD-10-CM

## 2024-10-01 LAB
AORTIC VALVE PEAK VELOCITY: 1.17 M/S
AV PEAK GRADIENT: 5.5 MMHG
AVA (PEAK VEL): 2.1 CM2
EJECTION FRACTION APICAL 4 CHAMBER: 39.3
EJECTION FRACTION: 33 %
LEFT ATRIUM VOLUME AREA LENGTH INDEX BSA: 35.2 ML/M2
LEFT VENTRICLE INTERNAL DIMENSION DIASTOLE: 5.02 CM (ref 3.5–6)
LEFT VENTRICULAR OUTFLOW TRACT DIAMETER: 2.22 CM
MITRAL VALVE E/A RATIO: 0.66
RIGHT VENTRICLE FREE WALL PEAK S': 15 CM/S
RIGHT VENTRICLE PEAK SYSTOLIC PRESSURE: 26.9 MMHG
TRICUSPID ANNULAR PLANE SYSTOLIC EXCURSION: 1.9 CM

## 2024-10-01 PROCEDURE — 93306 TTE W/DOPPLER COMPLETE: CPT

## 2024-10-01 PROCEDURE — 93306 TTE W/DOPPLER COMPLETE: CPT | Performed by: INTERNAL MEDICINE

## 2024-10-02 ENCOUNTER — TELEPHONE (OUTPATIENT)
Dept: PRIMARY CARE | Facility: CLINIC | Age: 64
End: 2024-10-02
Payer: COMMERCIAL

## 2024-10-02 DIAGNOSIS — E78.5 HYPERLIPIDEMIA, UNSPECIFIED HYPERLIPIDEMIA TYPE: Primary | ICD-10-CM

## 2024-10-02 RX ORDER — ATORVASTATIN CALCIUM 80 MG/1
80 TABLET, FILM COATED ORAL
Qty: 90 TABLET | Refills: 0 | Status: SHIPPED | OUTPATIENT
Start: 2024-10-02 | End: 2024-12-31

## 2024-10-02 NOTE — TELEPHONE ENCOUNTER
Pt has been out of work from his surgery. Has an appt with you on oct 23. Is returning to work in January, but needs a note from you that he is returning to work in January so he can continue his benefits.

## 2024-10-04 DIAGNOSIS — I42.8 NICM (NONISCHEMIC CARDIOMYOPATHY) (MULTI): Primary | ICD-10-CM

## 2024-10-04 RX ORDER — SACUBITRIL AND VALSARTAN 49; 51 MG/1; MG/1
1 TABLET, FILM COATED ORAL 2 TIMES DAILY
Qty: 180 TABLET | Refills: 3 | Status: SHIPPED | OUTPATIENT
Start: 2024-10-04 | End: 2025-09-29

## 2024-10-04 NOTE — TELEPHONE ENCOUNTER
Patients spouse calling Result Communication    No results found from the In Basket message.    9:01 AM      Results {WERE / WERE NOT:71300} successfully communicated with the {RHEUM PARENT/PATIENT:38923} and they {AMB Acknowledged/Did Not Acknowledge:53749} their understanding.

## 2024-10-04 NOTE — TELEPHONE ENCOUNTER
Spouse requesting a back to work slip for January. Patient would like to come in sooner if possible. This letter is needed to continue benefits.

## 2024-10-07 NOTE — PREPROCEDURE INSTRUCTIONS
Current Medications   Medication Instructions    aspirin 81 mg EC tablet Hold    atorvastatin (Lipitor) 80 mg tablet Takes in the evening    carvedilol (Coreg) 12.5 mg tablet Take in am with small sip of water    empagliflozin (Jardiance) 10 mg Last dose 10/6 am    nicotine polacrilex (Commit) 4 mg lozenge Hold    sacubitriL-valsartan (Entresto) 49-51 mg tablet Hold    spironolactone (Aldactone) 25 mg tablet Take in am with small sip of water         NPO Instructions:  Nothing to eat after midnight tonight        Additional Instructions:     Enter through main entrance of [facility], located at [address]. Proceed to registration, located in the back right hand corner. You will need your ID and insurance card for registration. Please ensure you have a responsible adult to drive you home.     Take a shower the morning of or night before your procedure. After you shower avoid lotions, powders, deodorants or anything applied to the skin. If you wear contacts or glasses, wear the glasses. If you do not have glasses, please bring a case for your contacts. You may wear hearing aids and dentures, bring a case for them or we will provide one. Make sure you wear something loose and comfortable. Keep in mind your surgery type and wear something that will accommodate incisions or bandages. Please remove all jewelry.     You may have up to 14 ounces of clear liquids 2 hours before your instructed ARRIVAL time  (10:30)* to the hospital. You may take medications discussed during phone call with a small sip of water.    For further questions Jace ARIAS can be contacted at [phone] between 7AM-3PM.

## 2024-10-08 ENCOUNTER — TELEPHONE (OUTPATIENT)
Dept: PHARMACY | Facility: HOSPITAL | Age: 64
End: 2024-10-08

## 2024-10-08 ENCOUNTER — ANESTHESIA (OUTPATIENT)
Dept: OPERATING ROOM | Facility: HOSPITAL | Age: 64
End: 2024-10-08
Payer: COMMERCIAL

## 2024-10-08 ENCOUNTER — HOSPITAL ENCOUNTER (OUTPATIENT)
Facility: HOSPITAL | Age: 64
Setting detail: OUTPATIENT SURGERY
Discharge: HOME | End: 2024-10-08
Attending: UROLOGY | Admitting: UROLOGY
Payer: COMMERCIAL

## 2024-10-08 ENCOUNTER — APPOINTMENT (OUTPATIENT)
Dept: INFECTIOUS DISEASES | Facility: CLINIC | Age: 64
End: 2024-10-08
Payer: COMMERCIAL

## 2024-10-08 ENCOUNTER — ANESTHESIA EVENT (OUTPATIENT)
Dept: OPERATING ROOM | Facility: HOSPITAL | Age: 64
End: 2024-10-08
Payer: COMMERCIAL

## 2024-10-08 VITALS
TEMPERATURE: 97 F | SYSTOLIC BLOOD PRESSURE: 179 MMHG | BODY MASS INDEX: 22.49 KG/M2 | RESPIRATION RATE: 18 BRPM | OXYGEN SATURATION: 100 % | HEART RATE: 63 BPM | HEIGHT: 65 IN | DIASTOLIC BLOOD PRESSURE: 85 MMHG | WEIGHT: 135 LBS

## 2024-10-08 VITALS
SYSTOLIC BLOOD PRESSURE: 151 MMHG | HEART RATE: 63 BPM | BODY MASS INDEX: 22.49 KG/M2 | DIASTOLIC BLOOD PRESSURE: 71 MMHG | HEIGHT: 65 IN | WEIGHT: 135 LBS

## 2024-10-08 DIAGNOSIS — K29.70 HELICOBACTER PYLORI GASTRITIS: Primary | ICD-10-CM

## 2024-10-08 DIAGNOSIS — B96.81 HELICOBACTER PYLORI GASTRITIS: Primary | ICD-10-CM

## 2024-10-08 DIAGNOSIS — R97.20 ELEVATED PSA: ICD-10-CM

## 2024-10-08 LAB — GLUCOSE BLD MANUAL STRIP-MCNC: 77 MG/DL (ref 74–99)

## 2024-10-08 PROCEDURE — 3600000002 HC OR TIME - INITIAL BASE CHARGE - PROCEDURE LEVEL TWO: Performed by: UROLOGY

## 2024-10-08 PROCEDURE — 3078F DIAST BP <80 MM HG: CPT | Performed by: INTERNAL MEDICINE

## 2024-10-08 PROCEDURE — 88305 TISSUE EXAM BY PATHOLOGIST: CPT | Mod: TC,PARLAB | Performed by: UROLOGY

## 2024-10-08 PROCEDURE — 7100000010 HC PHASE TWO TIME - EACH INCREMENTAL 1 MINUTE: Performed by: UROLOGY

## 2024-10-08 PROCEDURE — 7100000009 HC PHASE TWO TIME - INITIAL BASE CHARGE: Performed by: UROLOGY

## 2024-10-08 PROCEDURE — 3008F BODY MASS INDEX DOCD: CPT | Performed by: INTERNAL MEDICINE

## 2024-10-08 PROCEDURE — 88344 IMHCHEM/IMCYTCHM EA MLT ANTB: CPT | Performed by: PATHOLOGY

## 2024-10-08 PROCEDURE — 3600000007 HC OR TIME - EACH INCREMENTAL 1 MINUTE - PROCEDURE LEVEL TWO: Performed by: UROLOGY

## 2024-10-08 PROCEDURE — A55700 PR BIOPSY OF PROSTATE,NEEDLE/PUNCH: Performed by: ANESTHESIOLOGY

## 2024-10-08 PROCEDURE — C1819 TISSUE LOCALIZATION-EXCISION: HCPCS | Performed by: UROLOGY

## 2024-10-08 PROCEDURE — 3700000002 HC GENERAL ANESTHESIA TIME - EACH INCREMENTAL 1 MINUTE: Performed by: UROLOGY

## 2024-10-08 PROCEDURE — 82947 ASSAY GLUCOSE BLOOD QUANT: CPT

## 2024-10-08 PROCEDURE — 2500000004 HC RX 250 GENERAL PHARMACY W/ HCPCS (ALT 636 FOR OP/ED): Performed by: NURSE ANESTHETIST, CERTIFIED REGISTERED

## 2024-10-08 PROCEDURE — 88305 TISSUE EXAM BY PATHOLOGIST: CPT | Performed by: PATHOLOGY

## 2024-10-08 PROCEDURE — 2500000004 HC RX 250 GENERAL PHARMACY W/ HCPCS (ALT 636 FOR OP/ED): Mod: JZ | Performed by: UROLOGY

## 2024-10-08 PROCEDURE — 2720000007 HC OR 272 NO HCPCS: Performed by: UROLOGY

## 2024-10-08 PROCEDURE — 99214 OFFICE O/P EST MOD 30 MIN: CPT | Performed by: INTERNAL MEDICINE

## 2024-10-08 PROCEDURE — RXMED WILLOW AMBULATORY MEDICATION CHARGE

## 2024-10-08 PROCEDURE — 3700000001 HC GENERAL ANESTHESIA TIME - INITIAL BASE CHARGE: Performed by: UROLOGY

## 2024-10-08 PROCEDURE — 4004F PT TOBACCO SCREEN RCVD TLK: CPT | Performed by: INTERNAL MEDICINE

## 2024-10-08 PROCEDURE — A55700 PR BIOPSY OF PROSTATE,NEEDLE/PUNCH: Performed by: NURSE ANESTHETIST, CERTIFIED REGISTERED

## 2024-10-08 PROCEDURE — 3077F SYST BP >= 140 MM HG: CPT | Performed by: INTERNAL MEDICINE

## 2024-10-08 PROCEDURE — 55700 PR PROSTATE NEEDLE BIOPSY ANY APPROACH: CPT | Performed by: UROLOGY

## 2024-10-08 RX ORDER — CEFAZOLIN SODIUM 2 G/100ML
2 INJECTION, SOLUTION INTRAVENOUS ONCE
Status: COMPLETED | OUTPATIENT
Start: 2024-10-08 | End: 2024-10-08

## 2024-10-08 RX ORDER — SODIUM CHLORIDE, SODIUM LACTATE, POTASSIUM CHLORIDE, CALCIUM CHLORIDE 600; 310; 30; 20 MG/100ML; MG/100ML; MG/100ML; MG/100ML
20 INJECTION, SOLUTION INTRAVENOUS CONTINUOUS
Status: DISCONTINUED | OUTPATIENT
Start: 2024-10-08 | End: 2024-10-08 | Stop reason: HOSPADM

## 2024-10-08 RX ORDER — LIDOCAINE HYDROCHLORIDE 20 MG/ML
INJECTION, SOLUTION EPIDURAL; INFILTRATION; INTRACAUDAL; PERINEURAL AS NEEDED
Status: DISCONTINUED | OUTPATIENT
Start: 2024-10-08 | End: 2024-10-08

## 2024-10-08 RX ORDER — SODIUM CHLORIDE 9 MG/ML
INJECTION, SOLUTION INTRAVENOUS CONTINUOUS PRN
Status: DISCONTINUED | OUTPATIENT
Start: 2024-10-08 | End: 2024-10-08

## 2024-10-08 RX ORDER — FENTANYL CITRATE 50 UG/ML
INJECTION, SOLUTION INTRAMUSCULAR; INTRAVENOUS AS NEEDED
Status: DISCONTINUED | OUTPATIENT
Start: 2024-10-08 | End: 2024-10-08

## 2024-10-08 RX ORDER — ONDANSETRON HYDROCHLORIDE 2 MG/ML
INJECTION, SOLUTION INTRAVENOUS AS NEEDED
Status: DISCONTINUED | OUTPATIENT
Start: 2024-10-08 | End: 2024-10-08

## 2024-10-08 RX ORDER — PROPOFOL 10 MG/ML
INJECTION, EMULSION INTRAVENOUS CONTINUOUS PRN
Status: DISCONTINUED | OUTPATIENT
Start: 2024-10-08 | End: 2024-10-08

## 2024-10-08 SDOH — HEALTH STABILITY: MENTAL HEALTH: CURRENT SMOKER: 0

## 2024-10-08 ASSESSMENT — COLUMBIA-SUICIDE SEVERITY RATING SCALE - C-SSRS
6. HAVE YOU EVER DONE ANYTHING, STARTED TO DO ANYTHING, OR PREPARED TO DO ANYTHING TO END YOUR LIFE?: NO
1. IN THE PAST MONTH, HAVE YOU WISHED YOU WERE DEAD OR WISHED YOU COULD GO TO SLEEP AND NOT WAKE UP?: NO
2. HAVE YOU ACTUALLY HAD ANY THOUGHTS OF KILLING YOURSELF?: NO

## 2024-10-08 ASSESSMENT — PAIN - FUNCTIONAL ASSESSMENT: PAIN_FUNCTIONAL_ASSESSMENT: 0-10

## 2024-10-08 ASSESSMENT — PAIN SCALES - GENERAL
PAINLEVEL_OUTOF10: 0 - NO PAIN
PAIN_LEVEL: 0
PAINLEVEL_OUTOF10: 0 - NO PAIN

## 2024-10-08 NOTE — ANESTHESIA PREPROCEDURE EVALUATION
Patient: Matthieu Solis    Procedure Information       Anesthesia Start Date/Time: 10/08/24 1212    Procedure: URO NAVIGATED FUSION PROSTATE BIOPSY (Bilateral)    Location: [facility] / [facility]    Surgeons: Aurelio Zuniga MD            Relevant Problems   Cardiac   (+) Essential hypertension   (+) Type 2 myocardial infarction (Multi)      Pulmonary   (+) Lung nodules      Neuro   (+) Anxiety   (+) Sciatica      Musculoskeletal   (+) Lumbar spondylosis      HEENT   (+) Hearing loss      ID   (+) Helicobacter positive gastritis       Clinical information reviewed:   Tobacco  Allergies  Meds  Problems  Med Hx  Surg Hx   Fam Hx  Soc   Hx        NPO Detail:  NPO/Void Status  Date of Last Liquid: 10/08/24  Time of Last Liquid: 0600  Date of Last Solid: 10/08/24  Time of Last Solid: 0000         Physical Exam    Airway  Mallampati: III  TM distance: >3 FB  Neck ROM: full     Cardiovascular - normal exam     Dental - normal exam     Pulmonary - normal exam     Abdominal - normal exam             Anesthesia Plan    History of general anesthesia?: yes  History of complications of general anesthesia?: no    ASA 3     MAC     The patient is not a current smoker.    intravenous induction   Anesthetic plan and risks discussed with patient.    Plan discussed with CRNA.

## 2024-10-08 NOTE — OP NOTE
PROSTATE BIOPSY (B) Operative Note     Date: 10/8/2024  OR Location: [facility]    Name: Matthieu Solis, : 1960, Age: 64 y.o., MRN: [id number], Sex: male    Diagnosis  Pre-op Diagnosis      * Elevated PSA [R97.20] Post-op Diagnosis     * Elevated PSA [R97.20]     Procedures  PROSTATE BIOPSY  53445 - IN PROSTATE NEEDLE BIOPSY ANY APPROACH    IN PROSTATE NEEDLE BIOPSY ANY APPROACH [12785]  Surgeons      * Aurelio Zuniga - Primary    Resident/Fellow/Other Assistant:  Surgeons and Role:  * No surgeons found with a matching role *    Procedure Summary  Anesthesia: Monitor Anesthesia Care  ASA: III  Anesthesia Staff: Anesthesiologist: Madelyn Parks MD  CRNA: PAT Goldstein-CRNA  Estimated Blood Loss: 0 mL  Intra-op Medications:   Administrations occurring from 1200 to 1305 on 10/08/24:   Medication Name Total Dose   ceFAZolin (Ancef) 2 g in dextrose (iso)  mL 2 g              Anesthesia Record               Intraprocedure I/O Totals          Intake    NaCl 0.9 % infusion 17.50 mL    Total Intake 17.5 mL          Specimen:   ID Type Source Tests Collected by Time   1 : Left lateral base prostate bx Tissue PROSTATE, BIOPSY, LEFT LATERAL BASE SURGICAL PATHOLOGY EXAM Aurelio Zuniga MD 10/8/2024 1148   2 : Left lateral mid prostate bx Tissue PROSTATE, BIOPSY, LEFT LATERAL MID SURGICAL PATHOLOGY EXAM Aurelio Zuniga MD 10/8/2024 1148   3 : Left lateral apex prostate bx Tissue PROSTATE, BIOPSY, LEFT LATERAL APEX SURGICAL PATHOLOGY EXAM Aurelio Zuniga MD 10/8/2024 1148   4 : Left medial base prostate bx Tissue PROSTATE, BIOPSY, LEFT MEDIAL BASE SURGICAL PATHOLOGY EXAM Aurelio Zuniga MD 10/8/2024 1148   5 : Left medial mid prostate bx Tissue PROSTATE, BIOPSY, LEFT MEDIAL MID SURGICAL PATHOLOGY EXAM Aurelio Zuniga MD 10/8/2024 1148   6 : Left medial apex prostate bx Tissue PROSTATE, BIOPSY, LEFT MEDIAL APEX SURGICAL PATHOLOGY EXAM Aurelio Zuniga MD 10/8/2024 1148   7 : Right lateral base prostate bx Tissue PROSTATE, BIOPSY,  RIGHT LATERAL BASE SURGICAL PATHOLOGY EXAM Aurelio Zuniga MD 10/8/2024 1148   8 : Right lateral mid prostate bx Tissue PROSTATE, BIOPSY, RIGHT LATERAL MID SURGICAL PATHOLOGY EXAM Aurelio Zuniga MD 10/8/2024 1148   9 : Right lateral apex prostate bx Tissue PROSTATE, BIOPSY, RIGHT LATERAL APEX SURGICAL PATHOLOGY EXAM Aurelio Zuniga MD 10/8/2024 1148   10 : Right medial base prostate bx Tissue PROSTATE, BIOPSY, RIGHT MEDIAL BASE SURGICAL PATHOLOGY EXAM Aurelio Zuniga MD 10/8/2024 1148   11 : Right medial mid prostate bx Tissue PROSTATE, BIOPSY, RIGHT MEDIAL MID SURGICAL PATHOLOGY EXAM Aurelio Zuniga MD 10/8/2024 1148   12 : Right medial apex prostate bx Tissue PROSTATE, BIOPSY, RIGHT MEDIAL APEX SURGICAL PATHOLOGY EXAM Aurelio Zuniga MD 10/8/2024 1148        Staff:   Circulator: Shraddha Gibbons Person: Artur  Circulator: Graciela         Drains and/or Catheters: * None in log *    Tourniquet Times:         Implants:     Findings: see op report    Indications: Matthieu Solis is an 64 y.o. male who is having surgery for Elevated PSA [R97.20].     The patient was seen in the preoperative area. The risks, benefits, complications, treatment options, non-operative alternatives, expected recovery and outcomes were discussed with the patient. The possibilities of reaction to medication, pulmonary aspiration, injury to surrounding structures, bleeding, recurrent infection, the need for additional procedures, failure to diagnose a condition, and creating a complication requiring transfusion or operation were discussed with the patient. The patient concurred with the proposed plan, giving informed consent.  The site of surgery was properly noted/marked if necessary per policy. The patient has been actively warmed in preoperative area. Preoperative antibiotics are not indicated. Venous thrombosis prophylaxis have been ordered including bilateral sequential compression devices    Procedure Details: Procedure  TRUSP BX  PRE OP DX -- ELEVATED  PSA  POST OP DX -- SAME  SURGEON -- LEISA HAMMONDS--MAC  FINDINGS:  HEIGHT-- 3.04 CM  WIDTH -- 4.8 CM  LENGTH--  3.5 CM  PROSTATE SIZE --  HYPOECHOIC AREAS-- NONE  NUMBER OF BX'S TAKEN -- 12    Complications:  None; patient tolerated the procedure well.    Disposition: PACU - hemodynamically stable.  Condition: stable         Additional Details:   none    Attending Attestation: I was present and scrubbed for the entire procedure.    Aurelio Zuniga  Phone Number: [phone]

## 2024-10-08 NOTE — PROGRESS NOTES
October 8, 2024  Patient tolerated his 4 drug therapy for drug-resistant H. Pylori.  Currently feeling great with no GI symptoms at all.  Has a prostate biopsy later today.    On exam looks well.  Lungs clear no peripheral edema abdomen soft nontender normal bowel sounds no masses    Patient tolerated 4 drug therapy for H. pylori.  Asymptomatic.  Has follow-up with GI to discuss any further testing.  Follow-up with infectious diseases as needed    From 8/27/24  Prior to seeing the patient we reviewed his chart in detail.  Including family history social history past surgical history notes pathology imaging and labs.  Patient referred from gastroenterology for infectious disease input and to salvage therapy for H. pylori.  He had an endoscopy in April that demonstrated H. pylori gastritis with antibiotic   In July he was treated with standard therapy with bismuth, metronidazole, tetracycline, and omeprazole for 14 days.  He was unable to tolerate this due to stomach cramping and pain.  When seen today patient could not identify which of the 4 medications was causing the problem.  Patient is allergic to amoxicillin with throat swelling.  Based upon genotype assay from his April 2024 EGD options include the 4 drug therapy as above as well as high-dose amoxicillin, levofloxacin, tinidazole or rifabutin.  Symptoms include stomach burning and discomfort.    On exam he has no acute distress.  Abdominal exam revealed some epigastric tenderness to deep palpation.  Otherwise abdomen soft with no organomegaly    Based upon patient's drug intolerances and allergies we are somewhat limited.  We reviewed the susceptibility data in detail.  We want to give him a regimen of omeprazole levofloxacin rifabutin and Tindazole.  We went over this with the patient.  I gave him our contact information with any concerns or questions.  He will call or message us with any intolerance to this regimen I will see him back in 3 to 4 weeks

## 2024-10-08 NOTE — TELEPHONE ENCOUNTER
Patient Assistance Program Approval:     We are pleased to inform you that your application for assistance has been approved.     This approval is valid through  10/8/25  as long as the following criteria continue to be satisfied:     Your medication (Jardiance and Entresto) remains covered under your current insurance plan.   Your prescriber does not discontinue therapy.   You do not seek reimbursement from any other private or government-funded programs for the  medication.    Under this program, the pharmacy will first bill your insurance plan for your indemnified specified medication. The [employer] Assistance Fund will then offset your copay balance, so that your out-of pocket expense for your specialty medication will be $0.00.    Joaquin Yoder, RobertD

## 2024-10-08 NOTE — ANESTHESIA POSTPROCEDURE EVALUATION
1. 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  Last sCr 1.04 from 5/30/34  -continue to avoid nonsteroidals(ibuprofen, aleve, advil, motrin, celebrex, naproxen, toradol)  -latest urinalysis shows moderate leukocytes, 1+ protein, 2-4 RBCs, 4-10 WBCs, 0-3 hyaline casts  -UpCr lower at 0.54g as of 5/13/24  -monitor CMP, UA with microscopy as well along with repeat UpCr.   -rheumatology recommends benlysta     2. proteinuria-+residual proteinuria from prior lupus nephritis (biopsy proven in 1996), now noted to have increased weight as well as HTN.   -Off HCTZ due to hypercalcemia, on fosinopril 20mg twice daily, aldactone 25mg daily   -last UpCr 0.54g as of May 2024  -Monitor UpCr along with microalb:Cr     3.. hypercalcemia/hyperparathyroidism - follows with endo, +nephrolithiasis history with recent Urinalysis showing calcium oxalate crystals  -calcium has normalized on latest bloodwork  -You must drink enough water to urinate at least 2L per day to prevent stone formation but this is difficult given her edema and need for diuretics. Unfortunately, diuretics will contribute to stone formation.  -avoid high salt diet both for stone reduction and to help blood pressure stay controlled  -PTH slightly elevated at 166 - off sensipar 30mg daily, per endocrinology  -calcium elevated at 11.2     4. hypertension-BP well controlled today  -continue nifedipine XL 30mg daily(at night), fosinopril 20mg twice daily, spironolactone 25mg daily (at lunch)  -avoid caffeine/tea     5. vitamin D insufficiency - Vit D level 27 as of May 2024 - on vitamin D3 1000u daily     6. SLE - on plaquenil, follows with rheumatology     Obtain bloodwork and urine testing prior to next office visit.  RTC in 6 months.   Your risk of kidney failure at 2 years and 5 years per  Patient: Matthieu Solis    Procedure Summary       Date: 10/08/24 Room / Location: [facility] / [facility]    Anesthesia Start: 1212 Anesthesia Stop:     Procedure: PROSTATE BIOPSY (Bilateral) Diagnosis:       Elevated PSA      (Elevated PSA [R97.20])    Surgeons: Aurelio Zuniga MD Responsible Provider: Madelyn Parks MD    Anesthesia Type: MAC ASA Status: 3            Anesthesia Type: MAC    Vitals Value Taken Time   /59 10/08/24 1241   Temp 36.5 10/08/24 1241   Pulse 64 10/08/24 1241   Resp 16 10/08/24 1241   SpO2 100 10/08/24 1241       Anesthesia Post Evaluation    Patient location during evaluation: PACU  Patient participation: complete - patient participated  Level of consciousness: awake and alert  Pain score: 0  Pain management: adequate  Airway patency: patent  Cardiovascular status: acceptable and hemodynamically stable  Respiratory status: acceptable, spontaneous ventilation and nasal cannula  Hydration status: acceptable  Postoperative Nausea and Vomiting: none        There were no known notable events for this encounter.     KFRE remains low.

## 2024-10-08 NOTE — LETTER
10/08/24    Claudio Maldonado DO  [address]  [facility] [address]  [facility] [id number]      Dear Dr. Claudio Maldonado DO,    I am writing to confirm that your patient, Matthieu Solis, received care in my office on 10/08/24. I have enclosed a summary of the care provided to Matthieu for your reference.    Please contact me with any questions you may have regarding the visit.    Sincerely,         Geovanni Mckenna MD  [address] [address] [facility] [facility] [address]  [facility] [id number]    CC: No Recipients

## 2024-10-09 ENCOUNTER — TELEPHONE (OUTPATIENT)
Dept: PRIMARY CARE | Facility: CLINIC | Age: 64
End: 2024-10-09
Payer: COMMERCIAL

## 2024-10-09 NOTE — TELEPHONE ENCOUNTER
Pt returning your call. Pt states he was having a procedure when you had called him yesterday. He states he does need the return to work done, but its up to your discretion if you want him to wait till he sees you on October 23 or can do it prior to his visit.

## 2024-10-18 ENCOUNTER — APPOINTMENT (OUTPATIENT)
Dept: PHYSICAL MEDICINE AND REHAB | Facility: CLINIC | Age: 64
End: 2024-10-18
Payer: COMMERCIAL

## 2024-10-18 ENCOUNTER — PHARMACY VISIT (OUTPATIENT)
Dept: PHARMACY | Facility: CLINIC | Age: 64
End: 2024-10-18
Payer: COMMERCIAL

## 2024-10-18 VITALS — DIASTOLIC BLOOD PRESSURE: 66 MMHG | SYSTOLIC BLOOD PRESSURE: 118 MMHG | HEART RATE: 80 BPM | RESPIRATION RATE: 20 BRPM

## 2024-10-18 DIAGNOSIS — G89.29 CHRONIC BILATERAL LOW BACK PAIN, UNSPECIFIED WHETHER SCIATICA PRESENT: ICD-10-CM

## 2024-10-18 DIAGNOSIS — R29.898 LEG WEAKNESS, BILATERAL: ICD-10-CM

## 2024-10-18 DIAGNOSIS — M54.50 CHRONIC BILATERAL LOW BACK PAIN, UNSPECIFIED WHETHER SCIATICA PRESENT: ICD-10-CM

## 2024-10-18 PROCEDURE — 99204 OFFICE O/P NEW MOD 45 MIN: CPT | Performed by: PHYSICAL MEDICINE & REHABILITATION

## 2024-10-18 PROCEDURE — RXMED WILLOW AMBULATORY MEDICATION CHARGE

## 2024-10-18 RX ORDER — MELOXICAM 15 MG/1
15 TABLET ORAL DAILY PRN
Qty: 30 TABLET | Refills: 2 | Status: SHIPPED | OUTPATIENT
Start: 2024-10-18 | End: 2024-10-18

## 2024-10-18 RX ORDER — MELOXICAM 15 MG/1
15 TABLET ORAL DAILY PRN
Qty: 30 TABLET | Refills: 2 | Status: ON HOLD | OUTPATIENT
Start: 2024-10-18 | End: 2024-11-17

## 2024-10-18 ASSESSMENT — PAIN SCALES - GENERAL: PAINLEVEL_OUTOF10: 3

## 2024-10-18 NOTE — PROGRESS NOTES
Physical Medicine and Rehabilitation [facility] Consult  10/18/24       Chief Complaint:  Low back pain     HPI:  Matthieu Solis is a  64 y.o. male with PMHx of CHF and chronic low back pain who presents to the clinic today for evaluation of low back pain. Patient was last seen by PCP Dr. Maldonado on 9/13/24 and discussed chronic low back pain. Ordered PT, a lumbar Xray, and referred to PMR. Did not get Lumbar Xray and does not have an appt made with PT yet. Previously prescribed a TENs unit which has helped. No weakness or saddle anesthesia.       Onset: years ago  Location: low back  Radiation: both legs anteriorly  Quality: ache; sharp  Duration: intermittent  Aggravating factors:  walking  Alleviating factors: rest  Severity: 3  Numbness/Tingling: Yes - feet, legs  Bowel or bladder incontinence: No  Pt's current medication regimen includes:   Tylenol 250mg PRN  Ibuprofen 500mg PRN     Treatment to date:  Physical therapy: No  Prior injections: No       Occupation: ; currently not working bc of the newly diagnosed HF and not being in the condition to work     Imaging  Reviewed 10/2024  9/28/21 MRI of Lumbar spine reviewed:  At L3-L4 mild degenerative changes noted causing mild bilateral  lateral recess and neural foramina narrowing.     At L4-L5 mild degenerative changes noted causing mild-to-moderate  bilateral lateral recess and neural foramina narrowing, left greater  than the right    3/12/21 Lumbar Xray reviewed:  Mild lumbar degenerative change L3-S1. This has progressed from the  prior exam.     Past Medical History:   Diagnosis Date    Cataract     GERD (gastroesophageal reflux disease)     Gout     HTN (hypertension)         Past Surgical History:   Procedure Laterality Date    COLONOSCOPY      ESOPHAGOGASTRODUODENOSCOPY      HERNIA REPAIR  2015    Inguinal        Patient Active Problem List    Diagnosis Date Noted    Nicotine use disorder 06/26/2024    NICM (nonischemic cardiomyopathy) (Multi)  06/25/2024    Acute on chronic combined systolic and diastolic heart failure 06/22/2024    Nonsustained ventricular tachycardia (Multi) 06/22/2024    Type 2 myocardial infarction (Multi) 06/22/2024    Acute respiratory failure with hypercapnia (Multi) 06/19/2024    Nondisplaced fracture of middle phalanx of right middle finger, initial encounter for open fracture 04/05/2024    Well adult exam 01/23/2024    Nausea and vomiting 01/03/2024    Combined form of age-related cataract, right eye 12/19/2023    Combined form of age-related cataract, left eye 12/19/2023    Hyperopia, bilateral 12/19/2023    Presbyopia 12/19/2023    Abdominal pain 12/18/2023    Acute pharyngitis 12/18/2023    Anxiety 12/18/2023    Blurring of visual image 12/18/2023    Cataract, nuclear sclerotic, both eyes 12/18/2023    Change in vision 12/18/2023    Cortical age-related cataract of left eye 12/18/2023    Episcleritis of left eye 12/18/2023    Essential hypertension 12/18/2023    Gastroenteritis 12/18/2023    Gout of right foot 12/18/2023    Helicobacter positive gastritis 12/18/2023    Hordeolum externum of left upper eyelid 12/18/2023    Astigmatism of both eyes 12/18/2023    Low back pain 12/18/2023    Lumbar spondylosis 12/18/2023    Lung nodules 12/18/2023    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 organic origin 12/18/2023    Rectal mass 12/18/2023    Urethritis 12/18/2023    Abdominal wall abscess 12/18/2023    Sacroiliitis (CMS-HCC) 12/18/2023    Right groin hernia 12/18/2023    Bilateral inguinal hernia 12/18/2023    Alcoholism (Multi) 12/12/2012    Hearing loss 12/12/2012    Sciatica 11/05/2012    Leg pain 10/04/2012    Elevated PSA 09/10/2024        Family History   Problem Relation Name Age of Onset    Other (CVA) Mother      Hypertension Mother      Other (CVA) Father      Hypertension Father          Current Outpatient Medications   Medication Sig Dispense Refill    aspirin 81 mg EC tablet Take 1 tablet (81 mg) by mouth once daily. (Patient  not taking: Reported on 10/8/2024) 90 tablet 3    atorvastatin (Lipitor) 80 mg tablet Take 1 tablet (80 mg) by mouth once daily. 90 tablet 0    carvedilol (Coreg) 12.5 mg tablet Take 1 tablet (12.5 mg) by mouth 2 times daily (morning and late afternoon). 60 tablet 11    empagliflozin (Jardiance) 10 mg Take 1 tablet (10 mg) by mouth once daily. (Patient not taking: Reported on 10/8/2024) 90 tablet 3    furosemide (Lasix) 20 mg tablet Take 1 tablet (20 mg) by mouth once daily as needed (edema).      nicotine polacrilex (Commit) 4 mg lozenge Use 1 lozenge (4 mg) in the mouth or throat every 2 hours if needed for smoking cessation.      omeprazole (PriLOSEC) 20 mg DR capsule Take 1 capsule (20 mg) by mouth 2 times a day before meals for 14 days. To be taken 30-60 minutes prior to breakfast and dinner. Do not crush or chew. 28 capsule 0    sacubitriL-valsartan (Entresto) 49-51 mg tablet Take 1 tablet by mouth 2 times a day. (Patient not taking: Reported on 10/8/2024) 180 tablet 3    spironolactone (Aldactone) 25 mg tablet Take 0.5 tablets (12.5 mg) by mouth once daily. 45 tablet 3     No current facility-administered medications for this visit.        Allergies   Allergen Reactions    Cinnamon Unknown     Cinnamon    Doxycycline Swelling    Penicillins Unknown        Social History     Socioeconomic History    Marital status: Significant Other   Tobacco Use    Smoking status: Every Day     Current packs/day: 1.00     Average packs/day: 1 pack/day for 50.8 years (50.8 ttl pk-yrs)     Types: Cigarettes     Start date: 1974    Smokeless tobacco: Never    Tobacco comments:     Trying to quit   Vaping Use    Vaping status: Never Used   Substance and Sexual Activity    Alcohol use: Never    Drug use: Never    Sexual activity: Defer     Social Drivers of Health     Financial Resource Strain: Low Risk  (1/3/2024)    Overall Financial Resource Strain (CARDIA)     Difficulty of Paying Living Expenses: Not hard at all   Recent  Concern: Financial Resource Strain - Medium Risk (12/18/2023)    Overall Financial Resource Strain (CARDIA)     Difficulty of Paying Living Expenses: Somewhat hard   Food Insecurity: Patient Declined (6/20/2024)    [facility], [facility], [facility]    Hunger Vital Sign     Worried About Running Out of Food in the Last Year: Patient declined     Ran Out of Food in the Last Year: Patient declined   Transportation Needs: Patient Declined (6/20/2024)    Received from [facility], [facility], [facility]    PRAPARE - Transportation     Lack of Transportation (Medical): Patient declined     Lack of Transportation (Non-Medical): Patient declined   Physical Activity: Sufficiently Active (12/18/2023)    Exercise Vital Sign     Days of Exercise per Week: 7 days     Minutes of Exercise per Session: 30 min   Stress: Stress Concern Present (2/12/2024)    [employer] [place] [employer] - Occupational Stress Questionnaire     Feeling of Stress : Rather much   Social Connections: Socially Integrated (12/18/2023)    Social Connection and Isolation Panel [NHANES]     Frequency of Communication with Friends and Family: More than three times a week     Frequency of Social Gatherings with Friends and Family: More than three times a week     Attends Sabianism Services: 1 to 4 times per year     Active Member of Clubs or Organizations: Yes     Attends Club or Organization Meetings: 1 to 4 times per year     Marital Status: Living with partner   Intimate Partner Violence: Not At Risk (12/18/2023)    Humiliation, Afraid, Rape, and Kick questionnaire     Fear of Current or Ex-Partner: No     Emotionally Abused: No     Physically Abused: No     Sexually Abused: No   Housing Stability: Patient Declined (6/20/2024)    Received from [facility], [facility], [facility]    Housing Stability Vital Sign     Unable to Pay for Housing in the Last Year: Patient declined      Unstable Housing in the Last Year: Patient declined        Review of Systems:  Constitutional:  Denies fever or chills, malaise, weight changes.   Eyes:  Denies change in visual acuity   HENT:  Denies nasal congestion or sore throat   Respiratory:  Denies cough or shortness of breath   Cardiovascular:  Denies chest pain or edema   GI:  Denies abdominal pain, nausea, vomiting, bloody stools or diarrhea   :  Denies dysuria   Integument:  Denies rash   Neurologic:  As per HPI  MSK: Per above HPI  Endocrine:  Denies polyuria or polydipsia   Lymphatic:  Denies swollen glands   Psychiatric:  Denies depression or anxiety            PHYSICAL EXAM:  /66 (BP Location: Left arm, Patient Position: Sitting)   Pulse 80   Resp 20         General:  NAD, well developed, male    Psychiatric: appropriate mood & affect.   Cardiovascular:  Normal pedal pulses; no LE edema.  Respiratory:  Normal rate; unlabored breathing.  Skin:  Intact; no erythema; no ecchymosis or rash.  Lymphatic:  No lymphadenopathy or lymphedema.  NEURO:  Alert and appropriate. Speech fluent, conversing appropriately.   Motor:    Rt: HF 5/5, KE 5/5, KF 5/5, DF 5/5, EHL 5/5, PF 5/5.    Lt: HF 5/5, KE 5/5, KF 5/5, DF 5/5, EHL 5/5, PF 5/5.  Sensation:     Light touch: intact in the b/l L3-S1 dermatomes.    PP: intact in the b/l L3-S1 dermatomes  Reflexes:     Right:  patellar 2+, achilles 2+,     Left: patellar 2+, achilles 2+,     Babinski's downgoing b/l; no clonus  Gait: Normal, narrow based gait.     MSK:  Inspection reveals no evidence of a pelvic obliqity   Spinal range of motion: Flexion to 90° degrees, Extension of 20 degrees.  There is tenderness over the right paraspinal lumbar   Special tests:    Straight leg raise: neg    Slump test: pos bilaterally    Facet loading: neg  ANA positive on right  Decreased ROM with ROM of hip joints bilaterally; some anterior groin pain with right hip internal rotation        Impression: Matthieu Solis is a 64  y.o.  male with PMHx of CHF and chronic low back pain who presents to the clinic today for evaluation of chronic low back pain.  Neurogenic claudication likely secondary to lumbar stenosis     Plan:  Reinforced calling PT and scheduling an appt for evaluation; core strengthening  Has order previously placed for Lumbar Xray  Will order Meloxicam 15mg daily PRN; Do not take NSAIDs within 24 hours  Can consider repeat MRI if fails conservative management and talk about potential STEFFI   Follow-up in 10 weeks       Thank you for the consultation.     Cornelio Rojo DO PGY4  Physical Medicine and Rehabilitation    Supervisory note:  Seen with Dr Rojo, resident.  I saw and evaluated the patient. I personally obtained the key and critical portions of the history and physical exam. I reviewed the resident's documentation and discussed the patient with the resident. I agree with the resident's medical decision making as documented in the resident's note.       Kristin Kauffman MD     Division of PM&R

## 2024-10-23 ENCOUNTER — HOSPITAL ENCOUNTER (OUTPATIENT)
Dept: RADIOLOGY | Facility: HOSPITAL | Age: 64
Discharge: HOME | End: 2024-10-23
Payer: COMMERCIAL

## 2024-10-23 ENCOUNTER — APPOINTMENT (OUTPATIENT)
Dept: PRIMARY CARE | Facility: CLINIC | Age: 64
End: 2024-10-23
Payer: COMMERCIAL

## 2024-10-23 VITALS
DIASTOLIC BLOOD PRESSURE: 52 MMHG | SYSTOLIC BLOOD PRESSURE: 106 MMHG | HEIGHT: 65 IN | WEIGHT: 131 LBS | HEART RATE: 87 BPM | BODY MASS INDEX: 21.83 KG/M2

## 2024-10-23 DIAGNOSIS — M54.50 CHRONIC BILATERAL LOW BACK PAIN, UNSPECIFIED WHETHER SCIATICA PRESENT: ICD-10-CM

## 2024-10-23 DIAGNOSIS — I50.43 ACUTE ON CHRONIC COMBINED SYSTOLIC AND DIASTOLIC HEART FAILURE: ICD-10-CM

## 2024-10-23 DIAGNOSIS — R29.898 LEG WEAKNESS, BILATERAL: ICD-10-CM

## 2024-10-23 DIAGNOSIS — G89.29 CHRONIC BILATERAL LOW BACK PAIN, UNSPECIFIED WHETHER SCIATICA PRESENT: ICD-10-CM

## 2024-10-23 DIAGNOSIS — R29.898 LEG WEAKNESS, BILATERAL: Primary | ICD-10-CM

## 2024-10-23 PROCEDURE — 3078F DIAST BP <80 MM HG: CPT | Performed by: FAMILY MEDICINE

## 2024-10-23 PROCEDURE — 72110 X-RAY EXAM L-2 SPINE 4/>VWS: CPT

## 2024-10-23 PROCEDURE — 72110 X-RAY EXAM L-2 SPINE 4/>VWS: CPT | Performed by: RADIOLOGY

## 2024-10-23 PROCEDURE — 99214 OFFICE O/P EST MOD 30 MIN: CPT | Performed by: FAMILY MEDICINE

## 2024-10-23 PROCEDURE — 3008F BODY MASS INDEX DOCD: CPT | Performed by: FAMILY MEDICINE

## 2024-10-23 PROCEDURE — 3074F SYST BP LT 130 MM HG: CPT | Performed by: FAMILY MEDICINE

## 2024-10-23 ASSESSMENT — ENCOUNTER SYMPTOMS
LOSS OF SENSATION IN FEET: 0
DEPRESSION: 0
OCCASIONAL FEELINGS OF UNSTEADINESS: 0

## 2024-10-23 NOTE — PROGRESS NOTES
Pt is here for fuv, concerns of weakness, numbness in all extremities, fatigue and leg pain           Chief Complaint:  No chief complaint on file.  History Of Present Illness:   Matthieu Solis is a 64 y.o. male          Matthieu Solis is a 64 y.o. male   - presenting for weakness in his extremities.    Back pain and weakness.  - weakness location: hands/arms; both legs.   - he's trying to get back stronger after his summer 2024 admission for HF.   - exercise bike- about 7.5 miles per day, but he is struggling with just a few miles per day now.   - MRI l-spine 2021.     Interval history:   A) Geriatrics 9/23/2024 for new patient visit. Possible mild cognitive impairment, referred for neuropsych testing. Deferring to PCP to evaluate for sleep apnea.     B) Urology 10/8/2024 for prostate biopsy given elevated PSA 4.37. biopsy pending.      C) Infectious disease 10/8/2024 for drug resistant H. pylori     D) PMR 10/18/2024 for low back pain. Prescribed PT and meloxicam.              Patient was seen virtually on 7/8/24 after hospital admission for severe dilated cardiomyopathy and acute on chronic systolic heart failure.         He is concerned about a blood clot in his left 'foot' (occluded left common iliac artery, left external iliac artery, left common femoral artery, and left superficial femoral artery on CTA abdomen/pelvis 7/1/24). Denies numbness, pain or swelling.   - agreed on vascular referral.     Patient was seen by Dr. Santos in cardiology clinic on 7/11/24 and is waiting on approval from insurance before starting Entresto and Jardiance. He has been smoking 1-2 cigarettes a day since discharge from hospital (smoked 1-1.5 packs/day prior to admission) and thinks it is sustainable but his goal is to stop smoking entirely. Patches have not been effective. Trying to prioritize his heart health as much as possible.     He views the hospitalization as a “horrifying experience,” and he currently “can't figure out  who I am.” Spoke with his family and they agree that he should start seeing psych. Has no recollection of the event and is currently studying trigonometry and other mathematic subjects to try and stay mentally sharp. Mentioned the 'bump' on the back of his head from the fall at work.   - agreed on psychiatry referral      Healthcare team:  Cardiology- Shane Santos (last seen 8/28/2024)  Urology- Aurelio Zuniga (last seen 10/9/2024)  PMR- Kristin Kauffman (last seen 10/18/2024)  Geriatrics- Hali Emmanuelo (last seen 9/23/2024)- has a follow up Oct 31, 2024  Infectious disease- Geovanni Mckenna (last seen 10/8/2024)  Dermatology- Ladarius Turpin (last seen 8/1/2024)  Gastroenterology- Brijesh Cabaner (last seen 7/25/2024)  Vascular- scheduled 12/4/2024  Ophthalmology- scheduled 12/24/2024  Endocrine- scheduled 2/3/2025  Neuropsych- scheduled 4/30/2025      PMH: Nonischemic cardiomyopathy, chronic systolic HF, HTN, Cataracts, GERD, Gout, CHF.   - occluded left common iliac artery, left external iliac artery, left common femoral artery, and left superficial femoral artery on CTA abdomen/pelvis 7/1/24          Allergies:   Penicillin    Surgical History: Hernia repair (2015)      Social History:  Living situation  Currently lives at home spouse. Two kids that are out of the house.  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 Has to carry a minimum of 25-50 lbs at work. Currently on short-term disability  Re-enrolling in college Jan 2025 to complete Associate's degree  Paperwork: at 10/23/24 visit: agreed to write a simple work note stating he needs to be off work from 9/12/24 to 1/25/25. Regarding STD- agreed to complete paperwork for those dates only.   Alcohol  None in over a decade  Illicit Substances  None  Diet  Currently transitioning to solid food after hospitalization.    Tobacco  Packs per day/years/quit date  1-1.5 packs for 50 years.   Currently smoking about 1-2 cigarettes since discharge from  "[facility].  Oct 2024 update: rare cig.     Family History:  Cardiac  Mother passed of heart failure  Cancer:   Father  of unknown cancer    Immunizations:   Influenza  no  Zoster (Shingrix; 50 or older)  Would like to have one, does not get one     Health Maintenance   Colonoscopy (45-75)  2021: “diverticulosis in the sigmoid colon, four 2-4 mm polyps in the rectum.”  Path: hyperplastic polyps    Mood: “trying to figure out who I am.”      Sleep: 6 hours/night     Sports/Exercise: started PT last week.      Review of Systems (BOLD if positive, delete if not asked):     Constitutional:   - fever   - chills   - night sweats  - unexpected weight change  - changes in sleep     Eyes:   - loss of vision  - double vision  - floaters     Ear/Nose/Throat/Mouth:   - sore throat  - hearing changes  - sinus congestion     Cardiovascular:   - chest pain  - chest heaviness  - palpitations  - swelling in ankles     Musculoskeletal:   - bone pain  - muscle pain  - joint pain     Respiratory:   - shortness of breath  - difficulty breathing  - frequent cough  - wheezing     Neurological:   - loss of consciousness  - tremors  - dizzy spells  - numbness   - tingling     Gastrointestinal:   - abdominal pain  - nausea  - vomiting  - constipation  - diarrhea  - bloody stools  - loss of bowel control  - indigestion  - heartburn  - sour taste in throat when waking up     Skin:   - Rash  - lumps or bumps     Endocrine:   - excessive thirst  - feeling too hot  - feeling too cold  - feeling tired  - fatigue     Psychological:   - feelings of depression  - feelings of anxiety.     Sexual:   - sexual health concerns      Psychological:   - feeling generally happy  - feeling safe at home       Last Recorded Vitals:  Vitals:    10/23/24 1050   BP: 106/52   Pulse: 87   Weight: 59.4 kg (131 lb)   Height: 1.651 m (5' 5\")        Past Medical History:  He has a past medical history of Cataract, GERD (gastroesophageal reflux disease), Gout, and " HTN (hypertension).     Past Surgical History:  He has a past surgical history that includes Colonoscopy; Esophagogastroduodenoscopy; and Hernia repair (2015).     Social History:  He reports that he has been smoking cigarettes. He started smoking about 50 years ago. He has a 50.8 pack-year smoking history. He has never used smokeless tobacco. He reports that he does not currently use alcohol. He reports that he does not use drugs.     Family History:  Family History   Problem Relation Name Age of Onset    Other (CVA) Mother      Hypertension Mother      Other (CVA) Father      Hypertension Father       Allergies:  Cinnamon, Doxycycline, and Penicillins     Outpatient Medications:  Current Outpatient Medications   Medication Instructions    aspirin 81 mg, oral, Daily    atorvastatin (LIPITOR) 80 mg, oral, Daily RT    carvedilol (COREG) 12.5 mg, oral, 2 times daily (morning and late afternoon)    furosemide (LASIX) 20 mg, Daily PRN    Jardiance 10 mg, oral, Daily    meloxicam (MOBIC) 15 mg, oral, Daily PRN    nicotine polacrilex (COMMIT) 4 mg, Every 2 hour PRN    omeprazole (PRILOSEC) 20 mg, oral, 2 times daily before meals, To be taken 30-60 minutes prior to breakfast and dinner. Do not crush or chew.    sacubitriL-valsartan (Entresto) 49-51 mg tablet 1 tablet, oral, 2 times daily    spironolactone (ALDACTONE) 12.5 mg, oral, Daily RT        Physical Exam:  GENERAL: Well developed, well nourished, alert and cooperative, and appears to be in no acute distress. Wearing lifevest.     PSYCH: mood pleasant and appropriate     HEAD: 1 cm diameter lump on the back of his head.     EYES: PERRL, EOMI. vision is grossly intact.     CARDIAC:   RRR.   No murmur.      GAIT: steady    Last Labs:  CBC -  Lab Results   Component Value Date    WBC 8.2 02/03/2024    HGB 11.5 (L) 02/03/2024    HCT 36.3 (L) 02/03/2024    MCV 96 02/03/2024     02/03/2024        CMP -  Lab Results   Component Value Date    CALCIUM 9.6 02/03/2024     PHOS 3.0 09/23/2020    PROT 6.8 02/03/2024    ALBUMIN 4.0 02/03/2024    AST 14 02/03/2024    ALT 16 02/03/2024    ALKPHOS 92 02/03/2024    BILITOT 0.4 02/03/2024        LIPID PANEL -  Lab Results   Component Value Date    CHOL 159 02/03/2024    TRIG 39 02/03/2024    HDL 51.0 02/03/2024    CHHDL 3.1 02/03/2024    VLDL 8 02/03/2024    NHDL 108 02/03/2024           Lab Results   Component Value Date    HGBA1C 5.1 06/19/2024    HGBA1C 5.2 01/03/2024          Transthoracic Echo (TTE) Complete     [place] [id number]               Tel [phone] and Fax [phone]    TRANSTHORACIC ECHOCARDIOGRAM REPORT       Patient Name:      VIDAL Hadley Physician:    [id number] Velasquez MD  Study Date:        10/1/2024            Ordering Provider:    [id number] KIM VELASQUEZ  MRN/PID:           [id number]             Fellow:  Accession#:        [id number]         Nurse:  Date of Birth/Age: 1960 / 64 years  Sonographer:          Pat GALVAN  Gender:            M                    Additional Staff:  Height:            165.10 cm            Admit Date:  Weight:            61.23 kg             Admission Status:     Outpatient  BSA / BMI:         1.67 m2 / 22.46      Encounter#:           [id number]                     kg/m2  Blood Pressure:    154/86 mmHg          Department Location:  [facility]    Study Type:    TRANSTHORACIC ECHO (TTE) COMPLETE  Diagnosis/ICD: Other cardiomyopathies-I42.8  Indication:    Cardiomyopathy, low EF%  CPT Code:      Echo Complete w Full Doppler-[id number]    Patient History:  Pertinent History: Cardiomyopathy, h/o low EF%, patient wear LifeVest, smoker.    Study Detail: The following Echo studies were performed: 2D, M-Mode, Doppler and                 color flow.       PHYSICIAN INTERPRETATION:  Left Ventricle: The left ventricular systolic function is moderately decreased, with a visually estimated ejection fraction of 30-35%. There is global hypokinesis of the left ventricle with minor regional variations. The left ventricular cavity size is normal. There is mild concentric left ventricular hypertrophy. Spectral Doppler shows an impaired relaxation pattern of left ventricular diastolic filling.  Left Atrium: The left atrium is normal in size.  Right Ventricle: The right ventricle is normal in size. There is normal right ventriclar wall thickness. There is normal right ventricular global systolic function.  Right Atrium: The right atrium is normal in size.  Aortic Valve: The aortic valve is trileaflet. There is no aortic valve cusp calcification noted. There is no evidence of reduced aortic valve leaflet excursion. There is trace to mild aortic valve regurgitation. The peak instantaneous gradient of the aortic valve is 5.5 mmHg.  Mitral Valve: The mitral valve is normal in structure. There is normal mitral valve leaflet mobility. There is trace mitral valve regurgitation.  Tricuspid Valve: The tricuspid valve is structurally normal. There is normal tricuspid valve leaflet mobility. There is trace tricuspid regurgitation.  Pulmonic Valve: The pulmonic valve is structurally normal. There is mild pulmonic valve regurgitation.  Pericardium: There is no pericardial effusion noted.  Aorta: The aortic root is normal. The Ao Sinus is 3.00 cm. The Asc Ao is 2.80 cm. The thoracic aorta diam is 2.2 cm. There is no dilatation of the aortic arch. There is no dilatation of the ascending aorta. There is no dilatation of the aortic root.  Pulmonary Artery: The pulmonary artery is normal in size. The tricuspid regurgitant velocity is 2.45 m/s, and with an estimated right atrial pressure of 3 mmHg, the estimated pulmonary artery pressure is normal with the RVSP at 26.9 mmHg. The  estimated PASP is 27 mmHg.  Systemic Veins: The inferior vena cava appears normal in size, with IVC inspiratory collapse greater than 50%.  In comparison to the previous echocardiogram(s): There are no prior studies on this patient for comparison purposes.       CONCLUSIONS:   1. The left ventricular systolic function is moderately decreased, with a visually estimated ejection fraction of 30-35%.   2. There is global hypokinesis of the left ventricle with minor regional variations.   3. Spectral Doppler shows an impaired relaxation pattern of left ventricular diastolic filling.   4. There is normal right ventricular global systolic function.    QUANTITATIVE DATA SUMMARY:     2D MEASUREMENTS:          Normal Ranges:  Ao Root d:       3.10 cm  (2.0-3.7cm)  LAs:             3.47 cm  (2.7-4.0cm)  IVSd:            1.25 cm  (0.6-1.1cm)  LVPWd:           1.17 cm  (0.6-1.1cm)  LVIDd:           5.02 cm  (3.9-5.9cm)  LVIDs:           4.55 cm  LV Mass Index:   142 g/m2  LVEDV Index:     77 ml/m2  LV % FS          9.4 %       LA VOLUME:                    Normal Ranges:  LA Vol A4C:        53.0 ml    (22+/-6mL/m2)  LA Vol A2C:        65.4 ml  LA Vol BP:         58.8 ml  LA Vol Index A4C:  31.6 ml/m2  LA Vol Index A2C:  39.1 ml/m2  LA Vol Index BP:   35.2 ml/m2  LA Area A4C:       18.0 cm2  LA Area A2C:       20.0 cm2  LA Major Axis A4C: 5.2 cm  LA Major Axis A2C: 5.2 cm  LA Volume Index:   36.0 ml/m2  LA Vol A4C:        50.1 ml  LA Vol A2C:        63.1 ml  LA Vol Index BSA:  33.8 ml/m2       RA VOLUME BY A/L METHOD:            Normal Ranges:  RA Vol A4C:              54.0 ml    (8.3-19.5ml)  RA Vol Index A4C:        32.3 ml/m2  RA Area A4C:             18.0 cm2  RA Major Axis A4C:       5.1 cm       M-MODE MEASUREMENTS:         Normal Ranges:  Ao Root:             3.20 cm (2.0-3.7cm)  LAs:                 3.43 cm (2.7-4.0cm)       AORTA MEASUREMENTS:         Normal Ranges:  Ao Sinus, d:        3.00 cm (2.1-3.5cm)  Asc Ao, d:           2.80 cm (2.1-3.4cm)  Ao Arch:            2.20 cm (2.0-3.6cm)       LV SYSTOLIC FUNCTION BY 2D PLANIMETRY (MOD):                       Normal Ranges:  EF-A4C View:    39 % (>=55%)  EF-A2C View:    41 %  EF-Biplane:     39 %  EF-Visual:      33 %  LV EF Reported: 33 %       LV DIASTOLIC FUNCTION:             Normal Ranges:  MV Peak E:             0.59 m/s    (0.7-1.2 m/s)  MV Peak A:             0.90 m/s    (0.42-0.7 m/s)  E/A Ratio:             0.66        (1.0-2.2)  MV e'                  0.083 m/s   (>8.0)  MV lateral e'          0.08 m/s  MV medial e'           0.09 m/s  MV A Dur:              95.15 msec  E/e' Ratio:            7.11        (<8.0)  a'                     0.10 m/s  PulmV Sys Deangelo:         44.38 cm/s  PulmV Mann Deangelo:        34.71 cm/s  PulmV S/D Deangelo:         1.28  PulmV A Revs Deangelo:      31.10 cm/s  PulmV A Revs Dur:      108.47 msec       MITRAL VALVE:          Normal Ranges:  MV DT:        259 msec (150-240msec)       AORTIC VALVE:           Normal Ranges:  AoV Vmax:      1.17 m/s (<=1.7m/s)  AoV Peak P.5 mmHg (<20mmHg)  LVOT Max Deangelo:  0.63 m/s (<=1.1m/s)  LVOT VTI:      13.31 cm  LVOT Diameter: 2.22 cm  (1.8-2.4cm)  AoV Area,Vmax: 2.10 cm2 (2.5-4.5cm2)       AORTIC INSUFFICIENCY:  AI Vmax:       3.46 m/s  AI Half-time:  719 msec  AI Decel Time: 2479 msec  AI Decel Rate: 148.54 cm/s2       RIGHT VENTRICLE:  RV Basal 3.50 cm  RV Mid   1.70 cm  RV Major 8.7 cm  TAPSE:   19.5 mm  RV s'    0.15 m/s       TRICUSPID VALVE/RVSP:          Normal Ranges:  Peak TR Velocity:     2.45 m/s  RV Syst Pressure:     27 mmHg  (< 30mmHg)  IVC Diam:             1.60 cm       PULMONIC VALVE:          Normal Ranges:  PV Accel Time:  138 msec (>120ms)  PV Max Deangelo:     0.9 m/s  (0.6-0.9m/s)  PV Max PG:      3.3 mmHg       Pulmonary Veins:  PulmV A Revs Dur: 108.47 msec  PulmV A Revs Deangelo: 31.10 cm/s  PulmV Mann Deangelo:   34.71 cm/s  PulmV S/D Deangelo:    1.28  PulmV Sys Deangelo:    44.38 cm/s       [id number] Tomás Donaldson  MD  Electronically signed on 10/1/2024 at 5:48:03 PM       ** Final **       1. Leg weakness, bilateral        2. Acute on chronic combined systolic and diastolic heart failure                Continue with all of your specialists.    Paperwork: work note printed today, I can complete STD paperwork from the dates of 9/12/24 to 1/25/25 if I get them at the office.      Smoking cessation- nicotine lozenges rx.     Regarding follow up here- pending the paperwork going through- follow up as needed for sports medicine needs. (Or sooner if the STD paperwork requires it)      Medical home base- primary=geriatrics.           Claudio Maldonado, DO

## 2024-10-23 NOTE — LETTER
October 23, 2024     Patient: Matthieu Solis   YOB: 1960   Date of Visit: 10/23/2024       To Whom It May Concern:    Matthieu Solis was seen in my clinic on 10/23/2024 at 10:30 am. Please excuse Matthieu for his absence from work on this day to make the appointment.    Recommend pt is OFF work due to multiple medical conditions including heart failure, from 9/12/24 to 1/25/25.     If you have any questions or concerns, please don't hesitate to call.         Sincerely,         Claudio Maldonado, DO        CC: No Recipients

## 2024-10-24 ENCOUNTER — APPOINTMENT (OUTPATIENT)
Dept: CARE COORDINATION | Facility: CLINIC | Age: 64
End: 2024-10-24
Payer: COMMERCIAL

## 2024-10-24 ENCOUNTER — APPOINTMENT (OUTPATIENT)
Dept: GERIATRIC MEDICINE | Facility: CLINIC | Age: 64
End: 2024-10-24
Payer: COMMERCIAL

## 2024-10-24 LAB
LAB AP ASR DISCLAIMER: NORMAL
LABORATORY COMMENT REPORT: NORMAL
PATH REPORT.FINAL DX SPEC: NORMAL
PATH REPORT.GROSS SPEC: NORMAL
PATH REPORT.RELEVANT HX SPEC: NORMAL
PATH REPORT.TOTAL CANCER: NORMAL
RESIDENT REVIEW: NORMAL

## 2024-10-24 PROCEDURE — 88344 IMHCHEM/IMCYTCHM EA MLT ANTB: CPT | Performed by: PATHOLOGY

## 2024-10-24 PROCEDURE — RXMED WILLOW AMBULATORY MEDICATION CHARGE

## 2024-10-25 ENCOUNTER — PHARMACY VISIT (OUTPATIENT)
Dept: PHARMACY | Facility: CLINIC | Age: 64
End: 2024-10-25
Payer: COMMERCIAL

## 2024-10-26 ENCOUNTER — HOSPITAL ENCOUNTER (INPATIENT)
Facility: HOSPITAL | Age: 64
DRG: 728 | End: 2024-10-26
Attending: EMERGENCY MEDICINE | Admitting: INTERNAL MEDICINE
Payer: COMMERCIAL

## 2024-10-26 ENCOUNTER — CLINICAL SUPPORT (OUTPATIENT)
Dept: EMERGENCY MEDICINE | Facility: HOSPITAL | Age: 64
DRG: 728 | End: 2024-10-26
Payer: COMMERCIAL

## 2024-10-26 ENCOUNTER — APPOINTMENT (OUTPATIENT)
Dept: RADIOLOGY | Facility: HOSPITAL | Age: 64
DRG: 728 | End: 2024-10-26
Payer: COMMERCIAL

## 2024-10-26 DIAGNOSIS — N41.2 PROSTATE ABSCESS: ICD-10-CM

## 2024-10-26 DIAGNOSIS — M79.605 PAIN IN BOTH LOWER EXTREMITIES: ICD-10-CM

## 2024-10-26 DIAGNOSIS — M79.604 PAIN IN BOTH LOWER EXTREMITIES: ICD-10-CM

## 2024-10-26 DIAGNOSIS — N41.0 ACUTE PROSTATITIS: Primary | ICD-10-CM

## 2024-10-26 DIAGNOSIS — R79.89 LOW VITAMIN D LEVEL: ICD-10-CM

## 2024-10-26 LAB
ALBUMIN SERPL BCP-MCNC: 3.7 G/DL (ref 3.4–5)
ALP SERPL-CCNC: 89 U/L (ref 33–136)
ALT SERPL W P-5'-P-CCNC: 26 U/L (ref 10–52)
ANION GAP SERPL CALC-SCNC: 16 MMOL/L (ref 10–20)
AST SERPL W P-5'-P-CCNC: 26 U/L (ref 9–39)
BASOPHILS # BLD AUTO: 0.05 X10*3/UL (ref 0–0.1)
BASOPHILS NFR BLD AUTO: 0.3 %
BILIRUB SERPL-MCNC: 1.1 MG/DL (ref 0–1.2)
BNP SERPL-MCNC: 102 PG/ML (ref 0–99)
BUN SERPL-MCNC: 13 MG/DL (ref 6–23)
CALCIUM SERPL-MCNC: 8.7 MG/DL (ref 8.6–10.6)
CARDIAC TROPONIN I PNL SERPL HS: 8 NG/L (ref 0–53)
CHLORIDE SERPL-SCNC: 102 MMOL/L (ref 98–107)
CO2 SERPL-SCNC: 21 MMOL/L (ref 21–32)
CREAT SERPL-MCNC: 1.08 MG/DL (ref 0.5–1.3)
EGFRCR SERPLBLD CKD-EPI 2021: 78 ML/MIN/1.73M*2
EOSINOPHIL # BLD AUTO: 0.11 X10*3/UL (ref 0–0.7)
EOSINOPHIL NFR BLD AUTO: 0.6 %
ERYTHROCYTE [DISTWIDTH] IN BLOOD BY AUTOMATED COUNT: 13.2 % (ref 11.5–14.5)
GLUCOSE SERPL-MCNC: 94 MG/DL (ref 74–99)
HCT VFR BLD AUTO: 34.9 % (ref 41–52)
HGB BLD-MCNC: 11.8 G/DL (ref 13.5–17.5)
IMM GRANULOCYTES # BLD AUTO: 0.31 X10*3/UL (ref 0–0.7)
IMM GRANULOCYTES NFR BLD AUTO: 1.8 % (ref 0–0.9)
LYMPHOCYTES # BLD AUTO: 1.84 X10*3/UL (ref 1.2–4.8)
LYMPHOCYTES NFR BLD AUTO: 10.7 %
MAGNESIUM SERPL-MCNC: 2.29 MG/DL (ref 1.6–2.4)
MCH RBC QN AUTO: 31.1 PG (ref 26–34)
MCHC RBC AUTO-ENTMCNC: 33.8 G/DL (ref 32–36)
MCV RBC AUTO: 92 FL (ref 80–100)
MONOCYTES # BLD AUTO: 1.69 X10*3/UL (ref 0.1–1)
MONOCYTES NFR BLD AUTO: 9.8 %
NEUTROPHILS # BLD AUTO: 13.27 X10*3/UL (ref 1.2–7.7)
NEUTROPHILS NFR BLD AUTO: 76.8 %
NRBC BLD-RTO: 0 /100 WBCS (ref 0–0)
PLATELET # BLD AUTO: 342 X10*3/UL (ref 150–450)
POTASSIUM SERPL-SCNC: 3.6 MMOL/L (ref 3.5–5.3)
PROT SERPL-MCNC: 7.7 G/DL (ref 6.4–8.2)
RBC # BLD AUTO: 3.79 X10*6/UL (ref 4.5–5.9)
SODIUM SERPL-SCNC: 135 MMOL/L (ref 136–145)
WBC # BLD AUTO: 17.3 X10*3/UL (ref 4.4–11.3)

## 2024-10-26 PROCEDURE — 83605 ASSAY OF LACTIC ACID: CPT | Performed by: PHYSICIAN ASSISTANT

## 2024-10-26 PROCEDURE — 71045 X-RAY EXAM CHEST 1 VIEW: CPT

## 2024-10-26 PROCEDURE — 36415 COLL VENOUS BLD VENIPUNCTURE: CPT | Performed by: PHYSICIAN ASSISTANT

## 2024-10-26 PROCEDURE — 81001 URINALYSIS AUTO W/SCOPE: CPT | Performed by: PHYSICIAN ASSISTANT

## 2024-10-26 PROCEDURE — 99285 EMERGENCY DEPT VISIT HI MDM: CPT | Performed by: EMERGENCY MEDICINE

## 2024-10-26 PROCEDURE — 84484 ASSAY OF TROPONIN QUANT: CPT | Performed by: PHYSICIAN ASSISTANT

## 2024-10-26 PROCEDURE — 83735 ASSAY OF MAGNESIUM: CPT | Performed by: PHYSICIAN ASSISTANT

## 2024-10-26 PROCEDURE — 71045 X-RAY EXAM CHEST 1 VIEW: CPT | Performed by: STUDENT IN AN ORGANIZED HEALTH CARE EDUCATION/TRAINING PROGRAM

## 2024-10-26 PROCEDURE — 93010 ELECTROCARDIOGRAM REPORT: CPT | Performed by: EMERGENCY MEDICINE

## 2024-10-26 PROCEDURE — 93005 ELECTROCARDIOGRAM TRACING: CPT

## 2024-10-26 PROCEDURE — 99285 EMERGENCY DEPT VISIT HI MDM: CPT

## 2024-10-26 PROCEDURE — 74177 CT ABD & PELVIS W/CONTRAST: CPT

## 2024-10-26 PROCEDURE — 2550000001 HC RX 255 CONTRASTS: Performed by: EMERGENCY MEDICINE

## 2024-10-26 PROCEDURE — 85025 COMPLETE CBC W/AUTO DIFF WBC: CPT | Performed by: PHYSICIAN ASSISTANT

## 2024-10-26 PROCEDURE — 2500000001 HC RX 250 WO HCPCS SELF ADMINISTERED DRUGS (ALT 637 FOR MEDICARE OP): Performed by: PHYSICIAN ASSISTANT

## 2024-10-26 PROCEDURE — 74177 CT ABD & PELVIS W/CONTRAST: CPT | Performed by: STUDENT IN AN ORGANIZED HEALTH CARE EDUCATION/TRAINING PROGRAM

## 2024-10-26 PROCEDURE — 80053 COMPREHEN METABOLIC PANEL: CPT | Performed by: PHYSICIAN ASSISTANT

## 2024-10-26 PROCEDURE — 87491 CHLMYD TRACH DNA AMP PROBE: CPT | Performed by: PHYSICIAN ASSISTANT

## 2024-10-26 PROCEDURE — 83880 ASSAY OF NATRIURETIC PEPTIDE: CPT | Performed by: PHYSICIAN ASSISTANT

## 2024-10-26 RX ORDER — ACETAMINOPHEN 325 MG/1
975 TABLET ORAL ONCE
Status: COMPLETED | OUTPATIENT
Start: 2024-10-26 | End: 2024-10-26

## 2024-10-26 RX ADMIN — ACETAMINOPHEN 975 MG: 325 TABLET ORAL at 17:47

## 2024-10-26 RX ADMIN — IOHEXOL 90 ML: 350 INJECTION, SOLUTION INTRAVENOUS at 21:14

## 2024-10-26 ASSESSMENT — PAIN - FUNCTIONAL ASSESSMENT
PAIN_FUNCTIONAL_ASSESSMENT: 0-10
PAIN_FUNCTIONAL_ASSESSMENT: 0-10

## 2024-10-26 ASSESSMENT — COLUMBIA-SUICIDE SEVERITY RATING SCALE - C-SSRS
1. IN THE PAST MONTH, HAVE YOU WISHED YOU WERE DEAD OR WISHED YOU COULD GO TO SLEEP AND NOT WAKE UP?: NO
6. HAVE YOU EVER DONE ANYTHING, STARTED TO DO ANYTHING, OR PREPARED TO DO ANYTHING TO END YOUR LIFE?: NO
2. HAVE YOU ACTUALLY HAD ANY THOUGHTS OF KILLING YOURSELF?: NO

## 2024-10-26 ASSESSMENT — LIFESTYLE VARIABLES
HAVE YOU EVER FELT YOU SHOULD CUT DOWN ON YOUR DRINKING: NO
HAVE PEOPLE ANNOYED YOU BY CRITICIZING YOUR DRINKING: NO
EVER HAD A DRINK FIRST THING IN THE MORNING TO STEADY YOUR NERVES TO GET RID OF A HANGOVER: NO
EVER FELT BAD OR GUILTY ABOUT YOUR DRINKING: NO
TOTAL SCORE: 0

## 2024-10-26 ASSESSMENT — PAIN DESCRIPTION - PAIN TYPE
TYPE: ACUTE PAIN
TYPE: ACUTE PAIN

## 2024-10-26 ASSESSMENT — PAIN DESCRIPTION - LOCATION: LOCATION: ABDOMEN

## 2024-10-26 ASSESSMENT — PAIN SCALES - GENERAL
PAINLEVEL_OUTOF10: 4
PAINLEVEL_OUTOF10: 4

## 2024-10-26 NOTE — ED PROVIDER NOTES
HPI   Chief Complaint   Patient presents with    Flu Symptoms       HPI  Patient is a 64-year-old male with past medical history of CHF (EF 9/2024 30-35%), smoker, elevated PSA, NSTEMI on daily aspirin that presents to the ED for evaluation of flulike symptoms and weight loss x 14 days.  She reports she has been having diarrhea for the last 14 days, no blood in stool about 4-5 times a day.  He is also having bodyaches and upper and lower extremity numbness, that has had for a while that have worsened over the last 14 days.  Patient reports 2 days ago he tested positive for COVID.  Patient reports he is unable to eat as much and has low appetite.  Patient had a biopsy of his prostate on 10/8/2024.  Endorses chills.  Denies fever, nausea, vomiting, shortness of breath, chest pain, headache, vision changes      Patient History   Past Medical History:   Diagnosis Date    Cataract     GERD (gastroesophageal reflux disease)     Gout     HTN (hypertension)      Past Surgical History:   Procedure Laterality Date    COLONOSCOPY      ESOPHAGOGASTRODUODENOSCOPY      HERNIA REPAIR  2015    Inguinal     Family History   Problem Relation Name Age of Onset    Other (CVA) Mother      Hypertension Mother      Other (CVA) Father      Hypertension Father       Social History     Tobacco Use    Smoking status: Every Day     Current packs/day: 1.00     Average packs/day: 1 pack/day for 50.8 years (50.8 ttl pk-yrs)     Types: Cigarettes     Start date: 1974    Smokeless tobacco: Never    Tobacco comments:     Trying to quit   Vaping Use    Vaping status: Never Used   Substance Use Topics    Alcohol use: Not Currently    Drug use: Never       Physical Exam   ED Triage Vitals   Temperature Heart Rate Respirations BP   10/26/24 1555 10/26/24 1555 10/26/24 1555 10/26/24 1555   36.7 °C (98.1 °F) 87 16 106/66      Pulse Ox Temp Source Heart Rate Source Patient Position   10/26/24 1555 10/26/24 1555 10/26/24 1718 10/26/24 1718   96 % Temporal  Monitor Lying      BP Location FiO2 (%)     10/26/24 1718 --     Left arm        Physical Exam  Vitals reviewed.   Constitutional:       General: He is not in acute distress.     Appearance: Normal appearance. He is not ill-appearing.   HENT:      Head: Normocephalic and atraumatic.      Nose: No congestion or rhinorrhea.   Eyes:      General: No visual field deficit or scleral icterus.        Right eye: No discharge.         Left eye: No discharge.      Extraocular Movements: Extraocular movements intact.      Conjunctiva/sclera: Conjunctivae normal.      Pupils: Pupils are equal, round, and reactive to light.   Cardiovascular:      Rate and Rhythm: Normal rate and regular rhythm.      Pulses: Normal pulses.      Heart sounds: Normal heart sounds. No murmur heard.     No friction rub.   Pulmonary:      Effort: Pulmonary effort is normal. No respiratory distress.      Breath sounds: Normal breath sounds. No stridor. No wheezing or rhonchi.   Chest:      Chest wall: No tenderness.   Abdominal:      General: Abdomen is flat. There is no distension.      Tenderness: There is no abdominal tenderness. There is no guarding or rebound.   Musculoskeletal:         General: No tenderness.      Cervical back: Neck supple. No tenderness.      Right lower leg: No edema.      Left lower leg: No edema.   Skin:     General: Skin is warm and dry.      Capillary Refill: Capillary refill takes less than 2 seconds.      Coloration: Skin is not jaundiced or pale.   Neurological:      General: No focal deficit present.      Mental Status: He is alert.      Cranial Nerves: Cranial nerves 2-12 are intact. No cranial nerve deficit, dysarthria or facial asymmetry.      Sensory: No sensory deficit.      Motor: No weakness, tremor, abnormal muscle tone or pronator drift.      Coordination: Finger-Nose-Finger Test normal.      Gait: Gait normal.   Psychiatric:         Mood and Affect: Mood normal.         Behavior: Behavior normal.         ED  Course & MDM   ED Course as of 10/26/24 2227   Sat Oct 26, 2024   1816 Interval: Baseline  Time:   Person Administering Scale: Bolivar James DO      1a  Level of consciousness: 0=alert; keenly responsive  1b. LOC questions:  0=Performs both tasks correctly  1c. LOC commands: 0=Performs both tasks correctly  2.  Best Gaze: 0=normal  3. Visual: 0=No visual loss  4. Facial Palsy: 0=Normal symmetric movement  5a. Motor left arm: 0=No drift, limb holds 90 (or 45) degrees for full 10 seconds  5b.  Motor right arm: 0=No drift, limb holds 90 (or 45) degrees for full 10 seconds  6a. motor left le=No drift, limb holds 90 (or 45) degrees for full 10 seconds  6b  Motor right le=No drift, limb holds 90 (or 45) degrees for full 10 seconds  7. Limb Ataxia: 0=Absent  8.  Sensory: 0=Normal; no sensory loss  9. Best Language:  0=No aphasia, normal  10. Dysarthria: 0=Normal  11. Extinction and Inattention: 0=No abnormality    Total:   0        VAN: VAN: Negative          [WJ]    The patient seen and examined with the nurse practitioner/physician assistant. I personally saw the patient and made/approved the management plan and take responsibility for the patient management.    History: Patient is a 64-year-old male with history of systolic CHF, cigarette smoking, NSTEMI on baby aspirin presents to the emergency department for 2 weeks of diarrhea.  Patient has been having diarrhea associated with bodyaches upper and lower extremity weakness.  Patient states that he has been having approximately 5 loose stools per day described as watery.  No blood, melena, hematuria.  No recent antibiotic use.  No history of C. difficile.  Patient has been having bodyaches.  States that she was having urinary and bowel frequency.  The patient came in today for evaluation.  Has been able to walk.  Denies any new lower extremity swelling, expected weight gain, shortness of breath, chest pain.    Exam: Appears stated age male.  I did  perform any NIH tests as documented elsewhere.  No lateralizing signs.  Patient had clear lung sounds bilaterally, regular rate and rhythm.  MDM: Patient presents to the emergency department for muscle aches, diarrhea, urinary frequency.  Differential diagnosis does include urinary tract infection, COVID infection, electrolyte disturbance from the diarrhea.  The patient was complaining of numbness, though this seems to be in episodes.  He also feels generally weak.  I did perform aims score which was overall unremarkable as documented.  The patient is COVID-positive.  We will obtain labs in addition to ultrasound to ensure that there is no evidence of heart failure, dehydration.        I reviewed the patient last TTE from August 29, 2024 where the patient had a ejection fraction of 30 to 35% with global hypokinesis, diastolic failure    Will Jacob, DO  Emergency Medicine [WJ]   1850 Troponin I, High Sensitivity  Believe a one-time troponin is appropriate as the patient currently has no active chest pain and no acute change in symptoms.  Delta troponin would not serve delta troponin would not have great utility unless abnormal [WJ]   1916 XR chest 1 view  IMPRESSION:  1.  Findings suggestive of mild interstitial edema.  2. No focal parenchymal consolidation, pleural effusion or  pneumothorax.   [HK]   1929 ECG reveals normal sinus rhythm with a rate of 82 bpm.  QT/QTc 368/429.  Normal axis and intervals.  Nonspecific ST abnormalities with artifact.  Compared to ECG obtained on September 18, 2024. [HK]   1939 WBC(!): 17.3  The patient has a new leukocytosis when compared to prior. [WJ]   2208 CT abdomen pelvis w IV contrast  IMPRESSION:  Extensive vascular atherosclerotic disease. Chronic appearing  occlusion of the left common iliac, proximal internal iliac, external  iliac, and common femoral artery. There is new areas of heterogeneous  hypoenhancement of the inferior prostate with focal 1.5 cm areas of  peripheral  enhancement in the posterolateral prostate. Clinical  correlation for prostatitis or prostate abscess is recommended.  Fluid-filled small bowel and ascending colon, without bowel  dilatation or wall thickening. Clinical correlation for enterocolitis  is recommended.   [HK]      ED Course User Index  [HK] Damien Barnes PA-C  [WJ] Bolivar James DO                 No data recorded     [place] Coma Scale Score: 15 (10/26/24 1552 : Lex Wilson RN)                           Medical Decision Making  Patient is a 64-year-old male with past medical history of CHF (EF 9/2024 30-35%), smoker, elevated PSA, NSTEMI on daily aspirin that presents to the ED for evaluation of flulike symptoms and weight loss x 14 days.  On exam patient is well-appearing in no acute distress.  Vital signs are stable.  Physical exam reveals large unremarkable cardiopulmonary exam.  Neuroexam is without deficits.  Patient does not appear to be fluid overloaded. Differential includes but is not limited to COVID/flu, viral enteritis, dehydration, electrolyte abnormality, UTI.  Low suspicion for ACS will rule out with troponin.  Low suspicion for CHF exacerbation will rule out with BNP along with physical exam not supportive of fluid overload.  Patient staffed with ED attending physician.    CBC is with leukocytosis at 17.3.  Hemoglobin is stable.  Magnesium within normal range.  CMP largely unremarkable.  .  Troponin negative.  Lactate ordered.  Urinalysis ordered. POCUS completed by residents show no bladder distention and baseline cardiac function.  See imaging interpretation for complete results.  He was treated in the ED with Tylenol.    Chest x-ray as described in ED course is with no acute process.  Mild interstitial edema.  CT abdomen pelvis supports chronic iliac vessel disease.  Patient follows with vascular has dopplerable pulses bilaterally at DP and PT.  It also shows concerns for prostatitis with prostate abscess.  With  patient's recent prostate biopsy, will consult urology.    At time of signout disposition is pending urology recommendations, lactate, and urinalysis.  Patient signed out to ED resident.    Procedure  Procedures     Damien Barnes PA-C  10/26/24 2236       Damien Barnes PA-C  10/26/24 2237

## 2024-10-26 NOTE — Clinical Note
Matthieu Solis was seen and treated in our emergency department on 10/26/2024.  He may return to work on 06/12/2025.       If you have any questions or concerns, please don't hesitate to call.      Terrence Duran MD

## 2024-10-26 NOTE — ED TRIAGE NOTES
Pt presented to ED for c/o body aches, diarrhea, HA for about 2 weeks. Pt tested positive for covid 2 days ago. PMHx of CHF

## 2024-10-27 ENCOUNTER — APPOINTMENT (OUTPATIENT)
Dept: RADIOLOGY | Facility: HOSPITAL | Age: 64
DRG: 728 | End: 2024-10-27
Payer: COMMERCIAL

## 2024-10-27 ENCOUNTER — APPOINTMENT (OUTPATIENT)
Dept: CARDIOLOGY | Facility: HOSPITAL | Age: 64
DRG: 728 | End: 2024-10-27
Payer: COMMERCIAL

## 2024-10-27 VITALS
OXYGEN SATURATION: 94 % | HEART RATE: 85 BPM | DIASTOLIC BLOOD PRESSURE: 66 MMHG | TEMPERATURE: 99 F | SYSTOLIC BLOOD PRESSURE: 121 MMHG | BODY MASS INDEX: 21.83 KG/M2 | RESPIRATION RATE: 13 BRPM | HEIGHT: 65 IN | WEIGHT: 131 LBS

## 2024-10-27 PROBLEM — N41.0 ACUTE PROSTATITIS: Status: ACTIVE | Noted: 2024-10-27

## 2024-10-27 LAB
ALBUMIN SERPL BCP-MCNC: 3.5 G/DL (ref 3.4–5)
ALBUMIN SERPL BCP-MCNC: 3.6 G/DL (ref 3.4–5)
ANION GAP SERPL CALC-SCNC: 13 MMOL/L (ref 10–20)
ANION GAP SERPL CALC-SCNC: 17 MMOL/L (ref 10–20)
APPEARANCE UR: CLEAR
BACTERIA #/AREA URNS AUTO: ABNORMAL /HPF
BACTERIA BLD CULT: NORMAL
BACTERIA BLD CULT: NORMAL
BILIRUB UR STRIP.AUTO-MCNC: NEGATIVE MG/DL
BUN SERPL-MCNC: 11 MG/DL (ref 6–23)
BUN SERPL-MCNC: 13 MG/DL (ref 6–23)
C TRACH RRNA SPEC QL NAA+PROBE: NEGATIVE
CALCIUM SERPL-MCNC: 8.6 MG/DL (ref 8.6–10.6)
CALCIUM SERPL-MCNC: 9.2 MG/DL (ref 8.6–10.6)
CHLORIDE SERPL-SCNC: 103 MMOL/L (ref 98–107)
CHLORIDE SERPL-SCNC: 103 MMOL/L (ref 98–107)
CO2 SERPL-SCNC: 20 MMOL/L (ref 21–32)
CO2 SERPL-SCNC: 23 MMOL/L (ref 21–32)
COLOR UR: ABNORMAL
CREAT SERPL-MCNC: 0.9 MG/DL (ref 0.5–1.3)
CREAT SERPL-MCNC: 1.05 MG/DL (ref 0.5–1.3)
CRP SERPL-MCNC: 13.32 MG/DL
EGFRCR SERPLBLD CKD-EPI 2021: 80 ML/MIN/1.73M*2
EGFRCR SERPLBLD CKD-EPI 2021: >90 ML/MIN/1.73M*2
GLUCOSE SERPL-MCNC: 100 MG/DL (ref 74–99)
GLUCOSE SERPL-MCNC: 82 MG/DL (ref 74–99)
GLUCOSE UR STRIP.AUTO-MCNC: ABNORMAL MG/DL
HOLD SPECIMEN: NORMAL
KETONES UR STRIP.AUTO-MCNC: NEGATIVE MG/DL
LACTATE SERPL-SCNC: 0.9 MMOL/L (ref 0.4–2)
LACTATE SERPL-SCNC: 1 MMOL/L (ref 0.4–2)
LEUKOCYTE ESTERASE UR QL STRIP.AUTO: NEGATIVE
MAGNESIUM SERPL-MCNC: 2.62 MG/DL (ref 1.6–2.4)
MUCOUS THREADS #/AREA URNS AUTO: ABNORMAL /LPF
N GONORRHOEA DNA SPEC QL PROBE+SIG AMP: NEGATIVE
NITRITE UR QL STRIP.AUTO: NEGATIVE
PH UR STRIP.AUTO: 6 [PH]
PHOSPHATE SERPL-MCNC: 3.2 MG/DL (ref 2.5–4.9)
PHOSPHATE SERPL-MCNC: 4 MG/DL (ref 2.5–4.9)
POTASSIUM SERPL-SCNC: 3.5 MMOL/L (ref 3.5–5.3)
POTASSIUM SERPL-SCNC: 6 MMOL/L (ref 3.5–5.3)
PROT UR STRIP.AUTO-MCNC: ABNORMAL MG/DL
PSA SERPL-MCNC: 10.53 NG/ML
RBC # UR STRIP.AUTO: NEGATIVE /UL
RBC #/AREA URNS AUTO: ABNORMAL /HPF
SARS-COV-2 RNA RESP QL NAA+PROBE: NOT DETECTED
SODIUM SERPL-SCNC: 134 MMOL/L (ref 136–145)
SODIUM SERPL-SCNC: 135 MMOL/L (ref 136–145)
SP GR UR STRIP.AUTO: >1.05
UROBILINOGEN UR STRIP.AUTO-MCNC: NORMAL MG/DL
VIT B12 SERPL-MCNC: 1256 PG/ML (ref 211–911)
WBC #/AREA URNS AUTO: ABNORMAL /HPF

## 2024-10-27 PROCEDURE — 86140 C-REACTIVE PROTEIN: CPT

## 2024-10-27 PROCEDURE — 2500000004 HC RX 250 GENERAL PHARMACY W/ HCPCS (ALT 636 FOR OP/ED)

## 2024-10-27 PROCEDURE — 2500000001 HC RX 250 WO HCPCS SELF ADMINISTERED DRUGS (ALT 637 FOR MEDICARE OP)

## 2024-10-27 PROCEDURE — 82607 VITAMIN B-12: CPT

## 2024-10-27 PROCEDURE — 93010 ELECTROCARDIOGRAM REPORT: CPT | Performed by: INTERNAL MEDICINE

## 2024-10-27 PROCEDURE — 36415 COLL VENOUS BLD VENIPUNCTURE: CPT | Performed by: EMERGENCY MEDICINE

## 2024-10-27 PROCEDURE — 96365 THER/PROPH/DIAG IV INF INIT: CPT

## 2024-10-27 PROCEDURE — 87040 BLOOD CULTURE FOR BACTERIA: CPT | Performed by: EMERGENCY MEDICINE

## 2024-10-27 PROCEDURE — 87635 SARS-COV-2 COVID-19 AMP PRB: CPT

## 2024-10-27 PROCEDURE — 83605 ASSAY OF LACTIC ACID: CPT | Performed by: EMERGENCY MEDICINE

## 2024-10-27 PROCEDURE — 83735 ASSAY OF MAGNESIUM: CPT

## 2024-10-27 PROCEDURE — 80069 RENAL FUNCTION PANEL: CPT

## 2024-10-27 PROCEDURE — 2500000004 HC RX 250 GENERAL PHARMACY W/ HCPCS (ALT 636 FOR OP/ED): Performed by: EMERGENCY MEDICINE

## 2024-10-27 PROCEDURE — 84153 ASSAY OF PSA TOTAL: CPT

## 2024-10-27 PROCEDURE — 72050 X-RAY EXAM NECK SPINE 4/5VWS: CPT | Performed by: STUDENT IN AN ORGANIZED HEALTH CARE EDUCATION/TRAINING PROGRAM

## 2024-10-27 PROCEDURE — 99223 1ST HOSP IP/OBS HIGH 75: CPT | Performed by: INTERNAL MEDICINE

## 2024-10-27 PROCEDURE — 82374 ASSAY BLOOD CARBON DIOXIDE: CPT

## 2024-10-27 PROCEDURE — 72050 X-RAY EXAM NECK SPINE 4/5VWS: CPT

## 2024-10-27 PROCEDURE — 2500000002 HC RX 250 W HCPCS SELF ADMINISTERED DRUGS (ALT 637 FOR MEDICARE OP, ALT 636 FOR OP/ED)

## 2024-10-27 PROCEDURE — 93005 ELECTROCARDIOGRAM TRACING: CPT

## 2024-10-27 PROCEDURE — 82306 VITAMIN D 25 HYDROXY: CPT

## 2024-10-27 PROCEDURE — 1200000002 HC GENERAL ROOM WITH TELEMETRY DAILY

## 2024-10-27 RX ORDER — SPIRONOLACTONE 25 MG/1
12.5 TABLET ORAL DAILY
Status: CANCELLED | OUTPATIENT
Start: 2024-10-28

## 2024-10-27 RX ORDER — SPIRONOLACTONE 25 MG/1
12.5 TABLET ORAL DAILY
Status: DISCONTINUED | OUTPATIENT
Start: 2024-10-27 | End: 2024-10-29 | Stop reason: HOSPADM

## 2024-10-27 RX ORDER — CARVEDILOL 12.5 MG/1
12.5 TABLET ORAL
Status: DISCONTINUED | OUTPATIENT
Start: 2024-10-27 | End: 2024-10-29 | Stop reason: HOSPADM

## 2024-10-27 RX ORDER — DIPHENHYDRAMINE HCL 25 MG
4 CAPSULE ORAL EVERY 2 HOUR PRN
Status: DISCONTINUED | OUTPATIENT
Start: 2024-10-27 | End: 2024-10-29 | Stop reason: HOSPADM

## 2024-10-27 RX ORDER — ENOXAPARIN SODIUM 100 MG/ML
40 INJECTION SUBCUTANEOUS EVERY 24 HOURS
Status: DISCONTINUED | OUTPATIENT
Start: 2024-10-27 | End: 2024-10-29 | Stop reason: HOSPADM

## 2024-10-27 RX ORDER — ATORVASTATIN CALCIUM 20 MG/1
80 TABLET, FILM COATED ORAL
Status: DISCONTINUED | OUTPATIENT
Start: 2024-10-27 | End: 2024-10-27

## 2024-10-27 RX ORDER — POLYETHYLENE GLYCOL 3350 17 G/17G
17 POWDER, FOR SOLUTION ORAL DAILY
Status: DISCONTINUED | OUTPATIENT
Start: 2024-10-27 | End: 2024-10-27

## 2024-10-27 RX ORDER — CEFTRIAXONE 2 G/50ML
2 INJECTION, SOLUTION INTRAVENOUS EVERY 24 HOURS
Status: DISCONTINUED | OUTPATIENT
Start: 2024-10-28 | End: 2024-10-27

## 2024-10-27 RX ORDER — CEFTRIAXONE 2 G/50ML
2 INJECTION, SOLUTION INTRAVENOUS ONCE
Status: COMPLETED | OUTPATIENT
Start: 2024-10-27 | End: 2024-10-27

## 2024-10-27 RX ORDER — PANTOPRAZOLE SODIUM 40 MG/1
40 TABLET, DELAYED RELEASE ORAL
Status: DISCONTINUED | OUTPATIENT
Start: 2024-10-27 | End: 2024-10-29 | Stop reason: HOSPADM

## 2024-10-27 RX ORDER — VANCOMYCIN HYDROCHLORIDE 1 G/20ML
INJECTION, POWDER, LYOPHILIZED, FOR SOLUTION INTRAVENOUS DAILY PRN
Status: DISCONTINUED | OUTPATIENT
Start: 2024-10-27 | End: 2024-10-28

## 2024-10-27 RX ORDER — CIPROFLOXACIN 2 MG/ML
400 INJECTION, SOLUTION INTRAVENOUS 2 TIMES DAILY
Status: DISCONTINUED | OUTPATIENT
Start: 2024-10-27 | End: 2024-10-28

## 2024-10-27 RX ORDER — ATORVASTATIN CALCIUM 80 MG/1
80 TABLET, FILM COATED ORAL DAILY
Status: DISCONTINUED | OUTPATIENT
Start: 2024-10-27 | End: 2024-10-29 | Stop reason: HOSPADM

## 2024-10-27 RX ORDER — ASPIRIN 81 MG/1
81 TABLET ORAL DAILY
Status: DISCONTINUED | OUTPATIENT
Start: 2024-10-27 | End: 2024-10-29 | Stop reason: HOSPADM

## 2024-10-27 RX ADMIN — PANTOPRAZOLE SODIUM 40 MG: 40 TABLET, DELAYED RELEASE ORAL at 17:36

## 2024-10-27 RX ADMIN — PANTOPRAZOLE SODIUM 40 MG: 40 TABLET, DELAYED RELEASE ORAL at 09:13

## 2024-10-27 RX ADMIN — ENOXAPARIN SODIUM 40 MG: 100 INJECTION SUBCUTANEOUS at 09:14

## 2024-10-27 RX ADMIN — VANCOMYCIN HYDROCHLORIDE 1500 MG: 1.5 INJECTION, POWDER, LYOPHILIZED, FOR SOLUTION INTRAVENOUS at 11:54

## 2024-10-27 RX ADMIN — SACUBITRIL AND VALSARTAN 1 TABLET: 49; 51 TABLET, FILM COATED ORAL at 09:13

## 2024-10-27 RX ADMIN — CEFTRIAXONE SODIUM 2 G: 2 INJECTION, SOLUTION INTRAVENOUS at 02:09

## 2024-10-27 RX ADMIN — ATORVASTATIN CALCIUM 80 MG: 80 TABLET, FILM COATED ORAL at 09:13

## 2024-10-27 RX ADMIN — CIPROFLOXACIN 400 MG: 400 INJECTION, SOLUTION INTRAVENOUS at 21:35

## 2024-10-27 RX ADMIN — CARVEDILOL 12.5 MG: 12.5 TABLET, FILM COATED ORAL at 17:36

## 2024-10-27 RX ADMIN — SPIRONOLACTONE 12.5 MG: 25 TABLET, FILM COATED ORAL at 09:13

## 2024-10-27 RX ADMIN — ASPIRIN 81 MG: 81 TABLET, COATED ORAL at 09:13

## 2024-10-27 RX ADMIN — CARVEDILOL 12.5 MG: 12.5 TABLET, FILM COATED ORAL at 09:13

## 2024-10-27 SDOH — SOCIAL STABILITY: SOCIAL INSECURITY: WITHIN THE LAST YEAR, HAVE YOU BEEN AFRAID OF YOUR PARTNER OR EX-PARTNER?: NO

## 2024-10-27 SDOH — ECONOMIC STABILITY: FOOD INSECURITY: WITHIN THE PAST 12 MONTHS, THE FOOD YOU BOUGHT JUST DIDN'T LAST AND YOU DIDN'T HAVE MONEY TO GET MORE.: SOMETIMES TRUE

## 2024-10-27 SDOH — SOCIAL STABILITY: SOCIAL INSECURITY
WITHIN THE LAST YEAR, HAVE YOU BEEN RAPED OR FORCED TO HAVE ANY KIND OF SEXUAL ACTIVITY BY YOUR PARTNER OR EX-PARTNER?: NO

## 2024-10-27 SDOH — SOCIAL STABILITY: SOCIAL INSECURITY: ARE THERE ANY APPARENT SIGNS OF INJURIES/BEHAVIORS THAT COULD BE RELATED TO ABUSE/NEGLECT?: NO

## 2024-10-27 SDOH — SOCIAL STABILITY: SOCIAL INSECURITY: ARE YOU OR HAVE YOU BEEN THREATENED OR ABUSED PHYSICALLY, EMOTIONALLY, OR SEXUALLY BY ANYONE?: NO

## 2024-10-27 SDOH — SOCIAL STABILITY: SOCIAL INSECURITY: WITHIN THE LAST YEAR, HAVE YOU BEEN HUMILIATED OR EMOTIONALLY ABUSED IN OTHER WAYS BY YOUR PARTNER OR EX-PARTNER?: NO

## 2024-10-27 SDOH — SOCIAL STABILITY: SOCIAL INSECURITY
WITHIN THE LAST YEAR, HAVE YOU BEEN KICKED, HIT, SLAPPED, OR OTHERWISE PHYSICALLY HURT BY YOUR PARTNER OR EX-PARTNER?: NO

## 2024-10-27 SDOH — ECONOMIC STABILITY: FOOD INSECURITY
WITHIN THE PAST 12 MONTHS, YOU WORRIED THAT YOUR FOOD WOULD RUN OUT BEFORE YOU GOT THE MONEY TO BUY MORE.: SOMETIMES TRUE

## 2024-10-27 SDOH — SOCIAL STABILITY: SOCIAL INSECURITY: WERE YOU ABLE TO COMPLETE ALL THE BEHAVIORAL HEALTH SCREENINGS?: YES

## 2024-10-27 SDOH — ECONOMIC STABILITY: INCOME INSECURITY: IN THE PAST 12 MONTHS HAS THE ELECTRIC, GAS, OIL, OR WATER COMPANY THREATENED TO SHUT OFF SERVICES IN YOUR HOME?: YES

## 2024-10-27 SDOH — SOCIAL STABILITY: SOCIAL INSECURITY: HAVE YOU HAD THOUGHTS OF HARMING ANYONE ELSE?: NO

## 2024-10-27 SDOH — SOCIAL STABILITY: SOCIAL INSECURITY: HAS ANYONE EVER THREATENED TO HURT YOUR FAMILY OR YOUR PETS?: NO

## 2024-10-27 SDOH — SOCIAL STABILITY: SOCIAL INSECURITY: ABUSE: ADULT

## 2024-10-27 SDOH — SOCIAL STABILITY: SOCIAL INSECURITY: HAVE YOU HAD ANY THOUGHTS OF HARMING ANYONE ELSE?: NO

## 2024-10-27 SDOH — SOCIAL STABILITY: SOCIAL INSECURITY: DO YOU FEEL ANYONE HAS EXPLOITED OR TAKEN ADVANTAGE OF YOU FINANCIALLY OR OF YOUR PERSONAL PROPERTY?: NO

## 2024-10-27 SDOH — SOCIAL STABILITY: SOCIAL INSECURITY: DOES ANYONE TRY TO KEEP YOU FROM HAVING/CONTACTING OTHER FRIENDS OR DOING THINGS OUTSIDE YOUR HOME?: NO

## 2024-10-27 SDOH — SOCIAL STABILITY: SOCIAL INSECURITY: DO YOU FEEL UNSAFE GOING BACK TO THE PLACE WHERE YOU ARE LIVING?: NO

## 2024-10-27 ASSESSMENT — COGNITIVE AND FUNCTIONAL STATUS - GENERAL
MOBILITY SCORE: 24
DAILY ACTIVITIY SCORE: 24
PATIENT BASELINE BEDBOUND: NO

## 2024-10-27 ASSESSMENT — PATIENT HEALTH QUESTIONNAIRE - PHQ9
SUM OF ALL RESPONSES TO PHQ9 QUESTIONS 1 & 2: 0
1. LITTLE INTEREST OR PLEASURE IN DOING THINGS: NOT AT ALL
2. FEELING DOWN, DEPRESSED OR HOPELESS: NOT AT ALL

## 2024-10-27 ASSESSMENT — ENCOUNTER SYMPTOMS
MYALGIAS: 1
SHORTNESS OF BREATH: 0
ABDOMINAL PAIN: 0
DYSURIA: 1
FEVER: 0
NECK PAIN: 1
CHILLS: 1
BACK PAIN: 1
HEMATURIA: 0
WEAKNESS: 1
DIAPHORESIS: 0
NUMBNESS: 1
BLOOD IN STOOL: 0

## 2024-10-27 ASSESSMENT — ACTIVITIES OF DAILY LIVING (ADL)
DRESSING YOURSELF: INDEPENDENT
TOILETING: INDEPENDENT
PATIENT'S MEMORY ADEQUATE TO SAFELY COMPLETE DAILY ACTIVITIES?: YES
WALKS IN HOME: INDEPENDENT
JUDGMENT_ADEQUATE_SAFELY_COMPLETE_DAILY_ACTIVITIES: YES
GROOMING: INDEPENDENT
HEARING - LEFT EAR: FUNCTIONAL
ADEQUATE_TO_COMPLETE_ADL: YES
ASSISTIVE_DEVICE: EYEGLASSES
BATHING: INDEPENDENT
LACK_OF_TRANSPORTATION: NO
FEEDING YOURSELF: INDEPENDENT
HEARING - RIGHT EAR: FUNCTIONAL

## 2024-10-27 ASSESSMENT — LIFESTYLE VARIABLES
AUDIT-C TOTAL SCORE: 0
AUDIT-C TOTAL SCORE: 0
HOW OFTEN DO YOU HAVE A DRINK CONTAINING ALCOHOL: NEVER
HOW OFTEN DO YOU HAVE 6 OR MORE DRINKS ON ONE OCCASION: NEVER
HOW MANY STANDARD DRINKS CONTAINING ALCOHOL DO YOU HAVE ON A TYPICAL DAY: PATIENT DOES NOT DRINK
SKIP TO QUESTIONS 9-10: 1

## 2024-10-27 ASSESSMENT — PAIN SCALES - GENERAL: PAINLEVEL_OUTOF10: 4

## 2024-10-27 NOTE — CONSULTS
Vancomycin Dosing by Pharmacy  INITIAL CONSULT      Matthieu Solis is a 64 y.o. male who Pharmacy is consulted to dose vancomycin for  prostate abscess .     Based on the patient's indication and renal status, this patient will be dosed based on a goal vancomycin AUC of 400-600.     Renal function is currently stable.    Estimated Creatinine Clearance: 59.6 mL/min (by C-G formula based on SCr of 1.08 mg/dL).    Results from last 7 days   Lab Units 10/26/24  1743   CREATININE mg/dL 1.08   BUN mg/dL 13   WBC AUTO x10*3/uL 17.3*        Visit Vitals  /61 (BP Location: Left arm, Patient Position: Lying)   Pulse 97   Temp 37.1 °C (98.8 °F) (Tympanic)   Resp 16       Blood Culture   Date/Time Value Ref Range Status   10/27/2024 01:49 AM Loaded on Instrument - Culture in progress  Preliminary   10/27/2024 01:49 AM Loaded on Instrument - Culture in progress  Preliminary        No results found for the last 90 days.      Assessment/Plan     Will order maintenance dose of 1500 mg every 24 hours. This dosing regimen is predicted by [employer] to result in the following pharmacokinetic parameters:   Loading dose: N/A  Regimen: 1500 mg IV every 24 hours.  Start time: 07:03 on 10/27/2024  Exposure target: AUC24 (range)400-600 mg/L.hr   BWI33-04: 435 mg/L.hr  AUC24,ss: 545 mg/L.hr  Probability of AUC24 > 400: 83 %  Ctrough,ss: 15.1 mg/L  Probability of Ctrough,ss > 20: 24 %    Vancomycin follow-up level will be ordered for 10/28 at AM labs , unless clinically indicated sooner.  Will continue to monitor renal function daily while on vancomycin and order serum creatinine at least every 48 hours if not already ordered.  Will follow for continued vancomycin needs, clinical response, and signs/symptoms of toxicity.       Bob Hickey, PharmD
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
## 2024-10-27 NOTE — PROGRESS NOTES
Pharmacy Medication History Review    Matthieu Solis is a 64 y.o. male admitted for Acute prostatitis. Pharmacy reviewed the patient's [id number] medications and allergies for accuracy.    The list below reflects the updated PTA list.   Prior to Admission Medications   Prescriptions Last Dose Informant Patient Reported? Taking?   aspirin 81 mg EC tablet 10/26/2024 Self No Yes   Sig: Take 1 tablet (81 mg) by mouth once daily.   atorvastatin (Lipitor) 80 mg tablet 10/26/2024 Self No Yes   Sig: Take 1 tablet (80 mg) by mouth once daily.   carvedilol (Coreg) 12.5 mg tablet 10/26/2024 Self No Yes   Sig: Take 1 tablet (12.5 mg) by mouth 2 times daily (morning and late afternoon).   empagliflozin (Jardiance) 10 mg 10/26/2024 Self No Yes   Sig: Take 1 tablet (10 mg) by mouth once daily.   furosemide (Lasix) 20 mg tablet Unknown Self Yes Yes   Sig: Take 1 tablet (20 mg) by mouth once daily as needed (edema).   meloxicam (Mobic) 15 mg tablet Past Week Self No Yes   Sig: Take 1 tablet (15 mg) by mouth once daily as needed for mild pain (1 - 3).   nicotine polacrilex (Commit) 4 mg lozenge Unknown Self Yes Yes   Sig: Use 1 lozenge (4 mg) in the mouth or throat every 2 hours if needed for smoking cessation.   omeprazole (PriLOSEC) 20 mg DR capsule Not Taking Self No Yes   Sig: Take 1 capsule (20 mg) by mouth 2 times a day before meals for 14 days. To be taken 30-60 minutes prior to breakfast and dinner. Do not crush or chew.   Patient not taking: Reported on 10/27/2024   sacubitriL-valsartan (Entresto) 49-51 mg tablet 10/26/2024 Self No Yes   Sig: Take 1 tablet by mouth 2 times a day.   spironolactone (Aldactone) 25 mg tablet 10/26/2024 Self No Yes   Sig: Take 0.5 tablets (12.5 mg) by mouth once daily.      Facility-Administered Medications: None        The list below reflects the updated allergy list. Please review each documented allergy for additional clarification and justification.  Allergies  Reviewed by Lex JIMENEZ  "Steve RN on 10/26/2024        Severity Reactions Comments    Cinnamon Not Specified Unknown Cinnamon    Doxycycline Not Specified Swelling     Penicillins Not Specified Unknown             Patient accepts M2B at discharge.     Sources:   Patient Interview - good historian, had updated medications list on hand  Admission MedRec Grid  OARRS - none  EPIC medication dispense report    Medications ADDED:  None  Medications CHANGED:  None  Medications REMOVED/MARKED NOT TAKING:   None     Additional Comments:  Per patient, omeprazole on hold    Sun Gordillo, PharmD  Transitions of Care Pharmacist  10/27/24     Secure Chat preferred   If no response call [phone] or Vocera \"Med Rec\"    "

## 2024-10-27 NOTE — PROGRESS NOTES
Subjective   Matthieu Solis is a 64 y.o. male on hospital day 0 with past medical history as outlined by housestaff note presented with a myriad of complaints including generalized myalgias/body aches, dry cough, previous subjective fevers and chills been up for days now along with now diarrhea over the past 2 weeks.  He had taken a home test approximately 2 days ago which was positive for COVID.  He denies any nausea/vomiting.  No chest pain.  Mild shortness of breath.  He also notes intermittent arm and leg numbness which usually goes from shoulders down to the fingertips and encompasses the entire extremities as well as from the hip down to the toes and accompany seeing the entire extremity as well with no sparing of either palmar or dorsal aspects.  No change in strength other no generalized weakness.  No involvement of his torso with the numbness/tingling.  This has been going on for some months now and predates his diagnosis of COVID.  It comes and goes at different times and was unable to say how long it typically lasts.  He does note that it seems to happen a lot when he is first wakes up in the morning.  In addition patient also had a prostate biopsy on 10/8 and after a couple days developed rectal pain and some dysuria without any bloody stools or hematuria.  As noted he does have the diarrhea as above.  He denies any significant lower extremity edema/orthopnea/PND      Objective     Exam     Vitals:    10/26/24 2357 10/27/24 0400 10/27/24 0906 10/27/24 1302   BP: 127/64 110/61 121/78 105/64   BP Location: Right arm Left arm     Patient Position: Lying Lying     Pulse: 71 97 (!) 110 92   Resp: 16 16 18 18   Temp: 37.1 °C (98.8 °F)  37.3 °C (99.1 °F) 37 °C (98.6 °F)   TempSrc: Tympanic  Temporal Temporal   SpO2: 97% 98% 95% 96%   Weight:       Height:          Intake/Output last 3 shifts:  No intake/output data recorded.    Physical Exam  Vitals reviewed.   Constitutional:       General: He is not in  acute distress.     Appearance: He is not ill-appearing, toxic-appearing or diaphoretic.   HENT:      Head: Normocephalic and atraumatic.      Mouth/Throat:      Mouth: Mucous membranes are moist.   Eyes:      Pupils: Pupils are equal, round, and reactive to light.   Cardiovascular:      Rate and Rhythm: Normal rate and regular rhythm.      Pulses: Normal pulses.      Heart sounds: No murmur heard.     No friction rub. No gallop.   Pulmonary:      Effort: Pulmonary effort is normal.      Breath sounds: Normal breath sounds. No wheezing, rhonchi or rales.   Abdominal:      General: Bowel sounds are normal. There is no distension.      Palpations: Abdomen is soft.      Tenderness: There is no abdominal tenderness. There is no guarding or rebound.   Genitourinary:     Comments: Urology rectal exam noted.  Note states no rectal/prostate tenderness although patient would argue to the contrary  Musculoskeletal:      Cervical back: Neck supple.      Right lower leg: No edema.      Left lower leg: No edema.   Skin:     General: Skin is warm and dry.   Neurological:      General: No focal deficit present.      Mental Status: He is alert and oriented to person, place, and time.   Psychiatric:         Mood and Affect: Mood normal.         Behavior: Behavior normal.            Medications   aspirin, 81 mg, oral, Daily  atorvastatin, 80 mg, oral, Daily  carvedilol, 12.5 mg, oral, BID  [START ON 10/28/2024] cefTRIAXone, 2 g, intravenous, q24h  enoxaparin, 40 mg, subcutaneous, q24h  pantoprazole, 40 mg, oral, BID AC  sacubitriL-valsartan, 1 tablet, oral, BID  spironolactone, 12.5 mg, oral, Daily  vancomycin, 1,500 mg, intravenous, q24h       PRN medications: nicotine polacrilex, vancomycin       Labs     All new labs reviewed:  some of the basic labs as follows -     Results from last 7 days   Lab Units 10/26/24  1743   WBC AUTO x10*3/uL 17.3*   HEMOGLOBIN g/dL 11.8*   HEMATOCRIT % 34.9*   PLATELETS AUTO x10*3/uL 342   NEUTROS PCT  "AUTO % 76.8   LYMPHS PCT AUTO % 10.7   MONOS PCT AUTO % 9.8   EOS PCT AUTO % 0.6          Results from last 72 hours   Lab Units 10/27/24  0558 10/26/24  1743   SODIUM mmol/L 134* 135*   POTASSIUM mmol/L 6.0* 3.6   CHLORIDE mmol/L 103 102   CO2 mmol/L 20* 21   BUN mg/dL 11 13   CREATININE mg/dL 0.90 1.08     Results from last 72 hours   Lab Units 10/27/24  0558 10/27/24  0159 10/26/24  2202 10/26/24  1743   ALK PHOS U/L  --   --   --  89   AST U/L  --   --   --  26   ALT U/L  --   --   --  26   BILIRUBIN TOTAL mg/dL  --   --   --  1.1   ALBUMIN g/dL 3.6  --   --  3.7   PROTEIN TOTAL g/dL  --   --   --  7.7   LACTATE mmol/L  --  1.0   < >  --     < > = values in this interval not displayed.     Results from last 72 hours   Lab Units 10/27/24  0558 10/26/24  1743   GLUCOSE mg/dL 82 94     Results from last 72 hours   Lab Units 10/26/24  2236   LEUKOCYTES U  NEGATIVE   NITRITE U  NEGATIVE   WBC UR /HPF 6-10*   RBC UR HPF /HPF 1-2   BLOOD UR  NEGATIVE     No results found for: \"TR1\"  Lab Results   Component Value Date    BLOODCULT Loaded on Instrument - Culture in progress 10/27/2024    BLOODCULT Loaded on Instrument - Culture in progress 10/27/2024            Imaging   XR cervical spine complete 4-5 views  Narrative: Interpreted By:  Dylan Ely,  and Ignacio Snyder   STUDY:  XR CERVICAL SPINE COMPLETE 4-5 VIEWS; ;  10/27/2024 11:18 am      INDICATION:  Signs/Symptoms:concerns for cervical radiculopathy.      COMPARISON:  Cervical spine radiograph 11/14/2024      ACCESSION NUMBER(S):  [id number]      ORDERING CLINICIAN:  JUAN VASQUEZ      FINDINGS:  Cervical spine, five views.      There is straightening of the normal cervical lordosis.      Multilevel discogenic degenerative changes are noted, most prominent  at C5-6 with anterior osteophytic spurring and intervertebral disc  space narrowing. Vertebral body heights are similar compared to  prior. Mild multilevel right-sided neural foraminal " stenosis.. There  is uncovertebral hypertrophy which contributes to moderate C5-6 and  C6-7 left-sided neural foraminal narrowing.      Prevertebral soft tissues unremarkable.      Impression: 1. No acute fracture.  2. Discogenic degenerative changes most prominent at C5-6 with mild  loss of intervertebral disc height, and moderate left-sided neural  foraminal narrowing.          I personally reviewed the images/study and I agree with the findings  as stated by Claudio Short MD (Radiology Resident).      MACRO:  None      Signed by: Dylan Ely 10/27/2024 11:34 AM  Dictation workstation:   [id number]     No results found for this or any previous visit from the past 1095 days.     Encounter Date: 09/18/24   ECG 12 lead   Result Value    Ventricular Rate 67    Atrial Rate 67    IA Interval 130    QRS Duration 94    QT Interval 386    QTC Calculation(Bazett) 407    P Axis 72    R Axis 33    T Axis 46    QRS Count 11    Q Onset 219    P Onset 154    P Offset 214    T Offset 412    QTC Fredericia 400    Narrative    Normal sinus rhythm  Possible Left atrial enlargement  Nonspecific T wave abnormality  Abnormal ECG  When compared with ECG of 08-JAN-2024 05:02,  QT has shortened  Confirmed by Brent Patel ([id number]) on 9/20/2024 1:54:02 PM        EKG is normal sinus rhythm no acute ischemic changes    Assessment and Plan     COVID-19: Repeat test here is negative.  Patient is not hypoxic and doing well  -No need for remdesivir or steroids at this time.  -Sent to spirometer  -Will continue isolation at this time    Possible prostatitis:  -Continue Cipro  -Follow-up cultures  -Follow-up urology recommendations    Extremity numbness: It is not painful just intermittent loss of sensation.  Very unusual and I cannot place why he would have such he unusual distribution and such an intermittent nature.  Patient denied any significant work exposure that would cause excessive vibration, denies alcohol abuse, and  no diabetes.  Currently patient does not have the numbness on exam and his neurological exam is unremarkable  -Can check cervical plain films  -Per report prostate biopsy done was negative for prostate cancer.  Can consider advanced imaging of the C-spine although I cannot place where lesion would be that would explain his findings    Cardiovascular:  -Continue aspirin and statin  -Continue carvedilol and Entresto as well as furosemide  -Hold Aldactone for now in light of diarrhea and possible hyperkalemia  -Can hold empagliflozin at this time    Hyperkalemia: Unclear if this is factual as he was just 3.6 nearly 12 hours earlier.  No potassium was given  -Repeat stat renal function panel  -Check repeat EKG  -Hold Aldactone and possibly Entresto if potassium is legitimately elevated like this    GI: Patient denies any sick contacts or camping/well water or other abnormal exposures  -Continue PPI  -Checking stool studies including pathogen PCR and C. difficile.  Diarrhea may be a sequelae of the COVID    DVT prophylaxis    Physical therapy/Occupational Therapy when appropriate    Edinson Mathis MD     Of note the above was done with Dragon dictation system.  Note was proofread to minimize errors.

## 2024-10-27 NOTE — CONSULTS
Reason For Consult  Prostate abscess    History Of Present Illness  Matthieu Solis is a 64 y.o. male with hx CHF, smoking, NSTEMI on daily ASA who presents to the ED for flu like symptoms and weight loss x 14 days. Patient endorses diarrhea for the last 14 days with associated body aches and extremity numbness. Patient is also COVID positive. He had a prostate biopsy 10/8/24. Urology consulted for findings of possible prostate abscess vs prostatitis on imaging.    Patient has had urinary incontinence in addition to his diarrhea. He endorses numbness of the extremities that has been present for the past two weeks as well. He endorses some chills. Denies fever, nausea, vomiting, SOB, chest pain.     Past Medical History  He has a past medical history of Cataract, GERD (gastroesophageal reflux disease), Gout, and HTN (hypertension).    Surgical History  He has a past surgical history that includes Colonoscopy; Esophagogastroduodenoscopy; and Hernia repair (2015).     Social History  He reports that he has been smoking cigarettes. He started smoking about 50 years ago. He has a 50.8 pack-year smoking history. He has never used smokeless tobacco. He reports that he does not currently use alcohol. He reports that he does not use drugs.    Family History  Family History   Problem Relation Name Age of Onset    Other (CVA) Mother      Hypertension Mother      Other (CVA) Father      Hypertension Father          Allergies  Cinnamon, Doxycycline, and Penicillins    Review of Systems  12 pt ROS reviewed and negative except as in HPI     Physical Exam  VITALS: Vital signs reviewed and within normal limits  GEN: no acute distress  HEENT: NCAT; moist mucus membranes  EYES: Sclera anicteric, EOMI  PULM: Symmetric chest rise, no increased work of breathing on room air, no respiratory distress  CV: Regular rate and rhythm  ABD: Soft, non-tender, non-distended  : voiding freely, prostate smooth, firm without ttp; penis circ,  "testicle BL and palpable, slightly tender to palpation. No ttp to perineum  MSK: Moving all extremities spontaneously and to command  EXTR: No joint swelling, no significant peripheral edema  NEURO: A&O x 3, no focal neurologic deficits  PSYCH: Appropriate mood and affect       Last Recorded Vitals  Blood pressure 127/64, pulse 71, temperature 37.1 °C (98.8 °F), temperature source Tympanic, resp. rate 16, height 1.651 m (5' 5\"), weight 59.4 kg (131 lb), SpO2 97%.    Relevant Results  UA neg nitrite, neg LE   Cr 1.08  WBC 17.3    IMPRESSION:  Extensive vascular atherosclerotic disease. Chronic appearing  occlusion of the left common iliac, proximal internal iliac, external  iliac, and common femoral artery. There is new areas of heterogeneous  hypoenhancement of the inferior prostate with focal 1.5 cm areas of  peripheral enhancement in the posterolateral prostate. Clinical  correlation for prostatitis or prostate abscess is recommended.  Fluid-filled small bowel and ascending colon, without bowel  dilatation or wall thickening. Clinical correlation for enterocolitis  is recommended.    Assessment/Plan   64 y.o. male with hx CHF, smoking, NSTEMI on daily ASA who presents to the ED for flu like symptoms and weight loss x 14 days. Patient endorses diarrhea for the last 14 days with associated body aches and extremity numbness. Patient is also COVID positive. He had a prostate biopsy 10/8/24. Urology consulted for findings of possible prostate abscess vs prostatitis on imaging.    Plan  - Patient's clinical picture unlikely related to prostatic findings on CT.   - No surgical intervention.   - Would recommend empiric Abx to treat possible small prostatic abscesses. Admit to medicine for IV Abx, COVID treatment, numbness of extremities, diarrhea.   - can transition to 2 weeks of Abx at discharge (ciprofloxacin)  - Urology will follow    Reagan Ferreira MD  [id number]  "

## 2024-10-27 NOTE — PROGRESS NOTES
Emergency Department Transition of Care Note       Signout   I received Matthieu Solis in signout from Damien Barnes PA-C.  Please see the ED Provider Note for all HPI, PE and MDM up to the time of signout at 2300.  This is in addition to the primary record.    In brief Matthieu Solis is an 64 y.o. male with PMHx elevated PSA s/p prostate biopsy 10/8, HFrEF, prior NSTEMI on ASA who presents to the emergency department for 14 days of flulike symptoms.  He reports nonbloody diarrhea x 14 days, body aches.  2 days ago tested positive for COVID.  Workup prior to my arrival significant for leukocytosis with WBC 17.3, CT abdomen pelvis with concern for prostatitis versus prostate abscess.    At the time of signout we were awaiting:  Urology recommendations    ED Course & Medical Decision Making   Medical Decision Making:  This with urology, at that time they recommended covering patient with IV antibiotics.  Prostatitis and possible abscess, cultures were collected and patient was absolutely started on Rocephin to cover for E. coli.  Given his recent instrumentation, staph aureus infection is also possible, and patient would likely benefit from infectious disease consult while inpatient.    I discussed with the patient the results of his CT imaging and my concern for infection.  I recommended admission for IV antibiotics.  Patient was in agreement with this plan.    Discussed with admissions coordinator, and patient was admitted to medicine.  Patient remained hemodynamically stable throughout the remainder of my shift pending bed placement.    ED Course:  ED Course as of 10/27/24 0937   Sat Oct 26, 2024   1816 Interval: Baseline  Time: 1810  Person Administering Scale: Bolivar James DO      1a  Level of consciousness: 0=alert; keenly responsive  1b. LOC questions:  0=Performs both tasks correctly  1c. LOC commands: 0=Performs both tasks correctly  2.  Best Gaze: 0=normal  3. Visual: 0=No visual loss  4. Facial  Palsy: 0=Normal symmetric movement  5a. Motor left arm: 0=No drift, limb holds 90 (or 45) degrees for full 10 seconds  5b.  Motor right arm: 0=No drift, limb holds 90 (or 45) degrees for full 10 seconds  6a. motor left le=No drift, limb holds 90 (or 45) degrees for full 10 seconds  6b  Motor right le=No drift, limb holds 90 (or 45) degrees for full 10 seconds  7. Limb Ataxia: 0=Absent  8.  Sensory: 0=Normal; no sensory loss  9. Best Language:  0=No aphasia, normal  10. Dysarthria: 0=Normal  11. Extinction and Inattention: 0=No abnormality    Total:   0        VAN: VAN: Negative          [WJ]   1818 The patient seen and examined with the nurse practitioner/physician assistant. I personally saw the patient and made/approved the management plan and take responsibility for the patient management.    History: Patient is a 64-year-old male with history of systolic CHF, cigarette smoking, NSTEMI on baby aspirin presents to the emergency department for 2 weeks of diarrhea.  Patient has been having diarrhea associated with bodyaches upper and lower extremity weakness.  Patient states that he has been having approximately 5 loose stools per day described as watery.  No blood, melena, hematuria.  No recent antibiotic use.  No history of C. difficile.  Patient has been having bodyaches.  States that she was having urinary and bowel frequency.  The patient came in today for evaluation.  Has been able to walk.  Denies any new lower extremity swelling, expected weight gain, shortness of breath, chest pain.    Exam: Appears stated age male.  I did perform any NIH tests as documented elsewhere.  No lateralizing signs.  Patient had clear lung sounds bilaterally, regular rate and rhythm.  MDM: Patient presents to the emergency department for muscle aches, diarrhea, urinary frequency.  Differential diagnosis does include urinary tract infection, COVID infection, electrolyte disturbance from the diarrhea.  The patient was  complaining of numbness, though this seems to be in episodes.  He also feels generally weak.  I did perform aims score which was overall unremarkable as documented.  The patient is COVID-positive.  We will obtain labs in addition to ultrasound to ensure that there is no evidence of heart failure, dehydration.        I reviewed the patient last TTE from August 29, 2024 where the patient had a ejection fraction of 30 to 35% with global hypokinesis, diastolic failure    Will DO Jacob  Emergency Medicine [WJ]   1850 Troponin I, High Sensitivity  Believe a one-time troponin is appropriate as the patient currently has no active chest pain and no acute change in symptoms.  Delta troponin would not serve delta troponin would not have great utility unless abnormal [WJ]   1916 XR chest 1 view  IMPRESSION:  1.  Findings suggestive of mild interstitial edema.  2. No focal parenchymal consolidation, pleural effusion or  pneumothorax.   [HK]   1929 ECG reveals normal sinus rhythm with a rate of 82 bpm.  QT/QTc 368/429.  Normal axis and intervals.  Nonspecific ST abnormalities with artifact.  Compared to ECG obtained on September 18, 2024. [HK]   1939 WBC(!): 17.3  The patient has a new leukocytosis when compared to prior. [WJ]   2208 CT abdomen pelvis w IV contrast  IMPRESSION:  Extensive vascular atherosclerotic disease. Chronic appearing  occlusion of the left common iliac, proximal internal iliac, external  iliac, and common femoral artery. There is new areas of heterogeneous  hypoenhancement of the inferior prostate with focal 1.5 cm areas of  peripheral enhancement in the posterolateral prostate. Clinical  correlation for prostatitis or prostate abscess is recommended.  Fluid-filled small bowel and ascending colon, without bowel  dilatation or wall thickening. Clinical correlation for enterocolitis  is recommended.   [HK]      ED Course User Index  [HK] Damien Barnes PA-C  [WJ] Bolivar James DO         Diagnoses as of  10/27/24 0937   Acute prostatitis   Prostate abscess       Disposition   As a result of their workup, the patient will require admission to the hospital.  The patient was informed of his diagnosis.  The patient was given the opportunity to ask questions and I answered them. The patient agreed to be admitted to the hospital.      Patient seen and discussed with ED attending physician.    Alix Ibarra MD  Emergency Medicine

## 2024-10-28 LAB
25(OH)D3 SERPL-MCNC: 22 NG/ML (ref 30–100)
ALBUMIN SERPL BCP-MCNC: 3.3 G/DL (ref 3.4–5)
ANION GAP SERPL CALC-SCNC: 13 MMOL/L (ref 10–20)
ATRIAL RATE: 80 BPM
ATRIAL RATE: 93 BPM
BASOPHILS # BLD AUTO: 0.06 X10*3/UL (ref 0–0.1)
BASOPHILS NFR BLD AUTO: 0.5 %
BUN SERPL-MCNC: 13 MG/DL (ref 6–23)
CALCIUM SERPL-MCNC: 8.6 MG/DL (ref 8.6–10.6)
CHLORIDE SERPL-SCNC: 106 MMOL/L (ref 98–107)
CO2 SERPL-SCNC: 22 MMOL/L (ref 21–32)
CREAT SERPL-MCNC: 0.91 MG/DL (ref 0.5–1.3)
EGFRCR SERPLBLD CKD-EPI 2021: >90 ML/MIN/1.73M*2
EOSINOPHIL # BLD AUTO: 0.41 X10*3/UL (ref 0–0.7)
EOSINOPHIL NFR BLD AUTO: 3.6 %
ERYTHROCYTE [DISTWIDTH] IN BLOOD BY AUTOMATED COUNT: 13.2 % (ref 11.5–14.5)
GLUCOSE SERPL-MCNC: 92 MG/DL (ref 74–99)
HCT VFR BLD AUTO: 37.8 % (ref 41–52)
HGB BLD-MCNC: 11.9 G/DL (ref 13.5–17.5)
IMM GRANULOCYTES # BLD AUTO: 0.12 X10*3/UL (ref 0–0.7)
IMM GRANULOCYTES NFR BLD AUTO: 1.1 % (ref 0–0.9)
LYMPHOCYTES # BLD AUTO: 1.54 X10*3/UL (ref 1.2–4.8)
LYMPHOCYTES NFR BLD AUTO: 13.5 %
MCH RBC QN AUTO: 30.2 PG (ref 26–34)
MCHC RBC AUTO-ENTMCNC: 31.5 G/DL (ref 32–36)
MCV RBC AUTO: 96 FL (ref 80–100)
MONOCYTES # BLD AUTO: 1.55 X10*3/UL (ref 0.1–1)
MONOCYTES NFR BLD AUTO: 13.6 %
NEUTROPHILS # BLD AUTO: 7.69 X10*3/UL (ref 1.2–7.7)
NEUTROPHILS NFR BLD AUTO: 67.7 %
NRBC BLD-RTO: 0 /100 WBCS (ref 0–0)
P AXIS: -1 DEGREES
P AXIS: 84 DEGREES
P OFFSET: 194 MS
P OFFSET: 217 MS
P ONSET: 154 MS
P ONSET: 155 MS
PHOSPHATE SERPL-MCNC: 3.3 MG/DL (ref 2.5–4.9)
PLATELET # BLD AUTO: 497 X10*3/UL (ref 150–450)
POTASSIUM SERPL-SCNC: 3.7 MMOL/L (ref 3.5–5.3)
PR INTERVAL: 116 MS
PR INTERVAL: 132 MS
Q ONSET: 213 MS
Q ONSET: 220 MS
QRS COUNT: 14 BEATS
QRS COUNT: 15 BEATS
QRS DURATION: 104 MS
QRS DURATION: 82 MS
QT INTERVAL: 390 MS
QT INTERVAL: 514 MS
QTC CALCULATION(BAZETT): 449 MS
QTC CALCULATION(BAZETT): 639 MS
QTC FREDERICIA: 429 MS
QTC FREDERICIA: 595 MS
R AXIS: -44 DEGREES
R AXIS: 82 DEGREES
RBC # BLD AUTO: 3.94 X10*6/UL (ref 4.5–5.9)
SODIUM SERPL-SCNC: 137 MMOL/L (ref 136–145)
T AXIS: -6 DEGREES
T AXIS: 70 DEGREES
T OFFSET: 415 MS
T OFFSET: 470 MS
VANCOMYCIN SERPL-MCNC: 5.6 UG/ML (ref 5–20)
VENTRICULAR RATE: 80 BPM
VENTRICULAR RATE: 93 BPM
WBC # BLD AUTO: 11.4 X10*3/UL (ref 4.4–11.3)

## 2024-10-28 PROCEDURE — 85025 COMPLETE CBC W/AUTO DIFF WBC: CPT

## 2024-10-28 PROCEDURE — 80202 ASSAY OF VANCOMYCIN: CPT

## 2024-10-28 PROCEDURE — 99232 SBSQ HOSP IP/OBS MODERATE 35: CPT | Performed by: STUDENT IN AN ORGANIZED HEALTH CARE EDUCATION/TRAINING PROGRAM

## 2024-10-28 PROCEDURE — 84295 ASSAY OF SERUM SODIUM: CPT

## 2024-10-28 PROCEDURE — 2500000004 HC RX 250 GENERAL PHARMACY W/ HCPCS (ALT 636 FOR OP/ED)

## 2024-10-28 PROCEDURE — 36415 COLL VENOUS BLD VENIPUNCTURE: CPT

## 2024-10-28 PROCEDURE — 2500000001 HC RX 250 WO HCPCS SELF ADMINISTERED DRUGS (ALT 637 FOR MEDICARE OP)

## 2024-10-28 PROCEDURE — 1200000002 HC GENERAL ROOM WITH TELEMETRY DAILY

## 2024-10-28 RX ORDER — ACETAMINOPHEN 325 MG/1
650 TABLET ORAL EVERY 6 HOURS PRN
Status: DISCONTINUED | OUTPATIENT
Start: 2024-10-28 | End: 2024-10-29 | Stop reason: HOSPADM

## 2024-10-28 RX ORDER — SPIRONOLACTONE 25 MG/1
12.5 TABLET ORAL DAILY
Status: CANCELLED | OUTPATIENT
Start: 2024-10-29

## 2024-10-28 RX ORDER — CIPROFLOXACIN 500 MG/1
500 TABLET ORAL EVERY 12 HOURS SCHEDULED
Status: DISCONTINUED | OUTPATIENT
Start: 2024-10-28 | End: 2024-10-29 | Stop reason: HOSPADM

## 2024-10-28 RX ORDER — CHOLECALCIFEROL (VITAMIN D3) 25 MCG
1000 TABLET ORAL DAILY
Status: DISCONTINUED | OUTPATIENT
Start: 2024-10-28 | End: 2024-10-28

## 2024-10-28 RX ORDER — ACETAMINOPHEN 325 MG/1
650 TABLET ORAL EVERY 6 HOURS PRN
Status: CANCELLED | OUTPATIENT
Start: 2024-10-28

## 2024-10-28 RX ORDER — CHOLECALCIFEROL (VITAMIN D3) 25 MCG
1000 TABLET ORAL DAILY
Status: DISCONTINUED | OUTPATIENT
Start: 2024-10-29 | End: 2024-10-29 | Stop reason: HOSPADM

## 2024-10-28 RX ADMIN — PANTOPRAZOLE SODIUM 40 MG: 40 TABLET, DELAYED RELEASE ORAL at 08:23

## 2024-10-28 RX ADMIN — SACUBITRIL AND VALSARTAN 1 TABLET: 49; 51 TABLET, FILM COATED ORAL at 20:47

## 2024-10-28 RX ADMIN — ASPIRIN 81 MG: 81 TABLET, COATED ORAL at 08:23

## 2024-10-28 RX ADMIN — ENOXAPARIN SODIUM 40 MG: 100 INJECTION SUBCUTANEOUS at 08:24

## 2024-10-28 RX ADMIN — CIPROFLOXACIN HYDROCHLORIDE 500 MG: 500 TABLET, FILM COATED ORAL at 20:47

## 2024-10-28 RX ADMIN — ATORVASTATIN CALCIUM 80 MG: 80 TABLET, FILM COATED ORAL at 08:23

## 2024-10-28 RX ADMIN — CARVEDILOL 12.5 MG: 12.5 TABLET, FILM COATED ORAL at 08:23

## 2024-10-28 RX ADMIN — CARVEDILOL 12.5 MG: 12.5 TABLET, FILM COATED ORAL at 16:13

## 2024-10-28 RX ADMIN — Medication 1000 UNITS: at 10:47

## 2024-10-28 RX ADMIN — CIPROFLOXACIN 400 MG: 400 INJECTION, SOLUTION INTRAVENOUS at 09:06

## 2024-10-28 RX ADMIN — PANTOPRAZOLE SODIUM 40 MG: 40 TABLET, DELAYED RELEASE ORAL at 16:13

## 2024-10-28 RX ADMIN — ACETAMINOPHEN 650 MG: 325 TABLET ORAL at 16:13

## 2024-10-28 ASSESSMENT — PAIN SCALES - GENERAL
PAINLEVEL_OUTOF10: 3
PAINLEVEL_OUTOF10: 2
PAINLEVEL_OUTOF10: 4

## 2024-10-28 ASSESSMENT — PAIN - FUNCTIONAL ASSESSMENT: PAIN_FUNCTIONAL_ASSESSMENT: 0-10

## 2024-10-28 NOTE — PROGRESS NOTES
Matthieu Solis    5013/5013-A    INTERNAL MEDICINE PROGRESS NOTE - HOSP DAY: 1 (10/28/2024)  Matthieu Solis is a 64 y.o. male with PMH of elevated PSA, CHF (EF 9/2024 30-35%), smoker, NSTEMI on daily aspirin presenting with flulike symptoms and body aches for 2 weeks. He also has non bloody diarrhea for about 2 weeks. In addition, he also endorses upper and lower extremity numbness that he has had for some time.     INTERVAL HISTORY:   NAEO. Denies cp, SOB, nausea, vomiting, weakness.     Subjective    Doing well overall. Still some rectal pain but improving. He is concerned most about when he can return to physical activity.       Objective     Visit Vitals  /62   Pulse 73   Temp 36.7 °C (98.1 °F)   Resp 24      O2SAT: 96%     Temp:  [36.1 °C (97 °F)-37.2 °C (99 °F)] 36.7 °C (98.1 °F)  Heart Rate:  [61-85] 73  Resp:  [13-24] 24  BP: (110-123)/(62-74) 110/62    Intake/Output Summary (Last 24 hours) at 10/28/2024 1624  Last data filed at 10/28/2024 1546  Gross per 24 hour   Intake 420 ml   Output --   Net 420 ml         Exam  Gen: not ill appearing, NAD  CV: RRR no mgr  Lungs: CTAB  Abd: Lower abdomen is tender to palpation.    Extremities: no LE edema   Neuro: Aox3.      CBC CMP   Results from last 72 hours   Lab Units 10/28/24  1153   WBC AUTO x10*3/uL 11.4*   HEMOGLOBIN g/dL 11.9*   PLATELETS AUTO x10*3/uL 497*     Results from last 72 hours   Lab Units 10/28/24  0800   CREATININE mg/dL 0.91   BUN mg/dL 13   SODIUM mmol/L 137   POTASSIUM mmol/L 3.7   CHLORIDE mmol/L 106   CO2 mmol/L 22   GLUCOSE mg/dL 92           Imaging:   XR cervical spine complete 4-5 views    Result Date: 10/27/2024  1. No acute fracture. 2. Discogenic degenerative changes most prominent at C5-6 with mild loss of intervertebral disc height, and moderate left-sided neural foraminal narrowing.     I personally reviewed the images/study and I agree with the findings as stated by Claudio Short MD (Radiology Resident).    MACRO: None   Signed by: Dylan Ely 10/27/2024 11:34 AM Dictation workstation:   [id number]    CT abdomen pelvis w IV contrast    Result Date: 10/26/2024  Extensive vascular atherosclerotic disease. Chronic appearing occlusion of the left common iliac, proximal internal iliac, external iliac, and common femoral artery. There is new areas of heterogeneous hypoenhancement of the inferior prostate with focal 1.5 cm areas of peripheral enhancement in the posterolateral prostate. Clinical correlation for prostatitis or prostate abscess is recommended. Fluid-filled small bowel and ascending colon, without bowel dilatation or wall thickening. Clinical correlation for enterocolitis is recommended.   MACRO: None.   Signed by: Jesu Estrella 10/26/2024 9:43 PM Dictation workstation:   [id number]    XR chest 1 view    Result Date: 10/26/2024  1.  Findings suggestive of mild interstitial edema. 2. No focal parenchymal consolidation, pleural effusion or pneumothorax.   I personally reviewed the images/study and I agree with the findings as stated by Claudio Short MD (Radiology Resident).   MACRO: None   Signed by: Dylan Ely 10/26/2024 6:54 PM Dictation workstation:   [id number]    XR lumbar spine complete 4+ views    Result Date: 10/23/2024    Facet arthrosis at L4-5 with a 5 mm anterolisthesis.   Minimal degenerative changes at other levels.   Signed by: Lew Hernandez 10/23/2024 2:43 PM Dictation workstation:   [id number]          Assessment/Plan      Matthieu Solis is a 64 y.o. male with PMH of elevated PSA, CHF (EF 9/2024 30-35%), smoker, NSTEMI on daily aspirin presenting with myalgia, fevers, chills, and diarrhea in the setting of a positive home COVID test, as well as prostatitis vs prostate abscess s/p prostate bx. In addition, he has intermittent numbness of b/l U/L extremities.     #prostatitis vs prostatic abscess  S/p prostate bx on 10/8.  Prostatitis was considered due to recent  instrumentation to the prostate, groin pain, pain (per pt) on TEJAS, markedly elevated PSA   Prostatic abscess was considered, due to elevated WBC on admission, and findings on imaging that are suggestive of small prostatic abscesses.  Plan  Oral ciprofloxacin, to continue on dc   Blood cx pending      #B/L upper and lower Extremity numbness of unclear etiology  #Vitamin D deficiency   Includes elements of pain as well as numbness. Distribution and intermittent nature of symptoms is not consistent with neuropathies.   There was consideration for the possibility of mets from prostate cx to vertebrae causing neural impingement, however surgical path report was benign.   Cervical XR showed degenerative changes at C5-C6, with foraminal narrowing  Chronically vitamin D deficient, which can cause peripheral neuropathy   Plan  Replete Vitamin D   Tylenol 650mg g9eaito for pain      #hyperkalemia, resolved    Markedly hemolysed sample per lab  EKG was nsr with no acute abnormalities   Confirmed with repeat K of 3.5   Plan  Monitor RFP     #Diarrhea   Unclear of etiology. Imaging was consistent with enterocolitis. Seems to be improved with only 1 BM in the last 24 hrs, albeit liquid.   Plan  Follow stool studies (pathogen PCR, C. Difficile, COVID)   Monitor RFP       Chronic, stable:      #CHF  EF 9/2024 30-35%  Plan  Restart Entresto  Continue carvedilol, furosemide  Hold spironolactone   Hold empagliflozin due to concerns for prostatic infection     #HTN  #HLD  Plan  Continue aspirin, atorvastatin       Dispo: likely tomorrow  F: prn  E: replete when K<4, Mg<2, Phos<2.5  N: regular  A: pIV L hand     DVT ppx: Lovenox  GI ppx: ppi   Code status: Full Code   NOK: Maria Eugenia Tony, spouse, [phone]    Yair Tang, MS4  Acting Intern

## 2024-10-28 NOTE — CARE PLAN
The patient's goals for the shift include IV antibiotics. No reports of discomfort. Will continue to monitor.     Problem: Fall/Injury  Goal: Not fall by end of shift  Outcome: Progressing  Goal: Be free from injury by end of the shift  Outcome: Progressing  Goal: Verbalize understanding of personal risk factors for fall in the hospital  Outcome: Progressing  Goal: Verbalize understanding of risk factor reduction measures to prevent injury from fall in the home  Outcome: Progressing  Goal: Use assistive devices by end of the shift  Outcome: Progressing  Goal: Pace activities to prevent fatigue by end of the shift  Outcome: Progressing

## 2024-10-28 NOTE — PROGRESS NOTES
Transitional Care Coordination Progress Note:  Patient discussed during interdisciplinary rounds.  Team members present: MD, TCC  Plan per Medical/Surgical team: Pt presenting with a myriad of complaints including generalized myalgias/body aches, dry cough, previous subjective fevers and chills been up for days now along with now diarrhea over the past 2 weeks. Per medical team plan to transition IV Cipro to oral, no home going needs anticipated at time of discharge.  Payer: O  Status: Inpatient  Discharge disposition: Home  Potential Barriers: none  ADOD: 10/29  Care coordinator will continue to follow for discharge planning needs.     Roe Herrera RN  Transitional Care Coordinator (TCC)  [phone] or [phone]

## 2024-10-28 NOTE — PROGRESS NOTES
Vancomycin Dosing by Pharmacy- Cessation of Therapy    Consult to pharmacy for vancomycin dosing has been discontinued by the prescriber, pharmacy will sign off at this time.    Please call pharmacy if there are further questions or re-enter a consult if vancomycin is resumed.     Sandra Velásquez [facility]

## 2024-10-29 ENCOUNTER — PHARMACY VISIT (OUTPATIENT)
Dept: PHARMACY | Facility: CLINIC | Age: 64
End: 2024-10-29
Payer: COMMERCIAL

## 2024-10-29 VITALS
OXYGEN SATURATION: 98 % | TEMPERATURE: 97.7 F | HEART RATE: 72 BPM | BODY MASS INDEX: 21.83 KG/M2 | SYSTOLIC BLOOD PRESSURE: 98 MMHG | DIASTOLIC BLOOD PRESSURE: 56 MMHG | WEIGHT: 131 LBS | RESPIRATION RATE: 17 BRPM | HEIGHT: 65 IN

## 2024-10-29 LAB
ALBUMIN SERPL BCP-MCNC: 3.7 G/DL (ref 3.4–5)
ANION GAP SERPL CALC-SCNC: 16 MMOL/L (ref 10–20)
BUN SERPL-MCNC: 12 MG/DL (ref 6–23)
CALCIUM SERPL-MCNC: 9.1 MG/DL (ref 8.6–10.6)
CHLORIDE SERPL-SCNC: 104 MMOL/L (ref 98–107)
CO2 SERPL-SCNC: 23 MMOL/L (ref 21–32)
CREAT SERPL-MCNC: 0.93 MG/DL (ref 0.5–1.3)
EGFRCR SERPLBLD CKD-EPI 2021: >90 ML/MIN/1.73M*2
GLUCOSE SERPL-MCNC: 95 MG/DL (ref 74–99)
PHOSPHATE SERPL-MCNC: 3.2 MG/DL (ref 2.5–4.9)
POTASSIUM SERPL-SCNC: 3.9 MMOL/L (ref 3.5–5.3)
SODIUM SERPL-SCNC: 139 MMOL/L (ref 136–145)

## 2024-10-29 PROCEDURE — 99239 HOSP IP/OBS DSCHRG MGMT >30: CPT | Performed by: STUDENT IN AN ORGANIZED HEALTH CARE EDUCATION/TRAINING PROGRAM

## 2024-10-29 PROCEDURE — 2500000001 HC RX 250 WO HCPCS SELF ADMINISTERED DRUGS (ALT 637 FOR MEDICARE OP)

## 2024-10-29 PROCEDURE — 36415 COLL VENOUS BLD VENIPUNCTURE: CPT

## 2024-10-29 PROCEDURE — 80069 RENAL FUNCTION PANEL: CPT

## 2024-10-29 PROCEDURE — RXMED WILLOW AMBULATORY MEDICATION CHARGE

## 2024-10-29 PROCEDURE — 2500000004 HC RX 250 GENERAL PHARMACY W/ HCPCS (ALT 636 FOR OP/ED)

## 2024-10-29 PROCEDURE — 87328 CRYPTOSPORIDIUM AG IA: CPT

## 2024-10-29 PROCEDURE — 87329 GIARDIA AG IA: CPT

## 2024-10-29 PROCEDURE — 87506 IADNA-DNA/RNA PROBE TQ 6-11: CPT

## 2024-10-29 RX ORDER — ACETAMINOPHEN 325 MG/1
650 TABLET ORAL EVERY 6 HOURS PRN
Qty: 20 TABLET | Refills: 0 | Status: SHIPPED | OUTPATIENT
Start: 2024-10-29

## 2024-10-29 RX ORDER — CIPROFLOXACIN 250 MG/1
500 TABLET, FILM COATED ORAL 2 TIMES DAILY
Qty: 48 TABLET | Refills: 0 | Status: SHIPPED | OUTPATIENT
Start: 2024-10-29 | End: 2024-11-10

## 2024-10-29 RX ORDER — CHOLECALCIFEROL (VITAMIN D3) 25 MCG
1000 TABLET ORAL DAILY
Qty: 30 TABLET | Refills: 1 | Status: SHIPPED | OUTPATIENT
Start: 2024-10-30 | End: 2024-12-29

## 2024-10-29 RX ADMIN — SACUBITRIL AND VALSARTAN 1 TABLET: 49; 51 TABLET, FILM COATED ORAL at 08:15

## 2024-10-29 RX ADMIN — Medication 1000 UNITS: at 08:14

## 2024-10-29 RX ADMIN — ASPIRIN 81 MG: 81 TABLET, COATED ORAL at 08:15

## 2024-10-29 RX ADMIN — PANTOPRAZOLE SODIUM 40 MG: 40 TABLET, DELAYED RELEASE ORAL at 08:15

## 2024-10-29 RX ADMIN — CARVEDILOL 12.5 MG: 12.5 TABLET, FILM COATED ORAL at 08:15

## 2024-10-29 RX ADMIN — CIPROFLOXACIN HYDROCHLORIDE 500 MG: 500 TABLET, FILM COATED ORAL at 08:15

## 2024-10-29 RX ADMIN — ENOXAPARIN SODIUM 40 MG: 100 INJECTION SUBCUTANEOUS at 06:23

## 2024-10-29 RX ADMIN — ATORVASTATIN CALCIUM 80 MG: 80 TABLET, FILM COATED ORAL at 08:14

## 2024-10-29 RX ADMIN — ACETAMINOPHEN 650 MG: 325 TABLET ORAL at 08:15

## 2024-10-29 ASSESSMENT — PAIN SCALES - GENERAL
PAINLEVEL_OUTOF10: 0 - NO PAIN
PAINLEVEL_OUTOF10: 8

## 2024-10-29 ASSESSMENT — PAIN DESCRIPTION - LOCATION: LOCATION: PENIS

## 2024-10-29 ASSESSMENT — ACTIVITIES OF DAILY LIVING (ADL): LACK_OF_TRANSPORTATION: NO

## 2024-10-29 NOTE — DISCHARGE SUMMARY
Discharge Diagnosis  Acute prostatitis    Issues Requiring Follow-Up  Continue oral ciprofloxacin for full course of 14 days total (2 days completed, 12 left)  Follow up with PCP, scheduled, to discuss further workup for intermittent numbness and pain in bilateral upper and lower extremities. CT spine suggested.   Follow up with cardiology to discuss when it is safe to resume the empagliflozin     Discharge Meds     Medication List      START taking these medications     acetaminophen 325 mg tablet; Commonly known as: Tylenol; Take 2 tablets   (650 mg) by mouth every 6 hours if needed for moderate pain (4 - 6) for up   to 10 doses.   cholecalciferol 25 MCG (1000 UT) tablet; Commonly known as: Vitamin D-3;   Take 1 tablet (1,000 Units) by mouth once daily.; Start taking on: October 30, 2024   ciprofloxacin 250 mg tablet; Commonly known as: Cipro; Take 2 tablets   (500 mg) by mouth 2 times a day for 12 days.     CONTINUE taking these medications     aspirin 81 mg EC tablet; Take 1 tablet (81 mg) by mouth once daily.   atorvastatin 80 mg tablet; Commonly known as: Lipitor; Take 1 tablet (80   mg) by mouth once daily.   carvedilol 12.5 mg tablet; Commonly known as: Coreg; Take 1 tablet (12.5   mg) by mouth 2 times daily (morning and late afternoon).   Entresto 49-51 mg tablet; Generic drug: sacubitriL-valsartan; Take 1   tablet by mouth 2 times a day.   furosemide 20 mg tablet; Commonly known as: Lasix   meloxicam 15 mg tablet; Commonly known as: Mobic; Take 1 tablet (15 mg)   by mouth once daily as needed for mild pain (1 - 3).   nicotine polacrilex 4 mg lozenge; Commonly known as: Commit   omeprazole 20 mg DR capsule; Commonly known as: PriLOSEC; Take 1 capsule   (20 mg) by mouth 2 times a day before meals for 14 days. To be taken 30-60   minutes prior to breakfast and dinner. Do not crush or chew.   spironolactone 25 mg tablet; Commonly known as: Aldactone; Take 0.5   tablets (12.5 mg) by mouth once daily.     STOP  taking these medications     Jardiance 10 mg; Generic drug: empagliflozin       Test Results Pending At Discharge  Pending Labs       Order Current Status    Cryptosporidium Antigen, Stool In process    Giardia Antigen In process    Ova and Parasite Examination In process    Ova/Para + Giardia/Cryptosporidium Antigen In process    Stool Pathogen Panel, PCR In process    Blood Culture Preliminary result    Blood Culture Preliminary result            Hospital Course  Matthieu Solis is a 64 y.o. male with past medical history of elevated PSA, CHF (EF 9/2024 30-35%), smoker, NSTEMI on daily aspirin presenting with flulike symptoms and body aches for 2 weeks. He also has non bloody diarrhea for about 2 weeks. In addition, he also endorses upper and lower extremity numbness that he has had for some time. Of note, he tested positive for COVID 2 days prior to admission.     Urological history: PSA 8 months ago was 4.37. Referred to urology. Brother with a history of prostate cancer. Repeat PSA in 9/24 was 7.32, prompting biopsy on 10/8/24. Results were benign prostatic tissue in all samples taken per surgical pathology report. He endorses pain with defecation, but denies presence of blood in stool or urine.     In the ED, workup was significant for leukocytosis with WBC 17.3, BNP: 102  Trop: negative   UA: neg nitrite, LE  Cr: 1.08  Lactate: 1.0    Imaging: - CT AP showed chronic iliac vessel disease, and was suggestive of prostatitis vs prostate abscess  - CXR showed mild interstitial edema with no acute process.   - POCUS: no bladder distention and baseline cardiac function    IV ceftriaxone was started. Urology was consulted, and was reccommended to continue empiric antibiotic treatment. Vancomycin was added to regimen for Gram+ coverage. Blood cx pending at this time.      He was recommended for admission for IV Abx for prostatitis vs prostatic abscess, COVID, numbness, and diarrhea.     ---     Prostatitis vs  prostatic abscess: Urology was consulted. He was started on oral ciprofloxcin per urology recs, with improvement of his symptoms and WBC count down trending to 11.9. He will be discharged with his remaining doses of cipro.     B/L upper and lower extremity numbness: He was evaluated for numbness. There are elements of pain as well as numbness. Distribution and intermittent nature of symptoms is unusual. There was consideration for the possibility of mets from prostate cx to vertebrae causing neural impingement, however surgical path report was benign. Vitamin D was found to be 22, so he was started on supplements. B12 was wnl, A1c wnl. He was given Tylenol 650mg [id number] for pain, with symptomatic relief. Will need to be evaluated outpatient for a more comprehensive examination including CT spine.     Hyperkalemia was detected during his visit, but this was determined to be a lab artifact from hemolyzed sample. EKG was nsr with no t wave abnormalities.     During his admission, his diarrhea has improved. He endorsed 2 phil-formed BM's on 10/29. Likely 2/2 COVID, stool pathogen panel is pending. He will need to follow up with PCP if issue persists.     For his HFrEF, his sprironolactone and empagliflozin were held initially, but will be restarted upon discharge.                                   Pertinent Physical Exam At Time of Discharge  Physical Exam  Constitutional:       Appearance: He is not ill-appearing.   HENT:      Head: Normocephalic and atraumatic.   Cardiovascular:      Rate and Rhythm: Normal rate and regular rhythm.      Heart sounds: No murmur heard.     No friction rub. No gallop.   Pulmonary:      Effort: Pulmonary effort is normal. No respiratory distress.   Abdominal:      General: Abdomen is flat. Bowel sounds are normal. There is no distension.      Palpations: Abdomen is soft.   Musculoskeletal:      Right lower leg: No edema.      Left lower leg: No edema.   Neurological:      General: No focal  deficit present.      Mental Status: He is alert.         Outpatient Follow-Up  Future Appointments   Date Time Provider Department Center   10/30/2024  4:15 PM Tomás Donaldson MD [id number] [facility]   10/31/2024  8:00 AM Charito Diaz, APRN-CNP [id number] [facility]   11/1/2024  1:30 PM Rahel Waldron, PharmD [id number] [facility]   11/6/2024 10:30 AM Aurelio Zuniga MD [id number] [facility]   11/20/2024 12:00 PM Eddie Lewis, PT [id number] [facility]   11/21/2024 10:30 AM Dunia Espino, MAO [id number] [facility]   12/4/2024 11:00 AM [facility] VASC 4 [id number] [facility] Rad [facility]   12/20/2024  1:00 PM Kristin Kauffman MD [id number] [facility]   1/20/2025  3:00 PM Inna Lau MD [id number] [facility]   1/22/2025 10:30 AM Kathy Hanna MD [id number] [facility]   2/3/2025 11:00 AM Susan Prasad MD [id number] [facility]   3/10/2025  1:00 PM Aurelio Zuniga MD [id number] [facility]   4/30/2025  9:00 AM Francia Ramsay, PhD [id number] [facility]

## 2024-10-29 NOTE — NURSING NOTE
Discharge Note:  This RN went over discharge instructions with patient, patient verbalized understanding. IV's taken out intact. Patient has all belongings including M2B, cell phone, cell phone , shoes, and clothing. Patient declined transport and walked off unit. Disposition is home with [facility] service.    Estrella Crowell RN

## 2024-10-29 NOTE — HOSPITAL COURSE
Matthieu Solis is a 64 y.o. male with past medical history of elevated PSA, CHF (EF 9/2024 30-35%), smoker, NSTEMI on daily aspirin presenting with flulike symptoms and body aches for 2 weeks. He also has non bloody diarrhea for about 2 weeks. In addition, he also endorses upper and lower extremity numbness that he has had for some time. Of note, he tested positive for COVID 2 days prior to admission.     Urological history: PSA 8 months ago was 4.37. Referred to urology. Brother with a history of prostate cancer. Repeat PSA in 9/24 was 7.32, prompting biopsy on 10/8/24. Results were benign prostatic tissue in all samples taken per surgical pathology report. He endorses pain with defecation, but denies presence of blood in stool or urine.     In the ED, workup was significant for leukocytosis with WBC 17.3, BNP: 102  Trop: negative   UA: neg nitrite, LE  Cr: 1.08  Lactate: 1.0    Imaging: - CT AP showed chronic iliac vessel disease, and was suggestive of prostatitis vs prostate abscess  - CXR showed mild interstitial edema with no acute process.   - POCUS: no bladder distention and baseline cardiac function    IV ceftriaxone was started. Urology was consulted, and was reccommended to continue empiric antibiotic treatment. Vancomycin was added to regimen for Gram+ coverage. Blood cx pending at this time.      He was recommended for admission for IV Abx for prostatitis vs prostatic abscess, COVID, numbness, and diarrhea.     ---     Prostatitis vs prostatic abscess: Urology was consulted. He was started on oral ciprofloxcin per urology recs, with improvement of his symptoms and WBC count down trending to 11.9. He will be discharged with his remaining doses of cipro.     B/L upper and lower extremity numbness: He was evaluated for numbness. There are elements of pain as well as numbness. Distribution and intermittent nature of symptoms is unusual. There was consideration for the possibility of mets from prostate cx to  vertebrae causing neural impingement, however surgical path report was benign. Vitamin D was found to be 22, so he was started on supplements. B12 was wnl, A1c wnl. He was given Tylenol 650mg q1hbylp for pain, with symptomatic relief. Will need to be evaluated outpatient for a more comprehensive examination including CT spine.     COVID: he was confirmed with PCR test to be negative.     Hyperkalemia was detected during his visit, but this was determined to be a lab artifact from hemolyzed sample. EKG was nsr with no t wave abnormalities.     During his admission, his diarrhea has improved. He endorsed 2 semi-formed BM's on 10/29. Likely 2/2 COVID, stool pathogen panel is pending. He will need to follow up with PCP if issue persists.     For his HFrEF, his sprironolactone and empagliflozin were held initially. Due to concern for prostatic infection, the empagliuflozin was held for now. He is to follow up with cardiology to assess resuming this medication.

## 2024-10-29 NOTE — DISCHARGE INSTRUCTIONS
Mr. Solis,    You were admitted to the hospital because you had a recent prostate biopsy and symptoms that suggested an infection. You also had diarrhea and upper and lower extremity body numbness, as well as COVID.     In the hospital, we started you on some antibiotics for a potential infection of your prostate. You responded well to the medications, and we will discharge you with the full course of antibiotics to take to ensure that it completely resolves.     For your numbness and pain, we looked at a variety of causes including vitamin deficiency, anatomical issues in the neck and spine, and others. We recommend that you discuss these findings with your PCP and see what else can be done outpatient to learn what may be causing this.     For your history of heart failure, he held your Jardiance while you were here because it can sometimes worsen urinary infections. Please make sure to follow up with cardiology outpatient to discuss when would be a safe time to restart this medication.     It was a pleasure to be involved in your care. We wish you all the best.     - [facility]         You have been enrolled in the [facility] program. This gives you access to 24/7 nurse, pharmacist, and virtual physician visits, as well as many other resources. They will call you to help you navigate through the program and resources. If they do not call within 3 days of your discharge, feel free to call the number listed below. See below for details and for a complete list of resources.     *Contact the [facility]'s [facility] Nurse 24/7 for enrollment or for any other questions at [phone]*  -The [facility] provides a team of multidisciplinary healthcare professionals to guide you from your transition from the hospital to home    You will be followed for 30 days upon discharge from the hospital, and the following will be provided to you:  - 24/7  Nurse access  - Daily Nurse calls  - Pharmacy medication reconciliation & education  - Physician virtual visits  - Medic visits for acute assessments  - Specialty virtual visits  - Social work interventions to connect you with community resources  - Follow up appointment scheduling    This is a service that is provided at NO COST to you    This does NOT replace the transitional care visits or any other routine post-discharge hospital service

## 2024-10-29 NOTE — PROGRESS NOTES
Matthieu Solis    [id number]    INTERNAL MEDICINE PROGRESS NOTE - HOSP DAY: 2 (10/29/2024)  Matthieu Solis is a 64 y.o. male with PMH of elevated PSA, CHF (EF 9/2024 30-35%), smoker, NSTEMI on daily aspirin presenting with flulike symptoms and body aches for 2 weeks. He also has non bloody diarrhea for about 2 weeks. In addition, he also endorses upper and lower extremity numbness that he has had for some time.     INTERVAL HISTORY:   NAEO. Denies cp, SOB, nausea, vomiting, weakness.     Subjective    Doing well overall. Still some rectal pain but improving. He is concerned most about when he can return to physical activity.       Objective     Visit Vitals  /63   Pulse 83   Temp 36.9 °C (98.4 °F)   Resp 16      O2SAT: 93%     Temp:  [36.1 °C (97 °F)-36.9 °C (98.4 °F)] 36.9 °C (98.4 °F)  Heart Rate:  [67-83] 83  Resp:  [16-24] 16  BP: ()/(53-74) 114/63    Intake/Output Summary (Last 24 hours) at 10/29/2024 0704  Last data filed at 10/28/2024 1546  Gross per 24 hour   Intake 420 ml   Output --   Net 420 ml         Exam  Gen: not ill appearing, NAD  CV: RRR no mgr  Lungs: CTAB  Abd: Lower abdomen is tender to palpation.    Extremities: no LE edema   Neuro: Aox3.      CBC CMP   Results from last 72 hours   Lab Units 10/28/24  1153   WBC AUTO x10*3/uL 11.4*   HEMOGLOBIN g/dL 11.9*   PLATELETS AUTO x10*3/uL 497*     Results from last 72 hours   Lab Units 10/28/24  0800   CREATININE mg/dL 0.91   BUN mg/dL 13   SODIUM mmol/L 137   POTASSIUM mmol/L 3.7   CHLORIDE mmol/L 106   CO2 mmol/L 22   GLUCOSE mg/dL 92           Imaging:   XR cervical spine complete 4-5 views    Result Date: 10/27/2024  1. No acute fracture. 2. Discogenic degenerative changes most prominent at C5-6 with mild loss of intervertebral disc height, and moderate left-sided neural foraminal narrowing.     I personally reviewed the images/study and I agree with the findings as stated by Claudio Short MD (Radiology Resident).    MACRO: None   Signed by: Dylan Ely 10/27/2024 11:34 AM Dictation workstation:   [id number]    CT abdomen pelvis w IV contrast    Result Date: 10/26/2024  Extensive vascular atherosclerotic disease. Chronic appearing occlusion of the left common iliac, proximal internal iliac, external iliac, and common femoral artery. There is new areas of heterogeneous hypoenhancement of the inferior prostate with focal 1.5 cm areas of peripheral enhancement in the posterolateral prostate. Clinical correlation for prostatitis or prostate abscess is recommended. Fluid-filled small bowel and ascending colon, without bowel dilatation or wall thickening. Clinical correlation for enterocolitis is recommended.   MACRO: None.   Signed by: Jesu Estrella 10/26/2024 9:43 PM Dictation workstation:   [id number]    XR chest 1 view    Result Date: 10/26/2024  1.  Findings suggestive of mild interstitial edema. 2. No focal parenchymal consolidation, pleural effusion or pneumothorax.   I personally reviewed the images/study and I agree with the findings as stated by Claudio Short MD (Radiology Resident).   MACRO: None   Signed by: Dylan Ely 10/26/2024 6:54 PM Dictation workstation:   [id number]    XR lumbar spine complete 4+ views    Result Date: 10/23/2024    Facet arthrosis at L4-5 with a 5 mm anterolisthesis.   Minimal degenerative changes at other levels.   Signed by: Lew Hernandez 10/23/2024 2:43 PM Dictation workstation:   [id number]          Assessment/Plan      Matthieu Solis is a 64 y.o. male with PMH of elevated PSA, CHF (EF 9/2024 30-35%), smoker, NSTEMI on daily aspirin presenting with myalgia, fevers, chills, and diarrhea in the setting of a positive home COVID test, as well as prostatitis vs prostate abscess s/p prostate bx. In addition, he has intermittent numbness of b/l U/L extremities.     #prostatitis vs prostatic abscess  S/p prostate bx on 10/8.  Prostatitis was considered due to recent  instrumentation to the prostate, groin pain, pain (per pt) on TEJAS, markedly elevated PSA   Prostatic abscess was considered, due to elevated WBC on admission, and findings on imaging that are suggestive of small prostatic abscesses.  Plan  Oral ciprofloxacin, to continue on dc   Blood cx pending      #B/L upper and lower Extremity numbness of unclear etiology  #Vitamin D deficiency   Includes elements of pain as well as numbness. Distribution and intermittent nature of symptoms is not consistent with neuropathies.   There was consideration for the possibility of mets from prostate cx to vertebrae causing neural impingement, however surgical path report was benign.   Cervical XR showed degenerative changes at C5-C6, with foraminal narrowing  Chronically vitamin D deficient, which can cause peripheral neuropathy   Plan  Replete Vitamin D   Tylenol 650mg [id number] for pain      #hyperkalemia, resolved    Markedly hemolysed sample per lab  EKG was nsr with no acute abnormalities   Confirmed with repeat K of 3.5   Plan  Monitor RFP     #Diarrhea   Unclear of etiology. Imaging was consistent with enterocolitis. Seems to be improved with only 1 BM in the last 24 hrs, albeit liquid.   Plan  Follow stool studies (pathogen PCR, C. Difficile, COVID)   Monitor RFP       Chronic, stable:      #CHF  EF 9/2024 30-35%  Plan  Restart Entresto  Continue carvedilol, furosemide  Hold spironolactone   Hold empagliflozin due to concerns for prostatic infection     #HTN  #HLD  Plan  Continue aspirin, atorvastatin       Dispo: likely tomorrow  F: prn  E: replete when K<4, Mg<2, Phos<2.5  N: regular  A: pIV L hand     DVT ppx: Lovenox  GI ppx: ppi   Code status: Full Code   NOK: Maria Eugenia Tony, spouse, [phone]    Yair Tang, MS4  Acting Intern

## 2024-10-29 NOTE — PROGRESS NOTES
10/29/24 1208   Discharge Planning   Living Arrangements Spouse/significant other   Support Systems Spouse/significant other   Assistance Needed none   Type of Residence Private residence   Number of Stairs to Enter Residence 6   Number of Stairs Within Residence 14   Do you have animals or pets at home? No   Who is requesting discharge planning? Provider   Home or Post Acute Services None   Expected Discharge Disposition Home   Does the patient need discharge transport arranged? No  (pt drove himself to the hospital)   Financial Resource Strain   How hard is it for you to pay for the very basics like food, housing, medical care, and heating? Not hard   Housing Stability   In the last 12 months, was there a time when you were not able to pay the mortgage or rent on time? N   In the past 12 months, how many times have you moved where you were living? 0   At any time in the past 12 months, were you homeless or living in a shelter (including now)? N   Transportation Needs   In the past 12 months, has lack of transportation kept you from medical appointments or from getting medications? no   In the past 12 months, has lack of transportation kept you from meetings, work, or from getting things needed for daily living? No     Met with pt and introduced myself as care coordinator and member of the Care Transitions team for discharge planning. Pt lives at home with his first ex wife. Pt reports being completely independent with ADL's/iADL's, walks without use of an assistive device + drives. Pt stated he fell on the job 6/24 when his heart stopped. 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 Pt exercises frequently at home by biking, weight lifting, and walking in an attempt to rebuild his strength. Pt stated he feels safe at home. Pt's address, phone number, and contact information was verified. Pt denies any social or financial concerns at this time.    Home  care: none/denies need.  DME: grab bars (bathroom).  : none.  PCP: Claudio Maldonado, DO  Last appt: 10/23/24    Transport to appts: Pt drives himself.  Pharm: [facility]  (denies issues affording/obtaining medications).    Discharge Planning: Per medical team pt transitioned to oral Cipro, plan for discharge home today with follow up appointment with Cardiologist, no home going needs anticipated. Pt instructed on how to purchase a weekly parking pass from the parking office on the 1st floor of the hospital vs paying daily rate for parking. Pt voiced no additional questions or concerns regarding discharge planning. Care coordinator will continue to follow for discharge planning needs.    Roe Herrera RN  Transitional Care Coordinator (TCC)  [phone] or [phone]

## 2024-10-29 NOTE — CARE PLAN
The patient's goals for the shift include  no fall or injury, hds     The clinical goals for the shift include pt will remain HDS      Problem: Fall/Injury  Goal: Not fall by end of shift  Outcome: Progressing  Goal: Be free from injury by end of the shift  Outcome: Progressing  Goal: Verbalize understanding of personal risk factors for fall in the hospital  Outcome: Progressing  Goal: Verbalize understanding of risk factor reduction measures to prevent injury from fall in the home  Outcome: Progressing  Goal: Use assistive devices by end of the shift  Outcome: Progressing  Goal: Pace activities to prevent fatigue by end of the shift  Outcome: Progressing

## 2024-10-29 NOTE — CONSULTS
"Nutrition Initial Assessment:   Nutrition Assessment    Reason for Assessment: Admission nursing screening    Patient is a 64 y.o. male with PMH of elevated PSA, CHF (EF 9/2024 30-35%), smoker, NSTEMI presenting with flulike symptoms and body aches for 2 weeks. Admission for Acute prostatitis.       Nutrition History:  Energy Intake: Fair 50-75 %  Food and Nutrient History: Pt endorses fair appetite. Reports does not care for the hospital food. States appetite was better PTA. Reports he has lost weight unintentionally and would like to gain weight back. Has not tried Ensure supplements before but is willing to try them during admission. Denies N/V/D/C/abd pain. Denies difficulty chewing/swallowing.  Food Allergies/Intolerances:   Cinnamon  GI Symptoms: None  Oral Problems: None       Anthropometrics:  Height: 165.1 cm (5' 5\")   Weight: 59.4 kg (131 lb)   BMI (Calculated): 21.8  IBW/kg (Dietitian Calculated): 61.8 kg  Percent of IBW: 96 %       Weight History:   Wt Readings from Last 25 Encounters:   10/26/24 59.4 kg (131 lb)   10/23/24 59.4 kg (131 lb)   10/08/24 61.2 kg (135 lb)   10/08/24 61.2 kg (135 lb)   09/23/24 62.2 kg (137 lb 3.2 oz) - 4.5% loss   09/18/24 61 kg (134 lb 7.7 oz)   09/13/24 59 kg (130 lb)   09/09/24 59 kg (130 lb)   09/04/24 59 kg (130 lb)   08/28/24 60.1 kg (132 lb 8 oz)   08/27/24 60.3 kg (133 lb)   07/25/24 58.1 kg (128 lb)   07/12/24 58.1 kg (128 lb)   04/23/24 65.3 kg (144 lb)   04/10/24 65.5 kg (144 lb 6.4 oz) - 9.3% loss   04/04/24 65.5 kg (144 lb 6.4 oz)   03/20/24 64.4 kg (141 lb 15.6 oz)   02/07/24 62.8 kg (138 lb 5.4 oz)   01/23/24 62.1 kg (137 lb)   01/08/24 64.4 kg (142 lb)   01/02/24 68 kg (150 lb) - outlier   01/02/24 59.9 kg (132 lb 0.9 oz)   12/19/23 64.4 kg (142 lb)   08/11/23 65.8 kg (145 lb 1 oz)       Weight Change %:  Weight History / % Weight Change: Pt reports usual body wt ~150lb. No signficiant wt loss identified per wt hx.  Significant Weight Loss: No    Nutrition " Focused Physical Exam Findings:    Subcutaneous Fat Loss:   Orbital Fat Pads: Mild-Moderate (slight dark circles and slight hollowing)  Buccal Fat Pads: Mild-Moderate (flat cheeks, minimal bounce)  Triceps: Mild-Moderate (less than ample fat tissue)  Muscle Wasting:  Temporalis: Mild-Moderate (slight depression)  Pectoralis (Clavicular Region): Mild-Moderate (some protrusion of clavicle)  Deltoid/Trapezius: Mild-Moderate (slight protrusion of acromion process)  Quadriceps: Mild-Moderate (mild depression on inner and outer thigh)  Gastrocnemius: Mild-Moderate (not well developed muscle)  Edema:  Edema: none  Physical Findings:  Hair: Negative  Eyes: Negative  Mouth: Negative  Nails: Negative  Skin: Negative    Nutrition Significant Labs:  CBC Trend:   Results from last 7 days   Lab Units 10/28/24  1153 10/26/24  1743   WBC AUTO x10*3/uL 11.4* 17.3*   RBC AUTO x10*6/uL 3.94* 3.79*   HEMOGLOBIN g/dL 11.9* 11.8*   HEMATOCRIT % 37.8* 34.9*   MCV fL 96 92   PLATELETS AUTO x10*3/uL 497* 342    , BMP Trend:   Results from last 7 days   Lab Units 10/29/24  0745 10/28/24  0800 10/27/24  1600 10/27/24  0558   GLUCOSE mg/dL 95 92 100* 82   CALCIUM mg/dL 9.1 8.6 8.6 9.2   SODIUM mmol/L 139 137 135* 134*   POTASSIUM mmol/L 3.9 3.7 3.5 6.0*   CO2 mmol/L 23 22 23 20*   CHLORIDE mmol/L 104 106 103 103   BUN mg/dL 12 13 13 11   CREATININE mg/dL 0.93 0.91 1.05 0.90   Renal Lab Trend:   Results from last 7 days   Lab Units 10/29/24  0745 10/28/24  0800 10/27/24  1600 10/27/24  0558   POTASSIUM mmol/L 3.9 3.7 3.5 6.0*   PHOSPHORUS mg/dL 3.2 3.3 3.2 4.0   SODIUM mmol/L 139 137 135* 134*   MAGNESIUM mg/dL  --   --   --  2.62*   EGFR mL/min/1.73m*2 >90 >90 80 >90   BUN mg/dL 12 13 13 11   CREATININE mg/dL 0.93 0.91 1.05 0.90        Nutrition Specific Medications:  Scheduled medications  aspirin, 81 mg, oral, Daily  atorvastatin, 80 mg, oral, Daily  carvedilol, 12.5 mg, oral, BID  cholecalciferol, 1,000 Units, oral, Daily  ciprofloxacin,  500 mg, oral, q12h MIKEY  enoxaparin, 40 mg, subcutaneous, q24h  pantoprazole, 40 mg, oral, BID AC  sacubitriL-valsartan, 1 tablet, oral, BID  [Held by provider] spironolactone, 12.5 mg, oral, Daily      Continuous medications     PRN medications  PRN medications: acetaminophen, nicotine polacrilex     I/O:   Last BM Date: 10/28/24; Stool Appearance: Loose (per patient) (10/28/24 0820)    Dietary Orders (From admission, onward)       Start     Ordered    10/27/24 1554  May Participate in Room Service  ( ROOM SERVICE MAY PARTICIPATE)  Once        Question:  .  Answer:  Yes    10/27/24 1553    10/27/24 0813  Adult diet Regular, Cardiac; 70 gm fat; 2 - 3 grams Sodium  Diet effective now        Question Answer Comment   Diet type Regular    Diet type Cardiac    Fat restriction: 70 gm fat    Sodium restriction: 2 - 3 grams Sodium        10/27/24 0812                     Estimated Needs:   Total Energy Estimated Needs (kCal): 1700 kCal  Method for Estimating Needs: 28kcal/kg IBW  Total Protein Estimated Needs (g): 75 g  Method for Estimating Needs: 1.2g/kg IBW  Total Fluid Estimated Needs (mL):  (1mL/kcal or per team)           Nutrition Diagnosis   Malnutrition Diagnosis  Patient has Malnutrition Diagnosis: Yes  Diagnosis Status: New  Malnutrition Diagnosis: Moderate malnutrition related to chronic disease or condition  As Evidenced by: suspect patient meeting <75% of estimated energy needs for >1 month; areas of moderate muscle and subcutaneous fat loss.            Nutrition Interventions/Recommendations         Nutrition Prescription:  Ensure Plus BID (350kcal, 13g protein each).  Recommend regular diet to promote PO intake.        Nutrition Interventions:   Interventions: Meals and snacks, Medical food supplement  Goal: consume >50% of meals  Medical Food Supplement: Commercial beverage  Goal: Enure Plus BID       Nutrition Education:   Encouraged PO intake of oral nutrition supplements. Educated that drinking Ensure  supplements can help pt gain weight.       Nutrition Monitoring and Evaluation   Food/Nutrient Related History Monitoring  Monitoring and Evaluation Plan: Energy intake, Amount of food  Criteria: meet >75% of estimated energy needs  Amount of Food: Medical food intake  Criteria: consume 100% ONS    Body Composition/Growth/Weight History  Monitoring and Evaluation Plan: Weight  Criteria: stable weight    Biochemical Data, Medical Tests and Procedures  Monitoring and Evaluation Plan: Electrolyte/renal panel, Glucose/endocrine profile  Criteria: WNL              Time Spent (min): 60 minutes

## 2024-10-30 ENCOUNTER — PATIENT OUTREACH (OUTPATIENT)
Dept: PRIMARY CARE | Facility: CLINIC | Age: 64
End: 2024-10-30
Payer: COMMERCIAL

## 2024-10-30 ENCOUNTER — OFFICE VISIT (OUTPATIENT)
Dept: CARDIOLOGY | Facility: CLINIC | Age: 64
End: 2024-10-30
Payer: COMMERCIAL

## 2024-10-30 VITALS
OXYGEN SATURATION: 98 % | HEIGHT: 65 IN | DIASTOLIC BLOOD PRESSURE: 60 MMHG | BODY MASS INDEX: 21.19 KG/M2 | SYSTOLIC BLOOD PRESSURE: 118 MMHG | WEIGHT: 127.2 LBS | HEART RATE: 64 BPM

## 2024-10-30 DIAGNOSIS — I50.43 ACUTE ON CHRONIC COMBINED SYSTOLIC AND DIASTOLIC HEART FAILURE: Primary | ICD-10-CM

## 2024-10-30 LAB

## 2024-10-30 PROCEDURE — 99214 OFFICE O/P EST MOD 30 MIN: CPT | Performed by: INTERNAL MEDICINE

## 2024-10-30 PROCEDURE — 3008F BODY MASS INDEX DOCD: CPT | Performed by: INTERNAL MEDICINE

## 2024-10-30 PROCEDURE — 4004F PT TOBACCO SCREEN RCVD TLK: CPT | Performed by: INTERNAL MEDICINE

## 2024-10-30 PROCEDURE — 3078F DIAST BP <80 MM HG: CPT | Performed by: INTERNAL MEDICINE

## 2024-10-30 PROCEDURE — 3074F SYST BP LT 130 MM HG: CPT | Performed by: INTERNAL MEDICINE

## 2024-10-30 ASSESSMENT — PAIN SCALES - GENERAL: PAINLEVEL_OUTOF10: 0-NO PAIN

## 2024-10-31 ENCOUNTER — PATIENT OUTREACH (OUTPATIENT)
Dept: HOME HEALTH SERVICES | Age: 64
End: 2024-10-31
Payer: COMMERCIAL

## 2024-10-31 ENCOUNTER — MULTIDISCIPLINARY VISIT (OUTPATIENT)
Dept: GERIATRIC MEDICINE | Facility: CLINIC | Age: 64
End: 2024-10-31
Payer: COMMERCIAL

## 2024-10-31 VITALS
WEIGHT: 128.3 LBS | TEMPERATURE: 95.5 F | HEART RATE: 70 BPM | DIASTOLIC BLOOD PRESSURE: 71 MMHG | RESPIRATION RATE: 20 BRPM | BODY MASS INDEX: 21.35 KG/M2 | SYSTOLIC BLOOD PRESSURE: 134 MMHG

## 2024-10-31 DIAGNOSIS — R06.83 SNORING: ICD-10-CM

## 2024-10-31 DIAGNOSIS — R41.89 COGNITIVE CHANGE: Primary | ICD-10-CM

## 2024-10-31 LAB
BACTERIA BLD CULT: NORMAL
BACTERIA BLD CULT: NORMAL
CRYPTOSP AG STL QL IA: NEGATIVE
G LAMBLIA AG STL QL IA: NEGATIVE

## 2024-10-31 PROCEDURE — 99417 PROLNG OP E/M EACH 15 MIN: CPT | Performed by: NURSE PRACTITIONER

## 2024-10-31 PROCEDURE — 99215 OFFICE O/P EST HI 40 MIN: CPT | Performed by: NURSE PRACTITIONER

## 2024-10-31 SDOH — ECONOMIC STABILITY: HOUSING INSECURITY: IN THE LAST 12 MONTHS, WAS THERE A TIME WHEN YOU WERE NOT ABLE TO PAY THE MORTGAGE OR RENT ON TIME?: NO

## 2024-10-31 SDOH — ECONOMIC STABILITY: FOOD INSECURITY: WITHIN THE PAST 12 MONTHS, THE FOOD YOU BOUGHT JUST DIDN'T LAST AND YOU DIDN'T HAVE MONEY TO GET MORE.: SOMETIMES TRUE

## 2024-10-31 SDOH — ECONOMIC STABILITY: HOUSING INSECURITY: AT ANY TIME IN THE PAST 12 MONTHS, WERE YOU HOMELESS OR LIVING IN A SHELTER (INCLUDING NOW)?: NO

## 2024-10-31 SDOH — HEALTH STABILITY: MENTAL HEALTH: HOW OFTEN DO YOU HAVE SIX OR MORE DRINKS ON ONE OCCASION?: NEVER

## 2024-10-31 SDOH — ECONOMIC STABILITY: INCOME INSECURITY: IN THE PAST 12 MONTHS HAS THE ELECTRIC, GAS, OIL, OR WATER COMPANY THREATENED TO SHUT OFF SERVICES IN YOUR HOME?: YES

## 2024-10-31 SDOH — HEALTH STABILITY: PHYSICAL HEALTH
HOW OFTEN DO YOU NEED TO HAVE SOMEONE HELP YOU WHEN YOU READ INSTRUCTIONS, PAMPHLETS, OR OTHER WRITTEN MATERIAL FROM YOUR DOCTOR OR PHARMACY?: NEVER

## 2024-10-31 SDOH — SOCIAL STABILITY: SOCIAL INSECURITY: ARE YOU MARRIED, WIDOWED, DIVORCED, SEPARATED, NEVER MARRIED, OR LIVING WITH A PARTNER?: LIVING WITH PARTNER

## 2024-10-31 SDOH — SOCIAL STABILITY: SOCIAL NETWORK: HOW OFTEN DO YOU ATTEND CHURCH OR RELIGIOUS SERVICES?: MORE THAN 4 TIMES PER YEAR

## 2024-10-31 SDOH — HEALTH STABILITY: MENTAL HEALTH: HOW OFTEN DO YOU HAVE A DRINK CONTAINING ALCOHOL?: NEVER

## 2024-10-31 SDOH — HEALTH STABILITY: PHYSICAL HEALTH: ON AVERAGE, HOW MANY DAYS PER WEEK DO YOU ENGAGE IN MODERATE TO STRENUOUS EXERCISE (LIKE A BRISK WALK)?: 0 DAYS

## 2024-10-31 SDOH — SOCIAL STABILITY: SOCIAL INSECURITY: WITHIN THE LAST YEAR, HAVE YOU BEEN AFRAID OF YOUR PARTNER OR EX-PARTNER?: NO

## 2024-10-31 SDOH — SOCIAL STABILITY: SOCIAL INSECURITY: WITHIN THE LAST YEAR, HAVE YOU BEEN HUMILIATED OR EMOTIONALLY ABUSED IN OTHER WAYS BY YOUR PARTNER OR EX-PARTNER?: NO

## 2024-10-31 SDOH — SOCIAL STABILITY: SOCIAL NETWORK
IN A TYPICAL WEEK, HOW MANY TIMES DO YOU TALK ON THE PHONE WITH FAMILY, FRIENDS, OR NEIGHBORS?: MORE THAN THREE TIMES A WEEK

## 2024-10-31 SDOH — SOCIAL STABILITY: SOCIAL NETWORK
DO YOU BELONG TO ANY CLUBS OR ORGANIZATIONS SUCH AS CHURCH GROUPS, UNIONS, FRATERNAL OR ATHLETIC GROUPS, OR SCHOOL GROUPS?: NO

## 2024-10-31 SDOH — ECONOMIC STABILITY: FOOD INSECURITY: HOW HARD IS IT FOR YOU TO PAY FOR THE VERY BASICS LIKE FOOD, HOUSING, MEDICAL CARE, AND HEATING?: HARD

## 2024-10-31 SDOH — ECONOMIC STABILITY: TRANSPORTATION INSECURITY: IN THE PAST 12 MONTHS, HAS LACK OF TRANSPORTATION KEPT YOU FROM MEDICAL APPOINTMENTS OR FROM GETTING MEDICATIONS?: NO

## 2024-10-31 SDOH — HEALTH STABILITY: MENTAL HEALTH
DO YOU FEEL STRESS - TENSE, RESTLESS, NERVOUS, OR ANXIOUS, OR UNABLE TO SLEEP AT NIGHT BECAUSE YOUR MIND IS TROUBLED ALL THE TIME - THESE DAYS?: RATHER MUCH

## 2024-10-31 SDOH — HEALTH STABILITY: MENTAL HEALTH: HOW MANY DRINKS CONTAINING ALCOHOL DO YOU HAVE ON A TYPICAL DAY WHEN YOU ARE DRINKING?: PATIENT DOES NOT DRINK

## 2024-10-31 SDOH — ECONOMIC STABILITY: HOUSING INSECURITY: IN THE PAST 12 MONTHS, HOW MANY TIMES HAVE YOU MOVED WHERE YOU WERE LIVING?: 0

## 2024-10-31 SDOH — SOCIAL STABILITY: SOCIAL NETWORK: HOW OFTEN DO YOU GET TOGETHER WITH FRIENDS OR RELATIVES?: ONCE A WEEK

## 2024-10-31 SDOH — SOCIAL STABILITY: SOCIAL NETWORK: HOW OFTEN DO YOU ATTEND MEETINGS OF THE CLUBS OR ORGANIZATIONS YOU BELONG TO?: NEVER

## 2024-10-31 SDOH — HEALTH STABILITY: PHYSICAL HEALTH: ON AVERAGE, HOW MANY MINUTES DO YOU ENGAGE IN EXERCISE AT THIS LEVEL?: 0 MIN

## 2024-10-31 ASSESSMENT — LIFESTYLE VARIABLES
SKIP TO QUESTIONS 9-10: 1
AUDIT-C TOTAL SCORE: 0

## 2024-10-31 ASSESSMENT — PAIN SCALES - GENERAL: PAINLEVEL_OUTOF10: 0-NO PAIN

## 2024-10-31 ASSESSMENT — ENCOUNTER SYMPTOMS
OCCASIONAL FEELINGS OF UNSTEADINESS: 0
LOSS OF SENSATION IN FEET: 1

## 2024-10-31 ASSESSMENT — ACTIVITIES OF DAILY LIVING (ADL): LACK_OF_TRANSPORTATION: NO

## 2024-10-31 ASSESSMENT — PATIENT HEALTH QUESTIONNAIRE - PHQ9
1. LITTLE INTEREST OR PLEASURE IN DOING THINGS: NOT AT ALL
SUM OF ALL RESPONSES TO PHQ9 QUESTIONS 1 AND 2: 0
2. FEELING DOWN, DEPRESSED OR HOPELESS: NOT AT ALL

## 2024-10-31 NOTE — PROGRESS NOTES
Subjective   Patient ID: Matthieu Solis is a 64 y.o. male who presents for Follow-up.  Matthieu Solis is a 64 y.o. male who presents today for a geriatric medicine care summary conference with this provider and ALYSSA Bravo. This is a former patient of HAILEE Etienne. His last visit with XOCHITL Dias was 9/23/2024 for an initial geriatric assessment. Today, patient presents for: Follow-up. Pt came alone so the history is as accurate as and limited by his own report/memory. Their main concern today is being well enough to return to work. Mr. Solis reports he wore the life vest for 3 months, but developed skin reaction to metal, vest removed by provider 10/1/2024.     Passed out at work, went into respiratory distress. 911 called, intubated, admitted to ICU, dx w/ pulmonary edema, CHF w/ EF 30-35% (2011), ECHO 6/19/2024 showed EF 17%, mild CAD, Type 2 MI, NSVT. H/o heavy alcohol use, cocaine, LSD. Sober 7 years. Tobacco and cannabis use disorder. Current smoker, reportedly weaning off. Having GI ulcer and bleeding issues. Eventually extubated. Started on carvedilol, lisinopril, Jardiance, Lasix, spironolactone. Works as a  a [employer] .     Experienced cognitive change came to [facility] to be assessed. MoCA 23/30. XOCHITL Dias's recommendation's included due to mild cognitive impairment and unknown etiology, send for neuropsych testing; suggest assessment of sleep apnea by Sleep Medicine.        Acute respiratory failure with hypoxia (Multi)  Myocardial infarction type 2 (Multi)  Acute respiratory failure with hypercapnia (Multi)  Acute on chronic systolic (congestive) heart failure  Atherosclerotic heart disease of native coronary artery without angina pectoris    LifeVest ordered for prevention of sudden cardiac death in light of severe cardiomyopathy      fior Franklin, is healthcare surrogate    Current Outpatient Medications on File Prior to Visit   Medication Sig Dispense Refill     acetaminophen (Tylenol) 325 mg tablet Take 2 tablets (650 mg) by mouth every 6 hours if needed for moderate pain (4 - 6) for up to 10 doses. 20 tablet 0    aspirin 81 mg EC tablet Take 1 tablet (81 mg) by mouth once daily. 90 tablet 3    atorvastatin (Lipitor) 80 mg tablet Take 1 tablet (80 mg) by mouth once daily. 90 tablet 0    carvedilol (Coreg) 12.5 mg tablet Take 1 tablet (12.5 mg) by mouth 2 times daily (morning and late afternoon). 60 tablet 11    cholecalciferol (Vitamin D-3) 25 MCG (1000 UT) tablet Take 1 tablet (1,000 Units) by mouth once daily. 30 tablet 1    furosemide (Lasix) 20 mg tablet Take 1 tablet (20 mg) by mouth once daily as needed (edema).      omeprazole (PriLOSEC) 20 mg DR capsule Take 1 capsule (20 mg) by mouth 2 times a day before meals for 14 days. To be taken 30-60 minutes prior to breakfast and dinner. Do not crush or chew. (Patient not taking: Reported on 10/27/2024) 28 capsule 0    sacubitriL-valsartan (Entresto) 49-51 mg tablet Take 1 tablet by mouth 2 times a day. 180 tablet 3    spironolactone (Aldactone) 25 mg tablet Take 0.5 tablets (12.5 mg) by mouth once daily. 45 tablet 3     No current facility-administered medications on file prior to visit.     Allergies   Allergen Reactions    Cinnamon Unknown     Cinnamon    Doxycycline Swelling    Penicillins Unknown      Past Medical History:   Diagnosis Date    Cataract     GERD (gastroesophageal reflux disease)     Gout     HTN (hypertension)      Patient Active Problem List   Diagnosis    Abdominal pain    Acute pharyngitis    Anxiety    Blurring of visual image    Cataract, nuclear sclerotic, both eyes    Change in vision    Cortical age-related cataract of left eye    Episcleritis of left eye    Essential hypertension    Gastroenteritis    Gout of right foot    Helicobacter positive gastritis    Hordeolum externum of left upper eyelid    Astigmatism of both eyes    Low back pain    Lumbar spondylosis    Lung nodules    Male erectile  disorder of organic origin    Rectal mass    Urethritis    Abdominal wall abscess    Sacroiliitis (CMS-HCC)    Right groin hernia    Bilateral inguinal hernia    Combined form of age-related cataract, right eye    Combined form of age-related cataract, left eye    Hyperopia, bilateral    Presbyopia    Nausea and vomiting    Well adult exam    Nondisplaced fracture of middle phalanx of right middle finger, initial encounter for open fracture    Acute on chronic combined systolic and diastolic heart failure    Acute respiratory failure with hypercapnia (Multi)    NICM (nonischemic cardiomyopathy) (Multi)    Nicotine use disorder    Nonsustained ventricular tachycardia (Multi)    Type 2 myocardial infarction (Multi)    Alcoholism (Multi)    Hearing loss    Leg pain    Sciatica    Elevated PSA    Acute prostatitis    Leg weakness, bilateral    Cognitive change    Snoring     Past Surgical History:   Procedure Laterality Date    COLONOSCOPY      ESOPHAGOGASTRODUODENOSCOPY      HERNIA REPAIR  2015    Inguinal     Social Drivers of Health     Tobacco Use: High Risk (12/11/2024)    Patient History     Smoking Tobacco Use: Every Day     Smokeless Tobacco Use: Never     Passive Exposure: Not on file   Alcohol Use: Not At Risk (11/8/2024)    AUDIT-C     Frequency of Alcohol Consumption: Never     Average Number of Drinks: Patient does not drink     Frequency of Binge Drinking: Never   Financial Resource Strain: Medium Risk (11/8/2024)    Overall Financial Resource Strain (CARDIA)     Difficulty of Paying Living Expenses: Somewhat hard   Food Insecurity: Food Insecurity Present (11/8/2024)    Hunger Vital Sign     Worried About Running Out of Food in the Last Year: Often true     Ran Out of Food in the Last Year: Often true   Transportation Needs: No Transportation Needs (11/8/2024)    PRAPARE - Transportation     Lack of Transportation (Medical): No     Lack of Transportation (Non-Medical): No   Physical Activity:  Insufficiently Active (11/8/2024)    Exercise Vital Sign     Days of Exercise per Week: 2 days     Minutes of Exercise per Session: 20 min   Stress: Stress Concern Present (11/8/2024)    [employer] - Occupational Stress Questionnaire     Feeling of Stress : Rather much   Social Connections: Moderately Integrated (11/8/2024)    Social Connection and Isolation Panel [NHANES]     Frequency of Communication with Friends and Family: Three times a week     Frequency of Social Gatherings with Friends and Family: Twice a week     Attends [employer] Services: More than 4 times per year     Active Member of Clubs or Organizations: Yes     Attends Club or Organization Meetings: More than 4 times per year     Marital Status:    Intimate Partner Violence: Not At Risk (11/8/2024)    Humiliation, Afraid, Rape, and Kick questionnaire     Fear of Current or Ex-Partner: No     Emotionally Abused: No     Physically Abused: No     Sexually Abused: No   Depression: Not at risk (12/9/2024)    PHQ-2     PHQ-2 Score: 0   Housing Stability: Low Risk  (11/8/2024)    Housing Stability Vital Sign     Unable to Pay for Housing in the Last Year: No     Number of Times Moved in the Last Year: 0     Homeless in the Last Year: No   Utilities: At Risk (11/8/2024)    [facility] Utilities     Threatened with loss of utilities: Yes   Digital Equity: Not At Risk (12/18/2023)    Digital Health Equity Screening     Access to device/internet: Desktop computer, laptop computer, or tablet with broadband internet connection     Requested support: None of these   Health Literacy: Adequate Health Literacy (11/8/2024)     Health Literacy     Frequency of need for help with medical instructions: Rarely     Family History   Problem Relation Name Age of Onset    Other (CVA) Mother      Hypertension Mother      Other (CVA) Father      Hypertension Father         Review of Systems   Constitutional:  Negative for activity change,  chills and fatigue.   Eyes:  Negative for pain, discharge and visual disturbance.   Endocrine: Negative for cold intolerance.   All other systems reviewed and are negative.      Objective   Visit Vitals  /71   Pulse 70   Temp 35.3 °C (95.5 °F) (Tympanic)   Resp 20   Wt 58.2 kg (128 lb 4.8 oz)   BMI 21.35 kg/m²   Smoking Status Every Day   BSA 1.63 m²       Physical Exam    Assessment/Plan   Problem List Items Addressed This Visit             ICD-10-CM    Cognitive change - Primary R41.89    Relevant Orders    Referral to Adult Neuropsychology    Referral to Geriatrics    Snoring R06.83    Relevant Orders    Referral to Adult Sleep Medicine    Referral to Geriatrics       Time Spent  Prep time on day of patient encounter: 0 minutes  Time spent directly with patient, family or caregiver: 64 minutes  Additional Time Spent on Patient Care Activities: 10 minutes (Ordering referrals)  Documentation Time: 30 minutes  Other Time Spent: 0 minutes  Total: 104 minutes        Charito Diaz, APRN-CNP

## 2024-10-31 NOTE — PATIENT INSTRUCTIONS
[id number] patient signed IM Letter, I faxed IM Letter to [phone] and placed in chart under consent 
tab Follow-up in 3 months

## 2024-11-01 ENCOUNTER — APPOINTMENT (OUTPATIENT)
Dept: PHARMACY | Facility: HOSPITAL | Age: 64
End: 2024-11-01
Payer: COMMERCIAL

## 2024-11-01 ENCOUNTER — PATIENT OUTREACH (OUTPATIENT)
Dept: HOME HEALTH SERVICES | Age: 64
End: 2024-11-01

## 2024-11-01 ENCOUNTER — DOCUMENTATION (OUTPATIENT)
Dept: HOME HEALTH SERVICES | Age: 64
End: 2024-11-01

## 2024-11-01 DIAGNOSIS — I42.8 NICM (NONISCHEMIC CARDIOMYOPATHY) (MULTI): Primary | ICD-10-CM

## 2024-11-02 ENCOUNTER — PATIENT OUTREACH (OUTPATIENT)
Dept: HOME HEALTH SERVICES | Age: 64
End: 2024-11-02
Payer: COMMERCIAL

## 2024-11-02 VITALS — SYSTOLIC BLOOD PRESSURE: 127 MMHG | DIASTOLIC BLOOD PRESSURE: 66 MMHG | HEART RATE: 72 BPM

## 2024-11-04 ENCOUNTER — PATIENT OUTREACH (OUTPATIENT)
Dept: HOME HEALTH SERVICES | Age: 64
End: 2024-11-04

## 2024-11-04 ENCOUNTER — OFFICE VISIT (OUTPATIENT)
Dept: PRIMARY CARE | Facility: CLINIC | Age: 64
End: 2024-11-04
Payer: COMMERCIAL

## 2024-11-04 VITALS
DIASTOLIC BLOOD PRESSURE: 74 MMHG | WEIGHT: 128 LBS | SYSTOLIC BLOOD PRESSURE: 124 MMHG | HEART RATE: 72 BPM | HEIGHT: 65 IN | BODY MASS INDEX: 21.33 KG/M2

## 2024-11-04 VITALS — SYSTOLIC BLOOD PRESSURE: 128 MMHG | DIASTOLIC BLOOD PRESSURE: 74 MMHG

## 2024-11-04 DIAGNOSIS — I50.43 ACUTE ON CHRONIC COMBINED SYSTOLIC AND DIASTOLIC HEART FAILURE: Primary | ICD-10-CM

## 2024-11-04 LAB — O+P STL MICRO: NEGATIVE

## 2024-11-04 PROCEDURE — 4004F PT TOBACCO SCREEN RCVD TLK: CPT | Performed by: FAMILY MEDICINE

## 2024-11-04 PROCEDURE — 3078F DIAST BP <80 MM HG: CPT | Performed by: FAMILY MEDICINE

## 2024-11-04 PROCEDURE — 3008F BODY MASS INDEX DOCD: CPT | Performed by: FAMILY MEDICINE

## 2024-11-04 PROCEDURE — 3074F SYST BP LT 130 MM HG: CPT | Performed by: FAMILY MEDICINE

## 2024-11-04 PROCEDURE — 99496 TRANSJ CARE MGMT HIGH F2F 7D: CPT | Performed by: FAMILY MEDICINE

## 2024-11-04 ASSESSMENT — ENCOUNTER SYMPTOMS
DEPRESSION: 0
OCCASIONAL FEELINGS OF UNSTEADINESS: 0
LOSS OF SENSATION IN FEET: 0

## 2024-11-04 NOTE — PROGRESS NOTES
Pt is here for hospital fu, concerns of work documents needed to return to work.         Chief Complaint:  No chief complaint on file.  History Of Present Illness:   Matthieu Solis is a 64 y.o. male          Matthieu Solis is a 64 y.o. male   - presenting for weakness in his extremities.        Back pain and weakness.  - weakness location: hands/arms; both legs.   - he's trying to get back stronger after his summer 2024 admission for HF.   - exercise bike- about 7.5 miles per day, but he is struggling with just a few miles per day now.   - MRI l-spine 2021.     Last visit 10/23/24, interval history:   - ER visit 10/26/24 for prostatitis and infection. Admitted.   - 11/4/24 update: still taking antibiotics. Legs are very weak. Trying to work out upper body. Numbness in extremities is improved.           He is concerned about a blood clot in his left 'foot' (occluded left common iliac artery, left external iliac artery, left common femoral artery, and left superficial femoral artery on CTA abdomen/pelvis 7/1/24). Denies numbness, pain or swelling.   - vascular referral.     Patient was seen by Dr. Santos in cardiology clinic on 7/11/24 and is waiting on approval from insurance before starting Entresto and Jardiance. He has been smoking 1-2 cigarettes a day since discharge from hospital (smoked 1-1.5 packs/day prior to admission) and thinks it is sustainable but his goal is to stop smoking entirely. Patches have not been effective. Trying to prioritize his heart health as much as possible.     He views the hospitalization as a “horrifying experience,” and he currently “can't figure out who I am.” Spoke with his family and they agree that he should start seeing psych. Has no recollection of the event and is currently studying trigonometry and other mathematic subjects to try and stay mentally sharp. Mentioned the 'bump' on the back of his head from the fall at work.   - agreed on psychiatry referral      Healthcare  team:  Cardiology- Shane Santos (last seen 2024)  Urology- Aurelio Zuniga (last seen 10/9/2024)  PMR- Kristin Kauffman (last seen 10/18/2024)  Geriatrics- Hali Diaz CNP- last visit 10/31/24.   Infectious disease- Geovanni Mckenna (last seen 10/8/2024)  Dermatology- Ladarius Turpin (last seen 2024)  Gastroenterology- Brijesh Hollie (last seen 2024)  Vascular- scheduled 2024  Ophthalmology- scheduled 2024  Endocrine- scheduled 2/3/2025  Neuropsych- scheduled 2025   health at home program.   Physical therapy.       PMH: Nonischemic cardiomyopathy, chronic systolic HF, HTN, Cataracts, GERD, Gout, CHF.   - occluded left common iliac artery, left external iliac artery, left common femoral artery, and left superficial femoral artery on CTA abdomen/pelvis 24          Allergies:   Penicillin    Surgical History: Hernia repair ()      Social History:  Living situation  Currently lives at home spouse. Two kids that are out of the house.  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 Has to carry a minimum of 25-50 lbs at work. Currently on short-term disability  Re-enrolling in college 2025 to complete Associate's degree  Paperwork: at 10/23/24 visit: agreed to write a simple work note stating he needs to be off work from 24 to 25. Regarding STD- agreed to complete paperwork for those dates only. 25 update: pt brought STD paperwork and we agreed to fill it out from 24 to 25.   Alcohol  None in over a decade  Illicit Substances  None  Diet  Currently transitioning to solid food after hospitalization.    Tobacco  Packs per day/years/quit date  1-1.5 packs for 50 years.   Currently smoking about 1-2 cigarettes since discharge from hospital.  Oct 2024 update: rare cig.     Family History:  Cardiac  Mother passed of heart failure  Cancer:   Father  of unknown cancer    Immunizations:   Influenza  no  Zoster (Shingrix; 50 or older)  Would  "like to have one, does not get one     Health Maintenance   Colonoscopy (45-75)  Feb 2021: “diverticulosis in the sigmoid colon, four 2-4 mm polyps in the rectum.”  Path: hyperplastic polyps    Mood: “trying to figure out who I am.”      Sleep: 6 hours/night     Sports/Exercise: started PT last week.      Review of Systems (BOLD if positive, delete if not asked):     Constitutional:   - fever   - chills   - night sweats  - unexpected weight change  - changes in sleep     Eyes:   - loss of vision  - double vision  - floaters     Ear/Nose/Throat/Mouth:   - sore throat  - hearing changes  - sinus congestion     Cardiovascular:   - chest pain  - chest heaviness  - palpitations  - swelling in ankles     Musculoskeletal:   - bone pain  - muscle pain  - joint pain     Respiratory:   - shortness of breath  - difficulty breathing  - frequent cough  - wheezing     Neurological:   - loss of consciousness  - tremors  - dizzy spells  - numbness   - tingling     Gastrointestinal:   - abdominal pain  - nausea  - vomiting  - constipation  - diarrhea  - bloody stools  - loss of bowel control  - indigestion  - heartburn  - sour taste in throat when waking up     Skin:   - Rash  - lumps or bumps     Endocrine:   - excessive thirst  - feeling too hot  - feeling too cold  - feeling tired  - fatigue     Psychological:   - feelings of depression  - feelings of anxiety.     Sexual:   - sexual health concerns      Psychological:   - feeling generally happy  - feeling safe at home       Last Recorded Vitals:  Vitals:    11/04/24 1520   BP: 124/74   Pulse: 72   Weight: 58.1 kg (128 lb)   Height: 1.651 m (5' 5\")        Past Medical History:  He has a past medical history of Cataract, GERD (gastroesophageal reflux disease), Gout, and HTN (hypertension).     Past Surgical History:  He has a past surgical history that includes Colonoscopy; Esophagogastroduodenoscopy; and Hernia repair (2015).     Social History:  He reports that he has been " smoking cigarettes. He started smoking about 50 years ago. He has a 50.8 pack-year smoking history. He has never used smokeless tobacco. He reports that he does not currently use alcohol. He reports that he does not use drugs.     Family History:  Family History   Problem Relation Name Age of Onset    Other (CVA) Mother      Hypertension Mother      Other (CVA) Father      Hypertension Father       Allergies:  Cinnamon, Doxycycline, and Penicillins     Outpatient Medications:  Current Outpatient Medications   Medication Instructions    acetaminophen (TYLENOL) 650 mg, oral, Every 6 hours PRN    aspirin 81 mg, oral, Daily    atorvastatin (LIPITOR) 80 mg, oral, Daily RT    carvedilol (COREG) 12.5 mg, oral, 2 times daily (morning and late afternoon)    cholecalciferol (VITAMIN D-3) 1,000 Units, oral, Daily    ciprofloxacin (CIPRO) 500 mg, oral, 2 times daily    furosemide (LASIX) 20 mg, Daily PRN    meloxicam (MOBIC) 15 mg, oral, Daily PRN    nicotine polacrilex (COMMIT) 4 mg, Every 2 hour PRN    omeprazole (PRILOSEC) 20 mg, oral, 2 times daily before meals, To be taken 30-60 minutes prior to breakfast and dinner. Do not crush or chew.    sacubitriL-valsartan (Entresto) 49-51 mg tablet 1 tablet, oral, 2 times daily    spironolactone (ALDACTONE) 12.5 mg, oral, Daily RT        Physical Exam:  GENERAL: Well developed, well nourished, alert and cooperative, and appears to be in no acute distress. Wearing lifevest.     PSYCH: mood pleasant and appropriate     HEAD: 1 cm diameter lump on the back of his head.     EYES: PERRL, EOMI. vision is grossly intact.     CARDIAC:   RRR.   No murmur.      GAIT: steady    Last Labs:  CBC -  Lab Results   Component Value Date    WBC 11.4 (H) 10/28/2024    HGB 11.9 (L) 10/28/2024    HCT 37.8 (L) 10/28/2024    MCV 96 10/28/2024     (H) 10/28/2024        CMP -  Lab Results   Component Value Date    CALCIUM 9.1 10/29/2024    PHOS 3.2 10/29/2024    PROT 7.7 10/26/2024    ALBUMIN 3.7  10/29/2024    AST 26 10/26/2024    ALT 26 10/26/2024    ALKPHOS 89 10/26/2024    BILITOT 1.1 10/26/2024        LIPID PANEL -  Lab Results   Component Value Date    CHOL 159 02/03/2024    TRIG 39 02/03/2024    HDL 51.0 02/03/2024    CHHDL 3.1 02/03/2024    VLDL 8 02/03/2024    NHDL 108 02/03/2024           Lab Results   Component Value Date     (H) 10/26/2024    HGBA1C 5.1 06/19/2024    HGBA1C 5.2 01/03/2024               1. Acute on chronic combined systolic and diastolic heart failure              Discharged from hospital 10/29/24.     Continue with all of your specialists.    Paperwork: I completed STD paperwork from the dates of 9/12/24 to 1/25/25.      Smoking cessation- nicotine lozenges rx.     Regarding follow up here- follow up as needed for sports medicine needs.      Medical home base- primary=geriatrics.           Claudio Maldonado, DO

## 2024-11-04 NOTE — PROGRESS NOTES
Daily call completed patient is on his way to PCP appointment BP this am 128/74 weight 130 no medication needs questions and concerns addressed will follow up tomorrow for any changes

## 2024-11-05 ENCOUNTER — PATIENT OUTREACH (OUTPATIENT)
Dept: HOME HEALTH SERVICES | Age: 64
End: 2024-11-05
Payer: COMMERCIAL

## 2024-11-05 NOTE — PROGRESS NOTES
Pt returned call to [facility]. He Is out walking so hard to hear. He says he has been pushing himself to get back to how he used to feel. Advised pt we would be calling him tomorrow as well.         Patient's Address:   [address] [place] [id number]  **  If this is not the address patient will receive services - alert team and address in EMR**       Patient Contacts:  Extended Emergency Contact Information  Primary Emergency Contact: Nico Urbina  Home Phone: [phone]  Relation: Other  Secondary Emergency Contact: Maria Eugenia Tony  Mobile Phone: [phone]  Relation: Spouse                                Patient's Preferred Phone: [phone]  Patient's E-mail: [email or web address]

## 2024-11-06 ENCOUNTER — APPOINTMENT (OUTPATIENT)
Dept: CARE COORDINATION | Age: 64
End: 2024-11-06
Payer: COMMERCIAL

## 2024-11-06 ENCOUNTER — OFFICE VISIT (OUTPATIENT)
Dept: UROLOGY | Facility: CLINIC | Age: 64
End: 2024-11-06
Payer: COMMERCIAL

## 2024-11-06 ENCOUNTER — PATIENT OUTREACH (OUTPATIENT)
Dept: HOME HEALTH SERVICES | Age: 64
End: 2024-11-06

## 2024-11-06 VITALS
HEART RATE: 79 BPM | SYSTOLIC BLOOD PRESSURE: 118 MMHG | BODY MASS INDEX: 21.33 KG/M2 | RESPIRATION RATE: 16 BRPM | TEMPERATURE: 98.3 F | HEIGHT: 65 IN | DIASTOLIC BLOOD PRESSURE: 67 MMHG | WEIGHT: 128 LBS

## 2024-11-06 DIAGNOSIS — I50.43 ACUTE ON CHRONIC COMBINED SYSTOLIC AND DIASTOLIC HEART FAILURE: Primary | ICD-10-CM

## 2024-11-06 DIAGNOSIS — J96.02 ACUTE RESPIRATORY FAILURE WITH HYPERCAPNIA (MULTI): ICD-10-CM

## 2024-11-06 DIAGNOSIS — R97.20 ELEVATED PSA: ICD-10-CM

## 2024-11-06 PROCEDURE — 99212 OFFICE O/P EST SF 10 MIN: CPT | Performed by: UROLOGY

## 2024-11-06 PROCEDURE — 3078F DIAST BP <80 MM HG: CPT | Performed by: UROLOGY

## 2024-11-06 PROCEDURE — 3074F SYST BP LT 130 MM HG: CPT | Performed by: UROLOGY

## 2024-11-06 PROCEDURE — 4004F PT TOBACCO SCREEN RCVD TLK: CPT | Performed by: UROLOGY

## 2024-11-06 PROCEDURE — 3008F BODY MASS INDEX DOCD: CPT | Performed by: UROLOGY

## 2024-11-06 ASSESSMENT — ENCOUNTER SYMPTOMS
LOSS OF SENSATION IN FEET: 0
OCCASIONAL FEELINGS OF UNSTEADINESS: 0

## 2024-11-06 ASSESSMENT — PAIN SCALES - GENERAL: PAINLEVEL_OUTOF10: 0-NO PAIN

## 2024-11-06 NOTE — PROGRESS NOTES
Subjective   Patient ID: Matthieu Solis is a 64 y.o. male   HPI  PT S/P TRUSP BX ON 10-8-24 NOW PRESENTS TO GO OVER THE PATH REPORT AND TO PLAN FURTHER THERAPY   PSA    Assessment/Plan   A:  H/O AN ELEVATED PSA  PT S/P TRUSP BX ON 10-8-24 -- ALL BX'S BPH --PROSTATE SIZE -- 51 GMS  Pathophysiology of the above as well as options of further evaluation and therapy discussed in detail  All questions were answered    OLDER BROTHER PASSED AWAY FROM PROSTATE CANCER  P:  REASSURANCE, EDUCATION  RENDERED TO THE PT  Will continue to watch the patient closely over time  F/U IN 6 MONTHS FOR A TEJAS AND PSA RE CK     Aurelio Zuniga MD 11/06/24 8:54 AM

## 2024-11-06 NOTE — LETTER
November 8, 2024     Claudio Maldonado DO  [address] [facility] [address]  [place] [address]    Patient: Matthieu Solis   YOB: 1960   Date of Visit: 11/6/2024       Dear Dr. Claudio Maldonado, DO:    Thank you for referring Matthieu Solis to me for evaluation. Below are my notes for this consultation.  If you have questions, please do not hesitate to call me. I look forward to following your patient along with you.       Sincerely,     Aurelio Zuniga MD      CC: No Recipients  ______________________________________________________________________________________    Subjective   Patient ID: Matthieu Solis is a 64 y.o. male   HPI  PT S/P TRUSP BX ON 10-8-24 NOW PRESENTS TO GO OVER THE PATH REPORT AND TO PLAN FURTHER THERAPY   PSA    Assessment/Plan   A:  H/O AN ELEVATED PSA  PT S/P TRUSP BX ON 10-8-24 -- ALL BX'S BPH --PROSTATE SIZE -- 51 GMS  Pathophysiology of the above as well as options of further evaluation and therapy discussed in detail  All questions were answered    OLDER BROTHER PASSED AWAY FROM PROSTATE CANCER  P:  REASSURANCE, EDUCATION  RENDERED TO THE PT  Will continue to watch the patient closely over time  F/U IN 6 MONTHS FOR A TEJAS AND PSA RE CK     Aurelio Zuniga MD 11/06/24 8:54 AM

## 2024-11-07 ENCOUNTER — PATIENT OUTREACH (OUTPATIENT)
Dept: HOME HEALTH SERVICES | Age: 64
End: 2024-11-07
Payer: COMMERCIAL

## 2024-11-07 VITALS — BODY MASS INDEX: 22.3 KG/M2 | SYSTOLIC BLOOD PRESSURE: 118 MMHG | DIASTOLIC BLOOD PRESSURE: 67 MMHG | WEIGHT: 134 LBS

## 2024-11-07 NOTE — PROGRESS NOTES
Daily Call Note:   [facility] appt re-scheduled with Dr Lira 11/13 @3372. Pt aware of appt. Appt changed for cataract check with eye doctor to 2/10 2025 per pt. request. Pt has no further concerns.    Topics for Daily Review: appointments    Pt demonstrates clear understanding: Yes    Daily VS:  /67   Wt 60.8 kg (134 lb)   BMI 22.30 kg/m²     Last 3 Weights:  Wt Readings from Last 7 Encounters:   11/06/24 58.1 kg (128 lb)   11/04/24 58.1 kg (128 lb)   10/31/24 58.2 kg (128 lb 4.8 oz)   10/30/24 57.7 kg (127 lb 3.2 oz)   10/26/24 59.4 kg (131 lb)   10/23/24 59.4 kg (131 lb)   10/08/24 61.2 kg (135 lb)       Masimo Device: No     Virtual Visits--Scheduled (Most Recent Date at Top)  Follow up Appointments  Recent Visits  Date Type Provider Dept   11/04/24 Office Visit Claudio Maldonado DO Do [id number]   10/23/24 Office Visit Claudio Maldonado, DO Do [id number] [email or web address]   Showing recent visits within past 30 days and meeting all other requirements  Future Appointments  No visits were found meeting these conditions.  Showing future appointments within next 90 days and meeting all other requirements       Frequency of RN Calls & Virtual Visits per Team Agreement: daily

## 2024-11-08 ENCOUNTER — PATIENT OUTREACH (OUTPATIENT)
Dept: CARE COORDINATION | Facility: CLINIC | Age: 64
End: 2024-11-08
Payer: COMMERCIAL

## 2024-11-08 ENCOUNTER — PATIENT OUTREACH (OUTPATIENT)
Dept: HOME HEALTH SERVICES | Age: 64
End: 2024-11-08
Payer: COMMERCIAL

## 2024-11-08 ENCOUNTER — DOCUMENTATION (OUTPATIENT)
Dept: CARE COORDINATION | Facility: CLINIC | Age: 64
End: 2024-11-08
Payer: COMMERCIAL

## 2024-11-08 ENCOUNTER — TELEPHONE (OUTPATIENT)
Dept: PRIMARY CARE | Facility: CLINIC | Age: 64
End: 2024-11-08
Payer: COMMERCIAL

## 2024-11-08 VITALS — DIASTOLIC BLOOD PRESSURE: 68 MMHG | SYSTOLIC BLOOD PRESSURE: 137 MMHG

## 2024-11-08 SDOH — ECONOMIC STABILITY: HOUSING INSECURITY: AT ANY TIME IN THE PAST 12 MONTHS, WERE YOU HOMELESS OR LIVING IN A SHELTER (INCLUDING NOW)?: NO

## 2024-11-08 SDOH — HEALTH STABILITY: MENTAL HEALTH: HOW OFTEN DO YOU HAVE A DRINK CONTAINING ALCOHOL?: NEVER

## 2024-11-08 SDOH — HEALTH STABILITY: PHYSICAL HEALTH
HOW OFTEN DO YOU NEED TO HAVE SOMEONE HELP YOU WHEN YOU READ INSTRUCTIONS, PAMPHLETS, OR OTHER WRITTEN MATERIAL FROM YOUR DOCTOR OR PHARMACY?: RARELY

## 2024-11-08 SDOH — HEALTH STABILITY: MENTAL HEALTH: HOW OFTEN DO YOU HAVE SIX OR MORE DRINKS ON ONE OCCASION?: NEVER

## 2024-11-08 SDOH — SOCIAL STABILITY: SOCIAL NETWORK: IN A TYPICAL WEEK, HOW MANY TIMES DO YOU TALK ON THE PHONE WITH FAMILY, FRIENDS, OR NEIGHBORS?: THREE TIMES A WEEK

## 2024-11-08 SDOH — SOCIAL STABILITY: SOCIAL NETWORK
DO YOU BELONG TO ANY CLUBS OR ORGANIZATIONS SUCH AS CHURCH GROUPS, UNIONS, FRATERNAL OR ATHLETIC GROUPS, OR SCHOOL GROUPS?: YES

## 2024-11-08 SDOH — SOCIAL STABILITY: SOCIAL INSECURITY: ARE YOU MARRIED, WIDOWED, DIVORCED, SEPARATED, NEVER MARRIED, OR LIVING WITH A PARTNER?: DIVORCED

## 2024-11-08 SDOH — HEALTH STABILITY: MENTAL HEALTH: HOW MANY DRINKS CONTAINING ALCOHOL DO YOU HAVE ON A TYPICAL DAY WHEN YOU ARE DRINKING?: PATIENT DOES NOT DRINK

## 2024-11-08 SDOH — ECONOMIC STABILITY: HOUSING INSECURITY: IN THE PAST 12 MONTHS, HOW MANY TIMES HAVE YOU MOVED WHERE YOU WERE LIVING?: 0

## 2024-11-08 SDOH — ECONOMIC STABILITY: TRANSPORTATION INSECURITY: IN THE PAST 12 MONTHS, HAS LACK OF TRANSPORTATION KEPT YOU FROM MEDICAL APPOINTMENTS OR FROM GETTING MEDICATIONS?: NO

## 2024-11-08 SDOH — ECONOMIC STABILITY: FOOD INSECURITY: HOW HARD IS IT FOR YOU TO PAY FOR THE VERY BASICS LIKE FOOD, HOUSING, MEDICAL CARE, AND HEATING?: SOMEWHAT HARD

## 2024-11-08 SDOH — ECONOMIC STABILITY: HOUSING INSECURITY: IN THE LAST 12 MONTHS, WAS THERE A TIME WHEN YOU WERE NOT ABLE TO PAY THE MORTGAGE OR RENT ON TIME?: NO

## 2024-11-08 SDOH — SOCIAL STABILITY: SOCIAL INSECURITY: WITHIN THE LAST YEAR, HAVE YOU BEEN HUMILIATED OR EMOTIONALLY ABUSED IN OTHER WAYS BY YOUR PARTNER OR EX-PARTNER?: NO

## 2024-11-08 SDOH — SOCIAL STABILITY: SOCIAL NETWORK: HOW OFTEN DO YOU ATTEND CHURCH OR RELIGIOUS SERVICES?: MORE THAN 4 TIMES PER YEAR

## 2024-11-08 SDOH — SOCIAL STABILITY: SOCIAL INSECURITY: WITHIN THE LAST YEAR, HAVE YOU BEEN AFRAID OF YOUR PARTNER OR EX-PARTNER?: NO

## 2024-11-08 SDOH — HEALTH STABILITY: PHYSICAL HEALTH: ON AVERAGE, HOW MANY MINUTES DO YOU ENGAGE IN EXERCISE AT THIS LEVEL?: 20 MIN

## 2024-11-08 SDOH — ECONOMIC STABILITY: INCOME INSECURITY: IN THE PAST 12 MONTHS HAS THE ELECTRIC, GAS, OIL, OR WATER COMPANY THREATENED TO SHUT OFF SERVICES IN YOUR HOME?: YES

## 2024-11-08 SDOH — ECONOMIC STABILITY: FOOD INSECURITY: WITHIN THE PAST 12 MONTHS, YOU WORRIED THAT YOUR FOOD WOULD RUN OUT BEFORE YOU GOT THE MONEY TO BUY MORE.: OFTEN TRUE

## 2024-11-08 SDOH — ECONOMIC STABILITY: FOOD INSECURITY: WITHIN THE PAST 12 MONTHS, THE FOOD YOU BOUGHT JUST DIDN'T LAST AND YOU DIDN'T HAVE MONEY TO GET MORE.: OFTEN TRUE

## 2024-11-08 SDOH — HEALTH STABILITY: PHYSICAL HEALTH: ON AVERAGE, HOW MANY DAYS PER WEEK DO YOU ENGAGE IN MODERATE TO STRENUOUS EXERCISE (LIKE A BRISK WALK)?: 2 DAYS

## 2024-11-08 SDOH — SOCIAL STABILITY: SOCIAL NETWORK: HOW OFTEN DO YOU GET TOGETHER WITH FRIENDS OR RELATIVES?: TWICE A WEEK

## 2024-11-08 SDOH — SOCIAL STABILITY: SOCIAL NETWORK: HOW OFTEN DO YOU ATTEND MEETINGS OF THE CLUBS OR ORGANIZATIONS YOU BELONG TO?: MORE THAN 4 TIMES PER YEAR

## 2024-11-08 SDOH — HEALTH STABILITY: PHYSICAL HEALTH: ON AVERAGE, HOW MANY DAYS PER WEEK DO YOU ENGAGE IN MODERATE TO STRENUOUS EXERCISE (LIKE A BRISK WALK)?: 3 DAYS

## 2024-11-08 ASSESSMENT — LIFESTYLE VARIABLES
SKIP TO QUESTIONS 9-10: 1
SKIP TO QUESTIONS 9-10: 1
AUDIT-C TOTAL SCORE: 0
AUDIT-C TOTAL SCORE: 0

## 2024-11-08 ASSESSMENT — ACTIVITIES OF DAILY LIVING (ADL)
LACK_OF_TRANSPORTATION: NO
LACK_OF_TRANSPORTATION: NO

## 2024-11-08 NOTE — PROGRESS NOTES
Daily Call Note:   Pt stated he is the same, was able to go outside and walk 1/2 mile. He denies CP, SOB and swelling. No other questions at this time.       Topics for Daily Review: follow up appt  Pt demonstrates clear understanding: Yes    Daily VS:  /68     Last 3 Weights:  Wt Readings from Last 7 Encounters:   11/07/24 60.8 kg (134 lb)   11/06/24 58.1 kg (128 lb)   11/04/24 58.1 kg (128 lb)   10/31/24 58.2 kg (128 lb 4.8 oz)   10/30/24 57.7 kg (127 lb 3.2 oz)   10/26/24 59.4 kg (131 lb)   10/23/24 59.4 kg (131 lb)       Masimo Device: No       Virtual Visits--Scheduled (Most Recent Date at Top)  Follow up Appointments  Recent Visits  Date Type Provider Dept   11/04/24 Office Visit Claudio Madlonado, DO Do [id number]   10/23/24 Office Visit Claudio Maldonado, DO Do [id number]   Showing recent visits within past 30 days and meeting all other requirements  Future Appointments  No visits were found meeting these conditions.  Showing future appointments within next 90 days and meeting all other requirements       Frequency of RN Calls & Virtual Visits per Team Agreement: Healthy at Home Frequency: Daily    Medication issues Addressed (what was done):     Follow up appointments scheduled by [facility] Staff:  AMANDA 11/13 @2794

## 2024-11-08 NOTE — TELEPHONE ENCOUNTER
----- Message from Dona Espinoza MD sent at 10/13/2022  5:38 PM EDT -----  Tell him it looks normal; no ischemia; would like him to go on low dose beta blocker lopressor 25 bid to reduce the frequency of the extra beats; it suppresses the effect of adrenaline on the heart; send in hf/bp if he agrees to start uh

## 2024-11-08 NOTE — PROGRESS NOTES
Patient explained he is not looking for employment, he has a position waiting for him. Patient stated due to his recent illness he need assistance with utilities/ property taxes/ food assistance.    Patient applied for SNAP and was approved for $20 a month.    Patient stated he completed a application a not too long ago, but has not received an appointment for utility assistance yet.  Shared contact number to Utility appointment line [phone]/ [facility] [phone] for assistance with taxes.     Requested a referral to  [employer]. Patient agreed to receive additional resources via email.

## 2024-11-09 ENCOUNTER — APPOINTMENT (OUTPATIENT)
Dept: CARE COORDINATION | Age: 64
End: 2024-11-09
Payer: COMMERCIAL

## 2024-11-09 ENCOUNTER — PATIENT OUTREACH (OUTPATIENT)
Dept: HOME HEALTH SERVICES | Age: 64
End: 2024-11-09

## 2024-11-09 VITALS
BODY MASS INDEX: 21.63 KG/M2 | HEART RATE: 81 BPM | SYSTOLIC BLOOD PRESSURE: 137 MMHG | WEIGHT: 130 LBS | DIASTOLIC BLOOD PRESSURE: 68 MMHG

## 2024-11-09 NOTE — PROGRESS NOTES
Daily Call Note: Daily call completed - the patient stated that he is doing well today and has no issues of concern. He denies SOB or respiratory issues of any kind. He denies peripheral edema. He is incorporating exercise into his daily routine now, which includes walking and light weight lifting. He has begun walking approximately a half mile - he was instructed on pacing his exertions and monitoring how he feels physically during the activity. He is aware to take adequate rest periods to avoid overexertion and fatigue. He is making follow up appointments with specialists, and is aware of upcoming appointments.  /68 Comment: Same reading as yesterday but this is the number he stated for today  Pulse 81   Wt 59 kg (130 lb)   BMI 21.63 kg/m²   He was reminded of [facility] appointment with Dr. Lira 11/13.    Pt demonstrates clear understanding: Yes    Daily Weight:  There were no vitals filed for this visit.   Last 3 Weights:  Wt Readings from Last 7 Encounters:   11/07/24 60.8 kg (134 lb)   11/06/24 58.1 kg (128 lb)   11/04/24 58.1 kg (128 lb)   10/31/24 58.2 kg (128 lb 4.8 oz)   10/30/24 57.7 kg (127 lb 3.2 oz)   10/26/24 59.4 kg (131 lb)   10/23/24 59.4 kg (131 lb)         Virtual Visits--Scheduled (Most Recent Date at Top)  Follow up Appointments  Recent Visits  Date Type Provider Dept   11/04/24 Office Visit Claudio Maldonado DO Do [id number] [email or web address]   10/23/24 Office Visit Claudio Maldonado DO Do [id number] [email or web address]   Showing recent visits within past 30 days and meeting all other requirements  Future Appointments  No visits were found meeting these conditions.  Showing future appointments within next 90 days and meeting all other requirements       Frequency of RN Calls & Virtual Visits per Team Agreement: Healthy at Home Frequency: Daily

## 2024-11-11 ENCOUNTER — PATIENT OUTREACH (OUTPATIENT)
Dept: PRIMARY CARE | Facility: CLINIC | Age: 64
End: 2024-11-11
Payer: COMMERCIAL

## 2024-11-11 ENCOUNTER — PATIENT OUTREACH (OUTPATIENT)
Dept: HOME HEALTH SERVICES | Age: 64
End: 2024-11-11
Payer: COMMERCIAL

## 2024-11-11 VITALS — SYSTOLIC BLOOD PRESSURE: 134 MMHG | BODY MASS INDEX: 21.63 KG/M2 | WEIGHT: 130 LBS | DIASTOLIC BLOOD PRESSURE: 78 MMHG

## 2024-11-11 NOTE — PROGRESS NOTES
Call regarding appt. with PCP on  11/8/24 after hospitalization.  At time of outreach call the patient feels as if their condition has improved since last visit.  No questions or concerns at this time.

## 2024-11-11 NOTE — PROGRESS NOTES
Daily Call Note: Daily call completed - the patient stated that he was doing well - no physical pain other than mild discomfort in his legs after walking through the store. He is anxious to get back to his former baseline, but was reminded that recovery is a process, and he must make sure not to overdue the exertions to make steady progress. He is making efforts to prepare healthy foods that are nutritious in an effort to regain some of the weight he lost unintentionally.   All upcoming appointments were again reviewed. He had no questions or concerns for the nurse to assist with.    /78   Wt 59 kg (130 lb)   BMI 21.63 kg/m²       Pt demonstrates clear understanding: Yes    Daily Weight:  There were no vitals filed for this visit.   Last 3 Weights:  Wt Readings from Last 7 Encounters:   11/09/24 59 kg (130 lb)   11/07/24 60.8 kg (134 lb)   11/06/24 58.1 kg (128 lb)   11/04/24 58.1 kg (128 lb)   10/31/24 58.2 kg (128 lb 4.8 oz)   10/30/24 57.7 kg (127 lb 3.2 oz)   10/26/24 59.4 kg (131 lb)         Virtual Visits--Scheduled (Most Recent Date at Top)  Follow up Appointments  Recent Visits  Date Type Provider Dept   11/04/24 Office Visit Claudio Maldonado, DO Do [id number] [email or web address]   10/23/24 Office Visit Claudio Maldonado, DO Do [id number] [email or web address]   Showing recent visits within past 30 days and meeting all other requirements  Future Appointments  No visits were found meeting these conditions.  Showing future appointments within next 90 days and meeting all other requirements       Frequency of RN Calls & Virtual Visits per Team Agreement: Healthy at Home Frequency: Daily    Medication issues Addressed (what was done):

## 2024-11-12 ENCOUNTER — PATIENT OUTREACH (OUTPATIENT)
Dept: HOME HEALTH SERVICES | Age: 64
End: 2024-11-12
Payer: COMMERCIAL

## 2024-11-12 NOTE — PROGRESS NOTES
Daily call completed. Pt states he is doing well. Getting ready to go to PT across town. Pt states he is working on cardio and therapy at home as well. Pt states his sleep is slowly improving at home. Pt states he is trying to make all of the adjustments necessary. Reminded to give himself some understanding and leniency as his body needs time to heal. Denies any new symptoms or concerns. Denies SOB, swelling. States occasionally sore when he first wakes up but he is able to stretch out and be okay. Denies need for med refills at this time. Denies any further questions/concerns/needs at this time. Aware of upcoming appts. [facility] 11/13 @ 7790 with Dr. Lira.     Patient's Address:   [address] [place] [id number]  **  If this is not the address patient will receive services - alert team and address in EMR**       Patient Contacts:  Extended Emergency Contact Information  Primary Emergency Contact: CiaraNico  Home Phone: [phone]  Relation: Other  Secondary Emergency Contact: Maria Eugenia Tony  Mobile Phone: [phone]  Relation: Spouse                                Patient's Preferred Phone: [phone]  Patient's E-mail: [email or web address]

## 2024-11-13 ENCOUNTER — APPOINTMENT (OUTPATIENT)
Dept: CARE COORDINATION | Age: 64
End: 2024-11-13
Payer: COMMERCIAL

## 2024-11-13 ENCOUNTER — PATIENT OUTREACH (OUTPATIENT)
Dept: HOME HEALTH SERVICES | Age: 64
End: 2024-11-13

## 2024-11-13 NOTE — PROGRESS NOTES
Daily Call Note:  Weekly [facility] call complete w this RN,  and Dr Lira.  Denies CP/SOB/ edema  Has been working on increasing his strength, as he returns to work soon.    All questions/ concerns addressed  Follow up appts scheduled  Medications reviewed no refills necessary  Scheduled upcoming weekly [facility] call    Pt Education:  POC   Barriers:   Topics for Daily Review:   Pt demonstrates clear understanding: Yes    Daily Weight:  There were no vitals filed for this visit.   Last 3 Weights:  Wt Readings from Last 7 Encounters:   11/11/24 59 kg (130 lb)   11/09/24 59 kg (130 lb)   11/07/24 60.8 kg (134 lb)   11/06/24 58.1 kg (128 lb)   11/04/24 58.1 kg (128 lb)   10/31/24 58.2 kg (128 lb 4.8 oz)   10/30/24 57.7 kg (127 lb 3.2 oz)       Masimo Device: No   Masimo Clinical Impression:     Virtual Visits--Scheduled (Most Recent Date at Top)  Follow up Appointments  Recent Visits  Date Type Provider Dept   11/04/24 Office Visit Claudio Maldonado, DO Do [id number] [facility]   10/23/24 Office Visit Claudio Maldonado, DO Do [id number] [email or web address]   Showing recent visits within past 30 days and meeting all other requirements  Future Appointments  No visits were found meeting these conditions.  Showing future appointments within next 90 days and meeting all other requirements       Frequency of RN Calls & Virtual Visits per Team Agreement: Healthy at Home Frequency: Daily    Medication issues Addressed (what was done):     Follow up appointments scheduled by [facility] Staff:   Referrals made by [facility] staff:

## 2024-11-14 ENCOUNTER — PATIENT OUTREACH (OUTPATIENT)
Dept: HOME HEALTH SERVICES | Age: 64
End: 2024-11-14
Payer: COMMERCIAL

## 2024-11-14 NOTE — PROGRESS NOTES
Daily Call Note: Daily call complete. Patient states he is doing pretty good. He is trying to increase activity. He finished cipro. He hasn't checked BP or weight. Educated on reason for checking daily weight in morning, he verbalized understanding. Next [facility] 11/20/24 at 1435, verbalized understanding. No other questions.     Pt Education: per POC  Barriers:   Topics for Daily Review: BP, weight  Pt demonstrates clear understanding: Yes    Daily Weight:  There were no vitals filed for this visit.   Last 3 Weights:  Wt Readings from Last 7 Encounters:   11/11/24 59 kg (130 lb)   11/09/24 59 kg (130 lb)   11/07/24 60.8 kg (134 lb)   11/06/24 58.1 kg (128 lb)   11/04/24 58.1 kg (128 lb)   10/31/24 58.2 kg (128 lb 4.8 oz)   10/30/24 57.7 kg (127 lb 3.2 oz)       Masimo Device: No   Masimo Clinical Impression: n/a    Virtual Visits--Scheduled (Most Recent Date at Top)  Follow up Appointments  Recent Visits  Date Type Provider Dept   11/04/24 Office Visit Claudio Maldonado DO Do [id number] [email or web address]   10/23/24 Office Visit Claudio Maldonado, DO Do [id number] [email or web address]   Showing recent visits within past 30 days and meeting all other requirements  Future Appointments  No visits were found meeting these conditions.  Showing future appointments within next 90 days and meeting all other requirements       Frequency of RN Calls & Virtual Visits per Team Agreement: Healthy at Home Frequency: Daily    Medication issues Addressed (what was done): none    Follow up appointments scheduled by [facility] Staff: none  Referrals made by [facility] staff: none

## 2024-11-15 ENCOUNTER — DOCUMENTATION (OUTPATIENT)
Dept: CARE COORDINATION | Facility: CLINIC | Age: 64
End: 2024-11-15
Payer: COMMERCIAL

## 2024-11-15 ENCOUNTER — PATIENT OUTREACH (OUTPATIENT)
Dept: CARE COORDINATION | Age: 64
End: 2024-11-15
Payer: COMMERCIAL

## 2024-11-15 VITALS — SYSTOLIC BLOOD PRESSURE: 137 MMHG | DIASTOLIC BLOOD PRESSURE: 68 MMHG | BODY MASS INDEX: 21.77 KG/M2 | WEIGHT: 130.8 LBS

## 2024-11-15 NOTE — PROGRESS NOTES
Patient stated some of the resource has been helpful waiting to hear a response back from resource shared.  Requested a referral once again to [facility] [address] [place] [address], PH: [phone].    Emailed patient nearby food pantries in the area     [facility]  [address] [place] [id number]  [phone]  M-F [id number] (Utilities, Rent Asst., Pantry)    [facility]   [address] [place] [id number]  [phone]    [employer]   [address] [place] [address]  M- 9A-[id number]    [facility]  [address] [place] [address]  [phone] (option 18)

## 2024-11-15 NOTE — PROGRESS NOTES
Patient called back in, daily call complete. States he did cardio this morning and is going to walk later. States feeling good. Reports vitals and weight and reports wanting to put weight on. Advised for caloric intake without sodium. He verbalizes understanding.   He inquired about starting Jardiance, finished cipro 4 days ago.   Inquired with pharmacist, advised to wait for response from Dr. Donaldson cardio. He has message out to him.   Denies any other concerns, next call reviewed.

## 2024-11-16 ENCOUNTER — PATIENT OUTREACH (OUTPATIENT)
Dept: HOME HEALTH SERVICES | Age: 64
End: 2024-11-16
Payer: COMMERCIAL

## 2024-11-16 NOTE — PROGRESS NOTES
Daily Call Note: Daily call complete. Patient states he is trying to do better. He states he did 3 miles on the bike, walked a 1/2 mile and did cardio today. He denies any CP or SOB. He states he got a message back from the doctor and started the jardiance today. He did not check his weight or BP today. I instructed on importance of monitoring and reason for checking weight daily in the morning with same amount of clothes on, he verbalized understanding.Next [facility] 11/20/24 at 1435, verbalized understanding. No other questions at this time.     Pt Education: per POC  Barriers: none  Topics for Daily Review: BP, weight  Pt demonstrates clear understanding: Yes    Daily Weight:  There were no vitals filed for this visit.   Last 3 Weights:  Wt Readings from Last 7 Encounters:   11/15/24 59.3 kg (130 lb 12.8 oz)   11/11/24 59 kg (130 lb)   11/09/24 59 kg (130 lb)   11/07/24 60.8 kg (134 lb)   11/06/24 58.1 kg (128 lb)   11/04/24 58.1 kg (128 lb)   10/31/24 58.2 kg (128 lb 4.8 oz)       Masimo Device: No   Masimo Clinical Impression: n/a    Virtual Visits--Scheduled (Most Recent Date at Top)  Follow up Appointments  Recent Visits  Date Type Provider Dept   11/04/24 Office Visit Claudio Maldonado, DO Do [id number] [email or web address]   10/23/24 Office Visit Claudio Maldonado, DO Do [id number] [email or web address]   Showing recent visits within past 30 days and meeting all other requirements  Future Appointments  No visits were found meeting these conditions.  Showing future appointments within next 90 days and meeting all other requirements       Frequency of RN Calls & Virtual Visits per Team Agreement: Healthy at Home Frequency: Daily    Medication issues Addressed (what was done): see note    Follow up appointments scheduled by [facility] Staff: none  Referrals made by [facility] staff: none

## 2024-11-18 ENCOUNTER — PATIENT OUTREACH (OUTPATIENT)
Dept: HOME HEALTH SERVICES | Age: 64
End: 2024-11-18
Payer: COMMERCIAL

## 2024-11-18 VITALS — WEIGHT: 132.6 LBS | BODY MASS INDEX: 22.07 KG/M2

## 2024-11-18 NOTE — PROGRESS NOTES
Daily Call Note:   Spoke to patient, he reports feeling fair/good. He is trying to gain weight back.   Doing cardio and walking, states walking is harder than bike, causes aches and pains. Took tylenol, left leg pain.   Watches meal intake with low sodium.   Monitors bp and reads about how to improve his EF.   And how to take breaks while exercising.   He heard back from cardio Dr. Donaldson, he restarted Jardiance Saturday morning.   C/o nausea with eating, takes pills while eating.   No longer taking prilosec.   Advised we can discuss further on [facility]. Could be meal volume intake or added proteins,   Upcoming appointments reviewed.     Pt Education: POC  Barriers: na  Topics for Daily Review: POC  Pt demonstrates clear understanding: Yes    Daily Weight:  There were no vitals filed for this visit.   Last 3 Weights:  Wt Readings from Last 7 Encounters:   11/15/24 59.3 kg (130 lb 12.8 oz)   11/11/24 59 kg (130 lb)   11/09/24 59 kg (130 lb)   11/07/24 60.8 kg (134 lb)   11/06/24 58.1 kg (128 lb)   11/04/24 58.1 kg (128 lb)   10/31/24 58.2 kg (128 lb 4.8 oz)       Masimo Device: No   Masimo Clinical Impression: na    Virtual Visits--Scheduled (Most Recent Date at Top)  Follow up Appointments  Recent Visits  Date Type Provider Dept   11/04/24 Office Visit Claudio Maldonado, DO Do [id number] [email or web address]   10/23/24 Office Visit Claudio Maldonado, DO Do [id number] [email or web address]   Showing recent visits within past 30 days and meeting all other requirements  Future Appointments  No visits were found meeting these conditions.  Showing future appointments within next 90 days and meeting all other requirements       Frequency of RN Calls & Virtual Visits per Team Agreement: Healthy at Home Frequency: Daily    Medication issues Addressed (what was done): na    Follow up appointments scheduled by [facility] Staff: na  Referrals made by [facility] staff: champ

## 2024-11-19 ENCOUNTER — PATIENT OUTREACH (OUTPATIENT)
Dept: HOME HEALTH SERVICES | Age: 64
End: 2024-11-19
Payer: COMMERCIAL

## 2024-11-19 VITALS — BODY MASS INDEX: 21.93 KG/M2 | WEIGHT: 131.8 LBS

## 2024-11-19 NOTE — PROGRESS NOTES
Daily Call Note:     Incoming call back from the patient.  He states he was at an appt. His /68, and his weight 131.8 lbs.  Reminded of next [facility] appt tomorrow 11/20 @ 1435.  Pt states he has a PT appt in the morning.  No questions, concerns, or assistance needed during the call.     Pt Education:   Barriers:   Topics for Daily Review:   Pt demonstrates clear understanding:     Daily Weight:  Vitals:    11/19/24 1453   Weight: 59.8 kg (131 lb 12.8 oz)      Last 3 Weights:  Wt Readings from Last 7 Encounters:   11/19/24 59.8 kg (131 lb 12.8 oz)   11/18/24 60.1 kg (132 lb 9.6 oz)   11/15/24 59.3 kg (130 lb 12.8 oz)   11/11/24 59 kg (130 lb)   11/09/24 59 kg (130 lb)   11/07/24 60.8 kg (134 lb)   11/06/24 58.1 kg (128 lb)       Masimo Device:    Masimo Clinical Impression:     Virtual Visits--Scheduled (Most Recent Date at Top)  Follow up Appointments  Recent Visits  Date Type Provider Dept   11/04/24 Office Visit Claudio Maldonado DO Do [id number] [email or web address]   10/23/24 Office Visit Claudio Maldonado, DO Do [id number] [email or web address]   Showing recent visits within past 30 days and meeting all other requirements  Future Appointments  No visits were found meeting these conditions.  Showing future appointments within next 90 days and meeting all other requirements       Frequency of RN Calls & Virtual Visits per Team Agreement:     Medication issues Addressed (what was done):    Follow up appointments scheduled by [facility] Staff:   Referrals made by [facility] staff:

## 2024-11-19 NOTE — PROGRESS NOTES
Daily Call Note:   LVM.    Daily Weight:  There were no vitals filed for this visit.   Last 3 Weights:  Wt Readings from Last 7 Encounters:   11/19/24 59.8 kg (131 lb 12.8 oz)   11/18/24 60.1 kg (132 lb 9.6 oz)   11/15/24 59.3 kg (130 lb 12.8 oz)   11/11/24 59 kg (130 lb)   11/09/24 59 kg (130 lb)   11/07/24 60.8 kg (134 lb)   11/06/24 58.1 kg (128 lb)         Virtual Visits--Scheduled (Most Recent Date at Top)  Follow up Appointments  Recent Visits  Date Type Provider Dept   11/04/24 Office Visit Claudio Maldonado DO Do [id number] [email or web address]   10/23/24 Office Visit Claudio Maldonado DO Do [id number] [email or web address]   Showing recent visits within past 30 days and meeting all other requirements  Future Appointments  No visits were found meeting these conditions.  Showing future appointments within next 90 days and meeting all other requirements

## 2024-11-20 ENCOUNTER — APPOINTMENT (OUTPATIENT)
Dept: CARE COORDINATION | Age: 64
End: 2024-11-20
Payer: COMMERCIAL

## 2024-11-20 ENCOUNTER — EVALUATION (OUTPATIENT)
Dept: PHYSICAL THERAPY | Facility: CLINIC | Age: 64
End: 2024-11-20
Payer: COMMERCIAL

## 2024-11-20 ENCOUNTER — PATIENT OUTREACH (OUTPATIENT)
Dept: HOME HEALTH SERVICES | Age: 64
End: 2024-11-20

## 2024-11-20 VITALS — BODY MASS INDEX: 21.63 KG/M2 | WEIGHT: 130 LBS

## 2024-11-20 DIAGNOSIS — M54.50 CHRONIC BILATERAL LOW BACK PAIN, UNSPECIFIED WHETHER SCIATICA PRESENT: ICD-10-CM

## 2024-11-20 DIAGNOSIS — G89.29 CHRONIC BILATERAL LOW BACK PAIN, UNSPECIFIED WHETHER SCIATICA PRESENT: ICD-10-CM

## 2024-11-20 DIAGNOSIS — R29.898 LEG WEAKNESS, BILATERAL: ICD-10-CM

## 2024-11-20 PROCEDURE — 97110 THERAPEUTIC EXERCISES: CPT | Mod: GP | Performed by: PHYSICAL THERAPIST

## 2024-11-20 PROCEDURE — 97161 PT EVAL LOW COMPLEX 20 MIN: CPT | Mod: GP | Performed by: PHYSICAL THERAPIST

## 2024-11-20 ASSESSMENT — ENCOUNTER SYMPTOMS
LOSS OF SENSATION IN FEET: 0
DEPRESSION: 0
OCCASIONAL FEELINGS OF UNSTEADINESS: 0

## 2024-11-20 NOTE — PROGRESS NOTES
Physical Therapy    Physical Therapy Evaluation and Treatment      Patient Name: Matthieu Solis  MRN: [id number]  Today's Date: 11/20/2024    Time Entry:   Time Calculation  Start Time: 1215  Stop Time: 1300  Time Calculation (min): 45 min  PT Evaluation Time Entry  PT Evaluation (Low) Time Entry: 25  PT Therapeutic Procedures Time Entry  Therapeutic Exercise Time Entry: 15       Assessment:  Pt presents to clinic with chief complaint of low back pain with associated bilateral LE pain symptoms that occur with long periods of walking. Pt reports pain symptoms have been ongoing but have never limited him in this way. He is limited in walking less than .25 miles before pain into Les begins. He demonstrates reduced lumbar mobility, reduced hip flexibility, and mild decrease in hip strength bilat. Difficult to determine if there is a vascular component to symptoms  He is following up with vascular next month.   Pt will benefit from skilled therapy to address current impairments for goal of reduced LE pain and increased tolerance to his regular activity.     Plan:  OP PT Plan  Treatment/Interventions: Cryotherapy, Education/ Instruction, Electrical stimulation, Hot pack, Manual therapy, Neuromuscular re-education, Self care/ home management, Therapeutic activities, Therapeutic exercises  PT Plan: Skilled PT  PT Frequency: 1 time per week  Duration: 4 weeks  Onset Date: 11/04/24  Rehab Potential: Good  Plan of Care Agreement: Patient    Current Problem:   Problem List Items Addressed This Visit             ICD-10-CM    Low back pain M54.50    Relevant Orders    Follow Up In Physical Therapy    Leg weakness, bilateral R29.898    Relevant Orders    Follow Up In Physical Therapy         Subjective    General:  General  Reason for Referral: Low back pain, LE weakness  Referred By: Dr. Claudio Maldonado  Preferred Learning Style: verbal, visual, written  Precautions:  Precautions  STEADI Fall Risk Score (The score of 4 or more  indicates an increased risk of falling): 2  Precautions Comment: None    Chief complaints:   Pt presents to clinic with chief complaint of bilateral LE pain,  L worse than R and LE weakness that has been ongoing since recent hospitalization in Oct   Onset/Surgery Date: 11/4/24  Mechanism of Injury: no specific   Previous History: Yes, however, did not have significant limitations     Pain: 0/10           10/10 at it's worst       Location: bilat lateral LE pain    Type: achy, gripping, nagging type pain    Aggravators: Walking, lifting     Alleviators: Sitting, rest (2-5 min)     Function:    Prior Level: was able to walk at least a mile and was able to bike at least 7 miles; unable to walk half mile at this time     Current limitations: see aggravators    Condition: slowly improving      Home Situation:    Lives at home with partner      Sleep:     Disturbed: No     Preferred position(s):       Goals for Therapy:    Increase in strength into LE    Decrease LE pain        Objective      Observation/Posture:    Mild decrease in lumbar lordosis in standing   Mild tenderness SP thoracic spine, lumbar spine   Mild tenderness PSIS bilat       ROM/Flexibility:    Lumbar  Flexion: 70 deg; stretch      Extension: 5 deg       Sidebend R / L: 20 deg / 15 deg       Rotation R / L: 75% / 75%    Stretching into R low back with flexion, SB, rotation       Hip R / L Flexion: 90 deg / 100 deg       Ext Rot: mod limitation bilat       Int Rot: mild limitation bilat      Strength R / L:     Hip Flexion:     5 / 5    Hip Abduction:    5 / 5    Hip Adduction:    5 / 5    Hip ER:      4+ / 4+    Hip IR:       5 / 5      Knee Extension:   5 / 5    Knee Flexion:       4+ / 4+      Ankle DF:             5 / 5    Ankle PF:              5 / 5     Neurological: Sensation is intact and symmetrical in BLEs.      Gait: wide MAGGIE with amb   No significant gait deficits      Special Tests:     Slump R / L : - / -     SLR R / L : - / -     Long sit:  WNL     Outcome Measure:    KEV: 8 / 50 = 16 % impaired      TREATMENT  Therapeutic exercise:  PPT x 10   SKTC x 5 ea   Lumbar rot x 10   Figure 4 stretch x 1 min hold ea        Goals:  Active       PT Problem       Pt will report centralization of pain symptoms into low back for improved ability to walk longer than .25 miles at a time        Start:  11/20/24    Expected End:  02/18/25            Pt will increase bilateral LE strength by 1 MMT grade for all weakened muscle groups for improved ability to return to regular work duties        Start:  11/20/24    Expected End:  02/18/25            Pt will demonstrate full and pain-free lumbar AROM for improved ability to perform work duties        Start:  11/20/24    Expected End:  02/18/25            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

## 2024-11-20 NOTE — PROGRESS NOTES
Daily Call Note: [facility] weekly call with the patient and Dr. BRITTANI Lira.  The patient stated he has been to a physical therapy session today and is working on re-building strength and mobility. He is working with exercise bike and walking as prescribed and monitored by physical therapy.  He has started taking the Jardiance as ordered. He is continuing all medications as prescribed which were reviewed with him by the doctor. She confirmed with him that he will need medications chronically to manage his condition which is chronic. The patient verbalized good understanding. He has followed up with providers as instructed and is aware of upcoming appointments.  The patient stated that he is eating more food, but not gaining weight. He sometimes experiences nausea after eating. It was suggested to him that instead of eating three large meals a day that he divide the portions into six times a day, as he has been eating large amounts at one time.   The patient did not have a chance to check his BP today due to pressing appointments, but does generally check on a daily basis    Pt demonstrates clear understanding: Yes    Daily Weight:  There were no vitals filed for this visit.   Last 3 Weights:  Wt Readings from Last 7 Encounters:   11/19/24 59.8 kg (131 lb 12.8 oz)   11/18/24 60.1 kg (132 lb 9.6 oz)   11/15/24 59.3 kg (130 lb 12.8 oz)   11/11/24 59 kg (130 lb)   11/09/24 59 kg (130 lb)   11/07/24 60.8 kg (134 lb)   11/06/24 58.1 kg (128 lb)       Virtual Visits--Scheduled (Most Recent Date at Top)  Follow up Appointments  Recent Visits  Date Type Provider Dept   11/04/24 Office Visit Claudio Maldonado DO Do [id number] [email or web address]   10/23/24 Office Visit Claudio Maldonado DO Do [id number] [email or web address]   Showing recent visits within past 30 days and meeting all other requirements  Future Appointments  No visits were found meeting these conditions.  Showing future appointments within next 90 days and meeting all other  requirements       Frequency of RN Calls & Virtual Visits per Team Agreement: Healthy at Home Frequency: Daily  Changed to MWF  Medication issues Addressed (what was done):

## 2024-11-21 ENCOUNTER — APPOINTMENT (OUTPATIENT)
Dept: CARE COORDINATION | Facility: CLINIC | Age: 64
End: 2024-11-21
Payer: COMMERCIAL

## 2024-11-23 PROCEDURE — RXMED WILLOW AMBULATORY MEDICATION CHARGE

## 2024-11-25 ENCOUNTER — PATIENT OUTREACH (OUTPATIENT)
Dept: HOME HEALTH SERVICES | Age: 64
End: 2024-11-25
Payer: COMMERCIAL

## 2024-11-25 ENCOUNTER — PHARMACY VISIT (OUTPATIENT)
Dept: PHARMACY | Facility: CLINIC | Age: 64
End: 2024-11-25
Payer: COMMERCIAL

## 2024-11-26 NOTE — PROGRESS NOTES
Daily Call Note:     Incoming call back from the patient.  He states he is doing okay.  He didn't check his weight or vitals today.  He states his joints are still a little bit stiff.  He states he doesn't need any assistance,or have any questions or concerns.      Pt Education:   Barriers:   Topics for Daily Review:   Pt demonstrates clear understanding:    Daily Weight:  There were no vitals filed for this visit.   Last 3 Weights:  Wt Readings from Last 7 Encounters:   11/20/24 59 kg (130 lb)   11/19/24 59.8 kg (131 lb 12.8 oz)   11/18/24 60.1 kg (132 lb 9.6 oz)   11/15/24 59.3 kg (130 lb 12.8 oz)   11/11/24 59 kg (130 lb)   11/09/24 59 kg (130 lb)   11/07/24 60.8 kg (134 lb)       Masimo Device:    Masimo Clinical Impression:    Virtual Visits--Scheduled (Most Recent Date at Top)  Follow up Appointments  Recent Visits  Date Type Provider Dept   11/04/24 Office Visit Claudio Maldonado DO [email or web address]   Showing recent visits within past 30 days and meeting all other requirements  Future Appointments  No visits were found meeting these conditions.  Showing future appointments within next 90 days and meeting all other requirements       Frequency of RN Calls & Virtual Visits per Team Agreement:     Medication issues Addressed (what was done):     Follow up appointments scheduled by [facility] Staff:   Referrals made by [facility] staff:

## 2024-11-27 ENCOUNTER — PATIENT OUTREACH (OUTPATIENT)
Dept: HOME HEALTH SERVICES | Age: 64
End: 2024-11-27

## 2024-11-27 ENCOUNTER — APPOINTMENT (OUTPATIENT)
Dept: CARE COORDINATION | Age: 64
End: 2024-11-27
Payer: COMMERCIAL

## 2024-11-27 ENCOUNTER — PATIENT OUTREACH (OUTPATIENT)
Dept: PRIMARY CARE | Facility: CLINIC | Age: 64
End: 2024-11-27

## 2024-11-27 NOTE — PROGRESS NOTES
Weekly call completed with Dr Lira Patient had his covid vaccine over the weekend he was feeling very bad Sunday but he is getting better. He is going to get the Shingles vaccine in about 6 months. Patient has been working on increasing his calorie intake but was having Nausea he has since changed to smaller meals with more protein. He has not been able to get back to his mile a day walking. He has a vascular surgery appointment next week for his leg weakness. Medications reviewed Next [facility] appointment with Dr Lira scheduled 12/4 @ 9797

## 2024-11-28 ENCOUNTER — PATIENT OUTREACH (OUTPATIENT)
Dept: HOME HEALTH SERVICES | Age: 64
End: 2024-11-28
Payer: COMMERCIAL

## 2024-11-28 NOTE — PROGRESS NOTES
"Incoming call from patient who states his daughter's car broke down and he went to get her and was outside in 38 degree weather and his hands got cold and numb and he got scared about being out in the cold and his heart started beating faster. He states he has heart failure and was told to be careful in weather >80 or <20, so he wanted to make sure he was ok. He states he got his daughter home and got warmed up and he feels ok now, back to \"normal\" He is driving home. I suggested when he get home he can check his BP and HR and call back if any questions or concerns, he verbalized understanding.        "

## 2024-11-29 ENCOUNTER — PATIENT OUTREACH (OUTPATIENT)
Dept: HOME HEALTH SERVICES | Age: 64
End: 2024-11-29
Payer: COMMERCIAL

## 2024-11-29 VITALS — SYSTOLIC BLOOD PRESSURE: 138 MMHG | DIASTOLIC BLOOD PRESSURE: 68 MMHG

## 2024-11-29 NOTE — PROGRESS NOTES
Daily Call Note: Call complete. Patient states he is doing ok today. No new concerns. He is worried about the storm and cold winter weather coming and who is going to shovel snow because he knows that he can't this year. Active listening and support provided. Next [facility] 12/4/24 at 1435, verbalized understanding. No other questions at this time.    /68       Pt Education: per POC  Barriers: none  Topics for Daily Review: BP  Pt demonstrates clear understanding: Yes    Daily Weight:  There were no vitals filed for this visit.   Last 3 Weights:  Wt Readings from Last 7 Encounters:   11/20/24 59 kg (130 lb)   11/19/24 59.8 kg (131 lb 12.8 oz)   11/18/24 60.1 kg (132 lb 9.6 oz)   11/15/24 59.3 kg (130 lb 12.8 oz)   11/11/24 59 kg (130 lb)   11/09/24 59 kg (130 lb)   11/07/24 60.8 kg (134 lb)       Masimo Device: No   Masimo Clinical Impression: n/a    Virtual Visits--Scheduled (Most Recent Date at Top)  Follow up Appointments  Recent Visits  Date Type Provider Dept   11/04/24 Office Visit Claudio Maldonado DO [id number]   Showing recent visits within past 30 days and meeting all other requirements  Future Appointments  No visits were found meeting these conditions.  Showing future appointments within next 90 days and meeting all other requirements       Frequency of RN Calls & Virtual Visits per Team Agreement: Healthy at Home Frequency: M/W/F    Medication issues Addressed (what was done): none    Follow up appointments scheduled by [facility] Staff: none  Referrals made by [facility] staff: none

## 2024-12-02 ENCOUNTER — DOCUMENTATION (OUTPATIENT)
Dept: CARE COORDINATION | Age: 64
End: 2024-12-02
Payer: COMMERCIAL

## 2024-12-02 DIAGNOSIS — F17.200 NICOTINE USE DISORDER: Primary | ICD-10-CM

## 2024-12-02 RX ORDER — NICOTINE POLACRILEX 4 MG/1
4 LOZENGE ORAL EVERY 2 HOUR PRN
Qty: 100 LOZENGE | Refills: 0 | Status: SHIPPED | OUTPATIENT
Start: 2024-12-02

## 2024-12-02 NOTE — PROGRESS NOTES
[facility] - Mr. Solis requested to have the nicotine lozenges refilled. I sent the refill to his [facility] pharmacy.

## 2024-12-04 ENCOUNTER — PATIENT OUTREACH (OUTPATIENT)
Dept: CARE COORDINATION | Age: 64
End: 2024-12-04

## 2024-12-04 ENCOUNTER — APPOINTMENT (OUTPATIENT)
Dept: CARDIOLOGY | Facility: HOSPITAL | Age: 64
End: 2024-12-04
Payer: COMMERCIAL

## 2024-12-04 ENCOUNTER — HOSPITAL ENCOUNTER (OUTPATIENT)
Dept: VASCULAR MEDICINE | Facility: HOSPITAL | Age: 64
Discharge: HOME | End: 2024-12-04
Payer: COMMERCIAL

## 2024-12-04 ENCOUNTER — APPOINTMENT (OUTPATIENT)
Dept: CARE COORDINATION | Age: 64
End: 2024-12-04
Payer: COMMERCIAL

## 2024-12-04 DIAGNOSIS — I82.422 THROMBOSIS OF LEFT ILIAC VEIN (MULTI): ICD-10-CM

## 2024-12-04 DIAGNOSIS — R09.89 WEAK ARTERIAL PULSE: ICD-10-CM

## 2024-12-04 DIAGNOSIS — I73.9 CLAUDICATION (CMS-HCC): ICD-10-CM

## 2024-12-04 PROCEDURE — 93923 UPR/LXTR ART STDY 3+ LVLS: CPT | Performed by: INTERNAL MEDICINE

## 2024-12-04 PROCEDURE — 93923 UPR/LXTR ART STDY 3+ LVLS: CPT

## 2024-12-04 NOTE — PROGRESS NOTES
Weekly Call with Dr Lira patient is doing ok his BP was up a little this am he is going to re check later and let us know if it is still elevated. Patient had hi us done today but was unable to see Dr Hanna so they rescheduled for 1/22 Dr Lira reviewed the preliminary results. And feels the patient should be seen sooner she will reach out to Dr Hanna's. Patients proctitis has resolved and patient has no issues with that. We wilkl do one more appointment to follow up on the us/vascular surgery Monday 12/9 @ 6583

## 2024-12-06 ENCOUNTER — PATIENT OUTREACH (OUTPATIENT)
Dept: CARE COORDINATION | Age: 64
End: 2024-12-06

## 2024-12-06 ENCOUNTER — TELEPHONE (OUTPATIENT)
Dept: CARDIOLOGY | Facility: CLINIC | Age: 64
End: 2024-12-06

## 2024-12-06 ENCOUNTER — APPOINTMENT (OUTPATIENT)
Dept: PHARMACY | Facility: HOSPITAL | Age: 64
End: 2024-12-06
Payer: COMMERCIAL

## 2024-12-06 VITALS
DIASTOLIC BLOOD PRESSURE: 78 MMHG | WEIGHT: 140 LBS | BODY MASS INDEX: 23.3 KG/M2 | HEART RATE: 78 BPM | SYSTOLIC BLOOD PRESSURE: 137 MMHG

## 2024-12-06 DIAGNOSIS — I42.8 NICM (NONISCHEMIC CARDIOMYOPATHY) (MULTI): ICD-10-CM

## 2024-12-06 PROCEDURE — RXMED WILLOW AMBULATORY MEDICATION CHARGE

## 2024-12-06 NOTE — TELEPHONE ENCOUNTER
Called and spoke to patient and reviewed testing results and your recommendations to maintain aspirin and statins and to monitor for any new pain, discoloration or wounds.  Patient has an appointment with Dr. Wylie on 12/9/24.

## 2024-12-06 NOTE — PROGRESS NOTES
Daily Call Note: Call complete. Patient is doing well. Plan is to talk with surgeon on Monday. His Jardiance is ready at pharmacy and he will pick it up. He denies CP, SOB, and LE swelling. He reports 10 lb weight gain over last 2 weeks, this is intentional to get back to baseline weight. Next [facility] 12/9/24 at 1400, verbalized understanding. No other questions at this time.    /78   Pulse 78   Wt 63.5 kg (140 lb)   BMI 23.30 kg/m²       Pt Education: per POC  Barriers: none  Topics for Daily Review: BP, weight  Pt demonstrates clear understanding: Yes    Daily Weight:  Vitals:    12/06/24 1607   Weight: 63.5 kg (140 lb)      Last 3 Weights:  Wt Readings from Last 7 Encounters:   12/06/24 63.5 kg (140 lb)   11/20/24 59 kg (130 lb)   11/19/24 59.8 kg (131 lb 12.8 oz)   11/18/24 60.1 kg (132 lb 9.6 oz)   11/15/24 59.3 kg (130 lb 12.8 oz)   11/11/24 59 kg (130 lb)   11/09/24 59 kg (130 lb)       Masimo Device: No   Masimo Clinical Impression: n/a    Virtual Visits--Scheduled (Most Recent Date at Top)  Follow up Appointments  Recent Visits  No visits were found meeting these conditions.  Showing recent visits within past 30 days and meeting all other requirements  Future Appointments  No visits were found meeting these conditions.  Showing future appointments within next 90 days and meeting all other requirements       Frequency of RN Calls & Virtual Visits per Team Agreement: Healthy at Home Frequency: Bi-Weekly    Medication issues Addressed (what was done): n/a    Follow up appointments scheduled by [facility] Staff: n/a  Referrals made by [facility] staff: n/a

## 2024-12-06 NOTE — Clinical Note
Dandy Donaldson,  Today I spoke with Matthieu about his heart medications. Patient states he is tolerating his heart failure regimen well reporting adherence, no adverse effects, and denies s/s of worsening heart failure. Patient confirms he started back on Jardiance 10mg daily after completing his course of ciprofloxacin. Patient reports his blood pressure averages ~130s/70s, discussed with Matthieu optimizing GDMT. Patient reports he is under stress with the recent issues with his legs and the holidays. Will assess BP at follow up and consider increasing to Entresto 97/103mg twice daily at follow up in 1 month. Thank you!

## 2024-12-06 NOTE — TELEPHONE ENCOUNTER
----- Message from Kathy Hanna sent at 12/5/2024  1:13 PM EST -----  Patient already notified with his results   Please let him know that I reached out to Dr. Wylie (vascular surgery)  If he does not hear back from her office soon, please let us know   Continue ASA and statins   Monitor for worsening leg pain, pain at rest, wounds, discoloration...     Thanks,   Kathy Hanna MD

## 2024-12-06 NOTE — ASSESSMENT & PLAN NOTE
Patient currently on 4 of 4 GDMT. Patient reported BP averages 130s/70s. Renal function and potassium level appropriate to continue current regimen.   Future considerations: increase Entresto 97/103mg twice daily.     Labs (10/29/2024) Scr-0.93 K-3.9 eGFR: >90

## 2024-12-06 NOTE — PROGRESS NOTES
Pharmacist Clinic: Cardiology Management    Matthieu Solis is a 64 y.o. male was referred to Clinical Pharmacy Team for heart failure management.     Referring Provider: Tomás Donaldson MD    THIS IS A FOLLOW UP PATIENT APPOINTMENT. AT LAST VISIT ON 11/01/2024 WITH PHARMACIST (Rahel Waldron).    Appointment was completed by Matthieu who was reached at primary number.    REVIEW OF PAST APPNT (IF APPLICABLE):   Matthieu was recently discharged from the hospital for prostatitis vs prostate abscess. Spironolactone and Jardiance was held while inpatient. Patient was started on ciprofloxacin while inpatient and is finishing remaining antibiotic course outpatient. Patient reports he followed up with Dr. Donaldson after discharge. Patient reports Dr. Donaldson instructed him to continue holding the Jardiance and to message via [employer] after course is finished for further instruction.   Patient reports adherence and no adverse effects to his current heart medications, he denies s/s of worsening heart failure. Patient reports his BP yesterday was 116/71. Plan to continue recommendations of Dr. Donaldson and follow up in 1 month.      Allergies   Allergen Reactions    Cinnamon Unknown     Cinnamon    Doxycycline Swelling    Penicillins Unknown       Past Medical History:   Diagnosis Date    Cataract     GERD (gastroesophageal reflux disease)     Gout     HTN (hypertension)        Current Outpatient Medications on File Prior to Visit   Medication Sig Dispense Refill    acetaminophen (Tylenol) 325 mg tablet Take 2 tablets (650 mg) by mouth every 6 hours if needed for moderate pain (4 - 6) for up to 10 doses. 20 tablet 0    aspirin 81 mg EC tablet Take 1 tablet (81 mg) by mouth once daily. 90 tablet 3    atorvastatin (Lipitor) 80 mg tablet Take 1 tablet (80 mg) by mouth once daily. 90 tablet 0    carvedilol (Coreg) 12.5 mg tablet Take 1 tablet (12.5 mg) by mouth 2 times daily (morning and late afternoon). 60 tablet 11     "cholecalciferol (Vitamin D-3) 25 MCG (1000 UT) tablet Take 1 tablet (1,000 Units) by mouth once daily. 30 tablet 1    furosemide (Lasix) 20 mg tablet Take 1 tablet (20 mg) by mouth once daily as needed (edema).      nicotine polacrilex (Commit) 4 mg lozenge Use 1 lozenge (4 mg) in the mouth or throat every 2 hours if needed for smoking cessation. 100 lozenge 0    omeprazole (PriLOSEC) 20 mg DR capsule Take 1 capsule (20 mg) by mouth 2 times a day before meals for 14 days. To be taken 30-60 minutes prior to breakfast and dinner. Do not crush or chew. (Patient not taking: Reported on 10/27/2024) 28 capsule 0    sacubitriL-valsartan (Entresto) 49-51 mg tablet Take 1 tablet by mouth 2 times a day. 180 tablet 3    spironolactone (Aldactone) 25 mg tablet Take 0.5 tablets (12.5 mg) by mouth once daily. 45 tablet 3    [DISCONTINUED] nicotine polacrilex (Commit) 4 mg lozenge Use 1 lozenge (4 mg) in the mouth or throat every 2 hours if needed for smoking cessation.       No current facility-administered medications on file prior to visit.         RELEVANT LAB RESULTS:  Lab Results   Component Value Date    BILITOT 1.1 10/26/2024    CALCIUM 9.1 10/29/2024    CO2 23 10/29/2024     10/29/2024    CREATININE 0.93 10/29/2024    GLUCOSE 95 10/29/2024    ALKPHOS 89 10/26/2024    K 3.9 10/29/2024    PROT 7.7 10/26/2024     10/29/2024    AST 26 10/26/2024    ALT 26 10/26/2024    BUN 12 10/29/2024    ANIONGAP 16 10/29/2024    MG 2.62 (H) 10/27/2024    PHOS 3.2 10/29/2024    ALBUMIN 3.7 10/29/2024    LIPASE 8 (L) 01/08/2024    GFRMALE >90 03/13/2023     Lab Results   Component Value Date    TRIG 39 02/03/2024    CHOL 159 02/03/2024    LDLCALC 100 (H) 02/03/2024    HDL 51.0 02/03/2024     No results found for: \"BMCBC\", \"CBCDIF\"     PHARMACEUTICAL ASSESSMENT:    MEDICATION RECONCILIATION:      Medication Documentation Review Audit       Reviewed by Aurelio Zuniga MD (Physician) on 11/08/24 at 1133      Medication Order Taking? " Sig Documenting Provider Last Dose Status   acetaminophen (Tylenol) 325 mg tablet [id number] Yes Take 2 tablets (650 mg) by mouth every 6 hours if needed for moderate pain (4 - 6) for up to 10 doses. Susan Jeter MD  Active   aspirin 81 mg EC tablet [id number] Yes Take 1 tablet (81 mg) by mouth once daily. Kathy Hanna MD  Active   atorvastatin (Lipitor) 80 mg tablet [id number] Yes Take 1 tablet (80 mg) by mouth once daily. Tomás Donaldson MD  Active   carvedilol (Coreg) 12.5 mg tablet [id number] Yes Take 1 tablet (12.5 mg) by mouth 2 times daily (morning and late afternoon). Tomás Donaldson MD  Active   cholecalciferol (Vitamin D-3) 25 MCG (1000 UT) tablet [id number] Yes Take 1 tablet (1,000 Units) by mouth once daily. Susan Jeter MD  Active   ciprofloxacin (Cipro) 250 mg tablet [id number] Yes Take 2 tablets (500 mg) by mouth 2 times a day for 12 days. Susan Jetre MD  Active   furosemide (Lasix) 20 mg tablet [id number] Yes Take 1 tablet (20 mg) by mouth once daily as needed (edema). Historical Provider, MD  Active   meloxicam (Mobic) 15 mg tablet [id number] Yes Take 1 tablet (15 mg) by mouth once daily as needed for mild pain (1 - 3). Kristin Kauffman MD  Active   nicotine polacrilex (Commit) 4 mg lozenge [id number] Yes Use 1 lozenge (4 mg) in the mouth or throat every 2 hours if needed for smoking cessation. Historical Provider, MD  Active   omeprazole (PriLOSEC) 20 mg DR capsule [id number]  Take 1 capsule (20 mg) by mouth 2 times a day before meals for 14 days. To be taken 30-60 minutes prior to breakfast and dinner. Do not crush or chew.   Patient not taking: Reported on 10/27/2024    Geovanni Mckenna MD   [phone]   sacubitriL-valsartan (Entresto) 49-51 mg tablet [id number] Yes Take 1 tablet by mouth 2 times a day. Tomás Donaldson MD  Active   spironolactone (Aldactone) 25 mg tablet [id number] Yes Take 0.5 tablets (12.5 mg) by mouth once daily. Tomás MCINTYRE  MD Mendoza  Active                    DISEASE MANAGEMENT ASSESSMENT:     CHF ASSESSMENT     Symptom/Staging:  -Most recent ejection fraction: 30-35%    Results for orders placed during the hospital encounter of 10/01/24    Transthoracic Echo (TTE) Complete    Narrative  [facility], [address] [place] [address]  Tel [phone] and Fax [phone]    TRANSTHORACIC ECHOCARDIOGRAM REPORT      Patient Name:      IVDAL Hadley Physician:    Obdulio Velasquez MD  Study Date:        10/1/2024            Ordering Provider:    [id number] VELASQUEZ  MRN/PID:           [id number]             Fellow:  Accession#:        [id number]         Nurse:  Date of Birth/Age: 1960 / 64 years  Sonographer:          Pat GALVAN  Gender:            M                    Additional Staff:  Height:            165.10 cm            Admit Date:  Weight:            61.23 kg             Admission Status:     Outpatient  BSA / BMI:         1.67 m2 / 22.46      Encounter#:           [id number]  kg/m2  Blood Pressure:    154/86 mmHg          Department Location:  [facility]    Study Type:    TRANSTHORACIC ECHO (TTE) COMPLETE  Diagnosis/ICD: Other cardiomyopathies-I42.8  Indication:    Cardiomyopathy, low EF%  CPT Code:      Echo Complete w Full [id number]    Patient History:  Pertinent History: Cardiomyopathy, h/o low EF%, patient wear LifeVest, smoker.    Study Detail: The following Echo studies were performed: 2D, M-Mode, Doppler and  color flow.      PHYSICIAN INTERPRETATION:  Left Ventricle: The left ventricular systolic function is moderately decreased, with a visually estimated ejection fraction of 30-35%. There is global hypokinesis of the left ventricle with minor regional variations. The left ventricular cavity size is normal. There is mild concentric left ventricular hypertrophy. Spectral Doppler shows an impaired relaxation pattern of left ventricular diastolic filling.  Left  Atrium: The left atrium is normal in size.  Right Ventricle: The right ventricle is normal in size. There is normal right ventriclar wall thickness. There is normal right ventricular global systolic function.  Right Atrium: The right atrium is normal in size.  Aortic Valve: The aortic valve is trileaflet. There is no aortic valve cusp calcification noted. There is no evidence of reduced aortic valve leaflet excursion. There is trace to mild aortic valve regurgitation. The peak instantaneous gradient of the aortic valve is 5.5 mmHg.  Mitral Valve: The mitral valve is normal in structure. There is normal mitral valve leaflet mobility. There is trace mitral valve regurgitation.  Tricuspid Valve: The tricuspid valve is structurally normal. There is normal tricuspid valve leaflet mobility. There is trace tricuspid regurgitation.  Pulmonic Valve: The pulmonic valve is structurally normal. There is mild pulmonic valve regurgitation.  Pericardium: There is no pericardial effusion noted.  Aorta: The aortic root is normal. The Ao Sinus is 3.00 cm. The Asc Ao is 2.80 cm. The thoracic aorta diam is 2.2 cm. There is no dilatation of the aortic arch. There is no dilatation of the ascending aorta. There is no dilatation of the aortic root.  Pulmonary Artery: The pulmonary artery is normal in size. The tricuspid regurgitant velocity is 2.45 m/s, and with an estimated right atrial pressure of 3 mmHg, the estimated pulmonary artery pressure is normal with the RVSP at 26.9 mmHg. The estimated PASP is 27 mmHg.  Systemic Veins: The inferior vena cava appears normal in size, with IVC inspiratory collapse greater than 50%.  In comparison to the previous echocardiogram(s): There are no prior studies on this patient for comparison purposes.      CONCLUSIONS:  1. The left ventricular systolic function is moderately decreased, with a visually estimated ejection fraction of 30-35%.  2. There is global hypokinesis of the left ventricle with  minor regional variations.  3. Spectral Doppler shows an impaired relaxation pattern of left ventricular diastolic filling.  4. There is normal right ventricular global systolic function.    QUANTITATIVE DATA SUMMARY:    2D MEASUREMENTS:          Normal Ranges:  Ao Root d:       3.10 cm  (2.0-3.7cm)  LAs:             3.47 cm  (2.7-4.0cm)  IVSd:            1.25 cm  (0.6-1.1cm)  LVPWd:           1.17 cm  (0.6-1.1cm)  LVIDd:           5.02 cm  (3.9-5.9cm)  LVIDs:           4.55 cm  LV Mass Index:   142 g/m2  LVEDV Index:     77 ml/m2  LV % FS          9.4 %      LA VOLUME:                    Normal Ranges:  LA Vol A4C:        53.0 ml    (22+/-6mL/m2)  LA Vol A2C:        65.4 ml  LA Vol BP:         58.8 ml  LA Vol Index A4C:  31.6 ml/m2  LA Vol Index A2C:  39.1 ml/m2  LA Vol Index BP:   35.2 ml/m2  LA Area A4C:       18.0 cm2  LA Area A2C:       20.0 cm2  LA Major Axis A4C: 5.2 cm  LA Major Axis A2C: 5.2 cm  LA Volume Index:   36.0 ml/m2  LA Vol A4C:        50.1 ml  LA Vol A2C:        63.1 ml  LA Vol Index BSA:  33.8 ml/m2      RA VOLUME BY A/L METHOD:            Normal Ranges:  RA Vol A4C:              54.0 ml    (8.3-19.5ml)  RA Vol Index A4C:        32.3 ml/m2  RA Area A4C:             18.0 cm2  RA Major Axis A4C:       5.1 cm      M-MODE MEASUREMENTS:         Normal Ranges:  Ao Root:             3.20 cm (2.0-3.7cm)  LAs:                 3.43 cm (2.7-4.0cm)      AORTA MEASUREMENTS:         Normal Ranges:  Ao Sinus, d:        3.00 cm (2.1-3.5cm)  Asc Ao, d:          2.80 cm (2.1-3.4cm)  Ao Arch:            2.20 cm (2.0-3.6cm)      LV SYSTOLIC FUNCTION BY 2D PLANIMETRY (MOD):  Normal Ranges:  EF-A4C View:    39 % (>=55%)  EF-A2C View:    41 %  EF-Biplane:     39 %  EF-Visual:      33 %  LV EF Reported: 33 %      LV DIASTOLIC FUNCTION:             Normal Ranges:  MV Peak E:             0.59 m/s    (0.7-1.2 m/s)  MV Peak A:             0.90 m/s    (0.42-0.7 m/s)  E/A Ratio:             0.66        (1.0-2.2)  MV e'                   0.083 m/s   (>8.0)  MV lateral e'          0.08 m/s  MV medial e'           0.09 m/s  MV A Dur:              95.15 msec  E/e' Ratio:            7.11        (<8.0)  a'                     0.10 m/s  PulmV Sys Deangelo:         44.38 cm/s  PulmV Mann Deangelo:        34.71 cm/s  PulmV S/D Deangelo:         1.28  PulmV A Revs Deangelo:      31.10 cm/s  PulmV A Revs Dur:      108.47 msec      MITRAL VALVE:          Normal Ranges:  MV DT:        259 msec (150-240msec)      AORTIC VALVE:           Normal Ranges:  AoV Vmax:      1.17 m/s (<=1.7m/s)  AoV Peak P.5 mmHg (<20mmHg)  LVOT Max Deangelo:  0.63 m/s (<=1.1m/s)  LVOT VTI:      13.31 cm  LVOT Diameter: 2.22 cm  (1.8-2.4cm)  AoV Area,Vmax: 2.10 cm2 (2.5-4.5cm2)      AORTIC INSUFFICIENCY:  AI Vmax:       3.46 m/s  AI Half-time:  719 msec  AI Decel Time: 2479 msec  AI Decel Rate: 148.54 cm/s2      RIGHT VENTRICLE:  RV Basal 3.50 cm  RV Mid   1.70 cm  RV Major 8.7 cm  TAPSE:   19.5 mm  RV s'    0.15 m/s      TRICUSPID VALVE/RVSP:          Normal Ranges:  Peak TR Velocity:     2.45 m/s  RV Syst Pressure:     27 mmHg  (< 30mmHg)  IVC Diam:             1.60 cm      PULMONIC VALVE:          Normal Ranges:  PV Accel Time:  138 msec (>120ms)  PV Max Deangelo:     0.9 m/s  (0.6-0.9m/s)  PV Max PG:      3.3 mmHg      Pulmonary Veins:  PulmV A Revs Dur: 108.47 msec  PulmV A Revs Deangelo: 31.10 cm/s  PulmV Mann Deangelo:   34.71 cm/s  PulmV S/D Deangelo:    1.28  PulmV Sys Deangelo:    44.38 cm/s      [id number] Tomás Donaldson MD  Electronically signed on 10/1/2024 at 5:48:03 PM        ** Final **      Guideline-Directed Medical Therapy:  -ARNI: Yes, describe: Entresto 49/51mg twice daily  -Beta Blocker: Yes, describe: Carvedilol 12.5mg twice daily  -MRA: Yes, describe: Spironolactone 12.5mg daily  -SGLT2i: Yes, describe: Jardiance 10mg daily    Secondary Prevention:  -The ASCVD Risk score (Senthil KAPOOR, et al., 2019) failed to calculate for the following reasons:    Risk score cannot be calculated because patient has a  medical history suggesting prior/existing ASCVD   -Aspirin 81mg? yes  -Statin?: Yes, describe: Atorvastatin 80mg daily  -HTN?: Yes, describe: slightly elevated at last visit    CURRENT PHARMACOTHERAPY:   Entresto 49/51mg twice daily  Carvedilol 12.5mg twice daily  Spironolactone 12.5mg daily  Jardiance 10mg daily  Furosemide 20mg daily PRN for swelling    Affordability: [facility]  Adherence/Compliance: reports adherence  Adverse Effects: none reported    Monitoring Weights at Home: Yes- denies weight fluctuation  Home Weight Recordings: 128lbs    Past In Office Weight Readings:   Wt Readings from Last 6 Encounters:   11/20/24 59 kg (130 lb)   11/19/24 59.8 kg (131 lb 12.8 oz)   11/18/24 60.1 kg (132 lb 9.6 oz)   11/15/24 59.3 kg (130 lb 12.8 oz)   11/11/24 59 kg (130 lb)   11/09/24 59 kg (130 lb)       Monitoring Blood Pressure at Home: Yes; daily  Home BP Recordings: averages ~130s/70s; 137/71    Past In Office BP Readings:   BP Readings from Last 6 Encounters:   11/29/24 138/68   11/15/24 137/68   11/11/24 134/78   11/09/24 137/68   11/08/24 137/68   11/07/24 118/67       HEALTH MANAGEMENT    Maintaining fluid restriction (<2 L/day): Yes  Edema/swelling: No  Shortness of breath: No  Trouble sleeping/lying down: No  Dry/hacking cough: No  Recent Hospitalizations: No    EDUCATION/COUNSELING:   - Counseled patient on MOA, expectations, duration of therapy, contraindications, administration, and monitoring parameters  - Counseled patient on lifestyle modifications that can decrease your risk of having complications (smoking cessation, losing weight, daily weights, vaccines)  - Counseled patient on fluid intake and weight management. Recommended to not consume more than 2 liters of fliuids per day. If they have gained more than 2-3 pounds within a 24 hours period (or 5 pounds in a week), contact their cardiologist  - Answered all patient questions and concerns       DISCUSSION/NOTES:   Patient states he is tolerating his  heart failure regimen well reporting adherence, no adverse effects, and denies s/s of worsening heart failure. Patient confirms he started back on Jardiance 10mg daily after completing his course of ciprofloxacin. Patient reports his blood pressure averages ~130s/70s, discussed with Matthieu cee GDMT. Patient reports he is under stress with the recent issues with his legs and the holidays. Will assess BP at follow up and consider increasing to Entresto 97/103mg twice daily at follow up. Patient verbalized understanding. Patient states he has 1 dose of Jardiance left for tomorrow, [facility] sent new rx to [facility] for .     ASSESSMENT:    Assessment/Plan   Problem List Items Addressed This Visit       NICM (nonischemic cardiomyopathy) (Multi)     Patient currently on 4 of 4 GDMT. Patient reported BP averages 130s/70s. Renal function and potassium level appropriate to continue current regimen.   Future considerations: increase Entresto 97/103mg twice daily.     Labs (10/29/2024) Scr-0.93 K-3.9 eGFR: >90         Relevant Medications    empagliflozin (Jardiance) 10 mg    Other Relevant Orders    Referral to Clinical Pharmacy     RECOMMENDATIONS/PLAN:    CONTINUE  Entresto 49/51mg twice daily  Carvedilol 12.5mg twice daily  Spironolactone 12.5mg daily  Jardiance 10mg daily    Last Appnt with Referring Provider: 10/30/2024  Next Appnt with Referring Provider: 01/07/2025  Clinical Pharmacist follow up: 1 month  VAF/Application Expiration: Yes    Date: 10/08/2025  Type of Encounter: Arya Waldron PharmD    Verbal consent to manage patient's drug therapy was obtained from the patient . They were informed they may decline to participate or withdraw from participation in pharmacy services at any time.    Continue all meds under the continuation of care with the referring provider and clinical pharmacy team.

## 2024-12-07 ENCOUNTER — PHARMACY VISIT (OUTPATIENT)
Dept: PHARMACY | Facility: CLINIC | Age: 64
End: 2024-12-07
Payer: COMMERCIAL

## 2024-12-09 ENCOUNTER — APPOINTMENT (OUTPATIENT)
Dept: CARE COORDINATION | Age: 64
End: 2024-12-09
Payer: COMMERCIAL

## 2024-12-09 ENCOUNTER — PATIENT OUTREACH (OUTPATIENT)
Dept: CARE COORDINATION | Age: 64
End: 2024-12-09

## 2024-12-09 ENCOUNTER — OFFICE VISIT (OUTPATIENT)
Dept: VASCULAR SURGERY | Facility: HOSPITAL | Age: 64
End: 2024-12-09
Payer: COMMERCIAL

## 2024-12-09 ENCOUNTER — TELEPHONE (OUTPATIENT)
Dept: PRIMARY CARE | Facility: CLINIC | Age: 64
End: 2024-12-09

## 2024-12-09 VITALS
DIASTOLIC BLOOD PRESSURE: 67 MMHG | SYSTOLIC BLOOD PRESSURE: 113 MMHG | HEART RATE: 73 BPM | OXYGEN SATURATION: 95 % | HEIGHT: 65 IN | BODY MASS INDEX: 23.43 KG/M2 | WEIGHT: 140.6 LBS

## 2024-12-09 DIAGNOSIS — I73.9 PAD (PERIPHERAL ARTERY DISEASE) (CMS-HCC): Primary | ICD-10-CM

## 2024-12-09 DIAGNOSIS — H91.90 HEARING DISORDER, UNSPECIFIED LATERALITY: Primary | ICD-10-CM

## 2024-12-09 PROCEDURE — 99214 OFFICE O/P EST MOD 30 MIN: CPT | Performed by: SURGERY

## 2024-12-09 PROCEDURE — 3074F SYST BP LT 130 MM HG: CPT | Performed by: SURGERY

## 2024-12-09 PROCEDURE — 3008F BODY MASS INDEX DOCD: CPT | Performed by: SURGERY

## 2024-12-09 PROCEDURE — 3078F DIAST BP <80 MM HG: CPT | Performed by: SURGERY

## 2024-12-09 PROCEDURE — 99204 OFFICE O/P NEW MOD 45 MIN: CPT | Performed by: SURGERY

## 2024-12-09 PROCEDURE — 4004F PT TOBACCO SCREEN RCVD TLK: CPT | Performed by: SURGERY

## 2024-12-09 ASSESSMENT — PAIN SCALES - GENERAL: PAINLEVEL_OUTOF10: 6

## 2024-12-09 ASSESSMENT — COLUMBIA-SUICIDE SEVERITY RATING SCALE - C-SSRS
2. HAVE YOU ACTUALLY HAD ANY THOUGHTS OF KILLING YOURSELF?: NO
1. IN THE PAST MONTH, HAVE YOU WISHED YOU WERE DEAD OR WISHED YOU COULD GO TO SLEEP AND NOT WAKE UP?: NO
6. HAVE YOU EVER DONE ANYTHING, STARTED TO DO ANYTHING, OR PREPARED TO DO ANYTHING TO END YOUR LIFE?: NO

## 2024-12-09 ASSESSMENT — ENCOUNTER SYMPTOMS
OCCASIONAL FEELINGS OF UNSTEADINESS: 0
DEPRESSION: 0
LOSS OF SENSATION IN FEET: 1

## 2024-12-09 ASSESSMENT — PATIENT HEALTH QUESTIONNAIRE - PHQ9
SUM OF ALL RESPONSES TO PHQ9 QUESTIONS 1 AND 2: 0
2. FEELING DOWN, DEPRESSED OR HOPELESS: NOT AT ALL
1. LITTLE INTEREST OR PLEASURE IN DOING THINGS: NOT AT ALL

## 2024-12-09 NOTE — PROGRESS NOTES
Weekly [facility]  call with Dr Lira Patient is doing OK he was able to get an appointment moved up with Dr Wylie they will decide what the best plan for the patients results. Patient is doing ok otherwise. No medication needs questions and concerns addressed patient is ok with graduating today.

## 2024-12-11 NOTE — PROGRESS NOTES
Vascular Surgery Clinic Note    CC: BL LE claudication    HPI:  Matthieu Solis is 64 y.o. male with history of BL LE CLTI 3 lifestyle limiting claudication. Left leg is worse than right. Onset of pain at 250 feet. Relieved with rest. He does smoke 3-4 cigarettes daily due to stress, but has cut down from 1 ppd previously. No rest pain or foot wounds. He did undergo noninvasive studies which are severe bilaterally as well as a prior CT scan which demonstrates left DANYELLE occlusion. No hx of previous vascular surgeries or interventions. He does have history of HF.    Past Vascular History:  None    Past Vascular Testing:  CT a/p (10/26/24) - left DANYELLE occlusion  PVR (12/4/24) - R 0.42 (0.31), L 0.31 (0)    Medical History:  Patient Active Problem List   Diagnosis    Abdominal pain    Acute pharyngitis    Anxiety    Blurring of visual image    Cataract, nuclear sclerotic, both eyes    Change in vision    Cortical age-related cataract of left eye    Episcleritis of left eye    Essential hypertension    Gastroenteritis    Gout of right foot    Helicobacter positive gastritis    Hordeolum externum of left upper eyelid    Astigmatism of both eyes    Low back pain    Lumbar spondylosis    Lung nodules    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 organic origin    Rectal mass    Urethritis    Abdominal wall abscess    Sacroiliitis (CMS-HCC)    Right groin hernia    Bilateral inguinal hernia    Combined form of age-related cataract, right eye    Combined form of age-related cataract, left eye    Hyperopia, bilateral    Presbyopia    Nausea and vomiting    Well adult exam    Nondisplaced fracture of middle phalanx of right middle finger, initial encounter for open fracture    Acute on chronic combined systolic and diastolic heart failure    Acute respiratory failure with hypercapnia (Multi)    NICM (nonischemic cardiomyopathy) (Multi)    Nicotine use disorder    Nonsustained ventricular tachycardia (Multi)    Type 2 myocardial infarction (Multi)     Alcoholism (Multi)    Hearing loss    Leg pain    Sciatica    Elevated PSA    Acute prostatitis    Leg weakness, bilateral        Meds:   Current Outpatient Medications on File Prior to Visit   Medication Sig Dispense Refill    acetaminophen (Tylenol) 325 mg tablet Take 2 tablets (650 mg) by mouth every 6 hours if needed for moderate pain (4 - 6) for up to 10 doses. 20 tablet 0    aspirin 81 mg EC tablet Take 1 tablet (81 mg) by mouth once daily. 90 tablet 3    atorvastatin (Lipitor) 80 mg tablet Take 1 tablet (80 mg) by mouth once daily. 90 tablet 0    carvedilol (Coreg) 12.5 mg tablet Take 1 tablet (12.5 mg) by mouth 2 times daily (morning and late afternoon). 60 tablet 11    cholecalciferol (Vitamin D-3) 25 MCG (1000 UT) tablet Take 1 tablet (1,000 Units) by mouth once daily. 30 tablet 1    empagliflozin (Jardiance) 10 mg Take 1 tablet (10 mg) by mouth once daily. 90 tablet 3    furosemide (Lasix) 20 mg tablet Take 1 tablet (20 mg) by mouth once daily as needed (edema).      nicotine polacrilex (Commit) 4 mg lozenge Use 1 lozenge (4 mg) in the mouth or throat every 2 hours if needed for smoking cessation. 100 lozenge 0    sacubitriL-valsartan (Entresto) 49-51 mg tablet Take 1 tablet by mouth 2 times a day. 180 tablet 3    spironolactone (Aldactone) 25 mg tablet Take 0.5 tablets (12.5 mg) by mouth once daily. 45 tablet 3    omeprazole (PriLOSEC) 20 mg DR capsule Take 1 capsule (20 mg) by mouth 2 times a day before meals for 14 days. To be taken 30-60 minutes prior to breakfast and dinner. Do not crush or chew. (Patient not taking: Reported on 10/27/2024) 28 capsule 0     No current facility-administered medications on file prior to visit.        Allergies:   Allergies   Allergen Reactions    Cinnamon Unknown     Cinnamon    Doxycycline Swelling    Penicillins Unknown       SH:    Social Drivers of Health     Tobacco Use: High Risk (11/8/2024)    Patient History     Smoking Tobacco Use: Every Day     Smokeless  Tobacco Use: Never     Passive Exposure: Not on file   Alcohol Use: Not At Risk (11/8/2024)    AUDIT-C     Frequency of Alcohol Consumption: Never     Average Number of Drinks: Patient does not drink     Frequency of Binge Drinking: Never   Financial Resource Strain: Medium Risk (11/8/2024)    Overall Financial Resource Strain (CARDIA)     Difficulty of Paying Living Expenses: Somewhat hard   Food Insecurity: Food Insecurity Present (11/8/2024)    Hunger Vital Sign     Worried About Running Out of Food in the Last Year: Often true     Ran Out of Food in the Last Year: Often true   Transportation Needs: No Transportation Needs (11/8/2024)    PRAPARE - Transportation     Lack of Transportation (Medical): No     Lack of Transportation (Non-Medical): No   Physical Activity: Insufficiently Active (11/8/2024)    Exercise Vital Sign     Days of Exercise per Week: 2 days     Minutes of Exercise per Session: 20 min   Stress: Stress Concern Present (11/8/2024)    [employer] [place] [employer] - Occupational Stress Questionnaire     Feeling of Stress : Rather much   Social Connections: Moderately Integrated (11/8/2024)    Social Connection and Isolation Panel [NHANES]     Frequency of Communication with Friends and Family: Three times a week     Frequency of Social Gatherings with Friends and Family: Twice a week     Attends Confucianist Services: More than 4 times per year     Active Member of Clubs or Organizations: Yes     Attends Club or Organization Meetings: More than 4 times per year     Marital Status:    Intimate Partner Violence: Not At Risk (11/8/2024)    Humiliation, Afraid, Rape, and Kick questionnaire     Fear of Current or Ex-Partner: No     Emotionally Abused: No     Physically Abused: No     Sexually Abused: No   Depression: Not at risk (12/9/2024)    PHQ-2     PHQ-2 Score: 0   Housing Stability: Low Risk  (11/8/2024)    Housing Stability Vital Sign     Unable to Pay for Housing in the Last  Year: No     Number of Times Moved in the Last Year: 0     Homeless in the Last Year: No   Utilities: At Risk (11/8/2024)    [facility] Utilities     Threatened with loss of utilities: Yes   Digital Equity: Not At Risk (12/18/2023)    Digital Health Equity Screening     Access to device/internet: Desktop computer, laptop computer, or tablet with broadband internet connection     Requested support: None of these   Health Literacy: Adequate Health Literacy (11/8/2024)     Health Literacy     Frequency of need for help with medical instructions: Rarely        FH:  Family History   Problem Relation Name Age of Onset    Other (CVA) Mother      Hypertension Mother      Other (CVA) Father      Hypertension Father          ROS:  All systems were reviewed and are negative except as per HPI.    Objective:  Vitals:  Vitals:    12/09/24 1603   BP: 113/67   Pulse:    SpO2:         Exam:  Constitutional: normal, well appearing  HEENT: normocephalic  CV: regular rate  RESP: symmetric expansion, unlabored breathing  GI: soft, nontender, nondistended  MSK: normal ROM  INT: no lesions  PSYCH: appropriate mood  NEURO: no deficits  VASC: Nonpalpable left femoral    Assessment & Plan:  Matthieu Solis is 64 y.o. male with BL LE CLTI 3  L > R    - Will confer with Dr. Donaldson regarding risk stratification for left iliac stent  - Will call afterwards to schedule and discuss intervention      Meds  - ASA 81 mg  - Atorvastatin 80 mg    Screening/Surveillance  - None    Next Followup  - Will call to schedule    Josefina Wylie MD

## 2024-12-12 PROBLEM — R06.83 SNORING: Status: ACTIVE | Noted: 2024-12-12

## 2024-12-12 PROBLEM — R41.89 COGNITIVE CHANGE: Status: ACTIVE | Noted: 2024-12-12

## 2024-12-12 ASSESSMENT — ENCOUNTER SYMPTOMS
CHILLS: 0
FATIGUE: 0
EYE PAIN: 0
ACTIVITY CHANGE: 0
EYE DISCHARGE: 0

## 2024-12-15 DIAGNOSIS — E78.5 HYPERLIPIDEMIA, UNSPECIFIED HYPERLIPIDEMIA TYPE: ICD-10-CM

## 2024-12-16 RX ORDER — ATORVASTATIN CALCIUM 80 MG/1
80 TABLET, FILM COATED ORAL DAILY
Qty: 90 TABLET | Refills: 3 | Status: SHIPPED | OUTPATIENT
Start: 2024-12-16

## 2024-12-19 ENCOUNTER — APPOINTMENT (OUTPATIENT)
Dept: CARE COORDINATION | Facility: CLINIC | Age: 64
End: 2024-12-19
Payer: COMMERCIAL

## 2024-12-19 ENCOUNTER — TREATMENT (OUTPATIENT)
Dept: PHYSICAL THERAPY | Facility: CLINIC | Age: 64
End: 2024-12-19
Payer: COMMERCIAL

## 2024-12-19 DIAGNOSIS — G89.29 CHRONIC BILATERAL LOW BACK PAIN, UNSPECIFIED WHETHER SCIATICA PRESENT: ICD-10-CM

## 2024-12-19 DIAGNOSIS — M54.50 CHRONIC BILATERAL LOW BACK PAIN, UNSPECIFIED WHETHER SCIATICA PRESENT: ICD-10-CM

## 2024-12-19 DIAGNOSIS — R29.898 LEG WEAKNESS, BILATERAL: ICD-10-CM

## 2024-12-19 PROCEDURE — 97110 THERAPEUTIC EXERCISES: CPT | Mod: GP,CQ

## 2024-12-19 NOTE — PROGRESS NOTES
"Physical Therapy    Physical Therapy Treatment      Patient Name: Matthieu Solis  MRN: [id number]  Today's Date: 12/19/2024    Time Entry:   Time Calculation  Start Time: 1049  Stop Time: 1132  Time Calculation (min): 43 min     PT Therapeutic Procedures Time Entry  Therapeutic Exercise Time Entry: 40       Assessment:  Pt reported increased R LBP w/ squat, therefore swithched to farmers carry    Plan:   Squats. Lat amb    Current Problem:   Problem List Items Addressed This Visit             ICD-10-CM    Low back pain M54.50    Leg weakness, bilateral R29.898         Subjective    \"I am starting using #25 DB's for regaining my UE strength. PAD surgery soon.  Trying to get my heart capacity to 50%(35% now)   Chief complaints:   Pt presents to clinic with chief complaint of bilateral LE pain,  L worse than R and LE weakness that has been ongoing since recent hospitalization in Oct   Onset/Surgery Date: 11/4/24  Mechanism of Injury: no specific   Previous History: Yes, however, did not have significant limitations     Pain: 5/10 R LBP, 3/10 EOS               Objective      Observation/Posture:    Mild decrease in lumbar lordosis in standing   Mild tenderness SP thoracic spine, lumbar spine   Mild tenderness PSIS bilat       ROM/Flexibility:    Lumbar  Flexion: 70 deg; stretch      Extension: 5 deg       Sidebend R / L: 20 deg / 15 deg       Rotation R / L: 75% / 75%    Stretching into R low back with flexion, SB, rotation       Hip R / L Flexion: 90 deg / 100 deg       Ext Rot: mod limitation bilat       Int Rot: mild limitation bilat      Strength R / L:     Hip Flexion:     5 / 5    Hip Abduction:    5 / 5    Hip Adduction:    5 / 5    Hip ER:      4+ / 4+    Hip IR:       5 / 5      Knee Extension:   5 / 5    Knee Flexion:       4+ / 4+      Ankle DF:             5 / 5    Ankle PF:              5 / 5     Neurological: Sensation is intact and symmetrical in BLEs.      Gait: wide MAGGIE with amb   No significant gait " "deficits      Special Tests:     Slump R / L : - / -     SLR R / L : - / -     Long sit: WNL     Outcome Measure:    KEV: 8 / 50 = 16 % impaired      TREATMENT  Therapeutic exercise:  PPT 2x10  SKTC x 10 ea   R LAD x 2 min  Lumbar rot x 10   Figure 4 stretch 10x10\", R  90/90 core stabilization 5x10\"  Clam x 20  [place] carry x 100', #3 ea       Goals:  Active       PT Problem       Pt will report centralization of pain symptoms into low back for improved ability to walk longer than .25 miles at a time        Start:  11/20/24    Expected End:  02/18/25            Pt will increase bilateral LE strength by 1 MMT grade for all weakened muscle groups for improved ability to return to regular work duties        Start:  11/20/24    Expected End:  02/18/25            Pt will demonstrate full and pain-free lumbar AROM for improved ability to perform work duties        Start:  11/20/24    Expected End:  02/18/25            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

## 2024-12-20 ENCOUNTER — APPOINTMENT (OUTPATIENT)
Dept: PHYSICAL MEDICINE AND REHAB | Facility: CLINIC | Age: 64
End: 2024-12-20
Payer: COMMERCIAL

## 2024-12-23 ENCOUNTER — APPOINTMENT (OUTPATIENT)
Dept: DERMATOLOGY | Facility: CLINIC | Age: 64
End: 2024-12-23
Payer: COMMERCIAL

## 2024-12-23 PROCEDURE — RXMED WILLOW AMBULATORY MEDICATION CHARGE

## 2024-12-24 ENCOUNTER — APPOINTMENT (OUTPATIENT)
Dept: OPHTHALMOLOGY | Facility: CLINIC | Age: 64
End: 2024-12-24
Payer: COMMERCIAL

## 2024-12-26 ENCOUNTER — PHARMACY VISIT (OUTPATIENT)
Dept: PHARMACY | Facility: CLINIC | Age: 64
End: 2024-12-26
Payer: COMMERCIAL

## 2024-12-30 ENCOUNTER — TREATMENT (OUTPATIENT)
Dept: PHYSICAL THERAPY | Facility: CLINIC | Age: 64
End: 2024-12-30
Payer: COMMERCIAL

## 2024-12-30 DIAGNOSIS — M54.50 CHRONIC BILATERAL LOW BACK PAIN, UNSPECIFIED WHETHER SCIATICA PRESENT: ICD-10-CM

## 2024-12-30 DIAGNOSIS — R29.898 LEG WEAKNESS, BILATERAL: ICD-10-CM

## 2024-12-30 DIAGNOSIS — G89.29 CHRONIC BILATERAL LOW BACK PAIN, UNSPECIFIED WHETHER SCIATICA PRESENT: ICD-10-CM

## 2024-12-30 PROCEDURE — 97110 THERAPEUTIC EXERCISES: CPT | Mod: GP | Performed by: PHYSICAL THERAPIST

## 2024-12-30 NOTE — PROGRESS NOTES
Physical Therapy    Physical Therapy Treatment      Patient Name: Matthieu Solis  MRN: [id number]  Today's Date: 12/30/2024    Time Entry:   Time Calculation  Start Time: 1505  Stop Time: 1545  Time Calculation (min): 40 min     PT Therapeutic Procedures Time Entry  Therapeutic Exercise Time Entry: 30     Visit: 3   Insurance: MMO  Auth: No   20 visit max     Assessment:  Had difficulty with all rx today d/t increased R low back pain  Was able to progress with some  table exercise     Plan:   Squats. Lat amb  Supine core     Current Problem:   Problem List Items Addressed This Visit             ICD-10-CM    Low back pain M54.50    Leg weakness, bilateral R29.898         Subjective    Pt reports he hasn't been able to do much stretching over the last week d/t the holidays  R low back still bothersome  Feels like he has lost strength since the last time he was in clinic       Objective       ROM/Flexibility:    Lumbar  Flexion: 70 deg; stretch      Extension: 5 deg       Sidebend R / L: 20 deg / 15 deg       Rotation R / L: 75% / 75%    Stretching into R low back with flexion, SB, rotation       Hip R / L Flexion: 90 deg / 100 deg       Ext Rot: mod limitation bilat       Int Rot: mild limitation bilat      Strength R / L:     Hip Flexion:     5 / 5    Hip Abduction:    5 / 5    Hip Adduction:    5 / 5    Hip ER:      4+ / 4+    Hip IR:       5 / 5      Knee Extension:   5 / 5    Knee Flexion:       4+ / 4+      Ankle DF:             5 / 5    Ankle PF:              5 / 5     Neurological: Sensation is intact and symmetrical in BLEs.       TREATMENT  Therapeutic exercise:  PPT x 15 with 3 hold   SKTC x 10 ea   Lumbar rot x 10   Figure 4 stretch x 1 min ea   Clam x 20 ea   Supine HSC on physio x 20   NuStep x 5 min     Not today-   [place] carry x 100', #3 ea       Goals:  Active       PT Problem       Pt will report centralization of pain symptoms into low back for improved ability to walk longer than .25 miles at a  time        Start:  11/20/24    Expected End:  02/18/25            Pt will increase bilateral LE strength by 1 MMT grade for all weakened muscle groups for improved ability to return to regular work duties        Start:  11/20/24    Expected End:  02/18/25            Pt will demonstrate full and pain-free lumbar AROM for improved ability to perform work duties        Start:  11/20/24    Expected End:  02/18/25            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

## 2025-01-02 DIAGNOSIS — I73.9 PERIPHERAL VASCULAR DISEASE (CMS-HCC): Primary | ICD-10-CM

## 2025-01-02 NOTE — PROGRESS NOTES
AUDIOLOGY ADULT AUDIOMETRIC EVALUATION      Name:  Matthieu Solis   :  1960  Age:  64 y.o.  Date of Evaluation:  1/3/2025    Time: 2141-0824    IMPRESSIONS     Right Ear: Hearing sensitivity within normal limits 125-750 Hz falling to a mild sensorineural hearing loss notched at 1000 Hz recovering to within normal limits at 1267-3307 Hz falling to moderate to severe at 1278-0772 Hz.  Left Ear: Hearing sensitivity within normal limits 125-750 Hz falling to a mild sensorineural hearing loss notched at 1000 Hz falling to moderate to moderately-severe 6113-9122 Hz.  Word recognition in quiet is excellent in both ears.  Tympanometry indicates normal middle ear pressure and tympanic membrane mobility in both ears.     Amplification needs: We discussed that patient is a borderline candidate for hearing aids.    RECOMMENDATIONS     Consideration of hearing aids if communication issues arise.  Continue medical follow up with primary care provider as recommended.  Return for audiologic evaluation annually to monitor hearing sensitivity and assess middle ear status or sooner should concerns arise. The audiology department can be reached at [phone] to schedule an appointment.   Avoid exposure to loud sounds by moving away from the noise, turning down the volume, or wearing proper hearing protection correctly.    HISTORY     Reason for visit:  Mr. Solis is seen today for an initial audiologic evaluation at the request of Claudio Maldonado DO due to difficulty hearing. History obtained from patient report and chart review.     Tinnitus: very rare  Otalgia: denied  Aural Pressure/Fullness: denied  Recent Ear Infections/Illness: denied  Otorrhea: denied  History of Ear Surgeries: denied  History of Noise Exposure: yes, occupational and recreational noise exposure (percussion, machinery, tool and die), hearing protection was reportedly worn intermittently  Family History of Hearing Loss: Yes, father wore hearing  "aids, had noise exposure as well  Hearing Aid Use: None    EVALUATION     See scanned audiogram in \"Media\"     TEST RESULTS     Otoscopic Evaluation:  Right Ear: Cerumen which appeared to be non-occluding; however, tympanic membrane could not be visualized. Testing continued given tympanometry results.  Left Ear: Non-occluding cerumen, tympanic membrane visualized.    Tympanometry (226 Hz):  Right Ear: Type A, middle ear pressure and tympanic membrane compliance within normal limits.   Left Ear: Type A, middle ear pressure and tympanic membrane compliance within normal limits.     Acoustic Reflexes:   Right Ear: (Ipsilateral) Responses present at 500-2000 Hz, and absent at 4000 Hz.  Left Ear: (Ipsilateral) Responses present at 500-2000 Hz, and absent at 4000 Hz.    Distortion Product Otoacoustic Emissions:  Right Ear: Did not test.  Left Ear:  Did not test.  Present OAEs suggest normal or near cochlear outer hair cell function for corresponding frequency region(s). Absent OAEs with normal middle ear function can be consistent with some degree of hearing loss. Assessment of cochlear outer hair cell function may be impacted by outer or middle ear function.    Test technique:  Pure Tone Audiometry via headphones  Reliability: Good    Pure Tone Audiometry:    Right Ear: Hearing sensitivity within normal limits 125-750 Hz falling to a mild sensorineural hearing loss notched at 1000 Hz recovering to within normal limits at 6042-8319 Hz falling to moderate to severe at 8375-0771 Hz.  Left Ear: Hearing sensitivity within normal limits 125-750 Hz falling to a mild sensorineural hearing loss notched at 1000 Hz falling to moderate to moderately-severe 9593-6682 Hz.    Speech Audiometry:   Right Ear:  Speech Reception Threshold (SRT) was obtained at 15 dB HL. Word Recognition scores were excellent (96%) in quiet when words were presented at 55 dB HL. These results are based on [facility] Auditory Test No.6 (NU-6) " (N=25).   Left Ear:  Speech Reception Threshold (SRT) was obtained at 20 dB HL. Word Recognition scores were excellent (92%) in quiet when words were presented at 60 dB HL. These results are based on [facility] Auditory Test No.6 (NU-6) (N=25).     Comparison of today's results with previous test results: No previous results available.           PATIENT EDUCATION     Discussed results and recommendations with Mr. Solis. Questions were addressed and the patient was encouraged to contact our department at [phone] should concerns arise.       Caridad Flores, CCC-A  Clinical Audiologist    Degree of   Hearing Sensitivity dB Range   Within Normal Limits (WNL) 0 - 20   Slight 25   Mild 26 - 40   Moderate 41 - 55   Moderately-Severe 56 - 70   Severe 71 - 90   Profound 91 +     Key   CHL Conductive Hearing Loss   ECV Ear Canal Volume   HA Hearing Aid   NIHL Noise-Induced Hearing Loss   PTA Pure Tone Average   SNHL Sensorineural Hearing Loss   TM Tympanic Membrane   WNL Within Normal Limits

## 2025-01-03 ENCOUNTER — CLINICAL SUPPORT (OUTPATIENT)
Dept: AUDIOLOGY | Facility: CLINIC | Age: 65
End: 2025-01-03
Payer: COMMERCIAL

## 2025-01-03 DIAGNOSIS — H90.3 SENSORINEURAL HEARING LOSS (SNHL) OF BOTH EARS: Primary | ICD-10-CM

## 2025-01-03 DIAGNOSIS — Z77.122 EXPOSURE TO NOISE: ICD-10-CM

## 2025-01-03 PROCEDURE — 92550 TYMPANOMETRY & REFLEX THRESH: CPT | Mod: 52 | Performed by: AUDIOLOGIST

## 2025-01-03 PROCEDURE — 92557 COMPREHENSIVE HEARING TEST: CPT | Performed by: AUDIOLOGIST

## 2025-01-06 PROCEDURE — RXMED WILLOW AMBULATORY MEDICATION CHARGE

## 2025-01-07 ENCOUNTER — TELEMEDICINE (OUTPATIENT)
Dept: PHYSICAL MEDICINE AND REHAB | Facility: CLINIC | Age: 65
End: 2025-01-07
Payer: COMMERCIAL

## 2025-01-07 ENCOUNTER — HOSPITAL ENCOUNTER (OUTPATIENT)
Dept: CARDIOLOGY | Facility: CLINIC | Age: 65
Discharge: HOME | End: 2025-01-07
Payer: COMMERCIAL

## 2025-01-07 ENCOUNTER — OFFICE VISIT (OUTPATIENT)
Dept: CARDIOLOGY | Facility: CLINIC | Age: 65
End: 2025-01-07
Payer: COMMERCIAL

## 2025-01-07 ENCOUNTER — PHARMACY VISIT (OUTPATIENT)
Dept: PHARMACY | Facility: CLINIC | Age: 65
End: 2025-01-07
Payer: COMMERCIAL

## 2025-01-07 VITALS
HEART RATE: 67 BPM | DIASTOLIC BLOOD PRESSURE: 79 MMHG | OXYGEN SATURATION: 98 % | HEIGHT: 65 IN | BODY MASS INDEX: 24.56 KG/M2 | WEIGHT: 147.4 LBS | SYSTOLIC BLOOD PRESSURE: 163 MMHG

## 2025-01-07 DIAGNOSIS — I10 ESSENTIAL HYPERTENSION: ICD-10-CM

## 2025-01-07 DIAGNOSIS — R94.30 LOW LEFT VENTRICULAR EJECTION FRACTION: ICD-10-CM

## 2025-01-07 DIAGNOSIS — I50.9 CONGESTIVE HEART FAILURE, UNSPECIFIED HF CHRONICITY, UNSPECIFIED HEART FAILURE TYPE: Primary | ICD-10-CM

## 2025-01-07 DIAGNOSIS — I42.8 OTHER CARDIOMYOPATHIES: ICD-10-CM

## 2025-01-07 PROCEDURE — 3077F SYST BP >= 140 MM HG: CPT | Performed by: NURSE PRACTITIONER

## 2025-01-07 PROCEDURE — 93306 TTE W/DOPPLER COMPLETE: CPT

## 2025-01-07 PROCEDURE — 3078F DIAST BP <80 MM HG: CPT | Performed by: NURSE PRACTITIONER

## 2025-01-07 PROCEDURE — 99214 OFFICE O/P EST MOD 30 MIN: CPT | Performed by: NURSE PRACTITIONER

## 2025-01-07 PROCEDURE — 93306 TTE W/DOPPLER COMPLETE: CPT | Performed by: INTERNAL MEDICINE

## 2025-01-07 PROCEDURE — 3008F BODY MASS INDEX DOCD: CPT | Performed by: NURSE PRACTITIONER

## 2025-01-07 PROCEDURE — 99214 OFFICE O/P EST MOD 30 MIN: CPT | Mod: 25 | Performed by: NURSE PRACTITIONER

## 2025-01-07 RX ORDER — SACUBITRIL AND VALSARTAN 97; 103 MG/1; MG/1
1 TABLET, FILM COATED ORAL 2 TIMES DAILY
Qty: 180 TABLET | Refills: 3 | Status: SHIPPED | OUTPATIENT
Start: 2025-01-07 | End: 2026-01-07

## 2025-01-07 ASSESSMENT — PAIN SCALES - GENERAL: PAINLEVEL_OUTOF10: 0-NO PAIN

## 2025-01-07 ASSESSMENT — ENCOUNTER SYMPTOMS
GASTROINTESTINAL NEGATIVE: 1
MUSCULOSKELETAL NEGATIVE: 1
RESPIRATORY NEGATIVE: 1
DEPRESSION: 0
CONSTITUTIONAL NEGATIVE: 1
NEUROLOGICAL NEGATIVE: 1
CARDIOVASCULAR NEGATIVE: 1
OCCASIONAL FEELINGS OF UNSTEADINESS: 0
LOSS OF SENSATION IN FEET: 0

## 2025-01-07 NOTE — PROGRESS NOTES
"Chief Complaint:   Follow-up    History Of Present Illness:    .Mr Solis returns in follow up.  Denies chest pain, sob, palpitations or pedal edema.  He had an echo done today which was reviewed by Dr Donaldson.  OK to proceed with planned vascular procedure.         Last Recorded Vitals:  Blood pressure 163/79, pulse 67, height 1.651 m (5' 5\"), weight 66.9 kg (147 lb 6.4 oz), SpO2 98%.     Past Medical History:  Past Medical History:   Diagnosis Date    Cataract     GERD (gastroesophageal reflux disease)     Gout     HTN (hypertension)         Past Surgical History:  Past Surgical History:   Procedure Laterality Date    COLONOSCOPY      ESOPHAGOGASTRODUODENOSCOPY      HERNIA REPAIR  2015    Inguinal       Social History:  Social History     Socioeconomic History    Marital status: Significant Other   Tobacco Use    Smoking status: Every Day     Current packs/day: 1.00     Average packs/day: 1 pack/day for 51.0 years (51.0 ttl pk-yrs)     Types: Cigarettes     Start date: 1974    Smokeless tobacco: Never    Tobacco comments:     Trying to quit   Vaping Use    Vaping status: Never Used   Substance and Sexual Activity    Alcohol use: Not Currently    Drug use: Never    Sexual activity: Defer     Social Drivers of Health     Financial Resource Strain: Medium Risk (11/8/2024)    Overall Financial Resource Strain (CARDIA)     Difficulty of Paying Living Expenses: Somewhat hard   Food Insecurity: Food Insecurity Present (11/8/2024)    Hunger Vital Sign     Worried About Running Out of Food in the Last Year: Often true     Ran Out of Food in the Last Year: Often true   Transportation Needs: Low Risk  (11/12/2024)    Received from [facility] Health Risk Assessment (HRA)     In the past year, have you had trouble getting to medical appointments or getting things you need because of transportation?: No   Physical Activity: Insufficiently Active (11/8/2024)    Exercise Vital Sign     Days of Exercise per Week: 2 days     " Minutes of Exercise per Session: 20 min   Stress: Stress Concern Present (11/8/2024)    [place] [employer] - Occupational Stress Questionnaire     Feeling of Stress : Rather much   Social Connections: Moderately Integrated (11/8/2024)    Social Connection and Isolation Panel [NHANES]     Frequency of Communication with Friends and Family: Three times a week     Frequency of Social Gatherings with Friends and Family: Twice a week     Attends Denominational Services: More than 4 times per year     Active Member of Clubs or Organizations: Yes     Attends Club or Organization Meetings: More than 4 times per year     Marital Status:    Intimate Partner Violence: Not At Risk (11/8/2024)    Humiliation, Afraid, Rape, and Kick questionnaire     Fear of Current or Ex-Partner: No     Emotionally Abused: No     Physically Abused: No     Sexually Abused: No   Housing Stability: High Risk (11/12/2024)    Received from [facility] Health Risk Assessment (HRA)     Does your current living situation have any of the following problems? (CHOOSE ALL THAT APPLY): Mold,Smoke detectors missing or not working     Describe your current living situation.: I have a steady place to live       Family History:  Family History   Problem Relation Name Age of Onset    Other (CVA) Mother      Hypertension Mother      Other (CVA) Father      Hypertension Father           Allergies:  Cinnamon, Doxycycline, and Penicillins    Outpatient Medications:  Current Outpatient Medications   Medication Sig Dispense Refill    acetaminophen (Tylenol) 325 mg tablet Take 2 tablets (650 mg) by mouth every 6 hours if needed for moderate pain (4 - 6) for up to 10 doses. 20 tablet 0    aspirin 81 mg EC tablet Take 1 tablet (81 mg) by mouth once daily. 90 tablet 3    atorvastatin (Lipitor) 80 mg tablet TAKE 1 TABLET BY MOUTH ONCE DAILY. 90 tablet 3    carvedilol (Coreg) 12.5 mg tablet Take 1 tablet (12.5 mg) by mouth 2 times daily (morning and  late afternoon). 60 tablet 11    empagliflozin (Jardiance) 10 mg Take 1 tablet (10 mg) by mouth once daily. 90 tablet 3    nicotine polacrilex (Commit) 4 mg lozenge Use 1 lozenge (4 mg) in the mouth or throat every 2 hours if needed for smoking cessation. 100 lozenge 0    sacubitriL-valsartan (Entresto) 49-51 mg tablet Take 1 tablet by mouth 2 times a day. 180 tablet 3    spironolactone (Aldactone) 25 mg tablet Take 0.5 tablets (12.5 mg) by mouth once daily. 45 tablet 3    furosemide (Lasix) 20 mg tablet Take 1 tablet (20 mg) by mouth once daily as needed (edema). (Patient not taking: Reported on 1/7/2025)      omeprazole (PriLOSEC) 20 mg DR capsule Take 1 capsule (20 mg) by mouth 2 times a day before meals for 14 days. To be taken 30-60 minutes prior to breakfast and dinner. Do not crush or chew. (Patient not taking: Reported on 10/27/2024) 28 capsule 0     No current facility-administered medications for this visit.        Physical Exam:  Cardiovascular:      PMI at left midclavicular line. Normal rate. Regular rhythm. Normal S1. Normal S2.       Murmurs: There is no murmur.      No gallop.  No click. No rub.   Pulses:     Intact distal pulses.   Edema:     Peripheral edema absent.         ROS:  Review of Systems   Constitutional: Negative.   Cardiovascular: Negative.    Respiratory: Negative.     Skin: Negative.    Musculoskeletal: Negative.    Gastrointestinal: Negative.    Genitourinary: Negative.    Neurological: Negative.           Last Labs:  CBC -  Lab Results   Component Value Date    WBC 11.4 (H) 10/28/2024    HGB 11.9 (L) 10/28/2024    HCT 37.8 (L) 10/28/2024    MCV 96 10/28/2024     (H) 10/28/2024       CMP -  Lab Results   Component Value Date    CALCIUM 9.1 10/29/2024    PHOS 3.2 10/29/2024    PROT 7.7 10/26/2024    ALBUMIN 3.7 10/29/2024    AST 26 10/26/2024    ALT 26 10/26/2024    ALKPHOS 89 10/26/2024    BILITOT 1.1 10/26/2024       LIPID PANEL -   Lab Results   Component Value Date    CHOL  159 02/03/2024    TRIG 39 02/03/2024    HDL 51.0 02/03/2024    CHHDL 3.1 02/03/2024    VLDL 8 02/03/2024    NHDL 108 02/03/2024       RENAL FUNCTION PANEL -   Lab Results   Component Value Date    GLUCOSE 95 10/29/2024     10/29/2024    K 3.9 10/29/2024     10/29/2024    CO2 23 10/29/2024    ANIONGAP 16 10/29/2024    BUN 12 10/29/2024    CREATININE 0.93 10/29/2024    GFRMALE >90 03/13/2023    CALCIUM 9.1 10/29/2024    PHOS 3.2 10/29/2024    ALBUMIN 3.7 10/29/2024        Lab Results   Component Value Date     (H) 10/26/2024    HGBA1C 5.1 06/19/2024    HGBA1C 5.2 01/03/2024         Assessment/Plan   Problem List Items Addressed This Visit    None      1.  Nonischemic dilated congestive cardiomyopathy.  This patient was evidently admitted to [facility] in 6/2024 after an apparent near syncopal event at work because of extreme shortness of breath.  An echocardiogram was performed on 6/19/2024 and interpreted as showing a severely reduced LV ejection fraction at 15-20% with mild left atrial enlargement.  He had had a previous echocardiogram at [facility] 3/11/2011 that estimated LV ejection fraction at 30-35%.  The patient was transferred to [facility] from 6/24/2024 -6/27/2024.  He underwent cardiac catheterization which demonstrated nonobstructive coronary artery disease and he was placed on a LifeVest.  Patient is currently feeling relatively well walking daily also riding a regular bike for up to 5 mph.  He is taking 6000 steps per day.  He is currently in some occupational training.  Current therapy will be modified by increasing carvedilol from 6.25 mg twice daily to 12.5 mg twice daily.  He will be switched from lisinopril 10 mg daily to Entresto 49/51 mg twice daily and will also begin Jardiance 10 mg daily.  Patient will remain on the Lasix 20 mg daily.  Patient is also on spironolactone 25 mg daily.  He will return in 1 month at which time a repeat echocardiogram  will be done and labs rechecked.  He was on a LifeVest.  Echo 10/2024  CONCLUSIONS:   1. The left ventricular systolic function is moderately decreased, with a visually estimated ejection fraction of 30-35%.   2. There is global hypokinesis of the left ventricle with minor regional variations.   3. Spectral Doppler shows an impaired relaxation pattern of left ventricular diastolic filling.   4. There is normal right ventricular global systolic function.         2.  Hypertension.     3.  Hyperlipidemia.  Continue atorvastatin 80 mg daily.       Rosalie Long, APRN-CNP

## 2025-01-08 ENCOUNTER — TREATMENT (OUTPATIENT)
Dept: PHYSICAL THERAPY | Facility: CLINIC | Age: 65
End: 2025-01-08
Payer: MEDICAID

## 2025-01-08 DIAGNOSIS — R29.898 LEG WEAKNESS, BILATERAL: ICD-10-CM

## 2025-01-08 DIAGNOSIS — M54.50 CHRONIC BILATERAL LOW BACK PAIN, UNSPECIFIED WHETHER SCIATICA PRESENT: ICD-10-CM

## 2025-01-08 DIAGNOSIS — G89.29 CHRONIC BILATERAL LOW BACK PAIN, UNSPECIFIED WHETHER SCIATICA PRESENT: ICD-10-CM

## 2025-01-08 PROCEDURE — 97110 THERAPEUTIC EXERCISES: CPT | Mod: GP,CQ

## 2025-01-08 NOTE — PROGRESS NOTES
"Physical Therapy    Physical Therapy Treatment      Patient Name: Matthieu Solis  MRN: [id number]  Today's Date: 1/8/2025    Time Entry:   Time Calculation  Start Time: 0955  Stop Time: 1100  Time Calculation (min): 65 min     PT Therapeutic Procedures Time Entry  Therapeutic Exercise Time Entry: 50     Visit: 4  Insurance: MMO  Auth: No   20 visit max     Assessment:  VC's to decrease intensity of ex's- 2` increased pain w/ ex's as originally performed. Pt reported fatigue in LE's w/ PRE's today, but pain returned w/ transfer from supine to standing    Plan:   Cont as able     Current Problem:   Problem List Items Addressed This Visit             ICD-10-CM    Low back pain M54.50    Leg weakness, bilateral R29.898           Subjective    \"I haven't done much of the ex's it has been busy,   I am scheduled for surgery on the 21st, so I will need to cancel my appointment on the 20th.\"  Pain currently 3/10, R upper LB    Objective       ROM/Flexibility:    Lumbar  Flexion: 70 deg; stretch      Extension: 5 deg       Sidebend R / L: 20 deg / 15 deg       Rotation R / L: 75% / 75%    Stretching into R low back with flexion, SB, rotation       Hip R / L Flexion: 90 deg / 100 deg       Ext Rot: mod limitation bilat       Int Rot: mild limitation bilat      Strength R / L:     Hip Flexion:     5 / 5    Hip Abduction:    5 / 5    Hip Adduction:    5 / 5    Hip ER:      4+ / 4+    Hip IR:       5 / 5      Knee Extension:   5 / 5    Knee Flexion:       4+ / 4+      Ankle DF:             5 / 5    Ankle PF:              5 / 5     Neurological: Sensation is intact and symmetrical in BLEs.       TREATMENT  Therapeutic exercise:  Passive rest w/ LE's on stool x 5 min  SKTC x 10 ea, 5\", LE's on stool  R LAD/add. Stretching  LTR x 10 ea  Supine HSC on Pball x 15  Ball squeeze x 15  Blue band hooklying clam x 15  Fig 4 ITB stretch 10x10\" ea  Nu Step x 5 min  Supine HP to LB EOS    Goals:  Active       PT Problem       Pt will report " centralization of pain symptoms into low back for improved ability to walk longer than .25 miles at a time        Start:  11/20/24    Expected End:  02/18/25            Pt will increase bilateral LE strength by 1 MMT grade for all weakened muscle groups for improved ability to return to regular work duties        Start:  11/20/24    Expected End:  02/18/25            Pt will demonstrate full and pain-free lumbar AROM for improved ability to perform work duties        Start:  11/20/24    Expected End:  02/18/25            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

## 2025-01-09 ENCOUNTER — CLINICAL SUPPORT (OUTPATIENT)
Dept: PREADMISSION TESTING | Facility: HOSPITAL | Age: 65
End: 2025-01-09
Payer: COMMERCIAL

## 2025-01-09 PROBLEM — I25.10 ATHEROSCLEROSIS OF NATIVE CORONARY ARTERY OF NATIVE HEART WITHOUT ANGINA PECTORIS: Status: ACTIVE | Noted: 2024-12-20

## 2025-01-09 PROBLEM — N40.0 BPH (BENIGN PROSTATIC HYPERPLASIA): Status: ACTIVE | Noted: 2024-12-20

## 2025-01-09 PROBLEM — I50.20 SYSTOLIC CONGESTIVE HEART FAILURE: Status: ACTIVE | Noted: 2024-12-20

## 2025-01-09 PROBLEM — M10.9 GOUT: Status: ACTIVE | Noted: 2024-12-20

## 2025-01-09 PROBLEM — E78.5 HLD (HYPERLIPIDEMIA): Status: ACTIVE | Noted: 2024-12-20

## 2025-01-09 NOTE — CPM/PAT H&P
CPM/PAT Evaluation       Name: Matthieu Solis (Matthieu Solis)  /Age: 1960/64 y.o.     { PAT Visit Type:87872}      Chief Complaint: ***    HPI    Matthieu Solis is scheduled for ANGIOGRAM, LOWER EXTREMITY - Left on 25  Past Medical History:   Diagnosis Date    Cataract     GERD (gastroesophageal reflux disease)     Gout     HTN (hypertension)     Hyperlipidemia        Past Surgical History:   Procedure Laterality Date    COLONOSCOPY      ESOPHAGOGASTRODUODENOSCOPY      HERNIA REPAIR  2015    Inguinal       Patient Sexual activity questions deferred to the physician.    Family History   Problem Relation Name Age of Onset    Other (CVA) Mother      Hypertension Mother      Other (CVA) Father      Hypertension Father         Allergies   Allergen Reactions    Cinnamon Unknown     Cinnamon    Doxycycline Swelling    Penicillins Unknown       Prior to Admission medications    Medication Sig Start Date End Date Taking? Authorizing Provider   acetaminophen (Tylenol) 325 mg tablet Take 2 tablets (650 mg) by mouth every 6 hours if needed for moderate pain (4 - 6) for up to 10 doses. 10/29/24   Susan Jeter MD   aspirin 81 mg EC tablet Take 1 tablet (81 mg) by mouth once daily. 24  Kathy Hanna MD   atorvastatin (Lipitor) 80 mg tablet TAKE 1 TABLET BY MOUTH ONCE DAILY. 24   Tomás Donaldson MD   carvedilol (Coreg) 12.5 mg tablet Take 1 tablet (12.5 mg) by mouth 2 times daily (morning and late afternoon). 24  Tomás Donaldson MD   empagliflozin (Jardiance) 10 mg Take 1 tablet (10 mg) by mouth once daily. 24  Tomás Donaldson MD   nicotine polacrilex (Commit) 4 mg lozenge Use 1 lozenge (4 mg) in the mouth or throat every 2 hours if needed for smoking cessation. 24   Que Hicks MD   sacubitriL-valsartan (Entresto)  mg tablet Take 1 tablet by mouth 2 times a day. 25  Rosalie Long, APRN-CNP   spironolactone (Aldactone) 25 mg  tablet Take 0.5 tablets (12.5 mg) by mouth once daily. 9/27/24 9/27/25  Tomás Donaldson MD   furosemide (Lasix) 20 mg tablet Take 1 tablet (20 mg) by mouth once daily as needed (edema).  Patient not taking: Reported on 1/7/2025 1/7/25  Historical Provider, MD   omeprazole (PriLOSEC) 20 mg DR capsule Take 1 capsule (20 mg) by mouth 2 times a day before meals for 14 days. To be taken 30-60 minutes prior to breakfast and dinner. Do not crush or chew.  Patient not taking: Reported on 10/27/2024 8/27/24 1/7/25  Geovanni Mckenna MD   sacubitriL-valsartan (Entresto) 49-51 mg tablet Take 1 tablet by mouth 2 times a day. 10/4/24 1/7/25  Tomás Donaldson MD        PAT ROS     PAT Physical Exam     Airway      Testing/Diagnostic:     - EKG: 10/27/24  Sinus rhythm with occasional Premature ventricular complexes  Left axis deviation  Minimal voltage criteria for LVH, may be normal variant  Nonspecific T wave abnormality  Prolonged QT  Abnormal ECG    - Echo: 01/07/25, results pending    10/01/24  CONCLUSIONS:   1. The left ventricular systolic function is moderately decreased, with a visually estimated ejection fraction of 30-35%.   2. There is global hypokinesis of the left ventricle with minor regional variations.   3. Spectral Doppler shows an impaired relaxation pattern of left ventricular diastolic filling.   4. There is normal right ventricular global systolic function.  - Stress Test: ***  - METS: ***  - Carotids:  - Cardiac Cath:   - PFTs:   - CT Chest:07/01/24  IMPRESSION:     Chest:     1. NO EVIDENCE OF ACUTE INTRATHORACIC PATHOLOGY.     Abdomen and pelvis:     1.  OCCLUDED LEFT COMMON ILIAC ARTERY, LEFT EXTERNAL ILIAC ARTERY, LEFT  COMMON FEMORAL ARTERY, AND INCLUDED VISUALIZED LEFT SUPERFICIAL FEMORAL   ARTERY.     .  - CT A/P: 10/26/24  IMPRESSION:  Extensive vascular atherosclerotic disease. Chronic appearing  occlusion of the left common iliac, proximal internal iliac, external  iliac, and common femoral  artery. There is new areas of heterogeneous hypoenhancement of the inferior prostate with focal 1.5 cm areas of peripheral enhancement in the posterolateral prostate. Clinical correlation for prostatitis or prostate abscess is recommended.  Fluid-filled small bowel and ascending colon, without bowel  dilatation or wall thickening. Clinical correlation for enterocolitis  is recommended.    - CT Head:  - Vascular US PVR: 12/04/24  CONCLUSIONS:  Right Lower PVR: There is evidence of moderate disease at the aorto-iliac level. Decreased digital perfusion noted. Monophasic flow is noted in the right common femoral artery, right popliteal artery, right posterior tibial artery and right dorsalis pedis artery.  Left Lower PVR: There is evidence of severe disease at the aorto-iliac level. Decreased digital perfusion noted. Monophasic flow is noted in the left common femoral artery, left popliteal artery, left posterior tibial artery and left dorsalis pedis artery.  - PET CT:       Patient Specialist/PCP:       Vascular: Josefina Wylie 12/09/24 presents with history of BL LE CLTI 3 lifestyle limiting claudication. Left leg is worse than right. Onset of pain at 250 feet. CT scan which demonstrates left DANYELLE occlusion.      Per note:   - Will confer with Dr. Donaldson regarding risk stratification for left iliac stent  - Will call afterwards to schedule and discuss intervention     Cardiology: Rosalie Long CNP () 01/07/25 follow up..hx of HTN, HLD, Cardiomyopathy non-ischemic, low EF%, patient wear LifeVest   **He had an echo done today which was reviewed by Dr Donaldson. OK to proceed with planned vascular procedure.       PCP: Claudio Maldonado 11/04/24 seen for hospital fuv, concerns of work documents needed to return to work. 10/26/24 presented to ED for generalized weakness anf flu like symptoms.     Endocrinolgy: Susan Prasad NPV 02/03/25      Urology: Aurelio Zuniga 11/06/24 presents to discuss biopsy results s/p TURP on  10/8/24..... hx of elevated PSA- PSA 10.53 on 10/27/24      __________________________________________________  Medication instructions:   Instructed to hold Vitamins, Supplements and Ibuprofen 7 days prior to surgery            Roberta Lozano LPN  Preadmission Testing            Visit Vitals  Smoking Status Every Day       DASI Risk Score      Flowsheet Row Pre-Admission Testing from 9/18/2024 in [facility]   Can you take care of yourself (eat, dress, bathe, or use toilet)?  2.75 filed at 09/18/2024 1412   Can you walk indoors, such as around your house? 1.75 filed at 09/18/2024 1412   Can you walk a block or two on level ground?  2.75 filed at 09/18/2024 1412   Can you climb a flight of stairs or walk up a hill? 5.5 filed at 09/18/2024 1412   Can you run a short distance? 0 filed at 09/18/2024 1412   Can you do light work around the house like dusting or washing dishes? 2.7 filed at 09/18/2024 1412   Can you do moderate work around the house like vacuuming, sweeping floors or carrying groceries? 3.5 filed at 09/18/2024 1412   Can you do heavy work around the house like scrubbing floors or lifting and moving heavy furniture?  0 filed at 09/18/2024 1412   Can you do yard work like raking leaves, weeding or pushing a mower? 0 filed at 09/18/2024 1412   Can you have sexual relations? 5.25 filed at 09/18/2024 1412   Can you participate in moderate recreational activities like golf, bowling, dancing, doubles tennis or throwing a baseball or football? 0 filed at 09/18/2024 1412   Can you participate in strenous sports like swimming, singles tennis, football, basketball, or skiing? 0 filed at 09/18/2024 1412   DASI SCORE 24.2 filed at 09/18/2024 1412   METS Score (Will be calculated only when all the questions are answered) 5.7 filed at 09/18/2024 1412          Caprini DVT Assessment      Flowsheet Row ED to Hosp-Admission (Discharged) from 10/26/2024 in [facility]  with Rosalina  MD Pankaj, Rohit Arevalo DO and Jasiel Lu MD MPH ED to Hosp-Admission (Discharged) from 1/2/2024 in [facility] Bldg A 1 with Dinora Aceves MD and Tiburcio Joya MD   DVT Score (IF A SCORE IS NOT CALCULATING, MUST SELECT A BMI TO COMPLETE) 3 filed at 10/27/2024 0533 3 filed at 01/03/2024 0230   BMI (BMI MUST BE CHOSEN) 30 or less filed at 10/27/2024 0533 30 or less filed at 01/03/2024 0230   RETIRED: Age -- 60-75 years filed at 01/03/2024 0230          Modified Frailty Index    No data to display       CHADS2 Stroke Risk  Current as of yesterday        N/A 3 to 100%: High Risk   2 to < 3%: Medium Risk   0 to < 2%: Low Risk     Last Change: N/A          This score determines the patient's risk of having a stroke if the patient has atrial fibrillation.        This score is not applicable to this patient. Components are not calculated.          Revised Cardiac Risk Index    No data to display       Apfel Simplified Score    No data to display       Risk Analysis Index Results This Encounter    No data found in the last 10 encounters.       Stop Bang Score      Flowsheet Row Pre-Admission Testing from 9/18/2024 in [facility]   Do you snore loudly? 1 filed at 09/18/2024 1420   Do you often feel tired or fatigued after your sleep? 0 filed at 09/18/2024 1420   Has anyone ever observed you stop breathing in your sleep? 0 filed at 09/18/2024 1420   Do you have or are you being treated for high blood pressure? 0 filed at 09/18/2024 1420   Recent BMI (Calculated) 22.4 filed at 09/18/2024 1420   Is BMI greater than 35 kg/m2? 0=No filed at 09/18/2024 1420   Age older than 50 years old? 1=Yes filed at 09/18/2024 1420   Is your neck circumference greater than 17 inches (Male) or 16 inches (Female)? 0 filed at 09/18/2024 1420   Gender - Male 1=Yes filed at 09/18/2024 1420   STOP-BANG Total Score 3 filed at 09/18/2024 1420          Prodigy: High Risk  Total Score: 16              Prodigy  Age Score      Prodigy Gender Score          ARISCAT Score for Postoperative Pulmonary Complications    No data to display       [place] Perioperative Risk for Myocardial Infarction or Cardiac Arrest (ADIN)    No data to display         Assessment and Plan:     { JUANA EMBEDDED ASSESSMENT AND PLAN:25670}

## 2025-01-10 ENCOUNTER — PHARMACY VISIT (OUTPATIENT)
Dept: PHARMACY | Facility: CLINIC | Age: 65
End: 2025-01-10
Payer: COMMERCIAL

## 2025-01-10 ENCOUNTER — TELEMEDICINE (OUTPATIENT)
Dept: PHYSICAL MEDICINE AND REHAB | Facility: CLINIC | Age: 65
End: 2025-01-10
Payer: COMMERCIAL

## 2025-01-10 DIAGNOSIS — G89.29 CHRONIC BILATERAL LOW BACK PAIN, UNSPECIFIED WHETHER SCIATICA PRESENT: Primary | ICD-10-CM

## 2025-01-10 DIAGNOSIS — R29.898 LEG WEAKNESS, BILATERAL: ICD-10-CM

## 2025-01-10 DIAGNOSIS — M54.50 CHRONIC BILATERAL LOW BACK PAIN, UNSPECIFIED WHETHER SCIATICA PRESENT: Primary | ICD-10-CM

## 2025-01-10 PROCEDURE — RXMED WILLOW AMBULATORY MEDICATION CHARGE

## 2025-01-10 PROCEDURE — 99214 OFFICE O/P EST MOD 30 MIN: CPT | Performed by: PHYSICAL MEDICINE & REHABILITATION

## 2025-01-10 RX ORDER — LIDOCAINE 50 MG/G
1 PATCH TOPICAL DAILY
Qty: 30 PATCH | Refills: 3 | Status: SHIPPED | OUTPATIENT
Start: 2025-01-10

## 2025-01-10 RX ORDER — MELOXICAM 15 MG/1
15 TABLET ORAL DAILY PRN
Qty: 30 TABLET | Refills: 2 | Status: SHIPPED | OUTPATIENT
Start: 2025-01-10 | End: 2026-01-10

## 2025-01-10 NOTE — PROGRESS NOTES
Virtual or Telephone Consent    An interactive audio and video telecommunication system which permits real time communications between the patient (at the originating site) and provider (at the distant site) was utilized to provide this telehealth service.   Verbal consent was requested and obtained from Matthieu Solis on this date, 01/10/25 for a telehealth visit.    Physical Medicine and Rehabilitation [facility]   01/10/25       Chief Complaint:  Low back pain     HPI:  Matthieu Solis is a  64 y.o. male with PMHx of CHF and chronic low back pain who presents to the clinic today for evaluation of low back pain. Patient was last seen by PCP Dr. Maldonado on 9/13/24 and discussed chronic low back pain. Ordered PT, a lumbar Xray, and referred to PMR. Did not get Lumbar Xray and does not have an appt made with PT yet. Previously prescribed a TENs unit which has helped. No weakness or saddle anesthesia.       Onset: years ago  Location: low back  Radiation: both legs anteriorly  Quality: ache; sharp  Duration: intermittent  Aggravating factors:  walking  Alleviating factors: rest  Severity: 3  Numbness/Tingling: Yes - feet, legs  Bowel or bladder incontinence: No  Pt's current medication regimen includes:   Tylenol 250mg PRN  Ibuprofen 500mg PRN     Treatment to date:  Physical therapy: No  Prior injections: No       Occupation: ; currently not working bc of the newly diagnosed HF and not being in the condition to work    He was last seen in October 2024. At that time we discussed:  Reinforced calling PT and scheduling an appt for evaluation; core strengthening  Has order previously placed for Lumbar Xray  Will order Meloxicam 15mg daily PRN; Do not take NSAIDs within 24 hours  Can consider repeat MRI if fails conservative management and talk about potential STEFFI   Follow-up in 10 weeks    He is doing therapy and it is helping, does some HEP. He is going to get stents in his LE due to PAD. Has not been taking  mobic until very severe pain.  He only takes med when pain is severe; lidocaine patches.   Feels better after therapy.    Imaging  Reviewed 01/10/25    Xr l spine 10/2024  Facet arthrosis at L4-5 with a 5 mm anterolisthesis.      Minimal degenerative changes at other levels.      No evidence of fracture.      IMPRESSION:      Facet arthrosis at L4-5 with a 5 mm anterolisthesis.      Minimal degenerative changes at other levels.  9/28/21 MRI of Lumbar spine reviewed:  At L3-L4 mild degenerative changes noted causing mild bilateral  lateral recess and neural foramina narrowing.     At L4-L5 mild degenerative changes noted causing mild-to-moderate  bilateral lateral recess and neural foramina narrowing, left greater  than the right    3/12/21 Lumbar Xray reviewed:  Mild lumbar degenerative change L3-S1. This has progressed from the  prior exam.     Past Medical History:   Diagnosis Date    Anxiety     Cataract     CHF (congestive heart failure)     chronic systolic HF    Elevated PSA     PSA 10.53 on 10/27/24    GERD (gastroesophageal reflux disease)     Gout     HTN (hypertension)     Hyperlipidemia     Non-ischemic cardiomyopathy (Multi)     s/p LifeVest 10/2024    Peripheral vascular disease (CMS-HCC)     BL LE CLTI 3  L > R    Sleep apnea     suspected YOHANA, sleep med visit on 1/20/25    Syncope     near syncopal event 6/2024    Vision loss     wears corrective lens        Past Surgical History:   Procedure Laterality Date    COLONOSCOPY      ESOPHAGOGASTRODUODENOSCOPY      HERNIA REPAIR  2015    Inguinal    PROSTATE BIOPSY          Patient Active Problem List    Diagnosis Date Noted    Atherosclerosis of native coronary artery of native heart without angina pectoris 12/20/2024    BPH (benign prostatic hyperplasia) 12/20/2024    Gout 12/20/2024    HLD (hyperlipidemia) 12/20/2024    Systolic congestive heart failure 12/20/2024    Cognitive change 12/12/2024    Snoring 12/12/2024    Leg weakness, bilateral 11/20/2024     Acute prostatitis 10/27/2024    Nicotine use disorder 06/26/2024    NICM (nonischemic cardiomyopathy) (Multi) 06/25/2024    Acute on chronic combined systolic and diastolic heart failure 06/22/2024    Nonsustained ventricular tachycardia (Multi) 06/22/2024    Type 2 myocardial infarction (Multi) 06/22/2024    Acute respiratory failure with hypercapnia (Multi) 06/19/2024    Nondisplaced fracture of middle phalanx of right middle finger, initial encounter for open fracture 04/05/2024    Well adult exam 01/23/2024    Nausea and vomiting 01/03/2024    Combined form of age-related cataract, right eye 12/19/2023    Combined form of age-related cataract, left eye 12/19/2023    Hyperopia, bilateral 12/19/2023    Presbyopia 12/19/2023    Abdominal pain 12/18/2023    Acute pharyngitis 12/18/2023    Anxiety 12/18/2023    Blurring of visual image 12/18/2023    Cataract, nuclear sclerotic, both eyes 12/18/2023    Change in vision 12/18/2023    Cortical age-related cataract of left eye 12/18/2023    Episcleritis of left eye 12/18/2023    Essential hypertension 12/18/2023    Gastroenteritis 12/18/2023    Gout of right foot 12/18/2023    Helicobacter positive gastritis 12/18/2023    Hordeolum externum of left upper eyelid 12/18/2023    Astigmatism of both eyes 12/18/2023    Low back pain 12/18/2023    Lumbar spondylosis 12/18/2023    Lung nodules 12/18/2023    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 organic origin 12/18/2023    Rectal mass 12/18/2023    Urethritis 12/18/2023    Abdominal wall abscess 12/18/2023    Sacroiliitis (CMS-HCC) 12/18/2023    Right groin hernia 12/18/2023    Bilateral inguinal hernia 12/18/2023    Alcoholism (Multi) 12/12/2012    Hearing loss 12/12/2012    Sciatica 11/05/2012    Leg pain 10/04/2012    Peripheral vascular disease (CMS-HCC) 01/02/2025    Elevated PSA 09/10/2024        Family History   Problem Relation Name Age of Onset    Other (CVA) Mother      Hypertension Mother      Other (CVA) Father       Hypertension Father          Current Outpatient Medications   Medication Sig Dispense Refill    acetaminophen (Tylenol) 325 mg tablet Take 2 tablets (650 mg) by mouth every 6 hours if needed for moderate pain (4 - 6) for up to 10 doses. 20 tablet 0    aspirin 81 mg EC tablet Take 1 tablet (81 mg) by mouth once daily. 90 tablet 3    atorvastatin (Lipitor) 80 mg tablet TAKE 1 TABLET BY MOUTH ONCE DAILY. 90 tablet 3    carvedilol (Coreg) 12.5 mg tablet Take 1 tablet (12.5 mg) by mouth 2 times daily (morning and late afternoon). 60 tablet 11    empagliflozin (Jardiance) 10 mg Take 1 tablet (10 mg) by mouth once daily. 90 tablet 3    LYSINE ORAL Take 1 capsule by mouth once daily.      nicotine polacrilex (Commit) 4 mg lozenge Use 1 lozenge (4 mg) in the mouth or throat every 2 hours if needed for smoking cessation. 100 lozenge 0    sacubitriL-valsartan (Entresto)  mg tablet Take 1 tablet by mouth 2 times a day. 180 tablet 3    spironolactone (Aldactone) 25 mg tablet Take 0.5 tablets (12.5 mg) by mouth once daily. 45 tablet 3     No current facility-administered medications for this visit.        Allergies   Allergen Reactions    Cinnamon Unknown     Cinnamon    Doxycycline Swelling    Penicillins Unknown        Social History     Socioeconomic History    Marital status: Significant Other   Tobacco Use    Smoking status: Every Day     Current packs/day: 1.00     Average packs/day: 1 pack/day for 51.0 years (51.0 ttl pk-yrs)     Types: Cigarettes     Start date: 1974    Smokeless tobacco: Never    Tobacco comments:     Trying to quit   Vaping Use    Vaping status: Never Used   Substance and Sexual Activity    Alcohol use: Not Currently    Drug use: Never    Sexual activity: Defer     Social Drivers of Health     Financial Resource Strain: Medium Risk (11/8/2024)    Overall Financial Resource Strain (CARDIA)     Difficulty of Paying Living Expenses: Somewhat hard   Food Insecurity: Food Insecurity Present (11/8/2024)     Hunger Vital Sign     Worried About Running Out of Food in the Last Year: Often true     Ran Out of Food in the Last Year: Often true   Transportation Needs: Low Risk  (11/12/2024)    Received from [facility] Health Risk Assessment (HRA)     In the past year, have you had trouble getting to medical appointments or getting things you need because of transportation?: No   Physical Activity: Insufficiently Active (11/8/2024)    Exercise Vital Sign     Days of Exercise per Week: 2 days     Minutes of Exercise per Session: 20 min   Stress: Stress Concern Present (11/8/2024)    [employer] - Occupational Stress Questionnaire     Feeling of Stress : Rather much   Social Connections: Moderately Integrated (11/8/2024)    Social Connection and Isolation Panel [NHANES]     Frequency of Communication with Friends and Family: Three times a week     Frequency of Social Gatherings with Friends and Family: Twice a week     Attends Gnosticist Services: More than 4 times per year     Active Member of Clubs or Organizations: Yes     Attends Club or Organization Meetings: More than 4 times per year     Marital Status:    Intimate Partner Violence: Not At Risk (11/8/2024)    Humiliation, Afraid, Rape, and Kick questionnaire     Fear of Current or Ex-Partner: No     Emotionally Abused: No     Physically Abused: No     Sexually Abused: No   Housing Stability: High Risk (11/12/2024)    Received from [facility] Health Risk Assessment (HRA)     Does your current living situation have any of the following problems? (CHOOSE ALL THAT APPLY): Mold,Smoke detectors missing or not working     Describe your current living situation.: I have a steady place to live        Review of Systems:  Constitutional:  Denies fever or chills, malaise, weight changes.   Eyes:  Denies change in visual acuity   HENT:  Denies nasal congestion or sore throat   Respiratory:  Denies cough or shortness of breath    Cardiovascular:  Denies chest pain or edema   GI:  Denies abdominal pain, nausea, vomiting, bloody stools or diarrhea   :  Denies dysuria   Integument:  Denies rash   Neurologic:  As per HPI  MSK: Per above HPI  Endocrine:  Denies polyuria or polydipsia   Lymphatic:  Denies swollen glands   Psychiatric:  Denies depression or anxiety            PHYSICAL EXAM:  None televisit        General:  NAD, well developed, male    Psychiatric: appropriate mood & affect.   Cardiovascular:  Normal pedal pulses; no LE edema.  Respiratory:  Normal rate; unlabored breathing.  Skin:  Intact; no erythema; no ecchymosis or rash.  Lymphatic:  No lymphadenopathy or lymphedema.  NEURO:  Alert and appropriate. Speech fluent, conversing appropriately.   Motor:    Rt: HF 5/5, KE 5/5, KF 5/5, DF 5/5, EHL 5/5, PF 5/5.    Lt: HF 5/5, KE 5/5, KF 5/5, DF 5/5, EHL 5/5, PF 5/5.  Sensation:     Light touch: intact in the b/l L3-S1 dermatomes.    PP: intact in the b/l L3-S1 dermatomes  Reflexes:     Right:  patellar 2+, achilles 2+,     Left: patellar 2+, achilles 2+,     Babinski's downgoing b/l; no clonus  Gait: Normal, narrow based gait.     MSK:  Inspection reveals no evidence of a pelvic obliqity   Spinal range of motion: Flexion to 90° degrees, Extension of 20 degrees.  There is tenderness over the right paraspinal lumbar   Special tests:    Straight leg raise: neg    Slump test: pos bilaterally    Facet loading: neg  ANA positive on right  Decreased ROM with ROM of hip joints bilaterally; some anterior groin pain with right hip internal rotation        Impression: Matthieu Solis is a 64 y.o.  male with PMHx of CHF and chronic low back pain who presents to the clinic today for evaluation of chronic low back pain. Overall symptoms stable w PT however he will have surgery in next few weeks for PAD that is is anxious and focused on.    Neurogenic claudication likely secondary to lumbar stenosis     Plan:  Continue PT and HEP  Lumbar  spondylosis on xray  refill Meloxicam 15mg daily PRN; Do not take NSAIDs within 24 hours; discussed he might be placed on a blood thinner after his stent procedure and in that case should avoid nsaids  Can consider repeat MRI if fails conservative management and talk about potential STEFFI   Follow-up in 10 weeks           Kristin Kauffman MD     Division of PM&R

## 2025-01-11 LAB
AORTIC VALVE PEAK VELOCITY: 1.25 M/S
AV PEAK GRADIENT: 6 MMHG
AVA (PEAK VEL): 2.12 CM2
EJECTION FRACTION APICAL 4 CHAMBER: 49.6
EJECTION FRACTION: 53 %
LEFT ATRIUM VOLUME AREA LENGTH INDEX BSA: 35.6 ML/M2
LEFT VENTRICLE INTERNAL DIMENSION DIASTOLE: 4.32 CM (ref 3.5–6)
LEFT VENTRICULAR OUTFLOW TRACT DIAMETER: 2.11 CM
MITRAL VALVE E/A RATIO: 0.65
RIGHT VENTRICLE FREE WALL PEAK S': 16 CM/S
RIGHT VENTRICLE PEAK SYSTOLIC PRESSURE: 19 MMHG
TRICUSPID ANNULAR PLANE SYSTOLIC EXCURSION: 2.2 CM

## 2025-01-13 ENCOUNTER — OFFICE VISIT (OUTPATIENT)
Dept: VASCULAR SURGERY | Facility: HOSPITAL | Age: 65
End: 2025-01-13
Payer: COMMERCIAL

## 2025-01-13 VITALS
SYSTOLIC BLOOD PRESSURE: 153 MMHG | OXYGEN SATURATION: 100 % | WEIGHT: 146.6 LBS | BODY MASS INDEX: 24.43 KG/M2 | HEART RATE: 72 BPM | DIASTOLIC BLOOD PRESSURE: 77 MMHG | HEIGHT: 65 IN

## 2025-01-13 DIAGNOSIS — I73.9 PAD (PERIPHERAL ARTERY DISEASE) (CMS-HCC): Primary | ICD-10-CM

## 2025-01-13 PROCEDURE — 3008F BODY MASS INDEX DOCD: CPT | Performed by: SURGERY

## 2025-01-13 PROCEDURE — 3077F SYST BP >= 140 MM HG: CPT | Performed by: SURGERY

## 2025-01-13 PROCEDURE — 99214 OFFICE O/P EST MOD 30 MIN: CPT | Performed by: SURGERY

## 2025-01-13 PROCEDURE — 3078F DIAST BP <80 MM HG: CPT | Performed by: SURGERY

## 2025-01-13 ASSESSMENT — PAIN SCALES - GENERAL: PAINLEVEL_OUTOF10: 0-NO PAIN

## 2025-01-13 NOTE — PROGRESS NOTES
[facility] Sleep Medicine  [facility]  [address]  [facility] [address]  [phone]     [facility]  New Visit Note      Subjective   Patient ID: Matthieu Solis is a 64 y.o. male with past medical history significant for Congestive Heart Failure, Hypertension, diabetes, Anxiety, Nicotine dependence, peripheral artery disease, and Sleep disorder breathing.     1/20/2025: The patient is here alone today and was referred by Gerontology Charito Diaz, APRN-CNP, for comprehensive sleep medicine evaluation due to suspected sleep apnea. He reports having to snore for about 40 years, and he never treated it. He had an episode in June/2024 and passed out and was having trouble breathing and sweating. He was admitted to the hospital, and he discovered that he had Congestive Heart Failure. Therefore, His provider is here to establish care ESS: 7, SHAHZAD: 6 today.      HPI  Patient had been having these symptoms for the past 40 years. Patient never had sleep study done yet.       SLEEP STUDY HISTORY: (personally reviewed raw data such as interpretation report, data sheet, hypnogram, and titration table if available and applicable)  N/A    SLEEP-WAKE SCHEDULE  Bedtime: 8-10 PM on weekdays, same on weekends  Subjective sleep latency: 5- 15 minutes  Problems falling asleep: No  Number of awakenings: 1-2 times per night spontaneously for night mare  Falls back asleep in 5 minutes  Problems staying asleep: No  Final wake time: 4-5 AM on weekdays, same on weekends  Shift work: No  Naps: No  Average sleep duration (excluding naps): 6 hours    SLEEP ENVIRONMENT  Sleep location: bed  Sleep status: Mostly sleeps alone and sometimes sleeps with partner  Room is dark:  Yes  Room is quiet: Yes  Room is cool: Yes  Bed comfort: good    SLEEP HABITS:   Activities before bedtime: watch TV  Activities in bed: no  Preferred sleep position:  side    SLEEP ROS:  Night symptoms: POSITIVE for snoring, nasal congestion , and mouth breathing  Morning symptoms: POSITIVE for unrefreshing sleep and morning dry mouth  Daytime symptoms POSITIVE for excessive daytime sleepiness, fatigue, trouble remembering things in daytime, and trouble staying focused in daytime  Hypersomnia / narcolepsy symptoms: Patient denies symptoms of a hypersomnolence disorder such as sleep paralysis, sleep-related hallucinations, and cataplexy.   RLS symptoms: Patient denies RLS symptoms.  Movements in sleep: Patient denies problematic movements in sleep such as seizures during sleep, frequent leg kicks / jerks while asleep, sleep-related bruxism, and waking up with bedsheets in disarray.  Parasomnia symptoms: Patient denies symptoms of parasomnia such as sleepwalking, sleeptalking, sleep-eating, acting out dreams, and nightmares.     WEIGHT: stable    REVIEW OF SYSTEMS: All other systems have been reviewed and are negative.    PERTINENT SOCIAL HISTORY:  Occupation:    Smoking: Yes   ETOH: No   Marijuana: No   Caffeine: Yes, 4-5 cups of coffee per day, the last coffee took at 4 PM  Sleep aids: No   Claustrophobia: No     PERTINENT PAST SURGICAL HISTORY:  non-contributory    PERTINENT FAMILY HISTORY:  Patient denies family history of any sleep disorder.  Patient denies family history of sleep apnea.    Active Problems, Allergy List, Medication List, and PMH/PSH/FH/Social Hx have been reviewed and reconciled in chart. No significant changes unless documented in the pertinent chart section. Updates made when necessary.       Objective   Vitals:    01/20/25 1408   BP: (!) 173/92   Pulse: 71   Temp: 35.7 °C (96.3 °F)   SpO2: 95%       REVIEW OF SYSTEMS  All other systems have been reviewed and are negative.    ALLERGIES  Allergies   Allergen Reactions    Cinnamon Unknown     Cinnamon    Doxycycline Swelling    Penicillins Unknown       MEDICATIONS  Current Outpatient  Medications   Medication Sig Dispense Refill    acetaminophen (Tylenol) 325 mg tablet Take 2 tablets (650 mg) by mouth every 6 hours if needed for moderate pain (4 - 6) for up to 10 doses. 20 tablet 0    aspirin 81 mg EC tablet Take 1 tablet (81 mg) by mouth once daily. 90 tablet 3    atorvastatin (Lipitor) 80 mg tablet TAKE 1 TABLET BY MOUTH ONCE DAILY. 90 tablet 3    carvedilol (Coreg) 12.5 mg tablet Take 1 tablet (12.5 mg) by mouth 2 times daily (morning and late afternoon). 60 tablet 11    empagliflozin (Jardiance) 10 mg Take 1 tablet (10 mg) by mouth once daily. 90 tablet 3    lidocaine (Lidoderm) 5 % patch Place 1 patch over 12 hours on the skin once daily. Remove & discard patch within 12 hours or as directed by MD. 30 patch 3    LYSINE ORAL Take 1 capsule by mouth once daily.      meloxicam (Mobic) 15 mg tablet Take 1 tablet (15 mg) by mouth once daily as needed for moderate pain (4 - 6). 30 tablet 2    nicotine polacrilex (Commit) 4 mg lozenge Use 1 lozenge (4 mg) in the mouth or throat every 2 hours if needed for smoking cessation. 100 lozenge 0    sacubitriL-valsartan (Entresto)  mg tablet Take 1 tablet by mouth 2 times a day. 180 tablet 3    spironolactone (Aldactone) 25 mg tablet Take 0.5 tablets (12.5 mg) by mouth once daily. 45 tablet 3     No current facility-administered medications for this visit.       Physical Exam  Constitutional:alert and oriented to time, place, and person  Sinus: - tenderness to palpation  Palate:  Narrow Mallampati 3, - Tongue scalloping, - macroglossia  Lungs: Clear to auscultation bilateral, no rales  Heart: Regular rate and rhythm, no murmurs    Assessment/Plan   Matthieu Solis is a 64 y.o. male who is seen to evaluate for possible obstructive sleep apnea. The pathophysiology of sleep apnea, diagnostic testing (HST vs PSG), treatment options (PAP, oral appliance, surgery, hypoglossal nerve stimulator called Inspire), and supportive management (weight loss,  positional therapy, smoking cessation, avoidance of alcohol and sedatives) were discussed with the patient in detail. Risk factors of sleep apnea as well as cardiometabolic and neurocognitive sequelae associated with untreated sleep apnea were also discussed. Lastly, patient was advised to avoid driving vehicle or operating heavy machinery when sleepy.     Matthieu Solis with the following problems:     # SLEEP DISORDERED BREATHING:  -This is likely sleep apnea based on the the history and physical examination.   -Matthieu Solis has not yet had a sleep study.  -Instruct patient to complete a home sleep study.  -HSAT is reasonable as patient likely has YOHANA based on history and exam and does not have any of the following comorbidities: neuromuscular weakness, hypoventilation, or significant COPD.  -We consider treatment as indicated when testing is complete.     # ANXIETY:   -Matthieu Solis is not taking medications.  -Denies HI/SI     # HYPERTENSION/ Congestive Heart Failure:  -Blood pressure was 173/92 today. To control the blood pressure better, instruct the patient to take anti-hypertension medication at bedtime and a water pill in the waking time.  -Denies headache, palpitation, and syncope in the clinic.  -Last Echo: The left ventricular systolic function is low normal, with a visually estimated ejection fraction of 50-55% on 1/7/2025   -Follows with PCP/ Cardiology     # NASAL CONGESTION:  -Instruct Matthieu Reyes use appropriate Flonase spray to ease congestion.    # XEROSTOMIA:  -Instruct Matthieu Reyes purchase the Biotene gel to ease the dry mouth symptom,     # TOBACCO ABUSE:Matthieu Solis is a current 2-6 cigarettes PPD smoker for 50  years.  -Smoking Cessation given    RTC 2-3 weeks after sleep study       All of patient's questions were answered. He verbalizes understanding and agreement with my assessment and plan.

## 2025-01-13 NOTE — PATIENT INSTRUCTIONS
- Plan for left iliac stent. Will attempt to thrombectomy and place stent which may require use of two stents depending on how much clot can be cleared  - Will call again closer to surgery date to confirm timing and instructions  - Call office [phone] with any questions or concerns

## 2025-01-14 ENCOUNTER — TREATMENT (OUTPATIENT)
Dept: PHYSICAL THERAPY | Facility: CLINIC | Age: 65
End: 2025-01-14
Payer: MEDICAID

## 2025-01-14 DIAGNOSIS — R29.898 LEG WEAKNESS, BILATERAL: ICD-10-CM

## 2025-01-14 DIAGNOSIS — M54.50 CHRONIC BILATERAL LOW BACK PAIN, UNSPECIFIED WHETHER SCIATICA PRESENT: ICD-10-CM

## 2025-01-14 DIAGNOSIS — G89.29 CHRONIC BILATERAL LOW BACK PAIN, UNSPECIFIED WHETHER SCIATICA PRESENT: ICD-10-CM

## 2025-01-14 PROCEDURE — 97110 THERAPEUTIC EXERCISES: CPT | Mod: GP,CQ

## 2025-01-14 NOTE — PROGRESS NOTES
"Physical Therapy    Physical Therapy Treatment      Patient Name: Matthieu Solis  MRN: [id number]  Today's Date: 1/14/2025    Time Entry:   Time Calculation  Start Time: 1034  Stop Time: 1127  Time Calculation (min): 53 min     PT Therapeutic Procedures Time Entry  Therapeutic Exercise Time Entry: 40     Visit: 5  Insurance: MMO  Auth: No   20 visit max     Assessment:  Pt did well w/  ex's today, no increased pain reported.    Plan:   Cont as able -RDL, step w/ march    Current Problem:   Problem List Items Addressed This Visit             ICD-10-CM    Low back pain M54.50    Leg weakness, bilateral R29.898           Subjective    \"PT is helping, until it wears off.  I have a pre-op appointment tomorrow and I was wondering about scheduling F/U appointments for Feb.\"  Pain currently 3-4/10, R upper LB w/ lidocaine patch    Objective       ROM/Flexibility:    Lumbar  Flexion: 70 deg; stretch      Extension: 5 deg       Sidebend R / L: 20 deg / 15 deg       Rotation R / L: 75% / 75%    Stretching into R low back with flexion, SB, rotation       Hip R / L Flexion: 90 deg / 100 deg       Ext Rot: mod limitation bilat       Int Rot: mild limitation bilat      Strength R / L:     Hip Flexion:     5 / 5    Hip Abduction:    5 / 5    Hip Adduction:    5 / 5    Hip ER:      4+ / 4+    Hip IR:       5 / 5      Knee Extension:   5 / 5    Knee Flexion:       4+ / 4+      Ankle DF:             5 / 5    Ankle PF:              5 / 5     Neurological: Sensation is intact and symmetrical in BLEs.       TREATMENT  Therapeutic exercise:  BKFO 3x30\" ea  SKTC 3x30\"  LTR x 10 ea  Supine HSC on Pball x 20  Ball squeeze x 20  Blue band hooklying clam x 20  Fig 4 ITB stretch 10x10\" ea  Seated lumbar flex stretch 3x30\"  Pball pelvic mobs x 20 ea, cw/ccw  Nu Step x 5 min  Supine HP to LB EOS    Goals:  Active       PT Problem       Pt will report centralization of pain symptoms into low back for improved ability to walk longer than .25 miles " at a time        Start:  11/20/24    Expected End:  02/18/25            Pt will increase bilateral LE strength by 1 MMT grade for all weakened muscle groups for improved ability to return to regular work duties        Start:  11/20/24    Expected End:  02/18/25            Pt will demonstrate full and pain-free lumbar AROM for improved ability to perform work duties        Start:  11/20/24    Expected End:  02/18/25            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

## 2025-01-15 ENCOUNTER — PRE-ADMISSION TESTING (OUTPATIENT)
Dept: PREADMISSION TESTING | Facility: HOSPITAL | Age: 65
End: 2025-01-15
Payer: COMMERCIAL

## 2025-01-15 VITALS
HEIGHT: 65 IN | SYSTOLIC BLOOD PRESSURE: 172 MMHG | TEMPERATURE: 97.2 F | RESPIRATION RATE: 18 BRPM | BODY MASS INDEX: 23.99 KG/M2 | DIASTOLIC BLOOD PRESSURE: 83 MMHG | WEIGHT: 144 LBS | HEART RATE: 71 BPM

## 2025-01-15 DIAGNOSIS — I73.9 PERIPHERAL VASCULAR DISEASE (CMS-HCC): Primary | ICD-10-CM

## 2025-01-15 LAB
ABO GROUP (TYPE) IN BLOOD: NORMAL
ANION GAP SERPL CALC-SCNC: 14 MMOL/L (ref 10–20)
ANTIBODY SCREEN: NORMAL
BUN SERPL-MCNC: 17 MG/DL (ref 6–23)
CALCIUM SERPL-MCNC: 10.3 MG/DL (ref 8.6–10.6)
CHLORIDE SERPL-SCNC: 105 MMOL/L (ref 98–107)
CO2 SERPL-SCNC: 26 MMOL/L (ref 21–32)
CREAT SERPL-MCNC: 0.94 MG/DL (ref 0.5–1.3)
EGFRCR SERPLBLD CKD-EPI 2021: >90 ML/MIN/1.73M*2
ERYTHROCYTE [DISTWIDTH] IN BLOOD BY AUTOMATED COUNT: 13.5 % (ref 11.5–14.5)
GLUCOSE SERPL-MCNC: 70 MG/DL (ref 74–99)
HCT VFR BLD AUTO: 45.2 % (ref 41–52)
HGB BLD-MCNC: 14.6 G/DL (ref 13.5–17.5)
MCH RBC QN AUTO: 31.1 PG (ref 26–34)
MCHC RBC AUTO-ENTMCNC: 32.3 G/DL (ref 32–36)
MCV RBC AUTO: 96 FL (ref 80–100)
NRBC BLD-RTO: 0 /100 WBCS (ref 0–0)
PLATELET # BLD AUTO: 376 X10*3/UL (ref 150–450)
POTASSIUM SERPL-SCNC: 4.9 MMOL/L (ref 3.5–5.3)
RBC # BLD AUTO: 4.69 X10*6/UL (ref 4.5–5.9)
RH FACTOR (ANTIGEN D): NORMAL
SODIUM SERPL-SCNC: 140 MMOL/L (ref 136–145)
WBC # BLD AUTO: 8.3 X10*3/UL (ref 4.4–11.3)

## 2025-01-15 PROCEDURE — 85027 COMPLETE CBC AUTOMATED: CPT

## 2025-01-15 PROCEDURE — 80048 BASIC METABOLIC PNL TOTAL CA: CPT | Performed by: SURGERY

## 2025-01-15 PROCEDURE — 87081 CULTURE SCREEN ONLY: CPT

## 2025-01-15 PROCEDURE — 36415 COLL VENOUS BLD VENIPUNCTURE: CPT

## 2025-01-15 PROCEDURE — 82374 ASSAY BLOOD CARBON DIOXIDE: CPT

## 2025-01-15 PROCEDURE — 99204 OFFICE O/P NEW MOD 45 MIN: CPT | Performed by: NURSE PRACTITIONER

## 2025-01-15 PROCEDURE — 86901 BLOOD TYPING SEROLOGIC RH(D): CPT

## 2025-01-15 RX ORDER — CHLORHEXIDINE GLUCONATE 40 MG/ML
SOLUTION TOPICAL DAILY PRN
Qty: 473 ML | Refills: 0 | Status: SHIPPED | OUTPATIENT
Start: 2025-01-15 | End: 2025-01-20

## 2025-01-15 ASSESSMENT — DUKE ACTIVITY SCORE INDEX (DASI)
DASI METS SCORE: 7.3
CAN YOU TAKE CARE OF YOURSELF (EAT, DRESS, BATHE, OR USE TOILET): YES
CAN YOU WALK A BLOCK OR TWO ON LEVEL GROUND: NO
CAN YOU RUN A SHORT DISTANCE: YES
CAN YOU DO LIGHT WORK AROUND THE HOUSE LIKE DUSTING OR WASHING DISHES: YES
CAN YOU PARTICIPATE IN MODERATE RECREATIONAL ACTIVITIES LIKE GOLF, BOWLING, DANCING, DOUBLES TENNIS OR THROWING A BASEBALL OR FOOTBALL: NO
CAN YOU DO YARD WORK LIKE RAKING LEAVES, WEEDING OR PUSHING A MOWER: NO
CAN YOU CLIMB A FLIGHT OF STAIRS OR WALK UP A HILL: YES
CAN YOU DO MODERATE WORK AROUND THE HOUSE LIKE VACUUMING, SWEEPING FLOORS OR CARRYING GROCERIES: YES
CAN YOU DO HEAVY WORK AROUND THE HOUSE LIKE SCRUBBING FLOORS OR LIFTING AND MOVING HEAVY FURNITURE: YES
CAN YOU PARTICIPATE IN STRENOUS SPORTS LIKE SWIMMING, SINGLES TENNIS, FOOTBALL, BASKETBALL, OR SKIING: NO
TOTAL_SCORE: 37.45
CAN YOU WALK INDOORS, SUCH AS AROUND YOUR HOUSE: YES
CAN YOU HAVE SEXUAL RELATIONS: YES

## 2025-01-15 ASSESSMENT — ENCOUNTER SYMPTOMS
CARDIOVASCULAR NEGATIVE: 1
ENDOCRINE NEGATIVE: 1
RESPIRATORY NEGATIVE: 1
NECK NEGATIVE: 1
GASTROINTESTINAL NEGATIVE: 1
CONSTITUTIONAL NEGATIVE: 1
WEAKNESS: 1
MUSCULOSKELETAL NEGATIVE: 1

## 2025-01-15 ASSESSMENT — LIFESTYLE VARIABLES: SMOKING_STATUS: SMOKER

## 2025-01-15 NOTE — CPM/PAT H&P
CPM/PAT Evaluation       Name: Matthieu Solis (Matthieu NEGRITA Will)  /Age: 1960/64 y.o.     Visit Type:   In-Person       Chief Complaint: Peripheral vascular disease (CMS-HCC)    HPI  Patient is a 64-year-old male with bilateral lower extremity claudication that is lifestyle limiting and affects ADLs.  Patient had CT imaging which demonstrates a left DANYELLE occlusion.  Patient is being evaluated in CPM in anticipation of a left lower extremity angiogram with Dr. Wylie on 2025  Past Medical History:   Diagnosis Date    Anxiety     Cataract     CHF (congestive heart failure)     chronic systolic HF    Elevated PSA     PSA 10.53 on 10/27/24    GERD (gastroesophageal reflux disease)     Gout     HTN (hypertension)     Hyperlipidemia     Non-ischemic cardiomyopathy (Multi)     s/p LifeVest 10/2024    Peripheral vascular disease (CMS-HCC)     BL LE CLTI 3  L > R    Sleep apnea     suspected YOHANA, sleep med visit on 25    Syncope     near syncopal event 2024    Vision loss     wears corrective lens       Past Surgical History:   Procedure Laterality Date    COLONOSCOPY      ESOPHAGOGASTRODUODENOSCOPY      HERNIA REPAIR  2015    Inguinal    PROSTATE BIOPSY         Patient Sexual activity questions deferred to the physician.    Family History   Problem Relation Name Age of Onset    Other (CVA) Mother      Hypertension Mother      Other (CVA) Father      Hypertension Father         Allergies   Allergen Reactions    Cinnamon Unknown     Cinnamon    Doxycycline Swelling    Penicillins Unknown       Prior to Admission medications    Medication Sig Start Date End Date Taking? Authorizing Provider   acetaminophen (Tylenol) 325 mg tablet Take 2 tablets (650 mg) by mouth every 6 hours if needed for moderate pain (4 - 6) for up to 10 doses. 10/29/24   Susan Jeetr MD   aspirin 81 mg EC tablet Take 1 tablet (81 mg) by mouth once daily. 24  Kathy Hanna MD   atorvastatin (Lipitor) 80 mg tablet  TAKE 1 TABLET BY MOUTH ONCE DAILY. 12/16/24   Tomás Donaldson MD   carvedilol (Coreg) 12.5 mg tablet Take 1 tablet (12.5 mg) by mouth 2 times daily (morning and late afternoon). 8/29/24 8/29/25  Tomás Donaldson MD   empagliflozin (Jardiance) 10 mg Take 1 tablet (10 mg) by mouth once daily. 12/6/24 12/6/25  Tomás Donaldson MD   lidocaine (Lidoderm) 5 % patch Place 1 patch over 12 hours on the skin once daily. Remove & discard patch within 12 hours or as directed by MD. 1/10/25   Kristin Kauffman MD   LYSINE ORAL Take 1 capsule by mouth once daily.    Historical Provider, MD   meloxicam (Mobic) 15 mg tablet Take 1 tablet (15 mg) by mouth once daily as needed for moderate pain (4 - 6). 1/10/25 1/10/26  Kristin Kauffman MD   nicotine polacrilex (Commit) 4 mg lozenge Use 1 lozenge (4 mg) in the mouth or throat every 2 hours if needed for smoking cessation. 12/2/24   Que Hicks MD   sacubitriL-valsartan (Entresto)  mg tablet Take 1 tablet by mouth 2 times a day. 1/7/25 1/7/26  Rosalie Long, APRN-CNP   spironolactone (Aldactone) 25 mg tablet Take 0.5 tablets (12.5 mg) by mouth once daily. 9/27/24 9/27/25  Tomás Donaldson MD        PAT ROS:   Constitutional:   neg    Neuro/Psych:    weakness (left foot)  Eyes:    use of corrective lenses  Ears:   neg    Nose:   neg    Mouth:   neg    Throat:   neg    Neck:   neg    Cardio:   neg    Respiratory:   neg    Endocrine:   neg    GI:   neg    :   neg    Musculoskeletal:   neg    Hematologic:   neg    Skin:  neg        Physical Exam  Vitals reviewed.   Constitutional:       Appearance: Normal appearance.   HENT:      Head: Normocephalic and atraumatic.      Nose: Nose normal.      Mouth/Throat:      Mouth: Mucous membranes are moist.   Cardiovascular:      Rate and Rhythm: Normal rate and regular rhythm.      Pulses: Normal pulses.   Pulmonary:      Effort: Pulmonary effort is normal.      Breath sounds: Normal breath sounds.   Abdominal:       Palpations: Abdomen is soft.   Musculoskeletal:         General: Normal range of motion.      Cervical back: Normal range of motion.   Skin:     General: Skin is warm.   Neurological:      General: No focal deficit present.      Mental Status: He is alert and oriented to person, place, and time.   Psychiatric:         Mood and Affect: Mood normal.         Behavior: Behavior normal.          PAT AIRWAY:   Airway:     Mallampati::  II    TM distance::  >3 FB    Neck ROM::  Full      Testing/Diagnostic:     Patient Specialist/PCP:     Visit Vitals  Smoking Status Every Day       DASI Risk Score      Flowsheet Row Pre-Admission Testing from 9/18/2024 in [facility]   Can you take care of yourself (eat, dress, bathe, or use toilet)?  2.75 filed at 09/18/2024 1412   Can you walk indoors, such as around your house? 1.75 filed at 09/18/2024 1412   Can you walk a block or two on level ground?  2.75 filed at 09/18/2024 1412   Can you climb a flight of stairs or walk up a hill? 5.5 filed at 09/18/2024 1412   Can you run a short distance? 0 filed at 09/18/2024 1412   Can you do light work around the house like dusting or washing dishes? 2.7 filed at 09/18/2024 1412   Can you do moderate work around the house like vacuuming, sweeping floors or carrying groceries? 3.5 filed at 09/18/2024 1412   Can you do heavy work around the house like scrubbing floors or lifting and moving heavy furniture?  0 filed at 09/18/2024 1412   Can you do yard work like raking leaves, weeding or pushing a mower? 0 filed at 09/18/2024 1412   Can you have sexual relations? 5.25 filed at 09/18/2024 1412   Can you participate in moderate recreational activities like golf, bowling, dancing, doubles tennis or throwing a baseball or football? 0 filed at 09/18/2024 1412   Can you participate in strenous sports like swimming, singles tennis, football, basketball, or skiing? 0 filed at 09/18/2024 1412   DASI SCORE 24.2 filed at 09/18/2024 2453    METS Score (Will be calculated only when all the questions are answered) 5.7 filed at 09/18/2024 1412          Caprini DVT Assessment      Flowsheet Row ED to Hosp-Admission (Discharged) from 10/26/2024 in [facility] [place] 52 with Rosalina Lira MD, Rohit Arevalo DO and Jasiel Lu MD MPH ED to Hosp-Admission (Discharged) from 1/2/2024 in [facility] Bldg A 1 with Dinora Aceves MD and Tiburcio Joya MD   DVT Score (IF A SCORE IS NOT CALCULATING, MUST SELECT A BMI TO COMPLETE) 3 filed at 10/27/2024 0533 3 filed at 01/03/2024 0230   BMI (BMI MUST BE CHOSEN) 30 or less filed at 10/27/2024 0533 30 or less filed at 01/03/2024 0230   RETIRED: Age -- 60-75 years filed at 01/03/2024 0230          Modified Frailty Index    No data to display       CHADS2 Stroke Risk  Current as of 2 hours ago        N/A 3 to 100%: High Risk   2 to < 3%: Medium Risk   0 to < 2%: Low Risk     Last Change: N/A          This score determines the patient's risk of having a stroke if the patient has atrial fibrillation.        This score is not applicable to this patient. Components are not calculated.          Revised Cardiac Risk Index    No data to display       Apfel Simplified Score    No data to display       Risk Analysis Index Results This Encounter    No data found in the last 10 encounters.       Stop Bang Score      Flowsheet Row Pre-Admission Testing from 9/18/2024 in [facility]   Do you snore loudly? 1 filed at 09/18/2024 1420   Do you often feel tired or fatigued after your sleep? 0 filed at 09/18/2024 1420   Has anyone ever observed you stop breathing in your sleep? 0 filed at 09/18/2024 1420   Do you have or are you being treated for high blood pressure? 0 filed at 09/18/2024 1420   Recent BMI (Calculated) 22.4 filed at 09/18/2024 1420   Is BMI greater than 35 kg/m2? 0=No filed at 09/18/2024 1420   Age older than 50 years old? 1=Yes filed at 09/18/2024 8868   Is your  neck circumference greater than 17 inches (Male) or 16 inches (Female)? 0 filed at 09/18/2024 1420   Gender - Male 1=Yes filed at 09/18/2024 1420   STOP-BANG Total Score 3 filed at 09/18/2024 1420          Prodigy: High Risk  Total Score: 23              Prodigy Age Score      Prodigy Gender Score     Prodigy CHF score          ARISCAT Score for Postoperative Pulmonary Complications    No data to display       Moreno Perioperative Risk for Myocardial Infarction or Cardiac Arrest (ADIN)    No data to display         Assessment and Plan:     Anesthesia  The patient denies problems with anesthesia in the past such as PONV, prolonged sedation, awareness, dental damage, aspiration, cardiac arrest, difficult intubation, or unexpected hospital admissions.     Neurology  The patient has no neurological diagnoses or significant findings on chart review, clinical presentation, and evaluation. No grossly apparent neurological perioperative risk. The patient is at increased risk for perioperative stroke secondary to hypertension , hyperlipidemia, general anesthesia, operative time >2.5 hours.    HEENT/Airway  No diagnoses, significant findings on chart review, clinical presentation, or evaluation. No documented or reported history of airway difficulty.     Cardiovascular  The patient is scheduled for non-cardiac surgery associated with elevated risk. The patient has no major cardiac contraindications to non- cardiac surgery.  RCRI  The patient meets 2 RCRI criteria and therefore has a 10.1% risk (elevated) of major adverse cardiac complications.  METS  The patient's functional capacity is greater than 4 METS.  EKG  The patient has no EKG or echocardiographic changes concerning for myocardial ischemia.   Heart Failure  The patient has a known history of heart failure, which is currently compensated, due to systolic dysfunction  Hypertension Evaluation  The patient has a known history of hypertension that is controlled.  Patient's  hypertension is most consistent with stage 1.  Heart Rhythm Evaluation  The patient has no history of arrhythmias.  Heart Valve Evaluation  The patient has no known history of valvular heart disease. The patient has no symptoms or physical exam findings to suggest valvular heart disease.  Cardiology Evaluation  The patient follows with cardiology, Dr. Donaldson and NP Rosalie Long. Patient was last seen 1-7-25. Per note,     Pt ok to proceed with procedure  The patient has a 30-day risk for MACE of 2 predictors, 10.1% risk for cardiac death, nonfatal myocardial infarction, and nonfatal cardiac arrest.  ADIN score which indicates a 0.2% risk of intraoperative or 30-day postoperative MACE (major adverse cardiac event).    Pulmonary  The patient has suspected YOHANA. The patient is at increased risk of perioperative pulmonary complications secondary to major peripheral vascular surgery, advanced age greater than 60.    The patient has a stop bang score of 5, which places patient at high risk for having YOHANA.    ARISCAT 26, Intermediate, 13.3% risk of in-hospital postoperative pulmonary complications  PRODIGY 28, high risk of respiratory depression episode. Patient given PI sheet for preoperative deep breathing exercises.    Hematology  No diagnoses or significant findings on chart review or clinical presentation and evaluation.  Antiplatelet management   The patient is currently receiving antiplatelet therapy for PVD.  Anticoagulation management  The patient is not currently receiving anticoagulation therapy.    Caprini score 9, high risk of perioperative VTE.     Patient instructed to ambulate as soon as possible postoperatively to decrease thromboembolic risk. Initiate mechanical DVT prophylaxis as soon as possible and initiate chemical prophylaxis when deemed safe from a bleeding standpoint post surgery.     Transfusion Evaluation  A type and screen was obtained given the likelihood for perioperative transfusion of blood  or blood products.    Gastrointestinal  No diagnoses or significant findings on chart review or clinical presentation and evaluation.    Eat 10- 0,  self-perceived oropharyngeal dysphagia scale (0-40)     Genitourinary  No diagnoses or significant findings on chart review or clinical presentation and evaluation.    Renal  No renal diagnoses or significant findings on chart review or clinical presentation and evaluation. The patient has specific risk factors associated with increased risk of perioperative renal complications related to age greater than 55, male gender, hypertension, diabetes mellitus.    Musculoskeletal  The patient has osteoarthritis    Endocrine  Diabetes Evaluation  The patient has no history of diabetes mellitus.  Thyroid Disease Evaluation  The patient has no history of thyroid disease.    ID  No diagnoses or significant findings on chart review or clinical presentation and evaluation. MRSA screening obtained. Prescriptions and instructions given for Hibiclens and Peridex.    -Preoperative medication instructions were provided and reviewed with the patient.  Any additional testing or evaluation was explained to the patient.  NPO Instructions were discussed, and the patient's questions were answered prior to conclusion of this encounter. Patient verbalized understanding of preoperative instructions. After Visit Summary given.    Recent Results (from the past 24 hours)   Type And Screen    Collection Time: 01/15/25  3:29 PM   Result Value Ref Range    ABO TYPE O     Rh TYPE POS     ANTIBODY SCREEN NEG    Basic metabolic panel    Collection Time: 01/15/25  3:29 PM   Result Value Ref Range    Glucose 70 (L) 74 - 99 mg/dL    Sodium 140 136 - 145 mmol/L    Potassium 4.9 3.5 - 5.3 mmol/L    Chloride 105 98 - 107 mmol/L    Bicarbonate 26 21 - 32 mmol/L    Anion Gap 14 10 - 20 mmol/L    Urea Nitrogen 17 6 - 23 mg/dL    Creatinine 0.94 0.50 - 1.30 mg/dL    eGFR >90 >60 mL/min/1.73m*2    Calcium 10.3 8.6 -  10.6 mg/dL   CBC    Collection Time: 01/15/25  3:29 PM   Result Value Ref Range    WBC 8.3 4.4 - 11.3 x10*3/uL    nRBC 0.0 0.0 - 0.0 /100 WBCs    RBC 4.69 4.50 - 5.90 x10*6/uL    Hemoglobin 14.6 13.5 - 17.5 g/dL    Hematocrit 45.2 41.0 - 52.0 %    MCV 96 80 - 100 fL    MCH 31.1 26.0 - 34.0 pg    MCHC 32.3 32.0 - 36.0 g/dL    RDW 13.5 11.5 - 14.5 %    Platelets 376 150 - 450 x10*3/uL

## 2025-01-15 NOTE — PREPROCEDURE INSTRUCTIONS
Fasting Guidelines    Why must I stop eating and drinking near surgery time?  With sedation, food or liquid in your stomach can enter your lungs causing serious complications  Increases nausea and vomiting    When do I need to stop eating and drinking before my surgery?  Do not eat any food or drink any liquids after midnight the night before your surgery/procedure.  You may have sips of water to take medications.    Additional Instructions:     We have sent a prescription for Hibiclens soap and Peridex mouth wash to your preferred pharmacy.  If you have not already, Please  your prescription and start using as directed before surgery.  Follow the instruction sheet provided to you at your CPM/PAT appointment.    Avoid herbal supplements, multivitamins and NSAIDS (non-steroidal anti-inflammatory drugs) such as Advil, Aleve, Ibuprofen, Naproxen, Excedrin, Meloxicam or Celebrex for at least 7 days prior to surgery. May take Tylenol as needed.    Avoid tobacco and alcohol products for 24 hours prior to surgery.    CONTACT SURGEON'S OFFICE IF YOU DEVELOP:  * Fever = 100.4 F   * New respiratory symptoms (e.g. cough, shortness of breath, respiratory distress, sore throat)  * Recent loss of taste or smell  *Flu like symptoms such as headache, fatigue or gastrointestinal symptoms  * You develop any open sores, shingles, burning or painful urination   AND/OR:  * You no longer wish to have the surgery.  * Any other personal circumstances change that may lead to the need to cancel or defer this surgery.  *You were admitted to any hospital within one week of your planned procedure.    Seven/Six Days before Surgery:  Review your medication instructions, stop indicated medications    Day of Surgery:  Review your medication instructions, take indicated medications  Wear comfortable loose fitting clothing  Do not use moisturizers, creams, lotions or perfume  All jewelry and valuables should be left at home      [facility]  [phone]           Patient Information: Pre-Operative Infection Prevention Measures     Why did I have my nose, under my arms, and groin swabbed?  The purpose of the swab is to identify Staphylococcus aureus inside your nose or on your skin.  The swab was sent to the laboratory for culture.  A positive swab/culture for Staphylococcus aureus is called colonization or carriage.      What is Staphylococcus aureus?  Staphylococcus aureus, also known as “staph”, is a germ found on the skin or in the nose of healthy people.  Sometimes Staphylococcus aureus can get into the body and cause an infection.  This can be minor (such as pimples, boils, or other skin problems).  It might also be serious (such as a blood infection, pneumonia, or a surgical site infection).    What is Staphylococcus aureus colonization or carriage?  Colonization or carriage means that a person has the germ but is not sick from it.  These bacteria can be spread on the hands or when breathing or sneezing.    How is Staphylococcus aureus spread?  It is most often spread by close contact with a person or item that carries it.    What happens if my culture is positive for Staphylococcus aureus?  Your doctor/medical team will use this information to guide any antibiotic treatment which may be necessary.  Regardless of the culture results, we will clean the inside of your nose with a betadine swab just before you have your surgery.      Will I get an infection if I have Staphylococcus aureus in my nose or on my skin?  Anyone can get an infection with Staphylococcus aureus.  However, the best way to reduce your risk of infection is to follow the instructions provided to you for the use of your CHG soap and dental rinse.        Patient Information: Oral/Dental Rinse    What is oral/dental rinse?   It is a mouthwash. It is a way of cleaning the mouth with a germ-killing solution before your surgery.  The solution contains  chlorhexidine, commonly known as CHG.   It is used inside the mouth to kill a bacteria known as Staphylococcus aureus.  Let your doctor know if you are allergic to Chlorhexidine.    Why do I need to use CHG oral/dental rinse?  The CHG oral/dental rinse helps to kill a bacteria in your mouth known as Staphylococcus aureus.     This reduces the risk of infection at the surgical site.      Using your CHG oral/dental rinse  STEPS:  Use your CHG oral/dental rinse after you brush your teeth the night before (at bedtime) and the morning of your surgery.  Follow all directions on your prescription label.    Use the cap on the container to measure 15ml   Swish (gargle if you can) the mouthwash in your mouth for at least 30 seconds, (do not swallow) and spit out  After you use your CHG rinse, do not rinse your mouth with water, drink or eat.  Please refer to the prescription label for the appropriate time to resume oral intake      What side effects might I have using the CHG oral/dental rinse?  CHG rinse will stick to plaque on the teeth.  Brush and floss just before use.  Teeth brushing will help avoid staining of plaque during use.      Patient Information: Home Preoperative Antibacterial Shower      What is a home preoperative antibacterial shower?  This shower is a way of cleaning the skin with a germ-killing solution before surgery.  The solution contains chlorhexidine, commonly known as CHG.  CHG is a skin cleanser with germ-killing ability.  Let your doctor know if you are allergic to chlorhexidine.    Why do I need to take a preoperative antibacterial shower?  Skin is not sterile.  It is best to try to make your skin as free of germs as possible before surgery.  Proper cleansing with a germ-killing soap before surgery can lower the number of germs on your skin.  This helps to reduce the risk of infection at the surgical site.  Following the instructions listed below will help you prepare your skin for surgery.       How do I use the solution?  Steps:  Begin using your CHG soap 5 days before your scheduled surgery on ________________________.    First, wash and rinse your hair using the CHG soap. Keep CHG soap away from ear canals and eyes.  Rinse completely, do not condition.  Hair extensions should be removed.  Wash your face with your normal soap and rinse.    Apply the CHG solution to a clean wet washcloth.  Turn the water off or move away from the water spray to avoid premature rinsing of the CHG soap as you are applying.   Firmly lather your entire body from the neck down.  Do not use on your face.  Pay special attention to the area(s) where your incision(s) will be located unless they are on your face.  Avoid scrubbing your skin too hard.  The important point is to have the CHG soap sit on your skin for 3 minutes.    When the 3 minutes are up, turn on the water and rinse the CHG solution off your body completely.   DO NOT wash with regular soap after you have used the CHG soap solution  Pat yourself dry with a clean, freshly-laundered towel.  DO NOT apply powders, deodorants, or lotions.  Dress in clean, freshly laundered nightclothes.    Be sure to sleep with clean, freshly laundered sheets.  Be aware that CHG will cause stains on fabrics; if you wash them with bleach after use.  Rinse your washcloth and other linens that have contact with CHG completely.  Use only non-chlorine detergents to launder the items used.   The morning of surgery is the fifth day.  Repeat the above steps and dress in clean comfortable clothing     Whom should I contact if I have any questions regarding the use of CHG soap?  Call the [facility] at [phone] if you have any questions.               Preoperative Brain Exercises    What are brain exercises?  A brain exercise is any activity that engages your thinking (cognitive) skills.    What types of activities are  considered brain exercises?  Jigsaw puzzles, crossword puzzles, word jumble, memory games, word search, and many more.  Many can be found free online or on your phone via a mobile richar.    Why should I do brain exercises before my surgery?  More recent research has shown brain exercise before surgery can lower the risk of postoperative delirium (confusion) which can be especially important for older adults.  Patients who did brain exercises for 5 to 10 hours the days before surgery, cut their risk of postoperative delirium in half up to 1 week after surgery.         [facility]    Preoperative Deep Breathing Exercises    Why it is important to do deep breathing exercises before my surgery?  Deep breathing exercises strengthen your breathing muscles.  This helps you to recover after your surgery and decreases the chance of breathing complications.      How are the deep breathing exercises done?  Sit straight with your back supported.  Breathe in deeply and slowly through your nose. Your lower rib cage should expand and your abdomen may move forward.  Hold that breath for 3 to 5 seconds.  Breathe out through pursed lips, slowly and completely.  Rest and repeat 10 times every hour while awake.  Rest longer if you become dizzy or lightheaded.         Patient and Family Education             Ways You Can Help Prevent Blood Clots             This handout explains some simple things you can do to help prevent blood clots.      Blood clots are blockages that can form in the body's veins. When a blood clot forms in your deep veins, it may be called a deep vein thrombosis, or DVT for short. Blood clots can happen in any part of the body where blood flows, but they are most common in the arms and legs. If a piece of a blood clot breaks free and travels to the lungs, it is called a pulmonary embolus (PE). A PE can be a very serious problem.         Being in the hospital or having surgery can raise your  chances of getting a blood clot because you may not be well enough to move around as much as you normally do.         Ways you can help prevent blood clots in the hospital         Wearing SCDs. SCDs stands for Sequential Compression Devices.   SCDs are special sleeves that wrap around your legs  They attach to a pump that fills them with air to gently squeeze your legs every few minutes.   This helps return the blood in your legs to your heart.   SCDs should only be taken off when walking or bathing.   SCDs may not be comfortable, but they can help save your life.               Wearing compression stockings - if your doctor orders them. These special snug fitting stockings gently squeeze your legs to help blood flow.       Walking. Walking helps move the blood in your legs.   If your doctor says it is ok, try walking the halls at least   5 times a day. Ask us to help you get up, so you don't fall.      Taking any blood thinning medicines your doctor orders.        Page 1 of 2     [facility]; 3/23   Ways you can help prevent blood clots at home       Wearing compression stockings - if your doctor orders them. ? Walking - to help move the blood in your legs.       Taking any blood thinning medicines your doctor orders.      Signs of a blood clot or PE      Tell your doctor or nurse know right away if you have of the problems listed below.    If you are at home, seek medical care right away. Call 911 for chest pain or problems breathing.               Signs of a blood clot (DVT) - such as pain,  swelling, redness or warmth in your arm or leg      Signs of a pulmonary embolism (PE) - such as chest     pain or feeling short of breath

## 2025-01-17 ENCOUNTER — APPOINTMENT (OUTPATIENT)
Dept: PHARMACY | Facility: HOSPITAL | Age: 65
End: 2025-01-17
Payer: COMMERCIAL

## 2025-01-17 DIAGNOSIS — I42.8 NICM (NONISCHEMIC CARDIOMYOPATHY) (MULTI): ICD-10-CM

## 2025-01-17 LAB — STAPHYLOCOCCUS SPEC CULT: NORMAL

## 2025-01-17 PROCEDURE — RXMED WILLOW AMBULATORY MEDICATION CHARGE

## 2025-01-17 RX ORDER — SACUBITRIL AND VALSARTAN 97; 103 MG/1; MG/1
1 TABLET, FILM COATED ORAL 2 TIMES DAILY
Qty: 180 TABLET | Refills: 3 | Status: SHIPPED | OUTPATIENT
Start: 2025-01-17

## 2025-01-17 NOTE — PROGRESS NOTES
Pharmacist Clinic: Cardiology Management    Matthieu Solis is a 64 y.o. male was referred to Clinical Pharmacy Team for heart failure management.     Referring Provider: Tomás Donaldson MD    THIS IS A FOLLOW UP PATIENT APPOINTMENT. AT LAST VISIT ON 12/06/2024 WITH PHARMACIST (Rahel Waldron).    Appointment was completed by Matthieu who was reached at primary number.    REVIEW OF PAST APPNT (IF APPLICABLE):   Patient states he is tolerating his heart failure regimen well reporting adherence, no adverse effects, and denies s/s of worsening heart failure. Patient confirms he started back on Jardiance 10mg daily after completing his course of ciprofloxacin. Patient reports his blood pressure averages ~130s/70s, discussed with Matthieu optimizing GDMT. Patient reports he is under stress with the recent issues with his legs and the holidays. Will assess BP at follow up and consider increasing to Entresto 97/103mg twice daily at follow up. Patient verbalized understanding. Patient states he has 1 dose of Jardiance left for tomorrow, [facility] rx to [facility] for .       Allergies   Allergen Reactions    Cinnamon Unknown     Cinnamon    Doxycycline Swelling    Penicillins Unknown       Past Medical History:   Diagnosis Date    Anxiety     Arthritis     Cataract     CHF (congestive heart failure)     chronic systolic HF    Depression     Elevated PSA     PSA 10.53 on 10/27/24    GERD (gastroesophageal reflux disease)     Gout     HL (hearing loss)     HTN (hypertension)     Hyperlipidemia     Non-ischemic cardiomyopathy (Multi)     s/p LifeVest 10/2024    Peripheral vascular disease (CMS-HCC)     BL LE CLTI 3  L > R    Sleep apnea     suspected YOHANA, sleep med visit on 1/20/25    Syncope     near syncopal event 6/2024    Vision loss     wears corrective lens       Current Outpatient Medications on File Prior to Visit   Medication Sig Dispense Refill    acetaminophen (Tylenol) 325 mg tablet Take 2 tablets (650 mg)  by mouth every 6 hours if needed for moderate pain (4 - 6) for up to 10 doses. 20 tablet 0    aspirin 81 mg EC tablet Take 1 tablet (81 mg) by mouth once daily. 90 tablet 3    atorvastatin (Lipitor) 80 mg tablet TAKE 1 TABLET BY MOUTH ONCE DAILY. 90 tablet 3    carvedilol (Coreg) 12.5 mg tablet Take 1 tablet (12.5 mg) by mouth 2 times daily (morning and late afternoon). 60 tablet 11    chlorhexidine (Hibiclens) 4 % external liquid Apply topically once daily as needed for wound care for up to 5 days. 473 mL 0    lidocaine (Lidoderm) 5 % patch Place 1 patch over 12 hours on the skin once daily. Remove & discard patch within 12 hours or as directed by MD. 30 patch 3    LYSINE ORAL Take 1 capsule by mouth once daily.      meloxicam (Mobic) 15 mg tablet Take 1 tablet (15 mg) by mouth once daily as needed for moderate pain (4 - 6). 30 tablet 2    nicotine polacrilex (Commit) 4 mg lozenge Use 1 lozenge (4 mg) in the mouth or throat every 2 hours if needed for smoking cessation. 100 lozenge 0    spironolactone (Aldactone) 25 mg tablet Take 0.5 tablets (12.5 mg) by mouth once daily. 45 tablet 3    [DISCONTINUED] empagliflozin (Jardiance) 10 mg Take 1 tablet (10 mg) by mouth once daily. 90 tablet 3    [DISCONTINUED] sacubitriL-valsartan (Entresto)  mg tablet Take 1 tablet by mouth 2 times a day. 180 tablet 3     No current facility-administered medications on file prior to visit.         RELEVANT LAB RESULTS:  Lab Results   Component Value Date    BILITOT 1.1 10/26/2024    CALCIUM 10.3 01/15/2025    CO2 26 01/15/2025     01/15/2025    CREATININE 0.94 01/15/2025    GLUCOSE 70 (L) 01/15/2025    ALKPHOS 89 10/26/2024    K 4.9 01/15/2025    PROT 7.7 10/26/2024     01/15/2025    AST 26 10/26/2024    ALT 26 10/26/2024    BUN 17 01/15/2025    ANIONGAP 14 01/15/2025    MG 2.62 (H) 10/27/2024    PHOS 3.2 10/29/2024    ALBUMIN 3.7 10/29/2024    LIPASE 8 (L) 01/08/2024    GFRMALE >90 03/13/2023     Lab Results  "  Component Value Date    TRIG 39 02/03/2024    CHOL 159 02/03/2024    LDLCALC 100 (H) 02/03/2024    HDL 51.0 02/03/2024     No results found for: \"BMCBC\", \"CBCDIF\"     PHARMACEUTICAL ASSESSMENT:    MEDICATION RECONCILIATION:      Medication Documentation Review Audit       Reviewed by Miya Bueno MA (Medical Assistant) on 01/15/25 at 1451      Medication Order Taking? Sig Documenting Provider Last Dose Status   acetaminophen (Tylenol) 325 mg tablet [id number] Yes Take 2 tablets (650 mg) by mouth every 6 hours if needed for moderate pain (4 - 6) for up to 10 doses. Susan Jeter MD 1/15/2025 Active   aspirin 81 mg EC tablet [id number] Yes Take 1 tablet (81 mg) by mouth once daily. Kathy Hanna MD 1/15/2025 Active   atorvastatin (Lipitor) 80 mg tablet [id number] Yes TAKE 1 TABLET BY MOUTH ONCE DAILY. Tomás Donaldson MD 1/15/2025 Active   carvedilol (Coreg) 12.5 mg tablet [id number] Yes Take 1 tablet (12.5 mg) by mouth 2 times daily (morning and late afternoon). Tomás Donaldson MD 1/15/2025 Active   empagliflozin (Jardiance) 10 mg [id number] Yes Take 1 tablet (10 mg) by mouth once daily. Tomás Donaldson MD 1/15/2025 Active   lidocaine (Lidoderm) 5 % patch [id number] Yes Place 1 patch over 12 hours on the skin once daily. Remove & discard patch within 12 hours or as directed by MD. Kristin Kauffman MD 1/15/2025 Active   LYSINE ORAL [id number] Yes Take 1 capsule by mouth once daily. Historical MD Mariya 1/15/2025 Active   meloxicam (Mobic) 15 mg tablet [id number] Yes Take 1 tablet (15 mg) by mouth once daily as needed for moderate pain (4 - 6). Kristin Kauffman MD 1/15/2025 Active   nicotine polacrilex (Commit) 4 mg lozenge [id number] Yes Use 1 lozenge (4 mg) in the mouth or throat every 2 hours if needed for smoking cessation. Que Hicks MD 1/15/2025 Active   sacubitriL-valsartan (Entresto)  mg tablet [id number] Yes Take 1 tablet by mouth 2 times a day. Rosalie ZHENG" PAT Long-CNP 1/15/2025 Active   spironolactone (Aldactone) 25 mg tablet [id number] Yes Take 0.5 tablets (12.5 mg) by mouth once daily. Tomás Donaldson MD 1/15/2025 Active                    DISEASE MANAGEMENT ASSESSMENT:     CHF ASSESSMENT     Symptom/Staging:  -Most recent ejection fraction: 30-35%    Results for orders placed during the hospital encounter of 10/01/24    Transthoracic Echo (TTE) Complete    Narrative  [facility], [address] [place] [address]  Tel [phone] and Fax [phone]    TRANSTHORACIC ECHOCARDIOGRAM REPORT      Patient Name:      VIDAL Hadley Physician:    [id number] Tomás Donaldson MD  Study Date:        10/1/2024            Ordering Provider:    [id number] TOMÁS DONALDSON  MRN/PID:           [id number]             Fellow:  Accession#:        [id number]         Nurse:  Date of Birth/Age: 1960 / 64 years  Sonographer:          Pat GALVAN  Gender:            M                    Additional Staff:  Height:            165.10 cm            Admit Date:  Weight:            61.23 kg             Admission Status:     Outpatient  BSA / BMI:         1.67 m2 / 22.46      Encounter#:           [id number]  kg/m2  Blood Pressure:    154/86 mmHg          Department Location:  [facility]    Study Type:    TRANSTHORACIC ECHO (TTE) COMPLETE  Diagnosis/ICD: Other cardiomyopathies-I42.8  Indication:    Cardiomyopathy, low EF%  CPT Code:      Echo Complete w Full [id number]    Patient History:  Pertinent History: Cardiomyopathy, h/o low EF%, patient wear LifeVest, smoker.    Study Detail: The following Echo studies were performed: 2D, M-Mode, Doppler and  color flow.      PHYSICIAN INTERPRETATION:  Left Ventricle: The left ventricular systolic function is moderately decreased, with a visually estimated ejection fraction of 30-35%. There is global hypokinesis of the left ventricle with minor regional variations. The left ventricular cavity size is  normal. There is mild concentric left ventricular hypertrophy. Spectral Doppler shows an impaired relaxation pattern of left ventricular diastolic filling.  Left Atrium: The left atrium is normal in size.  Right Ventricle: The right ventricle is normal in size. There is normal right ventriclar wall thickness. There is normal right ventricular global systolic function.  Right Atrium: The right atrium is normal in size.  Aortic Valve: The aortic valve is trileaflet. There is no aortic valve cusp calcification noted. There is no evidence of reduced aortic valve leaflet excursion. There is trace to mild aortic valve regurgitation. The peak instantaneous gradient of the aortic valve is 5.5 mmHg.  Mitral Valve: The mitral valve is normal in structure. There is normal mitral valve leaflet mobility. There is trace mitral valve regurgitation.  Tricuspid Valve: The tricuspid valve is structurally normal. There is normal tricuspid valve leaflet mobility. There is trace tricuspid regurgitation.  Pulmonic Valve: The pulmonic valve is structurally normal. There is mild pulmonic valve regurgitation.  Pericardium: There is no pericardial effusion noted.  Aorta: The aortic root is normal. The Ao Sinus is 3.00 cm. The Asc Ao is 2.80 cm. The thoracic aorta diam is 2.2 cm. There is no dilatation of the aortic arch. There is no dilatation of the ascending aorta. There is no dilatation of the aortic root.  Pulmonary Artery: The pulmonary artery is normal in size. The tricuspid regurgitant velocity is 2.45 m/s, and with an estimated right atrial pressure of 3 mmHg, the estimated pulmonary artery pressure is normal with the RVSP at 26.9 mmHg. The estimated PASP is 27 mmHg.  Systemic Veins: The inferior vena cava appears normal in size, with IVC inspiratory collapse greater than 50%.  In comparison to the previous echocardiogram(s): There are no prior studies on this patient for comparison purposes.      CONCLUSIONS:  1. The left  ventricular systolic function is moderately decreased, with a visually estimated ejection fraction of 30-35%.  2. There is global hypokinesis of the left ventricle with minor regional variations.  3. Spectral Doppler shows an impaired relaxation pattern of left ventricular diastolic filling.  4. There is normal right ventricular global systolic function.    QUANTITATIVE DATA SUMMARY:    2D MEASUREMENTS:          Normal Ranges:  Ao Root d:       3.10 cm  (2.0-3.7cm)  LAs:             3.47 cm  (2.7-4.0cm)  IVSd:            1.25 cm  (0.6-1.1cm)  LVPWd:           1.17 cm  (0.6-1.1cm)  LVIDd:           5.02 cm  (3.9-5.9cm)  LVIDs:           4.55 cm  LV Mass Index:   142 g/m2  LVEDV Index:     77 ml/m2  LV % FS          9.4 %      LA VOLUME:                    Normal Ranges:  LA Vol A4C:        53.0 ml    (22+/-6mL/m2)  LA Vol A2C:        65.4 ml  LA Vol BP:         58.8 ml  LA Vol Index A4C:  31.6 ml/m2  LA Vol Index A2C:  39.1 ml/m2  LA Vol Index BP:   35.2 ml/m2  LA Area A4C:       18.0 cm2  LA Area A2C:       20.0 cm2  LA Major Axis A4C: 5.2 cm  LA Major Axis A2C: 5.2 cm  LA Volume Index:   36.0 ml/m2  LA Vol A4C:        50.1 ml  LA Vol A2C:        63.1 ml  LA Vol Index BSA:  33.8 ml/m2      RA VOLUME BY A/L METHOD:            Normal Ranges:  RA Vol A4C:              54.0 ml    (8.3-19.5ml)  RA Vol Index A4C:        32.3 ml/m2  RA Area A4C:             18.0 cm2  RA Major Axis A4C:       5.1 cm      M-MODE MEASUREMENTS:         Normal Ranges:  Ao Root:             3.20 cm (2.0-3.7cm)  LAs:                 3.43 cm (2.7-4.0cm)      AORTA MEASUREMENTS:         Normal Ranges:  Ao Sinus, d:        3.00 cm (2.1-3.5cm)  Asc Ao, d:          2.80 cm (2.1-3.4cm)  Ao Arch:            2.20 cm (2.0-3.6cm)      LV SYSTOLIC FUNCTION BY 2D PLANIMETRY (MOD):  Normal Ranges:  EF-A4C View:    39 % (>=55%)  EF-A2C View:    41 %  EF-Biplane:     39 %  EF-Visual:      33 %  LV EF Reported: 33 %      LV DIASTOLIC FUNCTION:             Normal  Ranges:  MV Peak E:             0.59 m/s    (0.7-1.2 m/s)  MV Peak A:             0.90 m/s    (0.42-0.7 m/s)  E/A Ratio:             0.66        (1.0-2.2)  MV e'                  0.083 m/s   (>8.0)  MV lateral e'          0.08 m/s  MV medial e'           0.09 m/s  MV A Dur:              95.15 msec  E/e' Ratio:            7.11        (<8.0)  a'                     0.10 m/s  PulmV Sys Deangelo:         44.38 cm/s  PulmV Mann Deangelo:        34.71 cm/s  PulmV S/D Deangelo:         1.28  PulmV A Revs Deangelo:      31.10 cm/s  PulmV A Revs Dur:      108.47 msec      MITRAL VALVE:          Normal Ranges:  MV DT:        259 msec (150-240msec)      AORTIC VALVE:           Normal Ranges:  AoV Vmax:      1.17 m/s (<=1.7m/s)  AoV Peak P.5 mmHg (<20mmHg)  LVOT Max Deangelo:  0.63 m/s (<=1.1m/s)  LVOT VTI:      13.31 cm  LVOT Diameter: 2.22 cm  (1.8-2.4cm)  AoV Area,Vmax: 2.10 cm2 (2.5-4.5cm2)      AORTIC INSUFFICIENCY:  AI Vmax:       3.46 m/s  AI Half-time:  719 msec  AI Decel Time: 2479 msec  AI Decel Rate: 148.54 cm/s2      RIGHT VENTRICLE:  RV Basal 3.50 cm  RV Mid   1.70 cm  RV Major 8.7 cm  TAPSE:   19.5 mm  RV s'    0.15 m/s      TRICUSPID VALVE/RVSP:          Normal Ranges:  Peak TR Velocity:     2.45 m/s  RV Syst Pressure:     27 mmHg  (< 30mmHg)  IVC Diam:             1.60 cm      PULMONIC VALVE:          Normal Ranges:  PV Accel Time:  138 msec (>120ms)  PV Max Deangelo:     0.9 m/s  (0.6-0.9m/s)  PV Max PG:      3.3 mmHg      Pulmonary Veins:  PulmV A Revs Dur: 108.47 msec  PulmV A Revs Deangelo: 31.10 cm/s  PulmV Mann Deangelo:   34.71 cm/s  PulmV S/D Deangelo:    1.28  PulmV Sys Deangelo:    44.38 cm/s      [id number] Tomás Donaldson MD  Electronically signed on 10/1/2024 at 5:48:03 PM        ** Final **      Guideline-Directed Medical Therapy:  -ARNI: Yes, describe: Entresto 97/103mg twice daily  -Beta Blocker: Yes, describe: Carvedilol 12.5mg twice daily  -MRA: Yes, describe: Spironolactone 12.5mg daily  -SGLT2i: Yes, describe: Jardiance 10mg  daily    Secondary Prevention:  -The ASCVD Risk score (Senthil KAPOOR, et al., 2019) failed to calculate for the following reasons:    Risk score cannot be calculated because patient has a medical history suggesting prior/existing ASCVD   -Aspirin 81mg? yes  -Statin?: Yes, describe: Atorvastatin 80mg daily  -HTN?: Yes, describe: elevated at last office visit    CURRENT PHARMACOTHERAPY:   Entresto 97/103mg twice daily  Carvedilol 12.5mg twice daily  Spironolactone 12.5mg daily  Jardiance 10mg daily    Affordability: [facility]  Adherence/Compliance: reports adherence  Adverse Effects: occasional nausea, otherwise none reported    Monitoring Weights at Home: Yes- denies weight fluctuation  Home Weight Recordings: 144lbs    Past In Office Weight Readings:   Wt Readings from Last 6 Encounters:   01/15/25 65.3 kg (144 lb)   01/13/25 66.5 kg (146 lb 9.6 oz)   01/07/25 66.9 kg (147 lb 6.4 oz)   12/09/24 63.8 kg (140 lb 9.6 oz)   12/06/24 63.5 kg (140 lb)   11/20/24 59 kg (130 lb)       Monitoring Blood Pressure at Home: Yes  Home BP Recordings: unable to assess    Past In Office BP Readings:   BP Readings from Last 6 Encounters:   01/15/25 172/83   01/13/25 153/77   01/07/25 163/79   12/09/24 113/67   12/06/24 137/78   11/29/24 138/68       HEALTH MANAGEMENT    Maintaining fluid restriction (<2 L/day): Yes  Edema/swelling: No  Shortness of breath: No  Trouble sleeping/lying down: No  Dry/hacking cough: No  Recent Hospitalizations: No    EDUCATION/COUNSELING:   - Counseled patient on MOA, expectations, duration of therapy, contraindications, administration, and monitoring parameters  - Counseled patient on lifestyle modifications that can decrease your risk of having complications (smoking cessation, losing weight, daily weights, vaccines)  - Counseled patient on fluid intake and weight management. Recommended to not consume more than 2 liters of fliuids per day. If they have gained more than 2-3 pounds within a 24 hours period (or  5 pounds in a week), contact their cardiologist  - Answered all patient questions and concerns       DISCUSSION/NOTES:   Patient states he is tolerating his heart failure regimen well reporting adherence, no adverse effects, and denies s/s of worsening heart failure. Patient has not taken BP at home recently- he states he has a lot going on with upcoming surgery. He states BP has been elevated at the past few OV due to stress of the surgery. At most recent visit with cardiologist, Entresto was increased to 97/103mg twice daily, [facility] to send [facility] rx to [facility] so medication is PAP eligible.     ASSESSMENT:    Assessment/Plan   Problem List Items Addressed This Visit       NICM (nonischemic cardiomyopathy) (Multi)     Patient currently on 4 of 4 GDMT. Unable to assess BP. Renal function and potassium level appropriate to continue current regimen.      Labs (01/15/2025): Scr-0.94 eGFR >90 K-4.9         Relevant Medications    sacubitriL-valsartan (Entresto)  mg tablet    empagliflozin (Jardiance) 10 mg    Other Relevant Orders    Referral to Clinical Pharmacy       RECOMMENDATIONS/PLAN:    CONTINUE  Entresto 97/103mg twice daily  Carvedilol 12.5mg twice daily  Spironolactone 12.5mg daily  Jardiance 10mg daily    Last Appnt with Referring Provider: 01/07/2025  Next Appnt with Referring Provider: needs scheduled  Clinical Pharmacist follow up: 3 months  VAF/Application Expiration: Yes    Date: 10/08/2025  Type of Encounter: Robert PhanD    Verbal consent to manage patient's drug therapy was obtained from the patient . They were informed they may decline to participate or withdraw from participation in pharmacy services at any time.    Continue all meds under the continuation of care with the referring provider and clinical pharmacy team.

## 2025-01-17 NOTE — Clinical Note
Dandy Donaldson,  Today I spoke with Matthieu about his heart medications. Patient states he is tolerating his heart failure regimen well reporting adherence, no adverse effects, and denies s/s of worsening heart failure. Patient has not taken BP at home recently- he states he has a lot going on with upcoming surgery. Plan to continue current regimen and follow up in 3 months. Thank you!

## 2025-01-17 NOTE — ASSESSMENT & PLAN NOTE
Patient currently on 4 of 4 GDMT. Unable to assess BP. Renal function and potassium level appropriate to continue current regimen.      Labs (01/15/2025): Scr-0.94 eGFR >90 K-4.9

## 2025-01-20 ENCOUNTER — TELEPHONE (OUTPATIENT)
Dept: VASCULAR SURGERY | Facility: HOSPITAL | Age: 65
End: 2025-01-20

## 2025-01-20 ENCOUNTER — APPOINTMENT (OUTPATIENT)
Dept: OPHTHALMOLOGY | Facility: CLINIC | Age: 65
End: 2025-01-20
Payer: COMMERCIAL

## 2025-01-20 ENCOUNTER — OFFICE VISIT (OUTPATIENT)
Dept: SLEEP MEDICINE | Facility: HOSPITAL | Age: 65
End: 2025-01-20
Payer: COMMERCIAL

## 2025-01-20 VITALS
WEIGHT: 149 LBS | BODY MASS INDEX: 24.79 KG/M2 | TEMPERATURE: 96.3 F | SYSTOLIC BLOOD PRESSURE: 173 MMHG | DIASTOLIC BLOOD PRESSURE: 92 MMHG | OXYGEN SATURATION: 95 % | HEART RATE: 71 BPM

## 2025-01-20 DIAGNOSIS — R06.83 SNORING: ICD-10-CM

## 2025-01-20 DIAGNOSIS — G47.30 SLEEP-RELATED BREATHING DISORDER: Primary | ICD-10-CM

## 2025-01-20 DIAGNOSIS — I47.29 NONSUSTAINED VENTRICULAR TACHYCARDIA (MULTI): ICD-10-CM

## 2025-01-20 DIAGNOSIS — F17.200 NICOTINE USE DISORDER: ICD-10-CM

## 2025-01-20 DIAGNOSIS — I10 ESSENTIAL HYPERTENSION: ICD-10-CM

## 2025-01-20 DIAGNOSIS — I50.20 SYSTOLIC CONGESTIVE HEART FAILURE, UNSPECIFIED HF CHRONICITY: ICD-10-CM

## 2025-01-20 DIAGNOSIS — F41.9 ANXIETY: ICD-10-CM

## 2025-01-20 PROCEDURE — 3080F DIAST BP >= 90 MM HG: CPT

## 2025-01-20 PROCEDURE — 99204 OFFICE O/P NEW MOD 45 MIN: CPT

## 2025-01-20 PROCEDURE — 99214 OFFICE O/P EST MOD 30 MIN: CPT

## 2025-01-20 PROCEDURE — 3077F SYST BP >= 140 MM HG: CPT

## 2025-01-20 ASSESSMENT — SLEEP AND FATIGUE QUESTIONNAIRES
SLEEP_PROBLEM_NOTICEABLE_TO_OTHERS: A LITTLE
HOW LIKELY ARE YOU TO NOD OFF OR FALL ASLEEP WHILE SITTING AND TALKING TO SOMEONE: WOULD NEVER DOZE
HOW LIKELY ARE YOU TO NOD OFF OR FALL ASLEEP WHILE WATCHING TV: SLIGHT CHANCE OF DOZING
SLEEP_PROBLEM_INTERFERES_DAILY_ACTIVITIES: A LITTLE
SITING INACTIVE IN A PUBLIC PLACE LIKE A CLASS ROOM OR A MOVIE THEATER: WOULD NEVER DOZE
HOW LIKELY ARE YOU TO NOD OFF OR FALL ASLEEP WHILE LYING DOWN TO REST IN THE AFTERNOON WHEN CIRCUMSTANCES PERMIT: SLIGHT CHANCE OF DOZING
HOW LIKELY ARE YOU TO NOD OFF OR FALL ASLEEP WHEN YOU ARE A PASSENGER IN A CAR FOR AN HOUR WITHOUT A BREAK: MODERATE CHANCE OF DOZING
WORRIED_DISTRESSED_DUE_TO_SLEEP: A LITTLE
DIFFICULTY_FALLING_ASLEEP: MILD
ESS-CHAD TOTAL SCORE: 7
HOW LIKELY ARE YOU TO NOD OFF OR FALL ASLEEP WHILE SITTING QUIETLY AFTER LUNCH WITHOUT ALCOHOL: SLIGHT CHANCE OF DOZING
HOW LIKELY ARE YOU TO NOD OFF OR FALL ASLEEP WHILE SITTING AND READING: MODERATE CHANCE OF DOZING
HOW LIKELY ARE YOU TO NOD OFF OR FALL ASLEEP IN A CAR, WHILE STOPPED FOR A FEW MINUTES IN TRAFFIC: WOULD NEVER DOZE
WAKING_TOO_EARLY: MILD
SATISFACTION_WITH_CURRENT_SLEEP_PATTERN: SATISFIED

## 2025-01-20 ASSESSMENT — PATIENT HEALTH QUESTIONNAIRE - PHQ9
1. LITTLE INTEREST OR PLEASURE IN DOING THINGS: SEVERAL DAYS
2. FEELING DOWN, DEPRESSED OR HOPELESS: SEVERAL DAYS
SUM OF ALL RESPONSES TO PHQ9 QUESTIONS 1 & 2: 2
10. IF YOU CHECKED OFF ANY PROBLEMS, HOW DIFFICULT HAVE THESE PROBLEMS MADE IT FOR YOU TO DO YOUR WORK, TAKE CARE OF THINGS AT HOME, OR GET ALONG WITH OTHER PEOPLE: NOT DIFFICULT AT ALL

## 2025-01-20 ASSESSMENT — PAIN SCALES - GENERAL: PAINLEVEL_OUTOF10: 0-NO PAIN

## 2025-01-20 NOTE — H&P (VIEW-ONLY)
Vascular Surgery Clinic Note    CC: BL LE claudication    HPI:  Matthieu Solis is 64 y.o. male with history of BL LE CLTI 3 lifestyle limiting claudication. Left leg is worse than right. Onset of pain at 250 feet. Relieved with rest. He does smoke 3-4 cigarettes daily due to stress, but has cut down from 1 ppd previously. No rest pain or foot wounds. He did undergo noninvasive studies which are severe bilaterally as well as a prior CT scan which demonstrates left DANYELLE occlusion. No hx of previous vascular surgeries or interventions. He does have history of HF.     Since his last visit, his symptoms have remained stable. He has seen Dr. Donaldson and was cleared for iliac stent.      Past Vascular History:  None     Past Vascular Testing:  CT a/p (10/26/24) - left DANYELLE occlusion  PVR (12/4/24) - R 0.42 (0.31), L 0.31 (0)      Medical History:  Patient Active Problem List   Diagnosis    Abdominal pain    Acute pharyngitis    Anxiety    Blurring of visual image    Cataract, nuclear sclerotic, both eyes    Change in vision    Cortical age-related cataract of left eye    Episcleritis of left eye    Essential hypertension    Gastroenteritis    Gout of right foot    Helicobacter positive gastritis    Hordeolum externum of left upper eyelid    Astigmatism of both eyes    Low back pain    Lumbar spondylosis    Lung nodules    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 organic origin    Rectal mass    Urethritis    Abdominal wall abscess    Sacroiliitis (CMS-HCC)    Right groin hernia    Bilateral inguinal hernia    Combined form of age-related cataract, right eye    Combined form of age-related cataract, left eye    Hyperopia, bilateral    Presbyopia    Nausea and vomiting    Well adult exam    Nondisplaced fracture of middle phalanx of right middle finger, initial encounter for open fracture    Acute on chronic combined systolic and diastolic heart failure    Acute respiratory failure with hypercapnia (Multi)    NICM (nonischemic  cardiomyopathy) (Multi)    Nicotine use disorder    Nonsustained ventricular tachycardia (Multi)    Type 2 myocardial infarction (Multi)    Alcoholism (Multi)    Hearing loss    Leg pain    Sciatica    Elevated PSA    Acute prostatitis    Leg weakness, bilateral    Cognitive change    Snoring    Peripheral vascular disease (CMS-HCC)    Atherosclerosis of native coronary artery of native heart without angina pectoris    BPH (benign prostatic hyperplasia)    Gout    HLD (hyperlipidemia)    Systolic congestive heart failure        Meds:   Current Outpatient Medications on File Prior to Visit   Medication Sig Dispense Refill    acetaminophen (Tylenol) 325 mg tablet Take 2 tablets (650 mg) by mouth every 6 hours if needed for moderate pain (4 - 6) for up to 10 doses. 20 tablet 0    aspirin 81 mg EC tablet Take 1 tablet (81 mg) by mouth once daily. 90 tablet 3    atorvastatin (Lipitor) 80 mg tablet TAKE 1 TABLET BY MOUTH ONCE DAILY. 90 tablet 3    carvedilol (Coreg) 12.5 mg tablet Take 1 tablet (12.5 mg) by mouth 2 times daily (morning and late afternoon). 60 tablet 11    lidocaine (Lidoderm) 5 % patch Place 1 patch over 12 hours on the skin once daily. Remove & discard patch within 12 hours or as directed by MD. 30 patch 3    LYSINE ORAL Take 1 capsule by mouth once daily.      meloxicam (Mobic) 15 mg tablet Take 1 tablet (15 mg) by mouth once daily as needed for moderate pain (4 - 6). 30 tablet 2    nicotine polacrilex (Commit) 4 mg lozenge Use 1 lozenge (4 mg) in the mouth or throat every 2 hours if needed for smoking cessation. 100 lozenge 0    spironolactone (Aldactone) 25 mg tablet Take 0.5 tablets (12.5 mg) by mouth once daily. 45 tablet 3    [DISCONTINUED] empagliflozin (Jardiance) 10 mg Take 1 tablet (10 mg) by mouth once daily. 90 tablet 3    [DISCONTINUED] sacubitriL-valsartan (Entresto)  mg tablet Take 1 tablet by mouth 2 times a day. 180 tablet 3     No current facility-administered medications on file  prior to visit.        Allergies:   Allergies   Allergen Reactions    Cinnamon Unknown     Cinnamon    Doxycycline Swelling    Penicillins Unknown       SH:    Social Drivers of Health     Tobacco Use: High Risk (1/15/2025)    Patient History     Smoking Tobacco Use: Every Day     Smokeless Tobacco Use: Never     Passive Exposure: Not on file   Alcohol Use: Not At Risk (11/8/2024)    AUDIT-C     Frequency of Alcohol Consumption: Never     Average Number of Drinks: Patient does not drink     Frequency of Binge Drinking: Never   Financial Resource Strain: Medium Risk (11/8/2024)    Overall Financial Resource Strain (CARDIA)     Difficulty of Paying Living Expenses: Somewhat hard   Food Insecurity: Food Insecurity Present (11/8/2024)    Hunger Vital Sign     Worried About Running Out of Food in the Last Year: Often true     Ran Out of Food in the Last Year: Often true   Transportation Needs: Low Risk  (11/12/2024)    Received from [facility] Health Risk Assessment (HRA)     In the past year, have you had trouble getting to medical appointments or getting things you need because of transportation?: No   Physical Activity: Insufficiently Active (11/8/2024)    Exercise Vital Sign     Days of Exercise per Week: 2 days     Minutes of Exercise per Session: 20 min   Stress: Stress Concern Present (11/8/2024)    [employer] [place] [employer] - Occupational Stress Questionnaire     Feeling of Stress : Rather much   Social Connections: Moderately Integrated (11/8/2024)    Social Connection and Isolation Panel [NHANES]     Frequency of Communication with Friends and Family: Three times a week     Frequency of Social Gatherings with Friends and Family: Twice a week     Attends Bahai Services: More than 4 times per year     Active Member of Clubs or Organizations: Yes     Attends Club or Organization Meetings: More than 4 times per year     Marital Status:    Intimate Partner Violence: Not At Risk  (11/8/2024)    Humiliation, Afraid, Rape, and Kick questionnaire     Fear of Current or Ex-Partner: No     Emotionally Abused: No     Physically Abused: No     Sexually Abused: No   Depression: At risk (12/23/2024)    Received from [facility]    PHQ-2     Patient Health Questionnaire-2 Score: 4   Housing Stability: High Risk (11/12/2024)    Received from [facility] Health Risk Assessment (HRA)     Does your current living situation have any of the following problems? (CHOOSE ALL THAT APPLY): Mold,Smoke detectors missing or not working     Describe your current living situation.: I have a steady place to live   Utilities: At Risk (11/8/2024)    [facility] Utilities     Threatened with loss of utilities: Yes   Digital Equity: Not At Risk (12/18/2023)    Digital Health Equity Screening     Access to device/internet: Desktop computer, laptop computer, or tablet with broadband internet connection     Requested support: None of these   Health Literacy: Adequate Health Literacy (11/8/2024)     Health Literacy     Frequency of need for help with medical instructions: Rarely        FH:  Family History   Problem Relation Name Age of Onset    Other (CVA) Mother      Hypertension Mother      Other (CVA) Father      Hypertension Father          ROS:  All systems were reviewed and are negative except as per HPI.    Objective:  Vitals:  Vitals:    01/13/25 1354   BP: 153/77   Pulse:    SpO2:         Exam:  Constitutional: normal, well appearing  HEENT: normocephalic  CV: regular rate  RESP: symmetric expansion, unlabored breathing  GI: soft, nontender, nondistended  MSK: normal ROM  INT: no lesions  PSYCH: appropriate mood  NEURO: no deficits  VASC: Palpable right femoral, nonpalpable left femoral     Assessment & Plan:  Matthieu Solis is 64 y.o. male with BL LE CLTI 3 L > R     - Plan for left iliac stent. Will attempt to thrombectomy and place stent which may require use of two stents depending on how much clot can be cleared  -  Will call again closer to surgery date to confirm timing and instructions    Meds  - ASA 81 mg  - Atorvastatin 80 mg    Screening/Surveillance  - None    Next Followup  - Schedule surgery    Josefina Wylie MD

## 2025-01-20 NOTE — PATIENT INSTRUCTIONS
"       [facility] Sleep Medicine  [facility]  [address]  [facility] [address] [phone]       Thank you for coming to the Sleep Medicine Clinic today! Your sleep medicine provider today was: HAILEE Hansen Below is a summary of your treatment plan, patient education, other important information, and our contact numbers.    Dear Mr. Matthieu Solis       Your Sleep Provider Today: HAILEE Hansen  Your Primary Care Physician: Claudio Maldonado, DO   Your Referring Provider: Charito Diaz APRN-CNP      Thank you for visiting  Sleep Medicine Clinic !     1. We will proceed with a HOME sleep study to check your risk of sleep apnea. The lab will call you and schedule it for you.     2. Please do not drive when you are sleepy and start practicing the sleep hygiene as discussed in clinic.    3. Please start using Biotene to ease your dry mouth if needed.    4. Your blood pressure was elevated in the office today. Please obtain a home blood pressure monitoring kit, log your blood pressure at home, and follow up with your primary care physician. To control your blood pressure better, please take anti-hypertension medication at bedtime and a water pill at waking time.    5.  FOR QUESTIONS AND CONCERNS:   a) : To schedule, cancel, or reschedule SLEEP STUDY appointments, please call [phone]  b): Please call my office with issues or questions: [phone] ([place]); [phone] ([place]); [phone] ([place])    In the event that you are running more than 10 minutes late to your appointment, I will kindly ask you to reschedule. Thanks.      TREATMENT PLAN     - Do the following testing: home sleep apnea test  - Please read the \"Patient Education\" section below for more detailed information. Try implementing tips, reminders, strategies, and supportive management.   - If not yet done, please sign up for [facility] to make a future " schedule, send prescription requests, or send messages.    Follow-up Appointment:   Follow-up in 2-3 weeks after sleep study.    PATIENT EDUCATION     OBSTRUCTIVE SLEEP APNEA (YOHANA) is a sleep disorder where your upper airway muscles relax during sleep and the airway intermittently and repetitively narrows and collapses leading to partially blocked airway (hypopnea) or completely blocked airway (apnea) which, in turn, can disrupt breathing in sleep, lower oxygen levels while you sleep and cause night time wakings. Because both apnea and hypopnea may cause higher carbon dioxide or low oxygen levels, untreated YOHANA can lead to heart arrhythmia, elevation of blood pressure, and make it harder for the body to consolidate memory and facilitate metabolism (leading to higher blood sugars at night). Frequent partial arousals occur during sleep resulting in sleep deprivation and daytime sleepiness. YOHAAN is associated with an increased risk of cardiovascular disease, stroke, hypertension, and insulin resistance. Moreover, untreated YOHANA with excessive daytime sleepiness can increase the risk of motor vehicular accidents.    Below are conservative strategies for YOHANA regardless of YOHANA severity are:   Positional therapy - Avoid sleeping on your back.   Healthy diet and regular exercise to optimize weight is highly encouraged.   Avoid alcohol late in the evening and sedative-hypnotics as these substances can make sleep apnea worse.   Improve breathing through the nose with intranasal steroid spray, saline rinse, or antihistamines    Safety: Avoid driving vehicle and operating heavy equipment while sleepy. Drowsy driving may lead to life-threatening motor vehicle accidents. A person driving while sleepy is 5 times more likely to have an accident. If you feel sleepy, pull over and take a short power nap (sleep for less than 30 minutes). Otherwise, ask somebody to drive you.    Treatment options for sleep apnea include weight  management, positional therapy, Positive Airway Therapy (PAP) therapy, oral appliance therapy, hypoglossal nerve stimulator (Inspire) and select airway surgeries.    Sleep Testing for sleep apnea: The best and ideal way to check out if you have sleep apnea is to do an overnight sleep study in the sleep laboratory. Alternatively, a home sleep apnea test can also be done depending on your insurance and risk factors.     If you are having a home sleep apnea test, kindly allot 1 hour during pickup of the testing kit as you will have to complete paperwork and listen to the sleep technician for in-person on-the-spot demonstration and instructions on how to hook up the testing kit at home. Do the test for 1 day and start off with sleeping on your back. If you sleep on your side in the middle of night or you have always been a side or stomach-sleeper, it is ok as long as you have some time on your back and off-back.     If you are having an overnight in-lab sleep study, please make sure to bring toiletries, a comfy pillow, additional warm blankets, and any nighttime medications (include as-needed inhaler, pain pill, etc) that you may regularly take. Also, be sure to eat dinner before you arrive as we generally do not provide meals inside the sleep testing center. Lastly, in order to fall asleep faster in the sleep testing center, we advise patients to wake up 2 hours earlier on the morning of scheduled testing and avoid napping 2 days prior testing. Sometimes, your sleep provider may prescribe a sleep aid to be taken at lights out in the sleep testing center. If you are taking a sleep aid, consider having somebody pick you up after the sleep testing.    Overnight sleep studies may be scheduled on a weekday or weekend. We also perform daytime testing for shift workers on a case-by-case basis.    Once you have booked an appointment to do the sleep study, please contact my office for follow-up visit to discuss results.    On  the other hand, if you have any of the following, please consider calling the sleep testing center to RESCHEDULE your sleep study appointment:  If you tested positive for COVID within 10 days of your sleep study appointment.  If you were exposed to somebody who was confirmed for COVID within 10 days of your sleep study appointment and now you are having symptoms of possible COVID  If you have fever>100F or any acute symptoms that you think will lead to poor sleep during testing (e.g. new or worsening stuffy nose not relieved by steroid nasal spray)  If you have traveled domestically or internationally in the last month and now you are having symptoms of possible COVID    You can also go to the following EDUCATION WEBSITES for further information:   American Academy of Sleep Medicine http://sleepeducation.org  National Sleep Foundation: https://sleepfoundation.org  American Sleep Apnea Association: https://www.sleepapnea.org (for patients with sleep apnea)  Narcolepsy Network: https://www.narcolepsynetwork.org (for patients with narcolepsy)  [employer]: https://www.wakeupnarcolepsy.org (for patients with narcolepsy)  Hypersomnia Foundation: https://www.hypersomniafoundation.org (for patients with idiopathic hypersomnia)  RLS foundation: https://www.rls.org (for patients with restless leg syndrome)    IMPORTANT INFORMATION     Call 911 for medical emergencies.  Our offices are generally open from Monday-Friday, 8 am - 5 pm.   There are no supporting services by either the sleep doctors or their staff on weekends and Holidays, or after 5 PM on weekdays.   If you need to get in touch with me, you may either call my office number or you can use iiko.  If a referral for a test, for CPAP, or for another specialist was made, and you have not heard about scheduling this within a week, please call scheduling at [phone] ([phone]).  If you are unable to make your appointment for clinic or an overnight study,  kindly call the office or sleep testing center at least 48 hours in advance to cancel and reschedule.  If you are on CPAP, please bring your device's card and/or the device to each clinic appointment.   In case of problems with PAP machine or mask interface, please contact your DME (Durable Medical Equipment) company first. DME is the company who provides you the machine and/or PAP supplies.       PRESCRIPTIONS     We require 7 days advanced notice for prescription refills. If we do not receive the request in this time, we cannot guarantee that your medication will be refilled in time.    IMPORTANT PHONE NUMBERS     Sleep Medicine Clinic Fax: [phone]  Appointments (for Pediatric Sleep Clinic): [phone] - option 1  Appointments (for Adult Sleep Clinic): [phone] - option 2  Appointments (For Sleep Studies): [phone] - option 3  Behavioral Sleep Medicine: [phone]  Sleep Surgery: [phone]  Nutrition Service: [phone]  Weight management clinics with endocrinology: [phone]  Bariatric Services: [phone] (includes weight loss medications and weight loss surgery)  [facility]: [phone] (offers holistic approaches to weight management)  ENT (Otolaryngology): [phone]  Headache Clinic (Neurology): [phone]  Neurology: [phone]  Psychiatry: [phone]  Pulmonary Function Testing (PFT) Center: [phone]  Pulmonary Medicine: [phone]  Medical Service Company (DME): [phone]      OUR SLEEP TESTING LOCATIONS     Our team will contact you to schedule your sleep study, however, you can contact us as follow:  Main Phone Line (scheduling only): [phone]), option 3  Adult and Pediatric Locations  [facility] (6 years and older): [facility] - 4th floor ([address] [place] [address]) After hours line: [phone]  [facility] (Main campus: All ages): [place]  "6th floor. After hours line: [phone]   [place] (5 years and older; younger considered on case-by-case basis): [address]; [address], [address]. Scheduling  After hours line: [phone]   [place] (6 years and older): [address]; [address]; [address]   [place] (6 years and older): [address] [facility], [address]  After hours line: [phone]   [place] (13 years and older) in [place]: [address], 2nd floor  After hours line: [phone]   [place] (13 year and older): [address]  After hours line: [phone]     Adult Only Locations:   [place] (18 years and older): [address] [facility], 2nd floor   [place] (18 years and older): [address] [facility]; 4th floor  After hours line: [phone]  [facility] (18 years and older) at [place]: [address] [place] [address]  After hours line: [phone]     CONTACTING YOUR SLEEP MEDICINE PROVIDER AND SLEEP TEAM      For issues with your machine or mask interface, please call your DME provider first. DME stands for durable medical company. DME is the company who provides you the machine and/or PAP supplies / accessories.   To schedule, cancel, or reschedule SLEEP STUDY APPOINTMENTS, please call the Main Phone Line at [phone] - option 3.   To schedule, cancel, or reschedule CLINIC APPOINTMENTS, you can do it in \"[facility]\", call [phone] to speak with my  (Mihaela Polanco), or call the Main Phone Line at [phone] - option 2  For CLINICAL QUESTIONS or MEDICATION REFILLS, please call direct line for Adult Sleep Nurses at [phone].   Lastly, you can also send a message directly to your provider through \"My Chart\", which is the email service through your  Records Account: [email or web address]       Here at [facility], we wish you a restful sleep!   "

## 2025-01-20 NOTE — PROGRESS NOTES
Vascular Surgery Clinic Note    CC: BL LE claudication    HPI:  Matthieu Solis is 64 y.o. male with history of BL LE CLTI 3 lifestyle limiting claudication. Left leg is worse than right. Onset of pain at 250 feet. Relieved with rest. He does smoke 3-4 cigarettes daily due to stress, but has cut down from 1 ppd previously. No rest pain or foot wounds. He did undergo noninvasive studies which are severe bilaterally as well as a prior CT scan which demonstrates left DANYELLE occlusion. No hx of previous vascular surgeries or interventions. He does have history of HF.     Since his last visit, his symptoms have remained stable. He has seen Dr. Donaldson and was cleared for iliac stent.      Past Vascular History:  None     Past Vascular Testing:  CT a/p (10/26/24) - left DANYELLE occlusion  PVR (12/4/24) - R 0.42 (0.31), L 0.31 (0)      Medical History:  Patient Active Problem List   Diagnosis    Abdominal pain    Acute pharyngitis    Anxiety    Blurring of visual image    Cataract, nuclear sclerotic, both eyes    Change in vision    Cortical age-related cataract of left eye    Episcleritis of left eye    Essential hypertension    Gastroenteritis    Gout of right foot    Helicobacter positive gastritis    Hordeolum externum of left upper eyelid    Astigmatism of both eyes    Low back pain    Lumbar spondylosis    Lung nodules    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 organic origin    Rectal mass    Urethritis    Abdominal wall abscess    Sacroiliitis (CMS-HCC)    Right groin hernia    Bilateral inguinal hernia    Combined form of age-related cataract, right eye    Combined form of age-related cataract, left eye    Hyperopia, bilateral    Presbyopia    Nausea and vomiting    Well adult exam    Nondisplaced fracture of middle phalanx of right middle finger, initial encounter for open fracture    Acute on chronic combined systolic and diastolic heart failure    Acute respiratory failure with hypercapnia (Multi)    NICM (nonischemic  cardiomyopathy) (Multi)    Nicotine use disorder    Nonsustained ventricular tachycardia (Multi)    Type 2 myocardial infarction (Multi)    Alcoholism (Multi)    Hearing loss    Leg pain    Sciatica    Elevated PSA    Acute prostatitis    Leg weakness, bilateral    Cognitive change    Snoring    Peripheral vascular disease (CMS-HCC)    Atherosclerosis of native coronary artery of native heart without angina pectoris    BPH (benign prostatic hyperplasia)    Gout    HLD (hyperlipidemia)    Systolic congestive heart failure        Meds:   Current Outpatient Medications on File Prior to Visit   Medication Sig Dispense Refill    acetaminophen (Tylenol) 325 mg tablet Take 2 tablets (650 mg) by mouth every 6 hours if needed for moderate pain (4 - 6) for up to 10 doses. 20 tablet 0    aspirin 81 mg EC tablet Take 1 tablet (81 mg) by mouth once daily. 90 tablet 3    atorvastatin (Lipitor) 80 mg tablet TAKE 1 TABLET BY MOUTH ONCE DAILY. 90 tablet 3    carvedilol (Coreg) 12.5 mg tablet Take 1 tablet (12.5 mg) by mouth 2 times daily (morning and late afternoon). 60 tablet 11    lidocaine (Lidoderm) 5 % patch Place 1 patch over 12 hours on the skin once daily. Remove & discard patch within 12 hours or as directed by MD. 30 patch 3    LYSINE ORAL Take 1 capsule by mouth once daily.      meloxicam (Mobic) 15 mg tablet Take 1 tablet (15 mg) by mouth once daily as needed for moderate pain (4 - 6). 30 tablet 2    nicotine polacrilex (Commit) 4 mg lozenge Use 1 lozenge (4 mg) in the mouth or throat every 2 hours if needed for smoking cessation. 100 lozenge 0    spironolactone (Aldactone) 25 mg tablet Take 0.5 tablets (12.5 mg) by mouth once daily. 45 tablet 3    [DISCONTINUED] empagliflozin (Jardiance) 10 mg Take 1 tablet (10 mg) by mouth once daily. 90 tablet 3    [DISCONTINUED] sacubitriL-valsartan (Entresto)  mg tablet Take 1 tablet by mouth 2 times a day. 180 tablet 3     No current facility-administered medications on file  prior to visit.        Allergies:   Allergies   Allergen Reactions    Cinnamon Unknown     Cinnamon    Doxycycline Swelling    Penicillins Unknown       SH:    Social Drivers of Health     Tobacco Use: High Risk (1/15/2025)    Patient History     Smoking Tobacco Use: Every Day     Smokeless Tobacco Use: Never     Passive Exposure: Not on file   Alcohol Use: Not At Risk (11/8/2024)    AUDIT-C     Frequency of Alcohol Consumption: Never     Average Number of Drinks: Patient does not drink     Frequency of Binge Drinking: Never   Financial Resource Strain: Medium Risk (11/8/2024)    Overall Financial Resource Strain (CARDIA)     Difficulty of Paying Living Expenses: Somewhat hard   Food Insecurity: Food Insecurity Present (11/8/2024)    Hunger Vital Sign     Worried About Running Out of Food in the Last Year: Often true     Ran Out of Food in the Last Year: Often true   Transportation Needs: Low Risk  (11/12/2024)    Received from [facility] Health Risk Assessment (HRA)     In the past year, have you had trouble getting to medical appointments or getting things you need because of transportation?: No   Physical Activity: Insufficiently Active (11/8/2024)    Exercise Vital Sign     Days of Exercise per Week: 2 days     Minutes of Exercise per Session: 20 min   Stress: Stress Concern Present (11/8/2024)    [employer] - Occupational Stress Questionnaire     Feeling of Stress : Rather much   Social Connections: Moderately Integrated (11/8/2024)    Social Connection and Isolation Panel [NHANES]     Frequency of Communication with Friends and Family: Three times a week     Frequency of Social Gatherings with Friends and Family: Twice a week     Attends [employer] Services: More than 4 times per year     Active Member of Clubs or Organizations: Yes     Attends Club or Organization Meetings: More than 4 times per year     Marital Status:    Intimate Partner Violence: Not At Risk  (11/8/2024)    Humiliation, Afraid, Rape, and Kick questionnaire     Fear of Current or Ex-Partner: No     Emotionally Abused: No     Physically Abused: No     Sexually Abused: No   Depression: At risk (12/23/2024)    Received from [facility]    PHQ-2     Patient Health Questionnaire-2 Score: 4   Housing Stability: High Risk (11/12/2024)    Received from [facility] Health Risk Assessment (HRA)     Does your current living situation have any of the following problems? (CHOOSE ALL THAT APPLY): Mold,Smoke detectors missing or not working     Describe your current living situation.: I have a steady place to live   Utilities: At Risk (11/8/2024)    [facility] Utilities     Threatened with loss of utilities: Yes   Digital Equity: Not At Risk (12/18/2023)    Digital Health Equity Screening     Access to device/internet: Desktop computer, laptop computer, or tablet with broadband internet connection     Requested support: None of these   Health Literacy: Adequate Health Literacy (11/8/2024)     Health Literacy     Frequency of need for help with medical instructions: Rarely        FH:  Family History   Problem Relation Name Age of Onset    Other (CVA) Mother      Hypertension Mother      Other (CVA) Father      Hypertension Father          ROS:  All systems were reviewed and are negative except as per HPI.    Objective:  Vitals:  Vitals:    01/13/25 1354   BP: 153/77   Pulse:    SpO2:         Exam:  Constitutional: normal, well appearing  HEENT: normocephalic  CV: regular rate  RESP: symmetric expansion, unlabored breathing  GI: soft, nontender, nondistended  MSK: normal ROM  INT: no lesions  PSYCH: appropriate mood  NEURO: no deficits  VASC: Palpable right femoral, nonpalpable left femoral     Assessment & Plan:  Matthieu Solis is 64 y.o. male with BL LE CLTI 3 L > R     - Plan for left iliac stent. Will attempt to thrombectomy and place stent which may require use of two stents depending on how much clot can be cleared  -  Will call again closer to surgery date to confirm timing and instructions    Meds  - ASA 81 mg  - Atorvastatin 80 mg    Screening/Surveillance  - None    Next Followup  - Schedule surgery    Josefina Wylie MD

## 2025-01-20 NOTE — TELEPHONE ENCOUNTER
Discussed with patient regarding procedure  - LLE angiography. He will hold Jardiance, Entresto. Patient given NPO instructions and instructed to arrive at 1100    Josefina Wylie MD

## 2025-01-21 ENCOUNTER — HOSPITAL ENCOUNTER (INPATIENT)
Facility: HOSPITAL | Age: 65
LOS: 2 days | Discharge: HOME HEALTH | End: 2025-01-23
Attending: SURGERY | Admitting: SURGERY
Payer: COMMERCIAL

## 2025-01-21 ENCOUNTER — APPOINTMENT (OUTPATIENT)
Dept: RADIOLOGY | Facility: HOSPITAL | Age: 65
End: 2025-01-21
Payer: COMMERCIAL

## 2025-01-21 ENCOUNTER — ANESTHESIA EVENT (OUTPATIENT)
Dept: OPERATING ROOM | Facility: HOSPITAL | Age: 65
End: 2025-01-21
Payer: COMMERCIAL

## 2025-01-21 ENCOUNTER — APPOINTMENT (OUTPATIENT)
Dept: PHYSICAL THERAPY | Facility: CLINIC | Age: 65
End: 2025-01-21
Payer: COMMERCIAL

## 2025-01-21 ENCOUNTER — ANESTHESIA (OUTPATIENT)
Dept: OPERATING ROOM | Facility: HOSPITAL | Age: 65
End: 2025-01-21
Payer: COMMERCIAL

## 2025-01-21 DIAGNOSIS — I73.9 PERIPHERAL VASCULAR DISEASE (CMS-HCC): Primary | ICD-10-CM

## 2025-01-21 DIAGNOSIS — G89.18 ACUTE POST-OPERATIVE PAIN: ICD-10-CM

## 2025-01-21 DIAGNOSIS — R09.89 WEAK ARTERIAL PULSE: ICD-10-CM

## 2025-01-21 DIAGNOSIS — I73.9 CLAUDICATION (CMS-HCC): ICD-10-CM

## 2025-01-21 LAB
ABO GROUP (TYPE) IN BLOOD: NORMAL
ACT BLD: 152 SEC (ref 83–199)
ACT BLD: 152 SEC (ref 83–199)
ACT BLD: 244 SEC (ref 83–199)
ACT BLD: 250 SEC (ref 83–199)
ACT BLD: 263 SEC (ref 83–199)
ACT BLD: 268 SEC (ref 83–199)
ACT BLD: 281 SEC (ref 83–199)
ACT BLD: 290 SEC (ref 83–199)
ACT BLD: 295 SEC (ref 83–199)
ALBUMIN SERPL BCP-MCNC: 4.1 G/DL (ref 3.4–5)
ANION GAP BLDA CALCULATED.4IONS-SCNC: 13 MMO/L (ref 10–25)
ANION GAP BLDA CALCULATED.4IONS-SCNC: 14 MMO/L (ref 10–25)
ANION GAP BLDA CALCULATED.4IONS-SCNC: 9 MMO/L (ref 10–25)
ANION GAP SERPL CALC-SCNC: 16 MMOL/L (ref 10–20)
BASE EXCESS BLDA CALC-SCNC: -3 MMOL/L (ref -2–3)
BASE EXCESS BLDA CALC-SCNC: -4 MMOL/L (ref -2–3)
BASE EXCESS BLDA CALC-SCNC: -4.1 MMOL/L (ref -2–3)
BASOPHILS # BLD AUTO: 0.05 X10*3/UL (ref 0–0.1)
BASOPHILS NFR BLD AUTO: 0.3 %
BODY TEMPERATURE: 37 DEGREES CELSIUS
BUN SERPL-MCNC: 18 MG/DL (ref 6–23)
CA-I BLDA-SCNC: 1.18 MMOL/L (ref 1.1–1.33)
CA-I BLDA-SCNC: 1.18 MMOL/L (ref 1.1–1.33)
CA-I BLDA-SCNC: 1.19 MMOL/L (ref 1.1–1.33)
CALCIUM SERPL-MCNC: 8.9 MG/DL (ref 8.6–10.6)
CHLORIDE BLDA-SCNC: 107 MMOL/L (ref 98–107)
CHLORIDE BLDA-SCNC: 107 MMOL/L (ref 98–107)
CHLORIDE BLDA-SCNC: 109 MMOL/L (ref 98–107)
CHLORIDE SERPL-SCNC: 106 MMOL/L (ref 98–107)
CO2 SERPL-SCNC: 22 MMOL/L (ref 21–32)
CREAT SERPL-MCNC: 0.85 MG/DL (ref 0.5–1.3)
EGFRCR SERPLBLD CKD-EPI 2021: >90 ML/MIN/1.73M*2
EOSINOPHIL # BLD AUTO: 0.01 X10*3/UL (ref 0–0.7)
EOSINOPHIL NFR BLD AUTO: 0.1 %
ERYTHROCYTE [DISTWIDTH] IN BLOOD BY AUTOMATED COUNT: 13.2 % (ref 11.5–14.5)
GLUCOSE BLD MANUAL STRIP-MCNC: 134 MG/DL (ref 74–99)
GLUCOSE BLDA-MCNC: 107 MG/DL (ref 74–99)
GLUCOSE BLDA-MCNC: 112 MG/DL (ref 74–99)
GLUCOSE BLDA-MCNC: 89 MG/DL (ref 74–99)
GLUCOSE SERPL-MCNC: 136 MG/DL (ref 74–99)
HCO3 BLDA-SCNC: 20.9 MMOL/L (ref 22–26)
HCO3 BLDA-SCNC: 22.2 MMOL/L (ref 22–26)
HCO3 BLDA-SCNC: 22.7 MMOL/L (ref 22–26)
HCT VFR BLD AUTO: 33 % (ref 41–52)
HCT VFR BLD EST: 31 % (ref 41–52)
HCT VFR BLD EST: 37 % (ref 41–52)
HCT VFR BLD EST: 37 % (ref 41–52)
HGB BLD-MCNC: 11.2 G/DL (ref 13.5–17.5)
HGB BLDA-MCNC: 10.3 G/DL (ref 13.5–17.5)
HGB BLDA-MCNC: 12.2 G/DL (ref 13.5–17.5)
HGB BLDA-MCNC: 12.3 G/DL (ref 13.5–17.5)
IMM GRANULOCYTES # BLD AUTO: 0.08 X10*3/UL (ref 0–0.7)
IMM GRANULOCYTES NFR BLD AUTO: 0.5 % (ref 0–0.9)
INHALED O2 CONCENTRATION: 50 %
LACTATE BLDA-SCNC: 1.1 MMOL/L (ref 0.4–2)
LACTATE BLDA-SCNC: 1.4 MMOL/L (ref 0.4–2)
LACTATE BLDA-SCNC: 1.6 MMOL/L (ref 0.4–2)
LYMPHOCYTES # BLD AUTO: 1.46 X10*3/UL (ref 1.2–4.8)
LYMPHOCYTES NFR BLD AUTO: 9.4 %
MAGNESIUM SERPL-MCNC: 2.33 MG/DL (ref 1.6–2.4)
MCH RBC QN AUTO: 32 PG (ref 26–34)
MCHC RBC AUTO-ENTMCNC: 33.9 G/DL (ref 32–36)
MCV RBC AUTO: 94 FL (ref 80–100)
MONOCYTES # BLD AUTO: 0.16 X10*3/UL (ref 0.1–1)
MONOCYTES NFR BLD AUTO: 1 %
NEUTROPHILS # BLD AUTO: 13.84 X10*3/UL (ref 1.2–7.7)
NEUTROPHILS NFR BLD AUTO: 88.7 %
NRBC BLD-RTO: 0 /100 WBCS (ref 0–0)
OXYHGB MFR BLDA: 96 % (ref 94–98)
OXYHGB MFR BLDA: 96.4 % (ref 94–98)
OXYHGB MFR BLDA: 96.5 % (ref 94–98)
PCO2 BLDA: 37 MM HG (ref 38–42)
PCO2 BLDA: 42 MM HG (ref 38–42)
PCO2 BLDA: 44 MM HG (ref 38–42)
PH BLDA: 7.31 PH (ref 7.38–7.42)
PH BLDA: 7.34 PH (ref 7.38–7.42)
PH BLDA: 7.36 PH (ref 7.38–7.42)
PHOSPHATE SERPL-MCNC: 4.1 MG/DL (ref 2.5–4.9)
PLATELET # BLD AUTO: 298 X10*3/UL (ref 150–450)
PO2 BLDA: 158 MM HG (ref 85–95)
PO2 BLDA: 185 MM HG (ref 85–95)
PO2 BLDA: 199 MM HG (ref 85–95)
POTASSIUM BLDA-SCNC: 3.9 MMOL/L (ref 3.5–5.3)
POTASSIUM BLDA-SCNC: 4.3 MMOL/L (ref 3.5–5.3)
POTASSIUM BLDA-SCNC: 4.8 MMOL/L (ref 3.5–5.3)
POTASSIUM SERPL-SCNC: 4.1 MMOL/L (ref 3.5–5.3)
RBC # BLD AUTO: 3.5 X10*6/UL (ref 4.5–5.9)
RH FACTOR (ANTIGEN D): NORMAL
SAO2 % BLDA: 100 % (ref 94–100)
SAO2 % BLDA: 100 % (ref 94–100)
SAO2 % BLDA: 99 % (ref 94–100)
SODIUM BLDA-SCNC: 135 MMOL/L (ref 136–145)
SODIUM BLDA-SCNC: 138 MMOL/L (ref 136–145)
SODIUM BLDA-SCNC: 139 MMOL/L (ref 136–145)
SODIUM SERPL-SCNC: 140 MMOL/L (ref 136–145)
WBC # BLD AUTO: 15.6 X10*3/UL (ref 4.4–11.3)

## 2025-01-21 PROCEDURE — C1887 CATHETER, GUIDING: HCPCS | Performed by: SURGERY

## 2025-01-21 PROCEDURE — 36620 INSERTION CATHETER ARTERY: CPT

## 2025-01-21 PROCEDURE — 3700000001 HC GENERAL ANESTHESIA TIME - INITIAL BASE CHARGE: Performed by: SURGERY

## 2025-01-21 PROCEDURE — 84132 ASSAY OF SERUM POTASSIUM: CPT | Performed by: STUDENT IN AN ORGANIZED HEALTH CARE EDUCATION/TRAINING PROGRAM

## 2025-01-21 PROCEDURE — C1766 INTRO/SHEATH,STRBLE,NON-PEEL: HCPCS | Performed by: SURGERY

## 2025-01-21 PROCEDURE — C1894 INTRO/SHEATH, NON-LASER: HCPCS | Performed by: SURGERY

## 2025-01-21 PROCEDURE — 37223 PR REVSC OPN/PRQ ILIAC ART W/STNT & ANGIOP IPSILATL: CPT | Performed by: SURGERY

## 2025-01-21 PROCEDURE — 2500000004 HC RX 250 GENERAL PHARMACY W/ HCPCS (ALT 636 FOR OP/ED): Performed by: ANESTHESIOLOGY

## 2025-01-21 PROCEDURE — 2060000001 HC INTERMEDIATE ICU ROOM DAILY

## 2025-01-21 PROCEDURE — 2500000005 HC RX 250 GENERAL PHARMACY W/O HCPCS: Performed by: SURGERY

## 2025-01-21 PROCEDURE — 3700000002 HC GENERAL ANESTHESIA TIME - EACH INCREMENTAL 1 MINUTE: Performed by: SURGERY

## 2025-01-21 PROCEDURE — 7100000001 HC RECOVERY ROOM TIME - INITIAL BASE CHARGE: Performed by: SURGERY

## 2025-01-21 PROCEDURE — C1762 CONN TISS, HUMAN(INC FASCIA): HCPCS | Performed by: SURGERY

## 2025-01-21 PROCEDURE — 36556 INSERT NON-TUNNEL CV CATH: CPT

## 2025-01-21 PROCEDURE — 047C0DZ DILATION OF RIGHT COMMON ILIAC ARTERY WITH INTRALUMINAL DEVICE, OPEN APPROACH: ICD-10-PCS | Performed by: SURGERY

## 2025-01-21 PROCEDURE — 7100000002 HC RECOVERY ROOM TIME - EACH INCREMENTAL 1 MINUTE: Performed by: SURGERY

## 2025-01-21 PROCEDURE — 75774 ARTERY X-RAY EACH VESSEL: CPT | Performed by: SURGERY

## 2025-01-21 PROCEDURE — 85025 COMPLETE CBC W/AUTO DIFF WBC: CPT | Performed by: STUDENT IN AN ORGANIZED HEALTH CARE EDUCATION/TRAINING PROGRAM

## 2025-01-21 PROCEDURE — C1874 STENT, COATED/COV W/DEL SYS: HCPCS | Performed by: SURGERY

## 2025-01-21 PROCEDURE — 3600000008 HC OR TIME - EACH INCREMENTAL 1 MINUTE - PROCEDURE LEVEL THREE: Performed by: SURGERY

## 2025-01-21 PROCEDURE — 04CL0ZZ EXTIRPATION OF MATTER FROM LEFT FEMORAL ARTERY, OPEN APPROACH: ICD-10-PCS | Performed by: SURGERY

## 2025-01-21 PROCEDURE — 75630 X-RAY AORTA LEG ARTERIES: CPT | Performed by: SURGERY

## 2025-01-21 PROCEDURE — 04CK0ZZ EXTIRPATION OF MATTER FROM RIGHT FEMORAL ARTERY, OPEN APPROACH: ICD-10-PCS | Performed by: SURGERY

## 2025-01-21 PROCEDURE — 96373 THER/PROPH/DIAG INJ IA: CPT | Performed by: SURGERY

## 2025-01-21 PROCEDURE — 2500000004 HC RX 250 GENERAL PHARMACY W/ HCPCS (ALT 636 FOR OP/ED): Performed by: SURGERY

## 2025-01-21 PROCEDURE — 37221 PR REVSC OPN/PRQ ILIAC ART W/STNT PLMT & ANGIOPLSTY: CPT | Performed by: SURGERY

## 2025-01-21 PROCEDURE — 2550000001 HC RX 255 CONTRASTS: Performed by: SURGERY

## 2025-01-21 PROCEDURE — 35372 RECHANNELING OF ARTERY: CPT | Performed by: SURGERY

## 2025-01-21 PROCEDURE — C1773 RET DEV, INSERTABLE: HCPCS | Performed by: SURGERY

## 2025-01-21 PROCEDURE — 71045 X-RAY EXAM CHEST 1 VIEW: CPT

## 2025-01-21 PROCEDURE — P9045 ALBUMIN (HUMAN), 5%, 250 ML: HCPCS | Mod: JZ,TB

## 2025-01-21 PROCEDURE — 82947 ASSAY GLUCOSE BLOOD QUANT: CPT

## 2025-01-21 PROCEDURE — 2780000003 HC OR 278 NO HCPCS: Performed by: SURGERY

## 2025-01-21 PROCEDURE — 2500000004 HC RX 250 GENERAL PHARMACY W/ HCPCS (ALT 636 FOR OP/ED)

## 2025-01-21 PROCEDURE — 3600000003 HC OR TIME - INITIAL BASE CHARGE - PROCEDURE LEVEL THREE: Performed by: SURGERY

## 2025-01-21 PROCEDURE — 04UK0KZ SUPPLEMENT RIGHT FEMORAL ARTERY WITH NONAUTOLOGOUS TISSUE SUBSTITUTE, OPEN APPROACH: ICD-10-PCS | Performed by: SURGERY

## 2025-01-21 PROCEDURE — 2500000004 HC RX 250 GENERAL PHARMACY W/ HCPCS (ALT 636 FOR OP/ED): Performed by: STUDENT IN AN ORGANIZED HEALTH CARE EDUCATION/TRAINING PROGRAM

## 2025-01-21 PROCEDURE — 04UL0KZ SUPPLEMENT LEFT FEMORAL ARTERY WITH NONAUTOLOGOUS TISSUE SUBSTITUTE, OPEN APPROACH: ICD-10-PCS | Performed by: SURGERY

## 2025-01-21 PROCEDURE — C1769 GUIDE WIRE: HCPCS | Performed by: SURGERY

## 2025-01-21 PROCEDURE — 83735 ASSAY OF MAGNESIUM: CPT | Performed by: STUDENT IN AN ORGANIZED HEALTH CARE EDUCATION/TRAINING PROGRAM

## 2025-01-21 PROCEDURE — 84132 ASSAY OF SERUM POTASSIUM: CPT

## 2025-01-21 PROCEDURE — C1725 CATH, TRANSLUMIN NON-LASER: HCPCS | Performed by: SURGERY

## 2025-01-21 PROCEDURE — 047J0DZ DILATION OF LEFT EXTERNAL ILIAC ARTERY WITH INTRALUMINAL DEVICE, OPEN APPROACH: ICD-10-PCS | Performed by: SURGERY

## 2025-01-21 PROCEDURE — 2720000007 HC OR 272 NO HCPCS: Performed by: SURGERY

## 2025-01-21 PROCEDURE — 85347 COAGULATION TIME ACTIVATED: CPT

## 2025-01-21 PROCEDURE — 37799 UNLISTED PX VASCULAR SURGERY: CPT

## 2025-01-21 PROCEDURE — 86923 COMPATIBILITY TEST ELECTRIC: CPT

## 2025-01-21 PROCEDURE — 2500000001 HC RX 250 WO HCPCS SELF ADMINISTERED DRUGS (ALT 637 FOR MEDICARE OP): Performed by: STUDENT IN AN ORGANIZED HEALTH CARE EDUCATION/TRAINING PROGRAM

## 2025-01-21 PROCEDURE — 37799 UNLISTED PX VASCULAR SURGERY: CPT | Performed by: STUDENT IN AN ORGANIZED HEALTH CARE EDUCATION/TRAINING PROGRAM

## 2025-01-21 PROCEDURE — 047D0DZ DILATION OF LEFT COMMON ILIAC ARTERY WITH INTRALUMINAL DEVICE, OPEN APPROACH: ICD-10-PCS | Performed by: SURGERY

## 2025-01-21 DEVICE — XENOSURE BIOLOGIC PATCH, 0.8CM X 8CM, EIFU
Type: IMPLANTABLE DEVICE | Site: ARTERIAL | Status: FUNCTIONAL
Brand: XENOSURE BIOLOGIC PATCH

## 2025-01-21 DEVICE — VIABAHN BX BALLOON EXP ENDO 11MMX79MM 8FR135CMCATH HEPARIN
Type: IMPLANTABLE DEVICE | Site: ARTERIAL | Status: FUNCTIONAL
Brand: GORE VIABAHN VBX BALLOON EXPANDABLE ENDO

## 2025-01-21 DEVICE — VIABAHN SX ENDO HEPARIN 35 RO 8MMX10CM 8FR 120CMCATH
Type: IMPLANTABLE DEVICE | Site: ARTERIAL | Status: FUNCTIONAL
Brand: GORE VIABAHN ENDOPROSTHESIS WITH HEPARIN

## 2025-01-21 DEVICE — VIABAHN BX BALLOON EXP ENDO 11MMX59MM 8FR135CMCATH HEPARIN
Type: IMPLANTABLE DEVICE | Site: ILIAC CREST | Status: FUNCTIONAL
Brand: GORE VIABAHN VBX BALLOON EXPANDABLE ENDO

## 2025-01-21 RX ORDER — ONDANSETRON HYDROCHLORIDE 2 MG/ML
INJECTION, SOLUTION INTRAVENOUS AS NEEDED
Status: DISCONTINUED | OUTPATIENT
Start: 2025-01-21 | End: 2025-01-21

## 2025-01-21 RX ORDER — MAGNESIUM SULFATE HEPTAHYDRATE 500 MG/ML
INJECTION, SOLUTION INTRAMUSCULAR; INTRAVENOUS AS NEEDED
Status: DISCONTINUED | OUTPATIENT
Start: 2025-01-21 | End: 2025-01-21

## 2025-01-21 RX ORDER — HYDRALAZINE HYDROCHLORIDE 20 MG/ML
10 INJECTION INTRAMUSCULAR; INTRAVENOUS EVERY 4 HOURS PRN
Status: DISCONTINUED | OUTPATIENT
Start: 2025-01-21 | End: 2025-01-23

## 2025-01-21 RX ORDER — LIDOCAINE HYDROCHLORIDE 10 MG/ML
0.1 INJECTION, SOLUTION INFILTRATION; PERINEURAL ONCE
Status: DISCONTINUED | OUTPATIENT
Start: 2025-01-21 | End: 2025-01-21 | Stop reason: HOSPADM

## 2025-01-21 RX ORDER — DEXTROSE 50 % IN WATER (D50W) INTRAVENOUS SYRINGE
12.5
Status: DISCONTINUED | OUTPATIENT
Start: 2025-01-21 | End: 2025-01-22

## 2025-01-21 RX ORDER — ESMOLOL HYDROCHLORIDE 10 MG/ML
INJECTION INTRAVENOUS AS NEEDED
Status: DISCONTINUED | OUTPATIENT
Start: 2025-01-21 | End: 2025-01-21

## 2025-01-21 RX ORDER — OXYCODONE HYDROCHLORIDE 5 MG/1
5 TABLET ORAL EVERY 6 HOURS PRN
Status: DISCONTINUED | OUTPATIENT
Start: 2025-01-21 | End: 2025-01-23 | Stop reason: HOSPADM

## 2025-01-21 RX ORDER — HEPARIN SODIUM 5000 [USP'U]/ML
5000 INJECTION, SOLUTION INTRAVENOUS; SUBCUTANEOUS EVERY 8 HOURS
Status: DISCONTINUED | OUTPATIENT
Start: 2025-01-21 | End: 2025-01-23 | Stop reason: HOSPADM

## 2025-01-21 RX ORDER — CARVEDILOL 12.5 MG/1
12.5 TABLET ORAL
Status: DISCONTINUED | OUTPATIENT
Start: 2025-01-22 | End: 2025-01-23 | Stop reason: HOSPADM

## 2025-01-21 RX ORDER — OXYCODONE HYDROCHLORIDE 5 MG/1
10 TABLET ORAL EVERY 4 HOURS PRN
Status: DISCONTINUED | OUTPATIENT
Start: 2025-01-21 | End: 2025-01-21 | Stop reason: HOSPADM

## 2025-01-21 RX ORDER — LABETALOL HYDROCHLORIDE 5 MG/ML
20 INJECTION, SOLUTION INTRAVENOUS EVERY 2 HOUR PRN
Status: DISCONTINUED | OUTPATIENT
Start: 2025-01-21 | End: 2025-01-23

## 2025-01-21 RX ORDER — FENTANYL CITRATE 50 UG/ML
INJECTION, SOLUTION INTRAMUSCULAR; INTRAVENOUS AS NEEDED
Status: DISCONTINUED | OUTPATIENT
Start: 2025-01-21 | End: 2025-01-21

## 2025-01-21 RX ORDER — PROPOFOL 10 MG/ML
INJECTION, EMULSION INTRAVENOUS AS NEEDED
Status: DISCONTINUED | OUTPATIENT
Start: 2025-01-21 | End: 2025-01-21

## 2025-01-21 RX ORDER — SODIUM CHLORIDE, SODIUM LACTATE, POTASSIUM CHLORIDE, CALCIUM CHLORIDE 600; 310; 30; 20 MG/100ML; MG/100ML; MG/100ML; MG/100ML
100 INJECTION, SOLUTION INTRAVENOUS CONTINUOUS
Status: ACTIVE | OUTPATIENT
Start: 2025-01-21 | End: 2025-01-21

## 2025-01-21 RX ORDER — ATORVASTATIN CALCIUM 80 MG/1
80 TABLET, FILM COATED ORAL DAILY
Status: DISCONTINUED | OUTPATIENT
Start: 2025-01-22 | End: 2025-01-23 | Stop reason: HOSPADM

## 2025-01-21 RX ORDER — CEFAZOLIN SODIUM 2 G/100ML
2 INJECTION, SOLUTION INTRAVENOUS EVERY 8 HOURS
Status: COMPLETED | OUTPATIENT
Start: 2025-01-22 | End: 2025-01-22

## 2025-01-21 RX ORDER — SACUBITRIL AND VALSARTAN 97; 103 MG/1; MG/1
1 TABLET, FILM COATED ORAL 2 TIMES DAILY
Status: DISCONTINUED | OUTPATIENT
Start: 2025-01-22 | End: 2025-01-23 | Stop reason: HOSPADM

## 2025-01-21 RX ORDER — CEFAZOLIN 1 G/1
INJECTION, POWDER, FOR SOLUTION INTRAVENOUS AS NEEDED
Status: DISCONTINUED | OUTPATIENT
Start: 2025-01-21 | End: 2025-01-21

## 2025-01-21 RX ORDER — CLOPIDOGREL BISULFATE 75 MG/1
75 TABLET ORAL DAILY
Status: DISCONTINUED | OUTPATIENT
Start: 2025-01-22 | End: 2025-01-23 | Stop reason: HOSPADM

## 2025-01-21 RX ORDER — ASPIRIN 81 MG/1
81 TABLET ORAL DAILY
Status: DISCONTINUED | OUTPATIENT
Start: 2025-01-22 | End: 2025-01-23 | Stop reason: HOSPADM

## 2025-01-21 RX ORDER — NALOXONE HYDROCHLORIDE 0.4 MG/ML
0.2 INJECTION, SOLUTION INTRAMUSCULAR; INTRAVENOUS; SUBCUTANEOUS EVERY 5 MIN PRN
Status: DISCONTINUED | OUTPATIENT
Start: 2025-01-21 | End: 2025-01-23 | Stop reason: HOSPADM

## 2025-01-21 RX ORDER — ACETAMINOPHEN 325 MG/1
650 TABLET ORAL EVERY 6 HOURS SCHEDULED
Status: DISCONTINUED | OUTPATIENT
Start: 2025-01-22 | End: 2025-01-23 | Stop reason: HOSPADM

## 2025-01-21 RX ORDER — OXYCODONE HYDROCHLORIDE 5 MG/1
10 TABLET ORAL EVERY 4 HOURS PRN
Status: DISCONTINUED | OUTPATIENT
Start: 2025-01-21 | End: 2025-01-23 | Stop reason: HOSPADM

## 2025-01-21 RX ORDER — METHOCARBAMOL 100 MG/ML
1000 INJECTION, SOLUTION INTRAMUSCULAR; INTRAVENOUS ONCE
Status: COMPLETED | OUTPATIENT
Start: 2025-01-21 | End: 2025-01-21

## 2025-01-21 RX ORDER — OXYCODONE HYDROCHLORIDE 5 MG/1
5 TABLET ORAL EVERY 4 HOURS PRN
Status: DISCONTINUED | OUTPATIENT
Start: 2025-01-21 | End: 2025-01-21 | Stop reason: HOSPADM

## 2025-01-21 RX ORDER — SODIUM CHLORIDE 0.9 G/100ML
INJECTION, SOLUTION IRRIGATION AS NEEDED
Status: DISCONTINUED | OUTPATIENT
Start: 2025-01-21 | End: 2025-01-21 | Stop reason: HOSPADM

## 2025-01-21 RX ORDER — DEXTROSE 50 % IN WATER (D50W) INTRAVENOUS SYRINGE
25
Status: DISCONTINUED | OUTPATIENT
Start: 2025-01-21 | End: 2025-01-22

## 2025-01-21 RX ORDER — HYDROMORPHONE HYDROCHLORIDE 0.2 MG/ML
0.2 INJECTION INTRAMUSCULAR; INTRAVENOUS; SUBCUTANEOUS EVERY 5 MIN PRN
Status: DISCONTINUED | OUTPATIENT
Start: 2025-01-21 | End: 2025-01-21 | Stop reason: HOSPADM

## 2025-01-21 RX ORDER — IODIXANOL 320 MG/ML
INJECTION, SOLUTION INTRAVASCULAR AS NEEDED
Status: DISCONTINUED | OUTPATIENT
Start: 2025-01-21 | End: 2025-01-21 | Stop reason: HOSPADM

## 2025-01-21 RX ORDER — ROCURONIUM BROMIDE 10 MG/ML
INJECTION, SOLUTION INTRAVENOUS AS NEEDED
Status: DISCONTINUED | OUTPATIENT
Start: 2025-01-21 | End: 2025-01-21

## 2025-01-21 RX ORDER — PROTAMINE SULFATE 10 MG/ML
INJECTION, SOLUTION INTRAVENOUS AS NEEDED
Status: DISCONTINUED | OUTPATIENT
Start: 2025-01-21 | End: 2025-01-21

## 2025-01-21 RX ORDER — HEPARIN SODIUM 1000 [USP'U]/ML
INJECTION, SOLUTION INTRAVENOUS; SUBCUTANEOUS AS NEEDED
Status: DISCONTINUED | OUTPATIENT
Start: 2025-01-21 | End: 2025-01-21

## 2025-01-21 RX ORDER — HYDROMORPHONE HYDROCHLORIDE 1 MG/ML
INJECTION, SOLUTION INTRAMUSCULAR; INTRAVENOUS; SUBCUTANEOUS AS NEEDED
Status: DISCONTINUED | OUTPATIENT
Start: 2025-01-21 | End: 2025-01-21

## 2025-01-21 RX ORDER — CLOPIDOGREL BISULFATE 75 MG/1
75 TABLET ORAL ONCE
Status: COMPLETED | OUTPATIENT
Start: 2025-01-21 | End: 2025-01-21

## 2025-01-21 RX ORDER — ACETAMINOPHEN 10 MG/ML
1000 INJECTION, SOLUTION INTRAVENOUS ONCE
Status: COMPLETED | OUTPATIENT
Start: 2025-01-21 | End: 2025-01-21

## 2025-01-21 RX ORDER — LIDOCAINE HYDROCHLORIDE 10 MG/ML
INJECTION, SOLUTION INFILTRATION; PERINEURAL AS NEEDED
Status: DISCONTINUED | OUTPATIENT
Start: 2025-01-21 | End: 2025-01-21

## 2025-01-21 RX ORDER — HYDROMORPHONE HYDROCHLORIDE 1 MG/ML
0.5 INJECTION, SOLUTION INTRAMUSCULAR; INTRAVENOUS; SUBCUTANEOUS EVERY 2 HOUR PRN
Status: DISCONTINUED | OUTPATIENT
Start: 2025-01-21 | End: 2025-01-22

## 2025-01-21 RX ORDER — ALBUMIN HUMAN 50 G/1000ML
SOLUTION INTRAVENOUS AS NEEDED
Status: DISCONTINUED | OUTPATIENT
Start: 2025-01-21 | End: 2025-01-21

## 2025-01-21 RX ORDER — HYDRALAZINE HYDROCHLORIDE 20 MG/ML
INJECTION INTRAMUSCULAR; INTRAVENOUS AS NEEDED
Status: DISCONTINUED | OUTPATIENT
Start: 2025-01-21 | End: 2025-01-21

## 2025-01-21 RX ORDER — MIDAZOLAM HYDROCHLORIDE 1 MG/ML
INJECTION INTRAMUSCULAR; INTRAVENOUS AS NEEDED
Status: DISCONTINUED | OUTPATIENT
Start: 2025-01-21 | End: 2025-01-21

## 2025-01-21 RX ORDER — SPIRONOLACTONE 25 MG/1
12.5 TABLET ORAL
Status: DISCONTINUED | OUTPATIENT
Start: 2025-01-22 | End: 2025-01-23 | Stop reason: HOSPADM

## 2025-01-21 RX ORDER — LABETALOL HYDROCHLORIDE 5 MG/ML
5 INJECTION, SOLUTION INTRAVENOUS ONCE AS NEEDED
Status: DISCONTINUED | OUTPATIENT
Start: 2025-01-21 | End: 2025-01-21 | Stop reason: HOSPADM

## 2025-01-21 RX ORDER — PHENYLEPHRINE HCL IN 0.9% NACL 0.4MG/10ML
SYRINGE (ML) INTRAVENOUS AS NEEDED
Status: DISCONTINUED | OUTPATIENT
Start: 2025-01-21 | End: 2025-01-21

## 2025-01-21 RX ORDER — ACETAMINOPHEN 325 MG/1
650 TABLET ORAL EVERY 4 HOURS PRN
Status: DISCONTINUED | OUTPATIENT
Start: 2025-01-21 | End: 2025-01-21 | Stop reason: HOSPADM

## 2025-01-21 RX ORDER — SODIUM CHLORIDE, SODIUM LACTATE, POTASSIUM CHLORIDE, CALCIUM CHLORIDE 600; 310; 30; 20 MG/100ML; MG/100ML; MG/100ML; MG/100ML
INJECTION, SOLUTION INTRAVENOUS CONTINUOUS PRN
Status: DISCONTINUED | OUTPATIENT
Start: 2025-01-21 | End: 2025-01-21

## 2025-01-21 RX ORDER — MIDAZOLAM HYDROCHLORIDE 1 MG/ML
1 INJECTION INTRAMUSCULAR; INTRAVENOUS ONCE
Status: COMPLETED | OUTPATIENT
Start: 2025-01-21 | End: 2025-01-21

## 2025-01-21 RX ORDER — ONDANSETRON HYDROCHLORIDE 2 MG/ML
4 INJECTION, SOLUTION INTRAVENOUS ONCE AS NEEDED
Status: DISCONTINUED | OUTPATIENT
Start: 2025-01-21 | End: 2025-01-21 | Stop reason: HOSPADM

## 2025-01-21 RX ADMIN — OXYCODONE 10 MG: 5 TABLET ORAL at 22:51

## 2025-01-21 RX ADMIN — HEPARIN SODIUM 2000 UNITS: 1000 INJECTION, SOLUTION INTRAVENOUS; SUBCUTANEOUS at 14:44

## 2025-01-21 RX ADMIN — Medication 120 MCG: at 18:12

## 2025-01-21 RX ADMIN — HEPARIN SODIUM 2000 UNITS: 1000 INJECTION, SOLUTION INTRAVENOUS; SUBCUTANEOUS at 15:16

## 2025-01-21 RX ADMIN — CLOPIDOGREL BISULFATE 75 MG: 75 TABLET ORAL at 20:15

## 2025-01-21 RX ADMIN — HEPARIN SODIUM 2000 UNITS: 1000 INJECTION, SOLUTION INTRAVENOUS; SUBCUTANEOUS at 16:57

## 2025-01-21 RX ADMIN — CEFAZOLIN 2 G: 1 INJECTION, POWDER, FOR SOLUTION INTRAMUSCULAR; INTRAVENOUS at 13:02

## 2025-01-21 RX ADMIN — ALBUMIN HUMAN 250 ML: 0.05 INJECTION, SOLUTION INTRAVENOUS at 18:16

## 2025-01-21 RX ADMIN — ROCURONIUM 10 MG: 50 INJECTION, SOLUTION INTRAVENOUS at 16:03

## 2025-01-21 RX ADMIN — HYDROMORPHONE HYDROCHLORIDE 0.5 MG: 1 INJECTION, SOLUTION INTRAMUSCULAR; INTRAVENOUS; SUBCUTANEOUS at 19:15

## 2025-01-21 RX ADMIN — HYDROMORPHONE HYDROCHLORIDE 0.5 MG: 1 INJECTION, SOLUTION INTRAMUSCULAR; INTRAVENOUS; SUBCUTANEOUS at 14:04

## 2025-01-21 RX ADMIN — ONDANSETRON 4 MG: 2 INJECTION, SOLUTION INTRAMUSCULAR; INTRAVENOUS at 18:45

## 2025-01-21 RX ADMIN — Medication 80 MCG: at 17:10

## 2025-01-21 RX ADMIN — FENTANYL CITRATE 100 MCG: 50 INJECTION, SOLUTION INTRAMUSCULAR; INTRAVENOUS at 12:53

## 2025-01-21 RX ADMIN — HYDRALAZINE HYDROCHLORIDE 10 MG: 20 INJECTION INTRAMUSCULAR; INTRAVENOUS at 23:08

## 2025-01-21 RX ADMIN — SUGAMMADEX 200 MG: 100 INJECTION, SOLUTION INTRAVENOUS at 19:16

## 2025-01-21 RX ADMIN — HYDRALAZINE HYDROCHLORIDE 10 MG: 20 INJECTION INTRAMUSCULAR; INTRAVENOUS at 16:33

## 2025-01-21 RX ADMIN — ALBUMIN HUMAN 250 ML: 0.05 INJECTION, SOLUTION INTRAVENOUS at 14:54

## 2025-01-21 RX ADMIN — HYDROMORPHONE HYDROCHLORIDE 0.5 MG: 1 INJECTION, SOLUTION INTRAMUSCULAR; INTRAVENOUS; SUBCUTANEOUS at 14:26

## 2025-01-21 RX ADMIN — ESMOLOL HYDROCHLORIDE 50 MG: 100 INJECTION, SOLUTION INTRAVENOUS at 16:54

## 2025-01-21 RX ADMIN — ACETAMINOPHEN 650 MG: 325 TABLET ORAL at 23:07

## 2025-01-21 RX ADMIN — METHOCARBAMOL 1000 MG: 1000 INJECTION, SOLUTION INTRAMUSCULAR; INTRAVENOUS at 20:49

## 2025-01-21 RX ADMIN — Medication 120 MCG: at 18:37

## 2025-01-21 RX ADMIN — ESMOLOL HYDROCHLORIDE 50 MG: 100 INJECTION, SOLUTION INTRAVENOUS at 15:50

## 2025-01-21 RX ADMIN — ROCURONIUM 10 MG: 50 INJECTION, SOLUTION INTRAVENOUS at 13:50

## 2025-01-21 RX ADMIN — HEPARIN SODIUM 7000 UNITS: 1000 INJECTION, SOLUTION INTRAVENOUS; SUBCUTANEOUS at 13:57

## 2025-01-21 RX ADMIN — CEFAZOLIN 2 G: 1 INJECTION, POWDER, FOR SOLUTION INTRAMUSCULAR; INTRAVENOUS at 16:56

## 2025-01-21 RX ADMIN — ROCURONIUM 10 MG: 50 INJECTION, SOLUTION INTRAVENOUS at 14:50

## 2025-01-21 RX ADMIN — Medication 120 MCG: at 17:21

## 2025-01-21 RX ADMIN — MAGNESIUM SULFATE HEPTAHYDRATE 2 G: 500 INJECTION, SOLUTION INTRAMUSCULAR; INTRAVENOUS at 17:09

## 2025-01-21 RX ADMIN — DEXAMETHASONE SODIUM PHOSPHATE 8 MG: 4 INJECTION INTRA-ARTICULAR; INTRALESIONAL; INTRAMUSCULAR; INTRAVENOUS; SOFT TISSUE at 13:00

## 2025-01-21 RX ADMIN — Medication 120 MCG: at 18:00

## 2025-01-21 RX ADMIN — PROPOFOL 150 MG: 10 INJECTION, EMULSION INTRAVENOUS at 12:53

## 2025-01-21 RX ADMIN — LIDOCAINE HYDROCHLORIDE 100 MG: 10 INJECTION, SOLUTION INFILTRATION; PERINEURAL at 12:53

## 2025-01-21 RX ADMIN — PROPOFOL 50 MG: 10 INJECTION, EMULSION INTRAVENOUS at 13:52

## 2025-01-21 RX ADMIN — ACETAMINOPHEN 1000 MG: 10 INJECTION INTRAVENOUS at 20:49

## 2025-01-21 RX ADMIN — PROTAMINE SULFATE 40 MG: 10 INJECTION, SOLUTION INTRAVENOUS at 18:35

## 2025-01-21 RX ADMIN — SODIUM CHLORIDE, POTASSIUM CHLORIDE, SODIUM LACTATE AND CALCIUM CHLORIDE: 600; 310; 30; 20 INJECTION, SOLUTION INTRAVENOUS at 12:47

## 2025-01-21 RX ADMIN — ROCURONIUM 20 MG: 50 INJECTION, SOLUTION INTRAVENOUS at 16:40

## 2025-01-21 RX ADMIN — ALBUMIN HUMAN 250 ML: 0.05 INJECTION, SOLUTION INTRAVENOUS at 15:08

## 2025-01-21 RX ADMIN — ROCURONIUM 50 MG: 50 INJECTION, SOLUTION INTRAVENOUS at 12:53

## 2025-01-21 RX ADMIN — HYDRALAZINE HYDROCHLORIDE 10 MG: 20 INJECTION INTRAMUSCULAR; INTRAVENOUS at 16:13

## 2025-01-21 RX ADMIN — Medication 120 MCG: at 17:13

## 2025-01-21 RX ADMIN — MIDAZOLAM HYDROCHLORIDE 1 MG: 1 INJECTION, SOLUTION INTRAMUSCULAR; INTRAVENOUS at 21:22

## 2025-01-21 RX ADMIN — ESMOLOL HYDROCHLORIDE 50 MG: 100 INJECTION, SOLUTION INTRAVENOUS at 16:14

## 2025-01-21 RX ADMIN — HYDROMORPHONE HYDROCHLORIDE 0.5 MG: 0.5 INJECTION, SOLUTION INTRAMUSCULAR; INTRAVENOUS; SUBCUTANEOUS at 19:48

## 2025-01-21 RX ADMIN — MIDAZOLAM HYDROCHLORIDE 2 MG: 1 INJECTION, SOLUTION INTRAMUSCULAR; INTRAVENOUS at 12:45

## 2025-01-21 RX ADMIN — HYDROMORPHONE HYDROCHLORIDE 0.5 MG: 1 INJECTION, SOLUTION INTRAMUSCULAR; INTRAVENOUS; SUBCUTANEOUS at 19:24

## 2025-01-21 RX ADMIN — ESMOLOL HYDROCHLORIDE 50 MG: 100 INJECTION, SOLUTION INTRAVENOUS at 16:33

## 2025-01-21 RX ADMIN — HEPARIN SODIUM 5000 UNITS: 5000 INJECTION, SOLUTION INTRAVENOUS; SUBCUTANEOUS at 23:08

## 2025-01-21 RX ADMIN — HEPARIN SODIUM 2000 UNITS: 1000 INJECTION, SOLUTION INTRAVENOUS; SUBCUTANEOUS at 17:41

## 2025-01-21 RX ADMIN — HYDROMORPHONE HYDROCHLORIDE 0.5 MG: 0.5 INJECTION, SOLUTION INTRAMUSCULAR; INTRAVENOUS; SUBCUTANEOUS at 20:05

## 2025-01-21 SDOH — HEALTH STABILITY: MENTAL HEALTH: CURRENT SMOKER: 1

## 2025-01-21 ASSESSMENT — PAIN DESCRIPTION - ORIENTATION
ORIENTATION: RIGHT;LEFT
ORIENTATION: RIGHT;LEFT

## 2025-01-21 ASSESSMENT — PAIN DESCRIPTION - LOCATION
LOCATION: GROIN
LOCATION: GROIN

## 2025-01-21 ASSESSMENT — PAIN SCALES - GENERAL
PAINLEVEL_OUTOF10: 7
PAINLEVEL_OUTOF10: 0 - NO PAIN
PAINLEVEL_OUTOF10: 6
PAINLEVEL_OUTOF10: 8
PAINLEVEL_OUTOF10: 6
PAIN_LEVEL: 5
PAINLEVEL_OUTOF10: 6
PAINLEVEL_OUTOF10: 10 - WORST POSSIBLE PAIN
PAINLEVEL_OUTOF10: 6
PAINLEVEL_OUTOF10: 8
PAINLEVEL_OUTOF10: 6

## 2025-01-21 ASSESSMENT — ACTIVITIES OF DAILY LIVING (ADL)
TOILETING: INDEPENDENT
FEEDING YOURSELF: INDEPENDENT
HEARING - RIGHT EAR: DIFFICULTY WITH NOISE
HEARING - LEFT EAR: DIFFICULTY WITH NOISE
ADEQUATE_TO_COMPLETE_ADL: YES
GROOMING: INDEPENDENT
WALKS IN HOME: INDEPENDENT
DRESSING YOURSELF: INDEPENDENT
BATHING: INDEPENDENT
PATIENT'S MEMORY ADEQUATE TO SAFELY COMPLETE DAILY ACTIVITIES?: YES
JUDGMENT_ADEQUATE_SAFELY_COMPLETE_DAILY_ACTIVITIES: YES
ASSISTIVE_DEVICE: NONE;EYEGLASSES

## 2025-01-21 ASSESSMENT — PAIN - FUNCTIONAL ASSESSMENT: PAIN_FUNCTIONAL_ASSESSMENT: 0-10

## 2025-01-21 NOTE — ANESTHESIA PREPROCEDURE EVALUATION
Patient: Matthieu Solis    Procedure Information       Date/Time: 01/21/25 1245    Procedure: ANGIOGRAM, LOWER EXTREMITY (Left)    Location: [facility] OR 16 / [facility] OR    Surgeons: Josefina Wylie MD        HPI: 64 year old male presenting for left lower extremity angiogram. Patient had CT imaging which demonstrates a left DANYELLE occlusion. History significant for intermittent claudication, PAD, HTN, BPH, OA,     Denies recent cough, cold, runny nose, fever, flu like symptoms. No exertional angina nor exertional dyspnea. No orthopnea, paroxysmal nocturnal dyspnea, leg swelling. Denies palpitations. No issues with bleeding nor blood clots.    Anesthesia history: no issues    Visit Vitals  Smoking Status Every Day        [unfilled]        Transthoracic Echo (TTE) Complete 01/07/2025   CONCLUSIONS:   1. The left ventricular systolic function is low normal, with a visually estimated ejection fraction of 50-55%.   2. Spectral Doppler shows an abnormal pattern of left ventricular diastolic filling.   3. There is normal right ventricular global systolic function.         Stress test: none      Relevant Problems   Cardiac   (+) Atherosclerosis of native coronary artery of native heart without angina pectoris   (+) Essential hypertension   (+) HLD (hyperlipidemia)   (+) Peripheral vascular disease (CMS-HCC)   (+) Systolic congestive heart failure   (+) Type 2 myocardial infarction (Multi)      Pulmonary   (+) Lung nodules      Neuro   (+) Anxiety   (+) Sciatica      /Renal   (+) BPH (benign prostatic hyperplasia)      Musculoskeletal   (+) Lumbar spondylosis      HEENT   (+) Hearing loss      ID   (+) Helicobacter positive gastritis       Clinical information reviewed:                   NPO Detail:  No data recorded     Physical Exam    Airway  Mallampati: II  TM distance: >3 FB  Neck ROM: full     Cardiovascular   Rhythm: regular  Rate: normal     Dental - normal exam     Pulmonary   Breath sounds clear to  auscultation     Abdominal   Abdomen: soft             Anesthesia Plan    History of general anesthesia?: yes  History of complications of general anesthesia?: no    ASA 3     general     The patient is a current smoker.  Patient was previously instructed to abstain from smoking on day of procedure.  Patient did not smoke on day of procedure.    intravenous induction   Postoperative administration of opioids is intended.  Trial extubation is planned.  Anesthetic plan and risks discussed with patient.  Use of blood products discussed with patient who consented to blood products.    Plan discussed with resident and attending.

## 2025-01-21 NOTE — ANESTHESIA PROCEDURE NOTES
Arterial Line:    Date/Time: 1/21/2025 1:03 PM    Staffing  Performed: resident   Authorized by: Geovanni Judd DO    Performed by: Salma Santamaria MD    An arterial line was placed. Procedure performed using surface landmarks.in the OR for the following indication(s): continuous blood pressure monitoring and blood sampling needed.    A 20 gauge (size), 1 and 3/4 inch (length), Angiocath (type) catheter was placed into the Right radial artery, secured by Tegaderm,   Seldinger technique used.  Events:  patient tolerated procedure well with no complications.

## 2025-01-21 NOTE — INTERVAL H&P NOTE
I have reviewed the patient's History and Physical Examination. I have personally seen and evaluated the patient, repeating key portions. There is no significant interval change.     Surgery is still indicated. Yes    Consent reviewed and signed by patient/family: Yes    Operative site verified and marked: Yes left     Procedure: Left femoral endarterectomy, iliac stenting, possible right to left fem-fem bypass    Silvio Quinonez MD  PGY-5 Vascular Surgery

## 2025-01-21 NOTE — ANESTHESIA PROCEDURE NOTES
Airway  Date/Time: 1/21/2025 12:55 PM  Urgency: elective    Airway not difficult    Staffing  Performed: attending   Authorized by: Geovanni Judd DO    Performed by: Salma Santamaria MD  Patient location during procedure: OR    Indications and Patient Condition  Indications for airway management: anesthesia  Spontaneous Ventilation: absent  Sedation level: deep  Preoxygenated: yes  Patient position: sniffing  Mask difficulty assessment: 1 - vent by mask  Planned trial extubation    Final Airway Details  Final airway type: endotracheal airway      Successful airway: ETT  Cuffed: yes   Successful intubation technique: direct laryngoscopy  Facilitating devices/methods: intubating stylet  Endotracheal tube insertion site: oral  Blade: Yesy  Blade size: #3  ETT size (mm): 7.5  Cormack-Lehane Classification: grade I - full view of glottis  Placement verified by: chest auscultation and capnometry   Inital cuff pressure (cm H2O): 5  Measured from: lips  ETT to lips (cm): 23  Number of attempts at approach: 1  Number of other approaches attempted: 0

## 2025-01-21 NOTE — ANESTHESIA PROCEDURE NOTES
Central Venous Line:    Date/Time: 1/21/2025 1:30 PM    A central venous line was placed in the OR for the following indication(s): central venous access.  Staffing  Performed: resident   Authorized by: Geovanni Judd DO    Performed by: Salma Santamaria MD    Sterility preparation included the following: provider hand hygiene performed prior to central venous catheter insertion, all 5 sterile barriers used (gloves, gown, cap, mask, large sterile drape) during central venous catheter insertion, antiseptic used during central venous catheter insertion and skin prep agent completely dried prior to procedure.  The patient was placed in Trendelenburg position.    Left internal jugular vein was prepped.    The site was prepped with Chlorhexidine.  Catheter size: 8 Fr.   Catheter length (cm): 16 cm.  Number of Lumens: double lumen      During the procedure, the following specific steps were taken: target vein identified, needle advanced into vein and blood aspirated and guidewire advanced into vein.  Seldinger technique used.  Procedure performed using ultrasound guidance.  Sterile gel and probe cover used in ultrasound-guided central venous catheter insertion.    Intravenous verification was obtained by ultrasound, venous blood return and manometry.      Post insertion care included: all ports aspirated, all ports flushed easily, guidewire removed intact, Biopatch applied, line sutured in place and dressing applied.    During the procedure the patient experienced: patient tolerated procedure well with no complications.

## 2025-01-22 ENCOUNTER — HOME HEALTH ADMISSION (OUTPATIENT)
Dept: HOME HEALTH SERVICES | Facility: HOME HEALTH | Age: 65
End: 2025-01-22
Payer: COMMERCIAL

## 2025-01-22 ENCOUNTER — PHARMACY VISIT (OUTPATIENT)
Dept: PHARMACY | Facility: CLINIC | Age: 65
End: 2025-01-22
Payer: COMMERCIAL

## 2025-01-22 PROCEDURE — 97161 PT EVAL LOW COMPLEX 20 MIN: CPT | Mod: GP

## 2025-01-22 PROCEDURE — 2500000004 HC RX 250 GENERAL PHARMACY W/ HCPCS (ALT 636 FOR OP/ED): Performed by: NURSE PRACTITIONER

## 2025-01-22 PROCEDURE — 2500000004 HC RX 250 GENERAL PHARMACY W/ HCPCS (ALT 636 FOR OP/ED): Performed by: STUDENT IN AN ORGANIZED HEALTH CARE EDUCATION/TRAINING PROGRAM

## 2025-01-22 PROCEDURE — 2500000002 HC RX 250 W HCPCS SELF ADMINISTERED DRUGS (ALT 637 FOR MEDICARE OP, ALT 636 FOR OP/ED): Performed by: STUDENT IN AN ORGANIZED HEALTH CARE EDUCATION/TRAINING PROGRAM

## 2025-01-22 PROCEDURE — 99233 SBSQ HOSP IP/OBS HIGH 50: CPT | Performed by: NURSE PRACTITIONER

## 2025-01-22 PROCEDURE — RXMED WILLOW AMBULATORY MEDICATION CHARGE

## 2025-01-22 PROCEDURE — 1100000001 HC PRIVATE ROOM DAILY

## 2025-01-22 PROCEDURE — 2500000001 HC RX 250 WO HCPCS SELF ADMINISTERED DRUGS (ALT 637 FOR MEDICARE OP): Performed by: STUDENT IN AN ORGANIZED HEALTH CARE EDUCATION/TRAINING PROGRAM

## 2025-01-22 RX ORDER — CLOPIDOGREL BISULFATE 75 MG/1
75 TABLET ORAL DAILY
Qty: 90 TABLET | Refills: 3 | Status: SHIPPED | OUTPATIENT
Start: 2025-01-23

## 2025-01-22 RX ORDER — OXYCODONE HYDROCHLORIDE 5 MG/1
5 TABLET ORAL EVERY 6 HOURS PRN
Qty: 28 TABLET | Refills: 0 | Status: SHIPPED | OUTPATIENT
Start: 2025-01-22 | End: 2025-01-30 | Stop reason: SDUPTHER

## 2025-01-22 RX ORDER — ACETAMINOPHEN 325 MG/1
650 TABLET ORAL EVERY 6 HOURS PRN
Start: 2025-01-22

## 2025-01-22 RX ORDER — KETOROLAC TROMETHAMINE 30 MG/ML
30 INJECTION, SOLUTION INTRAMUSCULAR; INTRAVENOUS EVERY 6 HOURS SCHEDULED
Status: COMPLETED | OUTPATIENT
Start: 2025-01-22 | End: 2025-01-23

## 2025-01-22 RX ADMIN — SPIRONOLACTONE 12.5 MG: 25 TABLET, FILM COATED ORAL at 06:03

## 2025-01-22 RX ADMIN — KETOROLAC TROMETHAMINE 30 MG: 30 INJECTION, SOLUTION INTRAMUSCULAR; INTRAVENOUS at 20:37

## 2025-01-22 RX ADMIN — CARVEDILOL 12.5 MG: 12.5 TABLET, FILM COATED ORAL at 18:17

## 2025-01-22 RX ADMIN — ACETAMINOPHEN 650 MG: 325 TABLET ORAL at 23:52

## 2025-01-22 RX ADMIN — HYDROMORPHONE HYDROCHLORIDE 0.5 MG: 1 INJECTION, SOLUTION INTRAMUSCULAR; INTRAVENOUS; SUBCUTANEOUS at 00:07

## 2025-01-22 RX ADMIN — ASPIRIN 81 MG: 81 TABLET, COATED ORAL at 08:34

## 2025-01-22 RX ADMIN — OXYCODONE 10 MG: 5 TABLET ORAL at 12:37

## 2025-01-22 RX ADMIN — OXYCODONE 10 MG: 5 TABLET ORAL at 18:22

## 2025-01-22 RX ADMIN — OXYCODONE 10 MG: 5 TABLET ORAL at 03:06

## 2025-01-22 RX ADMIN — ACETAMINOPHEN 650 MG: 325 TABLET ORAL at 18:17

## 2025-01-22 RX ADMIN — SACUBITRIL AND VALSARTAN 1 TABLET: 97; 103 TABLET, FILM COATED ORAL at 20:37

## 2025-01-22 RX ADMIN — KETOROLAC TROMETHAMINE 30 MG: 30 INJECTION, SOLUTION INTRAMUSCULAR; INTRAVENOUS at 14:59

## 2025-01-22 RX ADMIN — HEPARIN SODIUM 5000 UNITS: 5000 INJECTION, SOLUTION INTRAVENOUS; SUBCUTANEOUS at 06:03

## 2025-01-22 RX ADMIN — OXYCODONE 10 MG: 5 TABLET ORAL at 08:34

## 2025-01-22 RX ADMIN — ATORVASTATIN CALCIUM 80 MG: 80 TABLET, FILM COATED ORAL at 08:34

## 2025-01-22 RX ADMIN — HEPARIN SODIUM 5000 UNITS: 5000 INJECTION, SOLUTION INTRAVENOUS; SUBCUTANEOUS at 14:59

## 2025-01-22 RX ADMIN — ACETAMINOPHEN 650 MG: 325 TABLET ORAL at 06:03

## 2025-01-22 RX ADMIN — CEFAZOLIN SODIUM 2 G: 2 INJECTION, SOLUTION INTRAVENOUS at 12:37

## 2025-01-22 RX ADMIN — SACUBITRIL AND VALSARTAN 1 TABLET: 97; 103 TABLET, FILM COATED ORAL at 08:34

## 2025-01-22 RX ADMIN — HEPARIN SODIUM 5000 UNITS: 5000 INJECTION, SOLUTION INTRAVENOUS; SUBCUTANEOUS at 23:56

## 2025-01-22 RX ADMIN — ACETAMINOPHEN 650 MG: 325 TABLET ORAL at 12:37

## 2025-01-22 RX ADMIN — HYDROMORPHONE HYDROCHLORIDE 0.5 MG: 1 INJECTION, SOLUTION INTRAMUSCULAR; INTRAVENOUS; SUBCUTANEOUS at 05:49

## 2025-01-22 RX ADMIN — CARVEDILOL 12.5 MG: 12.5 TABLET, FILM COATED ORAL at 08:34

## 2025-01-22 RX ADMIN — CEFAZOLIN SODIUM 2 G: 2 INJECTION, SOLUTION INTRAVENOUS at 04:34

## 2025-01-22 RX ADMIN — CLOPIDOGREL BISULFATE 75 MG: 75 TABLET ORAL at 08:34

## 2025-01-22 SDOH — SOCIAL STABILITY: SOCIAL INSECURITY: ARE THERE ANY APPARENT SIGNS OF INJURIES/BEHAVIORS THAT COULD BE RELATED TO ABUSE/NEGLECT?: NO

## 2025-01-22 SDOH — ECONOMIC STABILITY: INCOME INSECURITY: IN THE PAST 12 MONTHS HAS THE ELECTRIC, GAS, OIL, OR WATER COMPANY THREATENED TO SHUT OFF SERVICES IN YOUR HOME?: NO

## 2025-01-22 SDOH — HEALTH STABILITY: PHYSICAL HEALTH: ON AVERAGE, HOW MANY MINUTES DO YOU ENGAGE IN EXERCISE AT THIS LEVEL?: 0 MIN

## 2025-01-22 SDOH — SOCIAL STABILITY: SOCIAL INSECURITY: HAVE YOU HAD THOUGHTS OF HARMING ANYONE ELSE?: NO

## 2025-01-22 SDOH — ECONOMIC STABILITY: HOUSING INSECURITY: IN THE LAST 12 MONTHS, WAS THERE A TIME WHEN YOU WERE NOT ABLE TO PAY THE MORTGAGE OR RENT ON TIME?: NO

## 2025-01-22 SDOH — SOCIAL STABILITY: SOCIAL INSECURITY: WITHIN THE LAST YEAR, HAVE YOU BEEN AFRAID OF YOUR PARTNER OR EX-PARTNER?: NO

## 2025-01-22 SDOH — HEALTH STABILITY: PHYSICAL HEALTH: ON AVERAGE, HOW MANY DAYS PER WEEK DO YOU ENGAGE IN MODERATE TO STRENUOUS EXERCISE (LIKE A BRISK WALK)?: 0 DAYS

## 2025-01-22 SDOH — ECONOMIC STABILITY: FOOD INSECURITY: WITHIN THE PAST 12 MONTHS, THE FOOD YOU BOUGHT JUST DIDN'T LAST AND YOU DIDN'T HAVE MONEY TO GET MORE.: NEVER TRUE

## 2025-01-22 SDOH — HEALTH STABILITY: MENTAL HEALTH
DO YOU FEEL STRESS - TENSE, RESTLESS, NERVOUS, OR ANXIOUS, OR UNABLE TO SLEEP AT NIGHT BECAUSE YOUR MIND IS TROUBLED ALL THE TIME - THESE DAYS?: NOT AT ALL

## 2025-01-22 SDOH — SOCIAL STABILITY: SOCIAL INSECURITY: DO YOU FEEL UNSAFE GOING BACK TO THE PLACE WHERE YOU ARE LIVING?: NO

## 2025-01-22 SDOH — SOCIAL STABILITY: SOCIAL INSECURITY: HAVE YOU HAD ANY THOUGHTS OF HARMING ANYONE ELSE?: NO

## 2025-01-22 SDOH — SOCIAL STABILITY: SOCIAL INSECURITY: ABUSE: ADULT

## 2025-01-22 SDOH — ECONOMIC STABILITY: HOUSING INSECURITY: AT ANY TIME IN THE PAST 12 MONTHS, WERE YOU HOMELESS OR LIVING IN A SHELTER (INCLUDING NOW)?: NO

## 2025-01-22 SDOH — SOCIAL STABILITY: SOCIAL INSECURITY
ASK PARENT OR GUARDIAN: ARE THERE TIMES WHEN YOU, YOUR CHILD(REN), OR ANY MEMBER OF YOUR HOUSEHOLD FEEL UNSAFE, HARMED, OR THREATENED AROUND PERSONS WITH WHOM YOU KNOW OR LIVE?: NO

## 2025-01-22 SDOH — ECONOMIC STABILITY: HOUSING INSECURITY: DO YOU FEEL UNSAFE GOING BACK TO THE PLACE WHERE YOU LIVE?: NO

## 2025-01-22 SDOH — SOCIAL STABILITY: SOCIAL INSECURITY: DOES ANYONE TRY TO KEEP YOU FROM HAVING/CONTACTING OTHER FRIENDS OR DOING THINGS OUTSIDE YOUR HOME?: NO

## 2025-01-22 SDOH — SOCIAL STABILITY: SOCIAL INSECURITY: DO YOU FEEL ANYONE HAS EXPLOITED OR TAKEN ADVANTAGE OF YOU FINANCIALLY OR OF YOUR PERSONAL PROPERTY?: NO

## 2025-01-22 SDOH — SOCIAL STABILITY: SOCIAL INSECURITY: HAS ANYONE EVER THREATENED TO HURT YOUR FAMILY OR YOUR PETS?: NO

## 2025-01-22 SDOH — ECONOMIC STABILITY: FOOD INSECURITY: HOW HARD IS IT FOR YOU TO PAY FOR THE VERY BASICS LIKE FOOD, HOUSING, MEDICAL CARE, AND HEATING?: SOMEWHAT HARD

## 2025-01-22 SDOH — HEALTH STABILITY: PHYSICAL HEALTH
HOW OFTEN DO YOU NEED TO HAVE SOMEONE HELP YOU WHEN YOU READ INSTRUCTIONS, PAMPHLETS, OR OTHER WRITTEN MATERIAL FROM YOUR DOCTOR OR PHARMACY?: SOMETIMES

## 2025-01-22 SDOH — SOCIAL STABILITY: SOCIAL INSECURITY: WITHIN THE LAST YEAR, HAVE YOU BEEN HUMILIATED OR EMOTIONALLY ABUSED IN OTHER WAYS BY YOUR PARTNER OR EX-PARTNER?: NO

## 2025-01-22 SDOH — ECONOMIC STABILITY: HOUSING INSECURITY: IN THE PAST 12 MONTHS, HOW MANY TIMES HAVE YOU MOVED WHERE YOU WERE LIVING?: 0

## 2025-01-22 SDOH — ECONOMIC STABILITY: TRANSPORTATION INSECURITY: IN THE PAST 12 MONTHS, HAS LACK OF TRANSPORTATION KEPT YOU FROM MEDICAL APPOINTMENTS OR FROM GETTING MEDICATIONS?: NO

## 2025-01-22 SDOH — SOCIAL STABILITY: SOCIAL INSECURITY: ARE YOU OR HAVE YOU BEEN THREATENED OR ABUSED PHYSICALLY, EMOTIONALLY, OR SEXUALLY BY ANYONE?: NO

## 2025-01-22 ASSESSMENT — COGNITIVE AND FUNCTIONAL STATUS - GENERAL
MOVING TO AND FROM BED TO CHAIR: A LITTLE
WALKING IN HOSPITAL ROOM: A LITTLE
STANDING UP FROM CHAIR USING ARMS: A LITTLE
MOBILITY SCORE: 24
TURNING FROM BACK TO SIDE WHILE IN FLAT BAD: A LITTLE
DAILY ACTIVITIY SCORE: 23
TOILETING: A LITTLE
STANDING UP FROM CHAIR USING ARMS: A LITTLE
CLIMB 3 TO 5 STEPS WITH RAILING: A LITTLE
MOBILITY SCORE: 24
WALKING IN HOSPITAL ROOM: A LITTLE
DAILY ACTIVITIY SCORE: 24
DRESSING REGULAR LOWER BODY CLOTHING: A LITTLE
DRESSING REGULAR LOWER BODY CLOTHING: A LITTLE
PATIENT BASELINE BEDBOUND: NO
MOBILITY SCORE: 19
CLIMB 3 TO 5 STEPS WITH RAILING: A LOT
MOVING TO AND FROM BED TO CHAIR: A LITTLE
DAILY ACTIVITIY SCORE: 22
MOBILITY SCORE: 18
MOVING FROM LYING ON BACK TO SITTING ON SIDE OF FLAT BED WITH BEDRAILS: A LITTLE

## 2025-01-22 ASSESSMENT — PAIN SCALES - GENERAL
PAINLEVEL_OUTOF10: 7
PAINLEVEL_OUTOF10: 4
PAINLEVEL_OUTOF10: 10 - WORST POSSIBLE PAIN
PAINLEVEL_OUTOF10: 0 - NO PAIN
PAINLEVEL_OUTOF10: 7
PAINLEVEL_OUTOF10: 7
PAINLEVEL_OUTOF10: 0 - NO PAIN
PAINLEVEL_OUTOF10: 7
PAINLEVEL_OUTOF10: 4
PAINLEVEL_OUTOF10: 7
PAINLEVEL_OUTOF10: 6
PAINLEVEL_OUTOF10: 3

## 2025-01-22 ASSESSMENT — PAIN DESCRIPTION - ORIENTATION
ORIENTATION: RIGHT;LEFT
ORIENTATION: LEFT

## 2025-01-22 ASSESSMENT — LIFESTYLE VARIABLES
HOW MANY STANDARD DRINKS CONTAINING ALCOHOL DO YOU HAVE ON A TYPICAL DAY: PATIENT DOES NOT DRINK
SKIP TO QUESTIONS 9-10: 1
AUDIT-C TOTAL SCORE: 0
AUDIT-C TOTAL SCORE: 0
HOW OFTEN DO YOU HAVE 6 OR MORE DRINKS ON ONE OCCASION: NEVER
HOW OFTEN DO YOU HAVE A DRINK CONTAINING ALCOHOL: NEVER

## 2025-01-22 ASSESSMENT — PAIN - FUNCTIONAL ASSESSMENT
PAIN_FUNCTIONAL_ASSESSMENT: 0-10

## 2025-01-22 ASSESSMENT — PATIENT HEALTH QUESTIONNAIRE - PHQ9
1. LITTLE INTEREST OR PLEASURE IN DOING THINGS: SEVERAL DAYS
2. FEELING DOWN, DEPRESSED OR HOPELESS: NOT AT ALL

## 2025-01-22 ASSESSMENT — PAIN DESCRIPTION - LOCATION
LOCATION: GROIN

## 2025-01-22 ASSESSMENT — ACTIVITIES OF DAILY LIVING (ADL)
LACK_OF_TRANSPORTATION: NO
ADL_ASSISTANCE: INDEPENDENT

## 2025-01-22 NOTE — SIGNIFICANT EVENT
Vascular Surgery Post-Operative Plan    S/p bilateral iliofemoral endarterectomy, profundaplasty, and bilateral iliac stents    Pre-op Vascular Exam: monophasic right PT and AT, very weak monophasic left PT  Post-op Vascular Exam: multiphasic right PT and AT, weak monophasic left PT    Plan:  2 hours PACU stay for NV checks  pain control with Tylenol and PRN oxycodone  Aspirin 81mg daily  Plavix 75mg daily (first dose today)  Clear liquid diet as tolerated after 4 hours  remove abdi at midnight  24 hours Ancef for periop antibiotics  PT tomorrow morning  SQH for DVT Ppx  Wound: bilateral groin incisions with Dermabond    Silvio Quinonez MD  PGY-5 Vascular Surgery Resident

## 2025-01-22 NOTE — BRIEF OP NOTE
Date: 25 OR Location: [facility] OR 16     Name: Matthieu Solis YOB: 1960, Age: 64 y.o., MRN: [id number], Sex: male    Pre-op Diagnosis: Short distance claudication  Post-op Diagnosis: Same    Procedures:   Bilateral iliofemoral endarterectomy and profundaplasty  Aortoiliac and left leg angiogram  Bilateral kissing iliac stents    Surgeons: Josefina Wylie MD    Resident/Fellow/Other Assistant: Silvio Quinonez MD; Orlando Licea MD; Cloby Jacobs MD    Anesthesia: general ASA: III  Anesthesia Staff: Anesthesiologist: Max Antonio MD; Geovanni Judd DO; Shraddha Mina MD; Rashaun Denton MD; Neena Soto MD  C-AA: TAMARA Rodriguez  Anesthesia Resident: Salma Santamaria MD    Findings: multiphasic right AT, monophasic left PT, weak monophasic left PT, angiogram with PT run-off to leg via profunda collaterals    Specimens: left femoral thrombus, bilateral femoral plaque    Estimated Blood Loss: 1250cc  Fluids and Transfusions: No transfusions  Lines: PIVs  Implants:   Implant Name Type Inv. Item Serial No.  Lot No. LRB No. Used Action   GRAFT, XENOSURE, BIOLOGIC PATCH, 0.8CM X 8CM - [id number] Implant GRAFT, XENOSURE, BIOLOGIC PATCH, 0.8CM X 8CM  [employer] [id number] Right 1 Implanted   GRAFT, XENOSURE, BIOLOGIC PATCH, 0.8CM X 8CM - [id number] Implant GRAFT, XENOSURE, BIOLOGIC PATCH, 0.8CM X 8CM  [employer] [id number] Left 1 Implanted   STENT, VIABAHN VBX BALLOON, 11 X 79, 7FR 135CM HEPARIN - [id number] - [id number] Implant STENT, VIABAHN VBX BALLOON, 11 X 79, 7FR 135CM HEPARIN [id number] [employer] [id number] Bilateral 1 Implanted   STENT, VIABAHN VBX 2.0 BALLOON, 8 X 79, [id number] REDUCE PROFILE - [id number] - [id number] Other Cardiac Implant STENT, VIABAHN VBX 2.0 BALLOON, 8 X 79, [id number] REDUCE PROFILE [id number] [employer] [id number] Bilateral 1 Implanted   STENT, VIABAHN ENDO, 8 X 5 X 120, RADIOPAQUE W/HEPARIN - [id number] Stent  STENT, VIABAHN ENDO, 8 X 5 X 120, RADIOPAQUE W/HEPARIN  [employer] [id number] Left 1 Implanted   STENT, VIABAHN VBX BALLOON, 11 X 59, 7FR 135CM HEPARIN - [id number] - [id number] Other Cardiac Implant STENT, VIABAHN VBX BALLOON, 11 X 59, 7FR 135CM HEPARIN [id number] [employer] [id number] Left 1 Implanted   STENT, VIABAHN ENDO, 8 X 10 X 120, RADIOPAQUE W/HEPARIN - [id number] Stent STENT, VIABAHN ENDO, 8 X 10 X 120, RADIOPAQUE W/HEPARIN [id number] [employer] [id number] Right 1 Implanted     Complications: None  Condition: stable  Disposition: PACU - hemodynamically stable.    Silvio Quinonez MD

## 2025-01-22 NOTE — CARE PLAN
Problem: Pain - Adult  Goal: Verbalizes/displays adequate comfort level or baseline comfort level  1/22/2025 0321 by Olivia Horowitz RN  Outcome: Progressing  1/22/2025 0321 by Olivia Horowitz RN  Outcome: Progressing     Problem: Safety - Adult  Goal: Free from fall injury  Outcome: Progressing     Problem: Discharge Planning  Goal: Discharge to home or other facility with appropriate resources  Outcome: Progressing     Problem: Pain  Goal: Turns in bed with improved pain control throughout the shift  1/22/2025 0321 by Olivia Horowitz RN  Outcome: Progressing  1/22/2025 0321 by Olivia Horowitz RN  Outcome: Progressing     Problem: Pain  Goal: Walks with improved pain control throughout the shift  1/22/2025 0321 by Olivia Horowitz RN  Outcome: Progressing  1/22/2025 0321 by Olivia Horowitz RN  Outcome: Progressing     The clinical goals for the shift include pt will have adequate pain control

## 2025-01-22 NOTE — SIGNIFICANT EVENT
S:    POD 0 from bilateral iliofemoral endarterectomies, profundaplasties, and bilateral iliac stenting. He endorses pain in his bilateral groins, although denies any pain, parasthesias, or weakness of his legs or feet.     O:   Vital signs are stable, hypertensive, afebrile, no new or worsening oxygen requirement, not tachycardic  Visit Vitals  BP (!) 168/112   Pulse 77   Temp 36.5 °C (97.7 °F) (Temporal)   Resp 18        Constitutional: no acute distress  Skin: warm and dry overall   Neuro: A/O x4, no gross deficits   HEENT: Atraumatic, no scleral icterus  Cardiac: RRR  Pulmonary: Unlabored respirations, on RA    Abdomen: Non distended, non tender  GI: abdi in place, clear urine in bag  Surgical Site: Bilateral groin incisions are c/d/I. No swelling or hematoma noted. Motor and sensation intact bilaterally. Multiphasic right PT, AT; robust monophasic left PT    A/P:  Overall, patient is doing well postoperatively with no acute concerns.  Will continue to optimize pain control as needed.  Will continue to monitor clinical exam, vitals, I&O's, and labs when available.  Will follow up on the patient in the a.m. or sooner as needed.    Landon Foster MD   PGY-1 Vascular Surgery   [id number]

## 2025-01-22 NOTE — ANESTHESIA POSTPROCEDURE EVALUATION
Patient: Matthieu Solis    Procedure Summary       Date: 01/21/25 Room / Location: [facility] OR 16 / [facility] OR    Anesthesia Start: 1247 Anesthesia Stop:     Procedure: bilateral femoral endarterectomyand bilateral profundaplasty, bilateral iliac stents (Left) Diagnosis:       Peripheral vascular disease (CMS-[facility])      (Peripheral vascular disease (CMS-[facility]) [I73.9])    Surgeons: Josefina Wylie MD Responsible Provider: Shraddha Mina MD    Anesthesia Type: general ASA Status: 3            Anesthesia Type: general    Vitals Value Taken Time   /77 01/21/25 1939   Temp 36.1 01/21/25 1939   Pulse 98 01/21/25 1939   Resp 17 01/21/25 1939   SpO2 98 01/21/25 1939       Anesthesia Post Evaluation    Patient location during evaluation: PACU  Patient participation: complete - patient participated  Level of consciousness: combative and awake and alert  Pain score: 5  Pain management: adequate  Airway patency: patent  Cardiovascular status: acceptable  Respiratory status: acceptable and face mask  Hydration status: acceptable  Postoperative Nausea and Vomiting: none        No notable events documented.

## 2025-01-22 NOTE — PROGRESS NOTES
Physical Therapy    Physical Therapy Evaluation    Patient Name: Matthieu Solis  MRN: [id number]  Department: [place] [address]  Room: [facility]  Today's Date: 1/22/2025   Time Calculation  Start Time: 0925  Stop Time: 0940  Time Calculation (min): 15 min    Assessment/Plan   PT Assessment  PT Assessment Results: Decreased range of motion, Decreased strength, Decreased endurance, Impaired balance, Decreased mobility, Pain  Rehab Prognosis: Good  Barriers to Discharge Home: No anticipated barriers  Evaluation/Treatment Tolerance: Patient limited by pain  Medical Staff Made Aware: Yes  Strengths: Attitude of self  Barriers to Participation: Comorbidities  End of Session Communication: Bedside nurse  Assessment Comment: pt POD #1 bilateral iliofemoral endarterectomy, profundaplasty, and bilateral iliac stents. pt independent at baseline. CGA-Vanessa for all mobility this session, primarily limited by pain. anticipate low intensity PT at time of DC. will continue to progress mobility while IP  End of Session Patient Position: Bed, 3 rail up, Alarm on  IP OR SWING BED PT PLAN  Inpatient or Swing Bed: Inpatient  PT Plan  Treatment/Interventions: Bed mobility, Transfer training, Gait training, Stair training, Balance training, Endurance training, Strengthening, Range of motion, Therapeutic exercise, Therapeutic activity, Home exercise program, Positioning  PT Plan: Ongoing PT  PT Frequency: 3 times per week  PT Discharge Recommendations: Low intensity level of continued care  Equipment Recommended upon Discharge: Wheeled walker  PT Recommended Transfer Status: Assist x1  PT - OK to Discharge: Yes (DC rec made)    Subjective   General Visit Information:  General  Reason for Referral: POD #1  bilateral iliofemoral endarterectomy, profundaplasty, and bilateral iliac stents  Past Medical History Relevant to Rehab: BL LE CLTI 3 lifestyle limiting claudication  Family/Caregiver Present: Yes  Caregiver Feedback: family present at start of  session  Prior to Session Communication: Bedside nurse  Patient Position Received: Bed, 3 rail up, Alarm on  General Comment: pt supine alert and agreeable for PT  Home Living:  Home Living  Type of Home: House  Lives With: Significant other  Home Adaptive Equipment: None  Home Layout: Two level, Stairs to alternate level with rails  Alternate Level Stairs-Rails: Right  Alternate Level Stairs-Number of Steps: 12  Home Access: Stairs to enter with rails  Entrance Stairs-Number of Steps: 2-3  Bathroom Shower/Tub: Tub/shower unit  Bathroom Toilet: Standard  Prior Level of Function:  Prior Function Per Pt/Caregiver Report  Level of [place]: Independent with ADLs and functional transfers  ADL Assistance: Independent  Homemaking Assistance: Independent  Ambulatory Assistance: Independent  Vocational: Retired  Prior Function Comments: - falls; chair lift to 2nd floor but does not work currently  Precautions:  Precautions  Medical Precautions: Fall precautions      Date/Time Vitals Session Patient Position Pulse Resp SpO2 BP MAP (mmHg)    01/22/25 0853 --  --  69  --  93 %  161/71  99     01/22/25 0925 During PT  --  84  --  --  --  --                 Objective   Pain:  Pain Assessment  Pain Assessment: 0-10  0-10 (Numeric) Pain Score: 7  Pain Type: Surgical pain  Pain Location: Groin  Pain Orientation: Left  Cognition:  Cognition  Orientation Level: Oriented X4    General Assessments:                  Activity Tolerance  Endurance: Tolerates 10 - 20 min exercise with multiple rests    Sensation  Light Touch: No apparent deficits            Perception  Inattention/Neglect: Appears intact      Coordination  Movements are Fluid and Coordinated: Yes    Postural Control  Postural Control: Within Functional Limits    Static Sitting Balance  Static Sitting-Balance Support: Bilateral upper extremity supported, Feet supported  Static Sitting-Level of Assistance: Close supervision  Dynamic Sitting Balance  Dynamic  Sitting-Balance Support: Bilateral upper extremity supported, Feet supported  Dynamic Sitting-Level of Assistance: Contact guard  Dynamic Sitting-Balance: Trunk control activities    Static Standing Balance  Static Standing-Balance Support:  (1-2 UE support)  Static Standing-Level of Assistance: Contact guard  Dynamic Standing Balance  Dynamic Standing-Balance Support:  (0-1 BUE support)  Dynamic Standing-Level of Assistance: Contact guard  Dynamic Standing-Balance: Turning  Functional Assessments:  Bed Mobility  Bed Mobility: Yes  Bed Mobility 1  Bed Mobility 1: Supine to sitting  Level of Assistance 1: Minimum assistance  Bed Mobility Comments 1: HOB elevated, assist with LEs    Transfers  Transfer: Yes  Transfer 1  Transfer From 1: Bed to  Transfer to 1: Sit, Stand  Technique 1: Sit to stand, Stand to sit  Transfer Device 1:  (UE supporton bed rail)  Transfer Level of Assistance 1: Contact guard, Minimal verbal cues  Trials/Comments 1: x3 STS with VC for hand placement    Ambulation/Gait Training  Ambulation/Gait Training Performed: Yes  Ambulation/Gait Training 1  Surface 1: Level tile  Device 1:  (UE support on wall/therapist)  Assistance 1: Contact guard, Minimal verbal cues  Quality of Gait 1: Wide base of support, Inconsistent stride length, Decreased step length, Antalgic  Comments/Distance (ft) 1: ~4ft    Stairs  Stairs: No  Extremity/Trunk Assessments:  RLE   RLE : Within Functional Limits  LLE   LLE : Within Functional Limits  Outcome Measures:  [facility] Basic Mobility  Turning from your back to your side while in a flat bed without using bedrails: A little  Moving from lying on your back to sitting on the side of a flat bed without using bedrails: A little  Moving to and from bed to chair (including a wheelchair): A little  Standing up from a chair using your arms (e.g. wheelchair or bedside chair): A little  To walk in hospital room: A little  Climbing 3-5 steps with railing: A little  Basic Mobility -  Total Score: 18    Encounter Problems       Encounter Problems (Active)       Mobility       STG - Patient will ambulate >/= 250 ft SBA and LRAD (Progressing)       Start:  01/22/25    Expected End:  02/05/25            STG - Patient will ascend and descend a flight of stairs SBA (Progressing)       Start:  01/22/25    Expected End:  02/05/25               PT Transfers       STG - Transfer from bed to chair SBA and LRAD (Progressing)       Start:  01/22/25    Expected End:  02/05/25            STG - Patient will perform bed mobility SBA (Progressing)       Start:  01/22/25    Expected End:  02/05/25            STG - Patient will transfer sit to and from stand SBA and LRAD (Progressing)       Start:  01/22/25    Expected End:  02/05/25               Pain - Adult              Education Documentation  Body Mechanics, taught by Marleen Bedolla PT at 1/22/2025 10:46 AM.  Learner: Patient  Readiness: Acceptance  Method: Explanation  Response: Verbalizes Understanding    Mobility Training, taught by Marleen Bedolla PT at 1/22/2025 10:46 AM.  Learner: Patient  Readiness: Acceptance  Method: Explanation  Response: Verbalizes Understanding    Education Comments  No comments found.        01/22/25 at 10:47 AM - Marleen Bedolla PT

## 2025-01-22 NOTE — PROGRESS NOTES
"  VASCULAR SURGERY PROGRESS NOTE  Assessment/Plan   Matthieu Solis is 64 y.o. male with history of CHF, HTN, CADsmoking, BL LE CLTI 3 lifestyle limiting claudication. No rest pain or foot wounds. non noninvasive studies showed severe occlusive disease bilaterally as well as a prior CT scan which demonstrates left DANYELLE occlusion. On 1/21/25 he underwent bilateral iliofemoral endarterectomy, profundaplasty, and bilateral iliac stents.       Neuro: acute postoperative pain   - continue tylenol/oxy PRN for pain control     CV: hx  CHF, HTN, CAD, smoking, BL LE CLTI 3 lifestyle limiting claudication  - maintain blood pressure control- home carvedilol, entresto, spironalactone. PRN labetalol and hydralazine  - continue statin  - ASA and Plavix  - holding home Jardiance     Pulm: tobacco dependence, cigarettes  - continue to encourage IS hourly while awake  - OOB to chair and increase ambulation as tolerated     FENGI:   - abdi out, passed voiding trial  - tolerating regular diet  - continue bowel regimen to prevent OIC   - monitor and replete electrolytes     Endo: no hx     Heme:   - no s/sx of bleeding  - monitor H/H, transfuse for Hgb <7     ID:  no s/s of infection, leukocytosis likely 2/2 intra op steroids  - monitor s/s of infection  - trend WBC    DVT prophylaxis:  - SQH  - SCDs     Dispo-  - RNF status  - likely discharge 1/23 with HHC    Subjective   Having a lot of incisional pain. Feet feels warmer.    Objective   Vitals:  Heart Rate:  []   Temp:  [36.1 °C (97 °F)-37 °C (98.6 °F)]   Resp:  [11-21]   BP: (113-176)/()   Height:  [165.1 cm (5' 5\")]   Weight:  [63.3 kg (139 lb 8.8 oz)-65.8 kg (145 lb)]   SpO2:  [91 %-99 %]     Exam:  Constitutional: No acute distress, resting comfortably  Neuro:  AOx3, grossly intact  ENMT: moist mucous membranes  CV: no tachycardia  Pulm: non-labored on RA  GI: soft, non-tender, non-distended  Skin: warm and dry. Bilateral groin incisions dry and well approximated " with skin glue covering, no hematoma.   Musculoskeletal: moving all extremities  Extremities: warm and perfused, full sensation and movement. Right DP multiphasic signal, Lt PT biphasic. Left DP/PT multiphasic doppler signals.      Labs:  Results from last 7 days   Lab Units 01/21/25  2036 01/15/25  1529   WBC AUTO x10*3/uL 15.6* 8.3   HEMOGLOBIN g/dL 11.2* 14.6   PLATELETS AUTO x10*3/uL 298 376      Results from last 7 days   Lab Units 01/21/25  2036 01/15/25  1529   SODIUM mmol/L 140 140   POTASSIUM mmol/L 4.1 4.9   CHLORIDE mmol/L 106 105   CO2 mmol/L 22 26   BUN mg/dL 18 17   CREATININE mg/dL 0.85 0.94   GLUCOSE mg/dL 136* 70*   MAGNESIUM mg/dL 2.33  --    PHOSPHORUS mg/dL 4.1  --

## 2025-01-22 NOTE — SIGNIFICANT EVENT
Rapid Response Nurse Note: RADAR alert: 6    Pager time: 610  Arrival time: 611  Event end time: 617  Location: T7-35  [x] Triage by phone or secure messaging    Rapid response initiated by:  [] Rapid response RN [] Family [] Nursing Supervisor [] Physician   [x] RADAR auto page [] Sepsis auto-page [] RN [] RT   [] NP/PA [] Other:     Primary reason for call:   [] BAT [] New CPAP/BiPAP [] Bleeding [] Change in mental status   [] Chest pain [] Code blue [] FiO2 >/= 50% [] HR </= 40 bpm   [] HR >/= 130 bpm [] Hyperglycemia [] Hypoglycemia [x] RADAR    [] RR </= 8 bpm [] RR >/= 30 bpm [] SBP </= 90 mmHg [] SpO2 < 90%   [] Seizure [] Sepsis [] Shortness of breath  [] Staff concern: see comments     Initial VS and/or RADAR VS: T  °C; HR 67; RR 21; /61; SPO2 93%.    Providers present at bedside (if applicable):     Name of ICU Provider contacted (if applicable):     Interventions:  [x] None [] ABG/VBG [] Assist w/ICU transfer [] BAT paged    [] Bag mask [] Blood [] Cardioversion [] Code Blue   [] Code blue for intubation [] Code status changed [] Chest x-ray [] EKG   [] IV fluid/bolus [] KUB x-ray [] Labs/cultures [] Medication   [] Nebulizer treatment [] NIPPV (CPAP/BiPAP) [] Oxygen [] Oral airway   [] Peripheral IV [] Palliative care consult [] CT/MRI [] Sepsis protocol    [] Suctioned [] Other:     Outcome:  [] Coded and  [] Code blue for intubation [] Coded and transferred to ICU []  on division   [x] Remained on division (no change) [] Remained on division + additional monitoring [] Remained in ED [] Transferred to ED   [] Transferred to ICU [] Transferred to inpatient status [] Transferred for interventions (procedure) [] Transferred to ICU stepdown    [] Transferred to surgery [] Transferred to telemetry [] Sepsis protocol [] STEMI protocol   [] Stroke protocol [x] Bedside nurse instructed to page rapid response for any concerns or acute change in condition/VS     Additional Comments:  Reviewed above RADAR VS with bedside RN.  VS within patient's current trends.  No interventions by rapid response team indicated at this time.  Staff to page rapid response for any concerns or acute change in condition/VS.

## 2025-01-22 NOTE — PROGRESS NOTES
Pharmacy Medication History Review    Matthieu Solis is a 64 y.o. male admitted for Peripheral vascular disease (CMS-HCC). Pharmacy reviewed the patient's [id number] medications and allergies for accuracy.    Medications ADDED:  none  Medications CHANGED:  none  Medications REMOVED:   Lysine      The list below reflects the updated PTA list.   Prior to Admission Medications   Prescriptions Last Dose Informant   acetaminophen (Tylenol) 325 mg tablet 1/22/2025 Self   Sig: Take 2 tablets (650 mg) by mouth every 6 hours if needed for moderate pain (4 - 6) for up to 10 doses.   aspirin 81 mg EC tablet 1/22/2025 Self   Sig: Take 1 tablet (81 mg) by mouth once daily.   atorvastatin (Lipitor) 80 mg tablet 1/22/2025 Self   Sig: TAKE 1 TABLET BY MOUTH ONCE DAILY.   carvedilol (Coreg) 12.5 mg tablet 1/22/2025 Self   Sig: Take 1 tablet (12.5 mg) by mouth 2 times daily (morning and late afternoon).   chlorhexidine (Hibiclens) 4 % external liquid     Sig: Apply topically once daily as needed for wound care for up to 5 days.   empagliflozin (Jardiance) 10 mg 1/22/2025 Self   Sig: Take 1 tablet (10 mg) by mouth once daily.   lidocaine (Lidoderm) 5 % patch Not Taking Self   Sig: Place 1 patch over 12 hours on the skin once daily. Remove & discard patch within 12 hours or as directed by MD.   Patient not taking: Reported on 1/22/2025--> states that in makes him crave/smoke cigarettes    meloxicam (Mobic) 15 mg tablet 1/22/2025 Self   Sig: Take 1 tablet (15 mg) by mouth once daily as needed for moderate pain (4 - 6).   nicotine polacrilex (Commit) 4 mg lozenge 1/22/2025 Self   Sig: Use 1 lozenge (4 mg) in the mouth or throat every 2 hours if needed for smoking cessation.   sacubitriL-valsartan (Entresto)  mg tablet 1/22/2025 Self   Sig: Take 1 tablet by mouth 2 times a day.   spironolactone (Aldactone) 25 mg tablet 1/22/2025 Self   Sig: Take 0.5 tablets (12.5 mg) by mouth once daily.      Facility-Administered  "Medications: None        The list below reflects the updated allergy list. Please review each documented allergy for additional clarification and justification.  Allergies  Reviewed by Jania Plascencia PharmD on 1/22/2025        Severity Reactions Comments    Cinnamon Not Specified Unknown Cinnamon    Doxycycline Not Specified Swelling     Penicillins Not Specified Unknown             Patient accepts M2B at discharge.     Sources:   [facility]  Pharmacy dispense history  Patient interview Moderate historian  Only knowledgeable after seeing names of meds   Chart Review  Primary care note from 11/4  Cardio note from 1/7  Care Everywhere    Additional Comments:  Pt states that he has some financial instability at this moment therefore have not taken OTC products (lysine/vitamin D)  Review additional comments above      Jania Plascencia PharmD  Transitions of Care Pharmacist  01/22/25     Secure Chat preferred   If no response call [phone] or [employer]era \"Med Rec\"      "

## 2025-01-23 ENCOUNTER — PHARMACY VISIT (OUTPATIENT)
Dept: PHARMACY | Facility: CLINIC | Age: 65
End: 2025-01-23
Payer: COMMERCIAL

## 2025-01-23 ENCOUNTER — DOCUMENTATION (OUTPATIENT)
Dept: HOME HEALTH SERVICES | Facility: HOME HEALTH | Age: 65
End: 2025-01-23
Payer: COMMERCIAL

## 2025-01-23 VITALS
HEIGHT: 65 IN | WEIGHT: 143.96 LBS | HEART RATE: 84 BPM | RESPIRATION RATE: 18 BRPM | OXYGEN SATURATION: 98 % | BODY MASS INDEX: 23.99 KG/M2 | DIASTOLIC BLOOD PRESSURE: 47 MMHG | TEMPERATURE: 97.7 F | SYSTOLIC BLOOD PRESSURE: 107 MMHG

## 2025-01-23 DIAGNOSIS — I73.9 PERIPHERAL ARTERIAL DISEASE (CMS-HCC): Primary | ICD-10-CM

## 2025-01-23 LAB
ALBUMIN SERPL BCP-MCNC: 3.7 G/DL (ref 3.4–5)
ANION GAP SERPL CALC-SCNC: 9 MMOL/L (ref 10–20)
BASOPHILS # BLD AUTO: 0.04 X10*3/UL (ref 0–0.1)
BASOPHILS NFR BLD AUTO: 0.3 %
BUN SERPL-MCNC: 26 MG/DL (ref 6–23)
CALCIUM SERPL-MCNC: 8.2 MG/DL (ref 8.6–10.6)
CHLORIDE SERPL-SCNC: 108 MMOL/L (ref 98–107)
CO2 SERPL-SCNC: 24 MMOL/L (ref 21–32)
CREAT SERPL-MCNC: 0.94 MG/DL (ref 0.5–1.3)
EGFRCR SERPLBLD CKD-EPI 2021: >90 ML/MIN/1.73M*2
EOSINOPHIL # BLD AUTO: 0.38 X10*3/UL (ref 0–0.7)
EOSINOPHIL NFR BLD AUTO: 3 %
ERYTHROCYTE [DISTWIDTH] IN BLOOD BY AUTOMATED COUNT: 13 % (ref 11.5–14.5)
GLUCOSE SERPL-MCNC: 88 MG/DL (ref 74–99)
HCT VFR BLD AUTO: 28 % (ref 41–52)
HGB BLD-MCNC: 9.3 G/DL (ref 13.5–17.5)
IMM GRANULOCYTES # BLD AUTO: 0.05 X10*3/UL (ref 0–0.7)
IMM GRANULOCYTES NFR BLD AUTO: 0.4 % (ref 0–0.9)
LYMPHOCYTES # BLD AUTO: 2.36 X10*3/UL (ref 1.2–4.8)
LYMPHOCYTES NFR BLD AUTO: 18.6 %
MAGNESIUM SERPL-MCNC: 2.26 MG/DL (ref 1.6–2.4)
MCH RBC QN AUTO: 32.4 PG (ref 26–34)
MCHC RBC AUTO-ENTMCNC: 33.2 G/DL (ref 32–36)
MCV RBC AUTO: 98 FL (ref 80–100)
MONOCYTES # BLD AUTO: 1.71 X10*3/UL (ref 0.1–1)
MONOCYTES NFR BLD AUTO: 13.5 %
NEUTROPHILS # BLD AUTO: 8.12 X10*3/UL (ref 1.2–7.7)
NEUTROPHILS NFR BLD AUTO: 64.2 %
NRBC BLD-RTO: 0 /100 WBCS (ref 0–0)
PHOSPHATE SERPL-MCNC: 2.8 MG/DL (ref 2.5–4.9)
PLATELET # BLD AUTO: 229 X10*3/UL (ref 150–450)
POTASSIUM SERPL-SCNC: 3.7 MMOL/L (ref 3.5–5.3)
RBC # BLD AUTO: 2.87 X10*6/UL (ref 4.5–5.9)
SODIUM SERPL-SCNC: 137 MMOL/L (ref 136–145)
WBC # BLD AUTO: 12.7 X10*3/UL (ref 4.4–11.3)

## 2025-01-23 PROCEDURE — 85025 COMPLETE CBC W/AUTO DIFF WBC: CPT | Performed by: NURSE PRACTITIONER

## 2025-01-23 PROCEDURE — 2500000004 HC RX 250 GENERAL PHARMACY W/ HCPCS (ALT 636 FOR OP/ED): Performed by: NURSE PRACTITIONER

## 2025-01-23 PROCEDURE — 2500000004 HC RX 250 GENERAL PHARMACY W/ HCPCS (ALT 636 FOR OP/ED): Performed by: STUDENT IN AN ORGANIZED HEALTH CARE EDUCATION/TRAINING PROGRAM

## 2025-01-23 PROCEDURE — 36415 COLL VENOUS BLD VENIPUNCTURE: CPT | Performed by: NURSE PRACTITIONER

## 2025-01-23 PROCEDURE — 2500000001 HC RX 250 WO HCPCS SELF ADMINISTERED DRUGS (ALT 637 FOR MEDICARE OP): Performed by: STUDENT IN AN ORGANIZED HEALTH CARE EDUCATION/TRAINING PROGRAM

## 2025-01-23 PROCEDURE — 80069 RENAL FUNCTION PANEL: CPT | Performed by: NURSE PRACTITIONER

## 2025-01-23 PROCEDURE — 2500000002 HC RX 250 W HCPCS SELF ADMINISTERED DRUGS (ALT 637 FOR MEDICARE OP, ALT 636 FOR OP/ED): Performed by: STUDENT IN AN ORGANIZED HEALTH CARE EDUCATION/TRAINING PROGRAM

## 2025-01-23 PROCEDURE — 99239 HOSP IP/OBS DSCHRG MGMT >30: CPT | Performed by: NURSE PRACTITIONER

## 2025-01-23 PROCEDURE — 83735 ASSAY OF MAGNESIUM: CPT | Performed by: NURSE PRACTITIONER

## 2025-01-23 RX ADMIN — CLOPIDOGREL BISULFATE 75 MG: 75 TABLET ORAL at 10:07

## 2025-01-23 RX ADMIN — CARVEDILOL 12.5 MG: 12.5 TABLET, FILM COATED ORAL at 10:07

## 2025-01-23 RX ADMIN — ACETAMINOPHEN 650 MG: 325 TABLET ORAL at 11:24

## 2025-01-23 RX ADMIN — SACUBITRIL AND VALSARTAN 1 TABLET: 97; 103 TABLET, FILM COATED ORAL at 10:07

## 2025-01-23 RX ADMIN — HEPARIN SODIUM 5000 UNITS: 5000 INJECTION, SOLUTION INTRAVENOUS; SUBCUTANEOUS at 10:08

## 2025-01-23 RX ADMIN — KETOROLAC TROMETHAMINE 30 MG: 30 INJECTION, SOLUTION INTRAMUSCULAR; INTRAVENOUS at 03:07

## 2025-01-23 RX ADMIN — ATORVASTATIN CALCIUM 80 MG: 80 TABLET, FILM COATED ORAL at 10:07

## 2025-01-23 RX ADMIN — ACETAMINOPHEN 650 MG: 325 TABLET ORAL at 06:10

## 2025-01-23 RX ADMIN — SPIRONOLACTONE 12.5 MG: 25 TABLET, FILM COATED ORAL at 06:10

## 2025-01-23 RX ADMIN — OXYCODONE 10 MG: 5 TABLET ORAL at 08:31

## 2025-01-23 RX ADMIN — ASPIRIN 81 MG: 81 TABLET, COATED ORAL at 10:07

## 2025-01-23 ASSESSMENT — PAIN SCALES - GENERAL
PAINLEVEL_OUTOF10: 9
PAINLEVEL_OUTOF10: 5 - MODERATE PAIN

## 2025-01-23 ASSESSMENT — PAIN - FUNCTIONAL ASSESSMENT: PAIN_FUNCTIONAL_ASSESSMENT: 0-10

## 2025-01-23 ASSESSMENT — COGNITIVE AND FUNCTIONAL STATUS - GENERAL
MOBILITY SCORE: 24
DAILY ACTIVITIY SCORE: 23
DRESSING REGULAR LOWER BODY CLOTHING: A LITTLE

## 2025-01-23 NOTE — HOSPITAL COURSE
64 year old M admitted to [facility] for a planned procedure. Patient underwent bilateral iliofemoral endarterectomy, profundaplasty, and bilateral iliac stents on 1/21/2025 by Dr. Wylie for CLTI RC3. Intraoperative course was without complication and the patient was transferred to the regular nursing floor after routine PACU stay. The postoperative course was uncomplicated. The patient tolerated a regular diet, was controlled on oral pain medications and is medically ready for discharge. Physical therapy evaluated the patient with recommendations for home PT at time of discharge. Patient will be discharged home with assistance from family resuming all home medications including aspirin and plavix. Patient will follow up in the outpatient clinic with Dr. Wylie in 1 month with an OLENA.

## 2025-01-23 NOTE — CARE PLAN
Problem: Pain - Adult  Goal: Verbalizes/displays adequate comfort level or baseline comfort level  Outcome: Progressing     Problem: Pain  Goal: Takes deep breaths with improved pain control throughout the shift  Outcome: Progressing  Goal: Turns in bed with improved pain control throughout the shift  Outcome: Progressing  Goal: Walks with improved pain control throughout the shift  Outcome: Progressing  Goal: Performs ADL's with improved pain control throughout shift  Outcome: Progressing  Goal: Participates in PT with improved pain control throughout the shift  Outcome: Progressing  Goal: Free from opioid side effects throughout the shift  Outcome: Progressing  Goal: Free from acute confusion related to pain meds throughout the shift  Outcome: Progressing     Problem: Safety - Adult  Goal: Free from fall injury  Outcome: Progressing     Problem: Discharge Planning  Goal: Discharge to home or other facility with appropriate resources  Outcome: Progressing     Problem: Chronic Conditions and Co-morbidities  Goal: Patient's chronic conditions and co-morbidity symptoms are monitored and maintained or improved  Outcome: Progressing   The patient's goals for the shift include      The clinical goals for the shift include pt to report pain <3 by the end of shift    Over the shift, the patient did not make progress toward the following goals. Barriers to progression include disease process. Recommendations to address these barriers include pain control.

## 2025-01-23 NOTE — DISCHARGE SUMMARY
Discharge Diagnosis  Peripheral vascular disease (CMS-HCC)    Issues Requiring Follow-Up  Vascular surgery follow up    Test Results Pending At Discharge  Pending Labs       Order Current Status    Surgical Pathology Exam In process            Hospital Course  64 year old M admitted to [facility] for a planned procedure. Patient underwent bilateral iliofemoral endarterectomy, profundaplasty, and bilateral iliac stents on 1/21/2025 by Dr. Wylie for CLTI RC3. Intraoperative course was without complication and the patient was transferred to the regular nursing floor after routine PACU stay. The postoperative course was uncomplicated. The patient tolerated a regular diet, was controlled on oral pain medications and is medically ready for discharge. Physical therapy evaluated the patient with recommendations for home PT at time of discharge. Patient will be discharged home with assistance from family resuming all home medications including aspirin and plavix. Patient will follow up in the outpatient clinic with Dr. Wylie in 1 month with an OLENA.     Pertinent Physical Exam At Time of Discharge  Physical Exam  Constitutional: No acute distress, resting comfortably  Neuro:  AOx3, grossly intact  ENMT: moist mucous membranes  CV: no tachycardia  Pulm: non-labored on RA  GI: soft, non-tender, non-distended  Skin: warm and dry. Bilateral groin incisions dry and well approximated with skin glue covering, no hematoma.   Musculoskeletal: moving all extremities  Extremities: warm and perfused, full sensation and movement. Right DP multiphasic signal, Lt PT biphasic. Left DP/PT multiphasic doppler signals.    Home Medications     Medication List      START taking these medications     clopidogrel 75 mg tablet; Commonly known as: Plavix; Take 1 tablet (75   mg) by mouth once daily.   oxyCODONE 5 mg immediate release tablet; Commonly known as: Roxicodone;   Take 1 tablet (5 mg) by mouth every 6 hours if needed for severe pain (7 -   10)  for up to 7 days.     CHANGE how you take these medications     acetaminophen 325 mg tablet; Commonly known as: Tylenol; Take 2 tablets   (650 mg) by mouth every 6 hours if needed for mild pain (1 - 3) or   moderate pain (4 - 6).; What changed: reasons to take this     CONTINUE taking these medications     aspirin 81 mg EC tablet; Take 1 tablet (81 mg) by mouth once daily.   atorvastatin 80 mg tablet; Commonly known as: Lipitor; TAKE 1 TABLET BY   MOUTH ONCE DAILY.   carvedilol 12.5 mg tablet; Commonly known as: Coreg; Take 1 tablet (12.5   mg) by mouth 2 times daily (morning and late afternoon).   empagliflozin 10 mg; Commonly known as: Jardiance; Take 1 tablet (10 mg)   by mouth once daily.   Entresto  mg tablet; Generic drug: sacubitriL-valsartan; Take 1   tablet by mouth 2 times a day.   nicotine polacrilex 4 mg lozenge; Commonly known as: Commit; Use 1   lozenge (4 mg) in the mouth or throat every 2 hours if needed for smoking   cessation.   spironolactone 25 mg tablet; Commonly known as: Aldactone; Take 0.5   tablets (12.5 mg) by mouth once daily.     STOP taking these medications     chlorhexidine 4 % external liquid; Commonly known as: Hibiclens   lidocaine 5 % patch; Commonly known as: Lidoderm   meloxicam 15 mg tablet; Commonly known as: Mobic       Outpatient Follow-Up  Future Appointments   Date Time Provider Department Center   1/30/2025 11:00 AM Charito Diaz APRN-CNP [id number] [facility]   2/3/2025 11:00 AM Susan Prasad MD [id number] [place]   2/10/2025  3:00 PM Inna Lau MD [id number] [place]   3/10/2025  1:00 PM Aurelio Zuniga MD [id number] [place]   3/21/2025  2:00 PM Kristin Kauffman MD [id number] [facility]   4/18/2025  1:40 PM Rahel Waldron, PharmD [id number] [facility]   4/30/2025  9:00 AM Francia Ramsay, PhD [id number] [facility]   5/14/2025  1:00 PM Aurelio Zuniga MD [id number] [place]     >30 minutes spent on day of discharge assessment and plan including time spent in discharge  education and coordination    Ruth Schneider, APRN-CNP

## 2025-01-23 NOTE — CARE PLAN
The patient's goals for the shift include  pt will ambulate in room and discharge home today    The clinical goals for the shift include pt's pain will be maintained throughout shift

## 2025-01-23 NOTE — PROGRESS NOTES
1/23/2025 Care Coordination  PT/OT recs for low and a wheeled walker.  Provided pt with a wheeled walker for home.  Referral to [facility].

## 2025-01-23 NOTE — NURSING NOTE
Nursing Discharge Note: Pt discharged from [facility] with son accompanying him. Discharge orders reviewed, pt and family verbalized understanding. IV and tele removed.

## 2025-01-24 LAB
BLOOD EXPIRATION DATE: NORMAL
DISPENSE STATUS: NORMAL
PRODUCT BLOOD TYPE: 5100
PRODUCT CODE: NORMAL
UNIT ABO: NORMAL
UNIT NUMBER: NORMAL
UNIT RH: NORMAL
UNIT VOLUME: 328
UNIT VOLUME: 350
XM INTEP: NORMAL

## 2025-01-24 NOTE — OP NOTE
bilateral femoral endarterectomyand bilateral profundaplasty, bilateral iliac stents (L) Operative Note     Date: 2025  OR Location: [facility] OR    Name: Matthieu Solis YOB: 1960, Age: 64 y.o., MRN: [id number], Sex: male    Diagnosis  Pre-op Diagnosis      * Peripheral vascular disease (CMS-HCC) [I73.9] Post-op Diagnosis     * Peripheral vascular disease (CMS-HCC) [I73.9]     Procedures  Bilateral common femoral and deep femoral endarterectomy with bovine patch angioplasty  Abdominal aortogram with bilateral iliofemoral runoff  Bilateral common iliac artery stent  Bilateral external iliac artery stent      Surgeons      * Josefina Wylie - Primary    Resident/Fellow/Other Assistant:  Surgeons and Role:     * Orlando Licea MD - Resident - Assisting     * Colby Jacobs MD - Resident - Assisting     * Silvio Quinonez MD - Resident - Assisting    Staff:   Circulator: Mehnaz  Circulator: Hali  Scrub Person: Fartun  Scrub Person: Ivonne  Relief Circulator: Key  Relief Scrub: Deep  Relief Circulator: Padilla  Relief Scrub: Channon    Anesthesia Staff: Anesthesiologist: Max Antonio MD; Geovanni Judd DO; Shraddha Mina MD; Rashaun Denton MD; Neena Soto MD  C-AA: TAMARA Rodriguez  Anesthesia Resident: Salma Santamaria MD    Procedure Summary  Anesthesia: General  ASA: III  Estimated Blood Loss: 500 mL  Intra-op Medications:   Administrations occurring from 1245 to 1550 on 25:   Medication Name Total Dose   heparin (porcine) 5,000 Units in sodium chloride 0.9% 500 mL irrigation 5,000 Units   sodium chloride 0.9 % irrigation solution 1,000 mL   thrombin (bovine) 5000 Unit vial + gelatin absorbable 100 sponge topical mixture 10,000 Units   albumin human bottle 5% 500 mL   ceFAZolin (Ancef) vial 1 g 2 g   dexAMETHasone (Decadron) injection 4 mg/mL 8 mg   esmolol (Brevibloc) injection 50 mg   fentaNYL (Sublimaze) injection 50 mcg/mL 100 mcg   heparin injection 1,000 units/mL 11,000 Units    HYDROmorphone (Dilaudid) injection 1 mg/mL 1 mg   lactated Ringer's infusion Cannot be calculated   lidocaine (Xylocaine) injection 1 % 100 mg   midazolam PF (Versed) injection 1 mg/mL 2 mg   propofol (Diprivan) injection 10 mg/mL 200 mg   rocuronium (ZeMuron) 50 mg/5 mL injection 70 mg              Anesthesia Record               Intraprocedure I/O Totals          Intake    lactated Ringer's 3000.00 mL    Total Intake 3000 mL       Output    Urine 600 mL    Est. Blood Loss 1250 mL    Total Output 1850 mL       Net    Net Volume 1150 mL          Specimen:   ID Type Source Tests Collected by Time   1 : right and left femoral plaque Tissue PLAQUE SURGICAL PATHOLOGY EXAM Josefina Wylie MD 1/21/2025 1431   2 : left femoral thrombus Tissue THROMBUS (CLOT) SURGICAL PATHOLOGY EXAM Josefina Wylie MD 1/21/2025 1432                 Drains and/or Catheters:   [REMOVED] Urethral Catheter 16 Fr. (Removed)   Site Assessment Clean;Skin intact 01/21/25 2200   Collection Container Standard drainage bag 01/21/25 2200   Securement Method Securing device (Describe) 01/21/25 2200   Output (mL) 1100 mL 01/22/25 0600       Tourniquet Times:         Implants:  Implants       Type Name Action Serial No.      Implant GRAFT, XENOSURE, BIOLOGIC PATCH, 0.8CM X 8CM - [id number] Implanted      Implant GRAFT, XENOSURE, BIOLOGIC PATCH, 0.8CM X 8CM - [id number] Implanted      Implant STENT, VIABAHN VBX BALLOON, 11 X 79, 7FR 135CM HEPARIN - [id number] - [id number] Implanted [id number]     Other Cardiac Implant STENT, VIABAHN VBX 2.0 BALLOON, 8 X 79, [id number] REDUCE PROFILE - [id number] - [id number] Implanted [id number]     Stent STENT, VIABAHN ENDO, 8 X 5 X 120, RADIOPAQUE W/HEPARIN - [id number] Implanted      Other Cardiac Implant STENT, VIABAHN VBX BALLOON, 11 X 59, 7FR 135CM HEPARIN - [id number] - [id number] Implanted [id number]     Stent STENT, VIABAHN ENDO, 8 X 10 X 120, RADIOPAQUE W/HEPARIN - [id number] Implanted [id number]              Findings: Multiphasic  right PT signals, monophasic left PT signal    Indications: Matthieu Solis is an 64 y.o. male who is having surgery for Peripheral vascular disease (CMS-[facility]) [I73.9]. Patient presents with BL LE CLTI 3 lifestyle limiting claudication. He has tried walking therapy and maximal medical management. Noninvasive studies were significant for right OLENA 0.42 (0.31), left OLENA 0.31 (0).    The patient was seen in the preoperative area. The risks, benefits, complications, treatment options, non-operative alternatives, expected recovery and outcomes were discussed with the patient. The possibilities of reaction to medication, pulmonary aspiration, injury to surrounding structures, bleeding, recurrent infection, the need for additional procedures, failure to diagnose a condition, and creating a complication requiring transfusion or operation were discussed with the patient. The patient concurred with the proposed plan, giving informed consent.  The site of surgery was properly noted/marked if necessary per policy. The patient has been actively warmed in preoperative area. Preoperative antibiotics have been ordered and given within 1 hours of incision. Venous thrombosis prophylaxis have been ordered including chemical prophylaxis    Procedure Details:   #15 blade used to create bilateral femoral scimitar incisions. Electrocautery used to dissect through subcutaneous tissue until inguinal ligament visualized. The arteries were palpated and femoral sheaths incised. Dissection continued until the distal external iliac, common femoral artery, superficial femoral artery, and profunda artery were exposed and controlled with vessel loops. There was no pulse on the left consistent with known left iliac occlusion. Patient was systemically heparinized and serial ACT > 250 checked.    Arteries were clamped and #11 blade used to create an arteriotomy which was extended with Bonilla scissors. Large plaque removed with freer elevator and  carefully tapered to endpoints proximally and into the profunda. Heparinized saline was used to ensure no lifting flaps. A bovine patch was used for patch angioplasty using 6-0 prolene. Flushing maneuvers performed prior to completion of anastomosis. This was done on both sides concurrently.    Next, both patches were accessed directly with a micropuncture access kit in retrograde fashion. Bilateral 8F sheaths were inserted. Catheter inserted into the aorta and hand injection used to perform abdominal aortogram with bilateral iliofemoral runoff consistent with patent aorta, left iliac occlusion, right DANYELLE patency with ectasia, occluded right hypogastric artery, and patent right EIA.    From the left side, angled glidewire and quickcross catheter were used to attempt recanalization however this was in a subintimal plane with inability to enter back into the true lumen at the distal aorta. An omniflush catheter was inserted in the right side and used to go up and over the aortic bifurcation with the wire placed at the very proximal left DANYELLE. Balloon angioplasty from the left side was performed with a 4 mm balloon in attempted CART technique however unable to gain re-entry. To achieve better support and purchase, an 8.5 Tourguide was inserted through the right and used to hook onto the aortic bifurcation. From there the angled glidewire and quickcross catheter were able to advance through a different subintimal plane and this was snared out the left sided patch access. With this through and through wire established, the 8Fx25 cm sheath on the left was advanced into the distal aorta. The through and through wire was released and both sides had a [facility] wire from access site into the aorta. Angiography confirmed true lumen re-entry.    An 11 VBX was brought up the right side and 8L VBX on the left. They were deployed simultaneously in kissing balloon fashion. The left side additionally had a 7 mm Viabahn extended into  the EIA. The left hypogastric artery was chronically occluded.     At this point the devices were removed and patch access closed primarily with 5-0 prolene. The right side had strong multiphasic signals, but left had very weak monophasic signal on the PT. Angiography was performed of the left lower extremity significant for multilevel occlusive disease but reconstitution of the P1 segment and intact tibial runoff. The flow was slow though he has known HF.    Final angiography in a different view demonstrated the right DANYELLE stent proximal edge was not quite in line with the left DANYELLE stent. The right DANYELLE stent was proximally extended with another VBX and flared with balloon support of the left side. The right EIA also appeared to have a significant stenosis. Pressure gradient was measured and this was noted to be hemodynamically significant. An 8 mm Viabahn was placed and extended into the right EIA. Repeat angiography demonstrated significant improvement in stent alignment and inflow. The pulse in both femoral arteries were stronger. Devices removed again and patchotomy closed with 6-0 prolene. Protamine given. Wounds copiously irrigated with irrisept. Wounds closed in multiple layers with 2-0 and 3-0 Vicryl, 4-0 monocryl and dermabond for the skin      Complications:  None; patient tolerated the procedure well.    Disposition: PACU - hemodynamically stable.  Condition: stable         Attending Attestation: I was present and scrubbed for the entire procedure.    Josefina Wylie  Phone Number: [phone]

## 2025-01-26 ENCOUNTER — HOME CARE VISIT (OUTPATIENT)
Dept: HOME HEALTH SERVICES | Facility: HOME HEALTH | Age: 65
End: 2025-01-26
Payer: COMMERCIAL

## 2025-01-26 VITALS
RESPIRATION RATE: 18 BRPM | SYSTOLIC BLOOD PRESSURE: 154 MMHG | HEART RATE: 79 BPM | DIASTOLIC BLOOD PRESSURE: 65 MMHG | TEMPERATURE: 98.2 F

## 2025-01-26 PROCEDURE — G0151 HHCP-SERV OF PT,EA 15 MIN: HCPCS

## 2025-01-26 SDOH — HEALTH STABILITY: PHYSICAL HEALTH: EXERCISE TYPE: SKILLED THERAPEUTIC EXERCISES

## 2025-01-26 ASSESSMENT — ACTIVITIES OF DAILY LIVING (ADL)
ENTERING_EXITING_HOME: MAXIMUM ASSIST
PHYSICAL TRANSFERS ASSESSED: 1
PHYSICAL_TRANSFERS_DEVICES: BODY SUPPORT
AMBULATION ASSISTANCE ON FLAT SURFACES: 1
AMBULATION ASSISTANCE: 1
AMBULATION ASSISTANCE: STAND BY ASSIST
CURRENT_FUNCTION: MAXIMUM ASSIST
AMBULATION ASSISTANCE: MAXIMUM ASSIST
AMBULATION_DISTANCE/DURATION_TOLERATED: 40 FT
OASIS_M1830: 03

## 2025-01-26 ASSESSMENT — BALANCE ASSESSMENTS
SITTING BALANCE: 1 - STEADY, SAFE
SITTING DOWN: 1 - USES ARMS OR NOT SMOOTH MOTION
TURNING 360 DEGREES STEPS: 0 - DISCONTINUOUS STEPS
NUDGED SCORE: 0
STANDING BALANCE: 1 - STEADY BUT WIDE STANCE AND USES CANE OR OTHER SUPPORT
ARISES: 1 - ABLE, USES ARMS TO HELP
IMMEDIATE STANDING BALANCE FIRST 5 SECONDS: 1 - STEADY BUT USES WALKER OR OTHER SUPPORT
NUDGED: 0 - BEGINS TO FALL
ATTEMPTS TO ARISE: 1 - ABLE, REQUIRES MORE THAN ONE ATTEMPT
BALANCE SCORE: 6
EYES CLOSED AT MAXIMUM POSITION NUDGED: 0 - UNSTEADY
ARISING SCORE: 1

## 2025-01-26 ASSESSMENT — GAIT ASSESSMENTS
GAIT SCORE: 6
STEP CONTINUITY: 0 - STOPPING OR DISCONTINUITY BETWEEN STEPS
TRUNK: 1 - NO SWAY BUT FLEXION OF KNEES OR BACK OR SPREADS ARMS WHILE WALKING
WALKING STANCE: 0 - HEELS APART
INITIATION OF GAIT IMMEDIATELY AFTER GO: 0 - ANY HESITANCY OR MULTIPLE ATTEMPTS TO START
PATH SCORE: 1
TRUNK SCORE: 1
STEP SYMMETRY: 0 - RIGHT AND LEFT STEP LENGTH NOT EQUAL
PATH: 1 - MILD/MODERATE DEVIATION OR USES WALKING AID
BALANCE AND GAIT SCORE: 12

## 2025-01-26 ASSESSMENT — ENCOUNTER SYMPTOMS
LIMITED RANGE OF MOTION: 1
PAIN LOCATION: LEFT LEG
PAIN LOCATION - EXACERBATING FACTORS: AMBULATION
SUBJECTIVE PAIN PROGRESSION: UNCHANGED
PERSON REPORTING PAIN: PATIENT
PAIN LOCATION - PAIN SEVERITY: 7/10
PAIN: 1
HIGHEST PAIN SEVERITY IN PAST 24 HOURS: 9/10
PAIN LOCATION - RELIEVING FACTORS: PAIN MEDS
PAIN LOCATION: RIGHT LEG
LOWEST PAIN SEVERITY IN PAST 24 HOURS: 5/10
PAIN SEVERITY GOAL: 2/10
MUSCLE WEAKNESS: 1
PAIN LOCATION - PAIN SEVERITY: 7/10
ARTHRALGIAS: 1
HYPERTENSION: 1

## 2025-01-27 NOTE — HOME HEALTH
GOALS: PATIENT WILL DEMONSTRATE IMPROVED ENDURANCE, STRENGTH, MOBILITY, AND BALANCE.  SUBJECTIVE: THE PATIENT REPORTED BILATERAL LEG PAIN OF 7/10. PATIENT REPORTED DIFFICULTY WITH AMBULATION.  PRIOR LEVEL OF FUNCTION: MAX ASSIST NEEDED FOR TRANSFERS AND AMBULATION.  LIVING CONDITION: PATIENT IS CURRENTLY LIVING IN A ONE-STORY BUILDING WITH STAIRS. PATIENT IS CURRENTLY LIVING WITH HIS FAMILY.  OBJECTIVE: MANUAL MUSCLE TESTING WAS PERFORMED TO DETERMINE BOTH CORE AND LE STRENGTH. PATIENT DEMONSTRATED 2-/5 MUSCLE STRENGTH TO CORE AND EPHRAIM LE'S. BALANCE TESTING WAS PERFORMED WHICH SHOWED PATIENT TO BE A HIGH FALL RISK. PATIENT WAS ABLE TO WALK 40 FEET WITH NO ASSISTIVE DEVICE, BUT DEMONSTRATED DIFFICULTY WITH TRANSFERS FROM STS AND GAIT ABNORMALITIES ALONG WITH BALANCE IMPAIRMENT.  THERAPEUTIC ACTIVITIES: MMT, TINETTI, MOBILITY, AND GAIT ASSESSMENT COMPLETED  ASSESSMENT: DEMONSTRATED GROSSLY REDUCED STRENGTH AND ENDURANCE SECONDARY TO ASSOCIATED COMORBID CONDITIONS. PATIENT, PRESENTLY, IS A FALL RISK, CURRENTLY AMBULATING WITH A WALKER. FALL PRECAUTIONS DISCUSSED.  PLAN: CONSIDER COMORBID FACTORS WHEN IMPLEMENTING POC. CONTINUE WITH GENERAL ENDURANCE AND STRENGTH TRAINING UE'S AND LE'S, GAIT TRAINING ACTIVITIES, BALANCE AND PROPRIOCEPTIVE TRAINING, AND FALL PREVENTION STRATEGIES AS PER PT POC. PROGRESS AS TOLERATED.  PLAN FOR NEXT VISIT: SKILLED EXERCISES, BALANCE TRAINING, TRANSFER TRAINING.  REHAB POTENTIALS FOR STATED GOALS: GOOD  PATIENT STATED GOAL: ABLE TO WALK WITHOUT DIFFICULTY.

## 2025-01-28 ENCOUNTER — HOME CARE VISIT (OUTPATIENT)
Dept: HOME HEALTH SERVICES | Facility: HOME HEALTH | Age: 65
End: 2025-01-28
Payer: COMMERCIAL

## 2025-01-28 VITALS — HEART RATE: 81 BPM | SYSTOLIC BLOOD PRESSURE: 144 MMHG | RESPIRATION RATE: 18 BRPM | DIASTOLIC BLOOD PRESSURE: 84 MMHG

## 2025-01-28 PROCEDURE — G0151 HHCP-SERV OF PT,EA 15 MIN: HCPCS

## 2025-01-28 SDOH — HEALTH STABILITY: PHYSICAL HEALTH: EXERCISE TYPE: SKILLED THERAPEUTIC EXERCISES

## 2025-01-28 ASSESSMENT — ENCOUNTER SYMPTOMS
PAIN LOCATION: RIGHT LEG
PAIN LOCATION - PAIN SEVERITY: 5/10
PAIN LOCATION - PAIN SEVERITY: 5/10
PERSON REPORTING PAIN: PATIENT
PAIN LOCATION: LEFT LEG
PAIN: 1

## 2025-01-28 ASSESSMENT — ACTIVITIES OF DAILY LIVING (ADL)
AMBULATION ASSISTANCE ON FLAT SURFACES: 1
AMBULATION_DISTANCE/DURATION_TOLERATED: 30 FT

## 2025-01-29 NOTE — HOME HEALTH
S: Patient seen for routine follow-up PT visit today. Patient reported 4/10 pain to bilateral legs, particularly during sitting for long time.    O: Patient ambulated in the home using no AD. Performed skilled therapeutic exercises 2 sets x 10 reps; gait training with no AD x 30 FT.    A: Patient tolerated the treatment well today and was able to complete the exercise regimen with pain and moderate gait and transfer difficulty. Patient demonstrated ability to perform safe STS transfer from chair with hand support and initiate gait with hestiancy. Patient can expect consistent increase in strength and mobility by continuing with current exercise regimen.    P: Consider comorbid conditions when implementing and progressing POC. Continue current exercise regimen including skilled LE therapeutic exercises to improve gross strength and endurance, 2 x 10 reps; gait training with walker/cane in the home environment; balance and proprioceptive training emphasizing focus towards narrow base of support and vesticular challenges.    Plan for next visit: Skilled therapeutic exercises, gait training, transfer training.

## 2025-01-30 ENCOUNTER — APPOINTMENT (OUTPATIENT)
Dept: GERIATRIC MEDICINE | Facility: CLINIC | Age: 65
End: 2025-01-30
Payer: COMMERCIAL

## 2025-01-30 DIAGNOSIS — I73.9 PAD (PERIPHERAL ARTERY DISEASE) (CMS-HCC): Primary | ICD-10-CM

## 2025-01-30 PROCEDURE — RXMED WILLOW AMBULATORY MEDICATION CHARGE

## 2025-01-30 RX ORDER — OXYCODONE HYDROCHLORIDE 5 MG/1
5 TABLET ORAL EVERY 6 HOURS PRN
Qty: 10 TABLET | Refills: 0 | Status: SHIPPED | OUTPATIENT
Start: 2025-01-30 | End: 2025-02-06

## 2025-02-03 ENCOUNTER — APPOINTMENT (OUTPATIENT)
Dept: ENDOCRINOLOGY | Facility: CLINIC | Age: 65
End: 2025-02-03
Payer: COMMERCIAL

## 2025-02-03 VITALS
DIASTOLIC BLOOD PRESSURE: 60 MMHG | RESPIRATION RATE: 16 BRPM | HEART RATE: 78 BPM | HEIGHT: 65 IN | SYSTOLIC BLOOD PRESSURE: 136 MMHG | WEIGHT: 143.6 LBS | BODY MASS INDEX: 23.93 KG/M2

## 2025-02-03 DIAGNOSIS — R79.89 LOW TSH LEVEL: ICD-10-CM

## 2025-02-03 PROCEDURE — 3008F BODY MASS INDEX DOCD: CPT | Performed by: INTERNAL MEDICINE

## 2025-02-03 PROCEDURE — 99204 OFFICE O/P NEW MOD 45 MIN: CPT | Performed by: INTERNAL MEDICINE

## 2025-02-03 PROCEDURE — 3075F SYST BP GE 130 - 139MM HG: CPT | Performed by: INTERNAL MEDICINE

## 2025-02-03 PROCEDURE — 3078F DIAST BP <80 MM HG: CPT | Performed by: INTERNAL MEDICINE

## 2025-02-03 ASSESSMENT — ENCOUNTER SYMPTOMS
DIZZINESS: 0
VOMITING: 0
FEVER: 0
DIARRHEA: 0
NAUSEA: 0
CHILLS: 0
SHORTNESS OF BREATH: 0
LIGHT-HEADEDNESS: 0

## 2025-02-03 NOTE — PROGRESS NOTES
Endocrinology New Patient Consult  Subjective   Patient ID: Matthieu Solis is a 64 y.o. male who presents for Thyroid Problem. Patient was referred by Dr. Maldonado.     PCP: Claudio Maldonado DO    HPI  64-year-old referred by Dr. Maldonado for evaluation of abnormal thyroid blood work.  He had a TSH value about a year ago that was mildly suppressed at 0.3.  He had a recheck in September 2024 that was normal at 1.1.  He denies any personal or family history of thyroid disorders.  He has no sweats or heart palpitations.  His main issue recently has been vascular surgery on his legs within the last few weeks.  He had a lot of pain at the incision sites but is finally feeling better.  His weight has been stable.    Review of Systems   Constitutional:  Negative for chills and fever.   Respiratory:  Negative for shortness of breath.    Gastrointestinal:  Negative for diarrhea, nausea and vomiting.   Endocrine: Negative for cold intolerance and heat intolerance.   Neurological:  Negative for dizziness and light-headedness.       Patient Active Problem List   Diagnosis    Abdominal pain    Acute pharyngitis    Anxiety    Blurring of visual image    Cataract, nuclear sclerotic, both eyes    Change in vision    Cortical age-related cataract of left eye    Episcleritis of left eye    Essential hypertension    Gastroenteritis    Gout of right foot    Helicobacter positive gastritis    Hordeolum externum of left upper eyelid    Astigmatism of both eyes    Low back pain    Lumbar spondylosis    Lung nodules    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 organic origin    Rectal mass    Urethritis    Abdominal wall abscess    Sacroiliitis (CMS-HCC)    Right groin hernia    Bilateral inguinal hernia    Combined form of age-related cataract, right eye    Combined form of age-related cataract, left eye    Hyperopia, bilateral    Presbyopia    Nausea and vomiting    Well adult exam    Nondisplaced fracture of middle phalanx of right middle finger,  initial encounter for open fracture    Acute on chronic combined systolic and diastolic heart failure    Acute respiratory failure with hypercapnia (Multi)    NICM (nonischemic cardiomyopathy) (Multi)    Nicotine use disorder    Nonsustained ventricular tachycardia (Multi)    Type 2 myocardial infarction (Multi)    Alcoholism (Multi)    Hearing loss    Leg pain    Sciatica    Elevated PSA    Acute prostatitis    Leg weakness, bilateral    Cognitive change    Snoring    Peripheral vascular disease (CMS-HCC)    Atherosclerosis of native coronary artery of native heart without angina pectoris    BPH (benign prostatic hyperplasia)    Gout    HLD (hyperlipidemia)    Systolic congestive heart failure    Sleep-related breathing disorder       Past Medical History:   Diagnosis Date    Anxiety     Arthritis     Cataract     CHF (congestive heart failure)     chronic systolic HF    Depression     Elevated PSA     PSA 10.53 on 10/27/24    GERD (gastroesophageal reflux disease)     Gout     HL (hearing loss)     HTN (hypertension)     Hyperlipidemia     Non-ischemic cardiomyopathy (Multi)     s/p LifeVest 10/2024    Peripheral vascular disease (CMS-HCC)     BL LE CLTI 3  L > R    Sleep apnea     suspected YOHANA, sleep med visit on 1/20/25    Syncope     near syncopal event 6/2024    Vision loss     wears corrective lens        Past Surgical History:   Procedure Laterality Date    COLONOSCOPY      ESOPHAGOGASTRODUODENOSCOPY      HERNIA REPAIR  2015    Inguinal    PROSTATE BIOPSY          Social History     Tobacco Use   Smoking Status Every Day    Current packs/day: 1.00    Average packs/day: 1 pack/day for 52.1 years (51.3 ttl pk-yrs)    Types: Cigarettes    Start date: 1974   Smokeless Tobacco Never   Tobacco Comments    Trying to quit      Social History     Substance and Sexual Activity   Alcohol Use Not Currently      Marital status: Engaged  Employment:     Family History   Problem Relation Name Age of Onset     "Other (CVA) Mother      Hypertension Mother      Other (CVA) Father      Hypertension Father          Home Meds:  Current Outpatient Medications   Medication Instructions    acetaminophen (TYLENOL) 650 mg, oral, Every 6 hours PRN    aspirin 81 mg, oral, Daily    atorvastatin (LIPITOR) 80 mg, oral, Daily    carvedilol (COREG) 12.5 mg, oral, 2 times daily (morning and late afternoon)    clopidogrel (PLAVIX) 75 mg, oral, Daily    empagliflozin (JARDIANCE) 10 mg, oral, Daily    nicotine polacrilex (COMMIT) 4 mg, Mouth/Throat, Every 2 hour PRN    oxyCODONE (ROXICODONE) 5 mg, oral, Every 6 hours PRN    sacubitriL-valsartan (Entresto)  mg tablet 1 tablet, oral, 2 times daily    spironolactone (ALDACTONE) 12.5 mg, oral, Daily RT        Allergies   Allergen Reactions    Cinnamon Unknown     Cinnamon    Doxycycline Swelling    Penicillins Unknown        Objective   Vitals:    02/03/25 1115   BP: 136/60   Pulse: 78   Resp: 16      Vitals:    02/03/25 1115   Weight: 65.1 kg (143 lb 9.6 oz)      Body mass index is 23.9 kg/m².   Physical Exam  Constitutional:       Appearance: Normal appearance. He is normal weight.   HENT:      Head: Normocephalic and atraumatic.   Neck:      Thyroid: No thyroid mass, thyromegaly or thyroid tenderness.   Cardiovascular:      Rate and Rhythm: Normal rate and regular rhythm.      Heart sounds: No murmur heard.     No gallop.   Pulmonary:      Effort: Pulmonary effort is normal.      Breath sounds: Normal breath sounds.   Abdominal:      Palpations: Abdomen is soft.      Comments: benign   Neurological:      General: No focal deficit present.      Mental Status: He is alert and oriented to person, place, and time.      Deep Tendon Reflexes: Reflexes are normal and symmetric.   Psychiatric:         Behavior: Behavior is cooperative.         Labs:  Lab Results   Component Value Date    HGBA1C 5.1 06/19/2024    TSH 1.15 09/23/2024      No results found for: \"PR1\", \"THYROIDPAB\", \"TSI\" "     Assessment/Plan   Assessment & Plan  Low TSH level    Orders:    Referral to Endocrinology  64-year-old here for evaluation of abnormal thyroid blood work.  We discussed his course.  We reviewed his thyroid blood work and recheck results.  We will start over by doing a complete thyroid panel today and add on antibodies if needed.  I encouraged him to keep up his efforts with quitting smoking.  We will call or message with his results in the near future and then proceed with any further intervention needed.    Electronically signed by:  Susan Prasad MD 02/03/25  11:16 AM

## 2025-02-03 NOTE — LETTER
February 3, 2025     Claudio Maldonado DO  [address]  [facility] [address]    Patient: Matthieu Solis   YOB: 1960   Date of Visit: 2/3/2025       Dear Dr. Claudio Maldonado DO:    Thank you for referring Matthieu Solis to me for evaluation. Below are my notes for this consultation.  If you have questions, please do not hesitate to call me. I look forward to following your patient along with you.       Sincerely,     Susan Prasad MD      CC: No Recipients  ______________________________________________________________________________________    Endocrinology New Patient Consult  Subjective  Patient ID: Matthieu Solis is a 64 y.o. male who presents for Thyroid Problem. Patient was referred by Dr. Maldonado.     PCP: Claudio Maldonado DO    HPI  64-year-old referred by Dr. Maldonado for evaluation of abnormal thyroid blood work.  He had a TSH value about a year ago that was mildly suppressed at 0.3.  He had a recheck in September 2024 that was normal at 1.1.  He denies any personal or family history of thyroid disorders.  He has no sweats or heart palpitations.  His main issue recently has been vascular surgery on his legs within the last few weeks.  He had a lot of pain at the incision sites but is finally feeling better.  His weight has been stable.    Review of Systems   Constitutional:  Negative for chills and fever.   Respiratory:  Negative for shortness of breath.    Gastrointestinal:  Negative for diarrhea, nausea and vomiting.   Endocrine: Negative for cold intolerance and heat intolerance.   Neurological:  Negative for dizziness and light-headedness.       Patient Active Problem List   Diagnosis   • Abdominal pain   • Acute pharyngitis   • Anxiety   • Blurring of visual image   • Cataract, nuclear sclerotic, both eyes   • Change in vision   • Cortical age-related cataract of left eye   • Episcleritis of left eye   • Essential hypertension   • Gastroenteritis   •  Gout of right foot   • Helicobacter positive gastritis   • Hordeolum externum of left upper eyelid   • Astigmatism of both eyes   • Low back pain   • Lumbar spondylosis   • Lung nodules   • 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 organic origin   • Rectal mass   • Urethritis   • Abdominal wall abscess   • Sacroiliitis (CMS-HCC)   • Right groin hernia   • Bilateral inguinal hernia   • Combined form of age-related cataract, right eye   • Combined form of age-related cataract, left eye   • Hyperopia, bilateral   • Presbyopia   • Nausea and vomiting   • Well adult exam   • Nondisplaced fracture of middle phalanx of right middle finger, initial encounter for open fracture   • Acute on chronic combined systolic and diastolic heart failure   • Acute respiratory failure with hypercapnia (Multi)   • NICM (nonischemic cardiomyopathy) (Multi)   • Nicotine use disorder   • Nonsustained ventricular tachycardia (Multi)   • Type 2 myocardial infarction (Multi)   • Alcoholism (Multi)   • Hearing loss   • Leg pain   • Sciatica   • Elevated PSA   • Acute prostatitis   • Leg weakness, bilateral   • Cognitive change   • Snoring   • Peripheral vascular disease (CMS-[facility])   • Atherosclerosis of native coronary artery of native heart without angina pectoris   • BPH (benign prostatic hyperplasia)   • Gout   • HLD (hyperlipidemia)   • Systolic congestive heart failure   • Sleep-related breathing disorder       Past Medical History:   Diagnosis Date   • Anxiety    • Arthritis    • Cataract    • CHF (congestive heart failure)     chronic systolic HF   • Depression    • Elevated PSA     PSA 10.53 on 10/27/24   • GERD (gastroesophageal reflux disease)    • Gout    • HL (hearing loss)    • HTN (hypertension)    • Hyperlipidemia    • Non-ischemic cardiomyopathy (Multi)     s/p LifeVest 10/2024   • Peripheral vascular disease (CMS-[facility])     BL LE CLTI 3  L > R   • Sleep apnea     suspected YOHANA, sleep med visit on 1/20/25   • Syncope     near syncopal event 6/2024    • Vision loss     wears corrective lens        Past Surgical History:   Procedure Laterality Date   • COLONOSCOPY     • ESOPHAGOGASTRODUODENOSCOPY     • HERNIA REPAIR  2015    Inguinal   • PROSTATE BIOPSY          Social History     Tobacco Use   Smoking Status Every Day   • Current packs/day: 1.00   • Average packs/day: 1 pack/day for 52.1 years (51.3 ttl pk-yrs)   • Types: Cigarettes   • Start date: 1974   Smokeless Tobacco Never   Tobacco Comments    Trying to quit      Social History     Substance and Sexual Activity   Alcohol Use Not Currently      Marital status: Engaged  Employment:     Family History   Problem Relation Name Age of Onset   • Other (CVA) Mother     • Hypertension Mother     • Other (CVA) Father     • Hypertension Father          Home Meds:  Current Outpatient Medications   Medication Instructions   • acetaminophen (TYLENOL) 650 mg, oral, Every 6 hours PRN   • aspirin 81 mg, oral, Daily   • atorvastatin (LIPITOR) 80 mg, oral, Daily   • carvedilol (COREG) 12.5 mg, oral, 2 times daily (morning and late afternoon)   • clopidogrel (PLAVIX) 75 mg, oral, Daily   • empagliflozin (JARDIANCE) 10 mg, oral, Daily   • nicotine polacrilex (COMMIT) 4 mg, Mouth/Throat, Every 2 hour PRN   • oxyCODONE (ROXICODONE) 5 mg, oral, Every 6 hours PRN   • sacubitriL-valsartan (Entresto)  mg tablet 1 tablet, oral, 2 times daily   • spironolactone (ALDACTONE) 12.5 mg, oral, Daily RT        Allergies   Allergen Reactions   • Cinnamon Unknown     Cinnamon   • Doxycycline Swelling   • Penicillins Unknown        Objective  Vitals:    02/03/25 1115   BP: 136/60   Pulse: 78   Resp: 16      Vitals:    02/03/25 1115   Weight: 65.1 kg (143 lb 9.6 oz)      Body mass index is 23.9 kg/m².   Physical Exam  Constitutional:       Appearance: Normal appearance. He is normal weight.   HENT:      Head: Normocephalic and atraumatic.   Neck:      Thyroid: No thyroid mass, thyromegaly or thyroid tenderness.   Cardiovascular:  "     Rate and Rhythm: Normal rate and regular rhythm.      Heart sounds: No murmur heard.     No gallop.   Pulmonary:      Effort: Pulmonary effort is normal.      Breath sounds: Normal breath sounds.   Abdominal:      Palpations: Abdomen is soft.      Comments: benign   Neurological:      General: No focal deficit present.      Mental Status: He is alert and oriented to person, place, and time.      Deep Tendon Reflexes: Reflexes are normal and symmetric.   Psychiatric:         Behavior: Behavior is cooperative.         Labs:  Lab Results   Component Value Date    HGBA1C 5.1 06/19/2024    TSH 1.15 09/23/2024      No results found for: \"PR1\", \"THYROIDPAB\", \"TSI\"     Assessment/Plan  Assessment & Plan  Low TSH level    Orders:  •  Referral to Endocrinology  64-year-old here for evaluation of abnormal thyroid blood work.  We discussed his course.  We reviewed his thyroid blood work and recheck results.  We will start over by doing a complete thyroid panel today and add on antibodies if needed.  I encouraged him to keep up his efforts with quitting smoking.  We will call or message with his results in the near future and then proceed with any further intervention needed.    Electronically signed by:  Susan Prasad MD 02/03/25  11:16 AM    "

## 2025-02-04 ENCOUNTER — HOME CARE VISIT (OUTPATIENT)
Dept: HOME HEALTH SERVICES | Facility: HOME HEALTH | Age: 65
End: 2025-02-04
Payer: COMMERCIAL

## 2025-02-04 ENCOUNTER — PHARMACY VISIT (OUTPATIENT)
Dept: PHARMACY | Facility: CLINIC | Age: 65
End: 2025-02-04
Payer: COMMERCIAL

## 2025-02-04 LAB
T3FREE SERPL-MCNC: 3.2 PG/ML (ref 2.3–4.2)
T4 FREE SERPL-MCNC: 1 NG/DL (ref 0.8–1.8)
TSH SERPL-ACNC: 1.08 MIU/L (ref 0.4–4.5)

## 2025-02-04 PROCEDURE — G0151 HHCP-SERV OF PT,EA 15 MIN: HCPCS

## 2025-02-04 SDOH — HEALTH STABILITY: PHYSICAL HEALTH: RESISTANCE: AS TOLERATED

## 2025-02-04 SDOH — HEALTH STABILITY: PHYSICAL HEALTH: EXERCISE ACTIVITIES SETS: 2

## 2025-02-04 SDOH — HEALTH STABILITY: PHYSICAL HEALTH: EXERCISE ACTIVITY: SKILLED THERAPEUTIC EXERCISES

## 2025-02-04 SDOH — HEALTH STABILITY: PHYSICAL HEALTH: EXERCISE ACTIVITY: GAIT TRAINING

## 2025-02-04 SDOH — HEALTH STABILITY: PHYSICAL HEALTH: PHYSICAL EXERCISE: SITTING, STANDING

## 2025-02-04 SDOH — HEALTH STABILITY: PHYSICAL HEALTH: EXERCISE TYPE: SKILLED THERAPEUTIC EXERCISES

## 2025-02-04 SDOH — HEALTH STABILITY: PHYSICAL HEALTH: PHYSICAL EXERCISE: 10

## 2025-02-04 ASSESSMENT — ENCOUNTER SYMPTOMS
PAIN LOCATION: LEFT LEG
PERSON REPORTING PAIN: PATIENT
PAIN LOCATION - PAIN SEVERITY: 3/10
PAIN LOCATION: RIGHT LEG
PAIN LOCATION - EXACERBATING FACTORS: AMBULATION
PAIN: 1
PAIN LOCATION - EXACERBATING FACTORS: AMBULATION
PAIN LOCATION - PAIN SEVERITY: 3/10

## 2025-02-04 ASSESSMENT — ACTIVITIES OF DAILY LIVING (ADL)
AMBULATION ASSISTANCE ON FLAT SURFACES: 1
AMBULATION_DISTANCE/DURATION_TOLERATED: 40 FT

## 2025-02-05 NOTE — HOME HEALTH
S: Patient seen for routine follow-up PT visit today. Patient reported 3/10 pain to bilateral legs, particularly during sitting for long time.   O: Patient ambulated in the home using no AD. Performed skilled therapeutic exercises 2 sets x 10 reps; gait training with no AD x 40 FT.   A: Patient tolerated the treatment well today and was able to complete the exercise regimen with pain and moderate gait and transfer difficulty. Patient demonstrated ability to perform safe STS transfer from chair with hand support and initiate gait with hestiancy. Patient can expect consistent increase in strength and mobility by continuing with current exercise regimen.   P: Consider comorbid conditions when implementing and progressing POC. Continue current exercise regimen including skilled LE therapeutic exercises to improve gross strength and endurance, 2 x 10 reps; gait training with walker/cane in the home environment; balance and proprioceptive training emphasizing focus towards narrow base of support and vesticular challenges.   Plan for next visit: Skilled therapeutic exercises, gait training, transfer training.

## 2025-02-07 ENCOUNTER — HOME CARE VISIT (OUTPATIENT)
Dept: HOME HEALTH SERVICES | Facility: HOME HEALTH | Age: 65
End: 2025-02-07
Payer: COMMERCIAL

## 2025-02-07 PROCEDURE — G0151 HHCP-SERV OF PT,EA 15 MIN: HCPCS

## 2025-02-07 SDOH — HEALTH STABILITY: PHYSICAL HEALTH: EXERCISE ACTIVITIES SETS: 2

## 2025-02-07 SDOH — HEALTH STABILITY: PHYSICAL HEALTH: EXERCISE ACTIVITY: SKILLED THERAPEUTIC EXERCISES

## 2025-02-07 SDOH — HEALTH STABILITY: PHYSICAL HEALTH: EXERCISE ACTIVITIES SETS: 1

## 2025-02-07 SDOH — HEALTH STABILITY: PHYSICAL HEALTH: RESISTANCE: AS TOLERATED

## 2025-02-07 SDOH — HEALTH STABILITY: PHYSICAL HEALTH: PHYSICAL EXERCISE: 10

## 2025-02-07 SDOH — HEALTH STABILITY: PHYSICAL HEALTH: EXERCISE ACTIVITY: ISOMETRIC EXERCISES

## 2025-02-07 SDOH — HEALTH STABILITY: PHYSICAL HEALTH

## 2025-02-07 SDOH — HEALTH STABILITY: PHYSICAL HEALTH: EXERCISE TYPE: SKILLED THERAPEUTIC EXERCISES

## 2025-02-07 SDOH — HEALTH STABILITY: PHYSICAL HEALTH: EXERCISE ACTIVITY: STS TRANSFERS

## 2025-02-07 SDOH — HEALTH STABILITY: PHYSICAL HEALTH: PHYSICAL EXERCISE: SITTING, STANDING

## 2025-02-07 SDOH — HEALTH STABILITY: PHYSICAL HEALTH: EXERCISE ACTIVITY: GAIT TRAINING

## 2025-02-07 ASSESSMENT — ENCOUNTER SYMPTOMS
PAIN: 1
PERSON REPORTING PAIN: PATIENT
PAIN LOCATION - PAIN SEVERITY: 2/10
PAIN LOCATION: RIGHT LEG
PAIN LOCATION - PAIN SEVERITY: 2/10
PAIN LOCATION: LEFT LEG

## 2025-02-07 ASSESSMENT — ACTIVITIES OF DAILY LIVING (ADL)
AMBULATION ASSISTANCE ON FLAT SURFACES: 1
AMBULATION_DISTANCE/DURATION_TOLERATED: 40 FT

## 2025-02-08 NOTE — HOME HEALTH
S: Patient seen for routine follow-up PT visit today. Patient reported 2/10 pain to bilateral legs, particularly during sitting for long time.   O: Patient ambulated in the home using no AD. Performed skilled therapeutic exercises 2 sets x 10 reps; gait training with no AD x 40 FT.   A: Patient tolerated the treatment well today and was able to complete the exercise regimen with pain and moderate gait and transfer difficulty. Patient demonstrated ability to perform safe STS transfer from chair with hand support and initiate gait with hestiancy. Patient can expect consistent increase in strength and mobility by continuing with current exercise regimen.   P: Consider comorbid conditions when implementing and progressing POC. Continue current exercise regimen including skilled LE therapeutic exercises to improve gross strength and endurance, 2 x 10 reps; gait training with walker/cane in the home environment; balance and proprioceptive training emphasizing focus towards narrow base of support and vesticular challenges.   Plan for next visit: Skilled therapeutic exercises, gait training, transfer training.

## 2025-02-09 ASSESSMENT — CUP TO DISC RATIO
OD_RATIO: .3
OS_RATIO: .3

## 2025-02-09 ASSESSMENT — SLIT LAMP EXAM - LIDS
COMMENTS: GOOD POSITION
COMMENTS: GOOD POSITION

## 2025-02-09 ASSESSMENT — EXTERNAL EXAM - LEFT EYE: OS_EXAM: NORMAL

## 2025-02-09 ASSESSMENT — EXTERNAL EXAM - RIGHT EYE: OD_EXAM: NORMAL

## 2025-02-09 NOTE — PROGRESS NOTES
Combined form of age-related cataract, right eyeH25.811  Combined form of age-related cataract, left eyeH25.812  -Mildly visually significant, worsening with time OS>OD. Monitor for now. F/u 6-8 months to re-evaluate -- recheck refraction, glare/BAT, dilation. Plan for Lenstar/Pentacam/OCT macula only if cataract(s) visually significant and if patient would like to proceed with cataract surgery. Advised to call/return sooner if any change in vision before next appt.     DftesfbsrN25.00  PrnlwihrtwkR89.209  PbrpwosxcgI40.4  -Current glasses from 2023/2024.   -Refraction performed today for diagnostic purposes only and without specific intent to dispense Rx. Glasses Rx not dispensed today.         No history of intraocular surgery/refractive surgery.   No FH of AMD/glaucoma

## 2025-02-10 ENCOUNTER — APPOINTMENT (OUTPATIENT)
Dept: OPHTHALMOLOGY | Facility: CLINIC | Age: 65
End: 2025-02-10
Payer: COMMERCIAL

## 2025-02-10 DIAGNOSIS — H52.03 HYPEROPIA, BILATERAL: ICD-10-CM

## 2025-02-10 DIAGNOSIS — H25.811 COMBINED FORM OF AGE-RELATED CATARACT, RIGHT EYE: Primary | ICD-10-CM

## 2025-02-10 DIAGNOSIS — H52.4 PRESBYOPIA: ICD-10-CM

## 2025-02-10 DIAGNOSIS — H25.812 COMBINED FORM OF AGE-RELATED CATARACT, LEFT EYE: ICD-10-CM

## 2025-02-10 DIAGNOSIS — H52.203 ASTIGMATISM OF BOTH EYES, UNSPECIFIED TYPE: ICD-10-CM

## 2025-02-10 PROCEDURE — 92014 COMPRE OPH EXAM EST PT 1/>: CPT | Performed by: OPHTHALMOLOGY

## 2025-02-10 ASSESSMENT — ENCOUNTER SYMPTOMS
NEUROLOGICAL NEGATIVE: 0
MUSCULOSKELETAL NEGATIVE: 0
CARDIOVASCULAR NEGATIVE: 0
RESPIRATORY NEGATIVE: 0
GASTROINTESTINAL NEGATIVE: 0
PSYCHIATRIC NEGATIVE: 0
ENDOCRINE NEGATIVE: 0
CONSTITUTIONAL NEGATIVE: 0
ALLERGIC/IMMUNOLOGIC NEGATIVE: 0
HEMATOLOGIC/LYMPHATIC NEGATIVE: 0

## 2025-02-10 ASSESSMENT — REFRACTION_MANIFEST
OD_SPHERE: +2.00
OS_ADD: +2.50
OD_ADD: +2.50
OD_AXIS: 095
OD_CYLINDER: -1.25
OS_CYLINDER: -1.50
OS_SPHERE: +2.00
OS_AXIS: 080

## 2025-02-10 ASSESSMENT — REFRACTION_WEARINGRX
OS_SPHERE: +2.00
OD_AXIS: 095
OS_AXIS: 080
OD_SPHERE: +2.25
OD_ADD: +2.50
OD_CYLINDER: -1.25
OS_ADD: +2.50
OS_CYLINDER: -1.50

## 2025-02-10 ASSESSMENT — VISUAL ACUITY
CORRECTION_TYPE: GLASSES
OS_BAT_MED: 20/30
OD_CC: 20/25
OD_BAT_MED: 20/20
METHOD: SNELLEN - LINEAR
OS_CC: 20/30

## 2025-02-10 ASSESSMENT — TONOMETRY
OD_IOP_MMHG: 14
IOP_METHOD: GOLDMANN APPLANATION
OS_IOP_MMHG: 14

## 2025-02-11 ENCOUNTER — HOME CARE VISIT (OUTPATIENT)
Dept: HOME HEALTH SERVICES | Facility: HOME HEALTH | Age: 65
End: 2025-02-11
Payer: COMMERCIAL

## 2025-02-11 PROCEDURE — G0157 HHC PT ASSISTANT EA 15: HCPCS | Mod: CQ

## 2025-02-11 SDOH — HEALTH STABILITY: PHYSICAL HEALTH: EXERCISE TYPE: 90% CARRYOVER

## 2025-02-11 SDOH — HEALTH STABILITY: PHYSICAL HEALTH: EXERCISE COMMENTS: ES, LAQ X 40 REPS      SIT TO STAND 20 REPS X 2 SETS    ADDED FORWARD LUNGES 20 REPS EACH SIDE

## 2025-02-11 SDOH — HEALTH STABILITY: PHYSICAL HEALTH
EXERCISE COMMENTS: BIL LE STANDING THER EX: HEEL RAISES, ALTERNATE MARCHES, HIP ABDUCTION, HS CURLS, HIP FLEXION WITH KNEE EXTENSION, MINI SQUATS, HIP EXTENSION X 20 REPS  BIL LE SEATED THER EX WITH GREEN THERBAND RESISTIVE ANKLE DF/PF, HS CURLS, HIP ABDUCTION, MARCH

## 2025-02-11 ASSESSMENT — ACTIVITIES OF DAILY LIVING (ADL): AMBULATION ASSISTANCE ON FLAT SURFACES: 1

## 2025-02-11 ASSESSMENT — ENCOUNTER SYMPTOMS
HIGHEST PAIN SEVERITY IN PAST 24 HOURS: 6/10
PAIN: 1
PAIN LOCATION - PAIN SEVERITY: 3/10
LOWEST PAIN SEVERITY IN PAST 24 HOURS: 2/10
PAIN SEVERITY GOAL: 0/10
PAIN LOCATION - PAIN SEVERITY: 3/10
PAIN LOCATION: RIGHT LEG
PAIN LOCATION: LEFT LEG
PERSON REPORTING PAIN: PATIENT

## 2025-02-14 ENCOUNTER — HOME CARE VISIT (OUTPATIENT)
Dept: HOME HEALTH SERVICES | Facility: HOME HEALTH | Age: 65
End: 2025-02-14
Payer: COMMERCIAL

## 2025-02-14 PROCEDURE — G0151 HHCP-SERV OF PT,EA 15 MIN: HCPCS

## 2025-02-16 SDOH — HEALTH STABILITY: PHYSICAL HEALTH: EXERCISE ACTIVITY: ISOMETRIC EXERCISES

## 2025-02-16 SDOH — HEALTH STABILITY: PHYSICAL HEALTH: EXERCISE ACTIVITY: SKILLED THERAPEUTIC EXERCISES

## 2025-02-16 SDOH — HEALTH STABILITY: PHYSICAL HEALTH

## 2025-02-16 SDOH — HEALTH STABILITY: PHYSICAL HEALTH: PHYSICAL EXERCISE: 10

## 2025-02-16 SDOH — HEALTH STABILITY: PHYSICAL HEALTH: PHYSICAL EXERCISE: SITTING, STANDING

## 2025-02-16 SDOH — HEALTH STABILITY: PHYSICAL HEALTH: EXERCISE ACTIVITIES SETS: 2

## 2025-02-16 SDOH — HEALTH STABILITY: PHYSICAL HEALTH: RESISTANCE: AS TOLERATED

## 2025-02-16 SDOH — HEALTH STABILITY: PHYSICAL HEALTH: EXERCISE TYPE: SKILLED THERAPEUTIC EXERCISES

## 2025-02-16 SDOH — HEALTH STABILITY: PHYSICAL HEALTH: EXERCISE ACTIVITIES SETS: 1

## 2025-02-16 SDOH — HEALTH STABILITY: PHYSICAL HEALTH: EXERCISE ACTIVITY: GAIT TRAINING

## 2025-02-16 ASSESSMENT — ACTIVITIES OF DAILY LIVING (ADL)
AMBULATION ASSISTANCE ON FLAT SURFACES: 1
AMBULATION_DISTANCE/DURATION_TOLERATED: 30 FT

## 2025-02-16 ASSESSMENT — ENCOUNTER SYMPTOMS
DENIES PAIN: 1
PERSON REPORTING PAIN: PATIENT

## 2025-02-16 NOTE — HOME HEALTH
S: Patient seen for routine follow-up PT visit today. Patient reported 2/10 pain to bilateral legs, particularly during sitting for long time.   O: Patient ambulated in the home using no AD. Performed skilled therapeutic exercises 2 sets x 10 reps; gait training with no AD x 30 FT.   A: Patient tolerated the treatment well today and was able to complete the exercise regimen with pain and moderate gait and transfer difficulty. Patient demonstrated ability to perform safe STS transfer from chair with hand support and initiate gait with hestiancy. Patient can expect consistent increase in strength and mobility by continuing with current exercise regimen.   P: Consider comorbid conditions when implementing and progressing POC. Continue current exercise regimen including skilled LE therapeutic exercises to improve gross strength and endurance, 2 x 10 reps; gait training with walker/cane in the home environment; balance and proprioceptive training emphasizing focus towards narrow base of support and vesticular challenges.   Plan for next visit: Skilled therapeutic exercises, gait training, transfer training.

## 2025-02-18 ENCOUNTER — HOME CARE VISIT (OUTPATIENT)
Dept: HOME HEALTH SERVICES | Facility: HOME HEALTH | Age: 65
End: 2025-02-18
Payer: COMMERCIAL

## 2025-02-18 PROCEDURE — G0151 HHCP-SERV OF PT,EA 15 MIN: HCPCS

## 2025-02-18 SDOH — HEALTH STABILITY: PHYSICAL HEALTH: EXERCISE TYPE: SKILLED THERAPEUTIC EXERCISES

## 2025-02-18 SDOH — HEALTH STABILITY: PHYSICAL HEALTH: EXERCISE ACTIVITY: STEP UPS

## 2025-02-18 SDOH — HEALTH STABILITY: PHYSICAL HEALTH: EXERCISE ACTIVITY: SKILLED THERAPEUTIC EXERCISES

## 2025-02-18 SDOH — HEALTH STABILITY: PHYSICAL HEALTH: EXERCISE ACTIVITY: ISOMETRIC EXERICSES

## 2025-02-18 SDOH — HEALTH STABILITY: PHYSICAL HEALTH: RESISTANCE: AS TOLERATED

## 2025-02-18 SDOH — HEALTH STABILITY: PHYSICAL HEALTH

## 2025-02-18 SDOH — HEALTH STABILITY: PHYSICAL HEALTH: PHYSICAL EXERCISE: 10

## 2025-02-18 SDOH — HEALTH STABILITY: PHYSICAL HEALTH: EXERCISE ACTIVITY: GAIT TRAINING

## 2025-02-18 SDOH — HEALTH STABILITY: PHYSICAL HEALTH: PHYSICAL EXERCISE: SITTING, STANDING

## 2025-02-18 SDOH — HEALTH STABILITY: PHYSICAL HEALTH: EXERCISE ACTIVITIES SETS: 2

## 2025-02-18 SDOH — HEALTH STABILITY: PHYSICAL HEALTH: EXERCISE ACTIVITIES SETS: 1

## 2025-02-18 ASSESSMENT — ACTIVITIES OF DAILY LIVING (ADL)
AMBULATION ASSISTANCE ON FLAT SURFACES: 1
AMBULATION_DISTANCE/DURATION_TOLERATED: 30 FT

## 2025-02-18 ASSESSMENT — BALANCE ASSESSMENTS
EYES CLOSED AT MAXIMUM POSITION NUDGED: 1 - STEADY
SITTING BALANCE: 1 - STEADY, SAFE
ARISES: 1 - ABLE, USES ARMS TO HELP
IMMEDIATE STANDING BALANCE FIRST 5 SECONDS: 2 - STEADY WITHOUT WALKER OR OTHER SUPPORT
NUDGED SCORE: 1
ATTEMPTS TO ARISE: 2 - ABLE TO RISE, ONE ATTEMPT
TURNING 360 DEGREES STEPS: 1 - CONTINUOUS STEPS
ARISING SCORE: 1
SITTING DOWN: 2 - SAFE, SMOOTH MOTION
STANDING BALANCE: 2 - NARROW STANCE WITHOUT SUPPORT
BALANCE SCORE: 14
NUDGED: 1 - STAGGERS, GRABS, CATCHES SELF

## 2025-02-18 ASSESSMENT — GAIT ASSESSMENTS
STEP CONTINUITY: 1 - STEPS APPEAR CONTINUOUS
TRUNK SCORE: 1
PATH: 2 - STRAIGHT WITHOUT WALKING AID
INITIATION OF GAIT IMMEDIATELY AFTER GO: 1 - NO HESITANCY
STEP SYMMETRY: 0 - RIGHT AND LEFT STEP LENGTH NOT EQUAL
TRUNK: 1 - NO SWAY BUT FLEXION OF KNEES OR BACK OR SPREADS ARMS WHILE WALKING
WALKING STANCE: 0 - HEELS APART
BALANCE AND GAIT SCORE: 23
GAIT SCORE: 9
PATH SCORE: 2

## 2025-02-18 ASSESSMENT — ENCOUNTER SYMPTOMS
PERSON REPORTING PAIN: PATIENT
PAIN LOCATION - PAIN SEVERITY: 2/10
PAIN LOCATION: RIGHT LEG
PAIN: 1

## 2025-02-19 NOTE — HOME HEALTH
S: Patient seen for routine follow-up PT visit today. Patient reported 2/10 pain to RT leg, particularly during sitting for long time.   O: Patient ambulated in the home using no AD. Performed skilled therapeutic exercises 2 sets x 10 reps; gait training with no AD x 30 FT.   A: Patient tolerated the treatment well today and was able to complete the exercise regimen with pain and moderate gait and transfer difficulty. Patient demonstrated ability to perform safe STS transfer from chair with hand support and initiate gait with hestiancy. Patient can expect consistent increase in strength and mobility by continuing with current exercise regimen.   P: Consider comorbid conditions when implementing and progressing POC. Continue current exercise regimen including skilled LE therapeutic exercises to improve gross strength and endurance, 2 x 10 reps; gait training with walker/cane in the home environment; balance and proprioceptive training emphasizing focus towards narrow base of support and vesticular challenges.   Plan for next visit: Skilled therapeutic exercises, gait training, transfer training.

## 2025-02-21 ENCOUNTER — HOME CARE VISIT (OUTPATIENT)
Dept: HOME HEALTH SERVICES | Facility: HOME HEALTH | Age: 65
End: 2025-02-21
Payer: COMMERCIAL

## 2025-02-21 PROCEDURE — G0151 HHCP-SERV OF PT,EA 15 MIN: HCPCS

## 2025-02-22 PROCEDURE — RXMED WILLOW AMBULATORY MEDICATION CHARGE

## 2025-02-22 SDOH — HEALTH STABILITY: PHYSICAL HEALTH: EXERCISE ACTIVITY: ISOMETRIC EXERCISES

## 2025-02-22 SDOH — HEALTH STABILITY: PHYSICAL HEALTH

## 2025-02-22 SDOH — HEALTH STABILITY: PHYSICAL HEALTH: EXERCISE ACTIVITIES SETS: 1

## 2025-02-22 SDOH — HEALTH STABILITY: PHYSICAL HEALTH: EXERCISE ACTIVITY: SKILLED THERAPEUTIC EXERCISES

## 2025-02-22 SDOH — HEALTH STABILITY: PHYSICAL HEALTH: PHYSICAL EXERCISE: 10

## 2025-02-22 SDOH — HEALTH STABILITY: PHYSICAL HEALTH: PHYSICAL EXERCISE: 15

## 2025-02-22 SDOH — HEALTH STABILITY: PHYSICAL HEALTH: EXERCISE ACTIVITY: STAIR TRAINING

## 2025-02-22 SDOH — HEALTH STABILITY: PHYSICAL HEALTH: EXERCISE ACTIVITIES SETS: 2

## 2025-02-22 SDOH — HEALTH STABILITY: PHYSICAL HEALTH: EXERCISE ACTIVITY: GAIT TRAINING

## 2025-02-22 SDOH — HEALTH STABILITY: PHYSICAL HEALTH: RESISTANCE: AS TOLERATED

## 2025-02-22 SDOH — HEALTH STABILITY: PHYSICAL HEALTH: PHYSICAL EXERCISE: SITTING, STANDING

## 2025-02-22 SDOH — HEALTH STABILITY: PHYSICAL HEALTH: EXERCISE ACTIVITY: TRANSFER TRAINING

## 2025-02-22 SDOH — HEALTH STABILITY: PHYSICAL HEALTH: EXERCISE TYPE: SKILLED THERAPEUTIC EXERCISES

## 2025-02-22 ASSESSMENT — BALANCE ASSESSMENTS
BALANCE SCORE: 15
TURNING 360 DEGREES STEPS: 1 - CONTINUOUS STEPS
ARISES: 2 - ABLE WITHOUT USING ARMS
ARISING SCORE: 2
SITTING BALANCE: 1 - STEADY, SAFE
NUDGED SCORE: 1
IMMEDIATE STANDING BALANCE FIRST 5 SECONDS: 2 - STEADY WITHOUT WALKER OR OTHER SUPPORT
EYES CLOSED AT MAXIMUM POSITION NUDGED: 1 - STEADY
ATTEMPTS TO ARISE: 2 - ABLE TO RISE, ONE ATTEMPT
STANDING BALANCE: 2 - NARROW STANCE WITHOUT SUPPORT
SITTING DOWN: 2 - SAFE, SMOOTH MOTION
NUDGED: 1 - STAGGERS, GRABS, CATCHES SELF

## 2025-02-22 ASSESSMENT — ENCOUNTER SYMPTOMS
PERSON REPORTING PAIN: PATIENT
LIMITED RANGE OF MOTION: 1
PAIN: 1
PAIN LOCATION: GROIN
MUSCLE WEAKNESS: 1
PAIN LOCATION - PAIN SEVERITY: 3/10

## 2025-02-22 ASSESSMENT — GAIT ASSESSMENTS
STEP CONTINUITY: 1 - STEPS APPEAR CONTINUOUS
BALANCE AND GAIT SCORE: 27
PATH SCORE: 2
STEP SYMMETRY: 1 - RIGHT AND LEFT STEP LENGTH APPEAR EQUAL
GAIT SCORE: 12
INITIATION OF GAIT IMMEDIATELY AFTER GO: 1 - NO HESITANCY
PATH: 2 - STRAIGHT WITHOUT WALKING AID
TRUNK SCORE: 2
TRUNK: 2 - NO SWAY, NO FLEXION, NO USE OF ARMS, NO WALKING AID
WALKING STANCE: 1 - HEELS ALMOST TOUCHING WHILE WALKING

## 2025-02-22 ASSESSMENT — ACTIVITIES OF DAILY LIVING (ADL)
AMBULATION_DISTANCE/DURATION_TOLERATED: 40 FT
CURRENT_FUNCTION: STAND BY ASSIST
AMBULATION ASSISTANCE ON FLAT SURFACES: 1
AMBULATION ASSISTANCE: STAND BY ASSIST

## 2025-02-22 NOTE — HOME HEALTH
S: Patient seen for PT reassessment visit today. Patient reported mild pain to the RT groin region around the incision site during transfers, 3/10. Patient reported he has been performing the home exercises as planned twice daily. No changes in homebound status.    O: Patient ambulated in the home using no AD.    A: Patient tolerated the treatment well today and was able to complete the exercise regimen with mild transfer and no gait difficulty. Patient demonstrated ability to perform safe STS transfer from chair with no AD and initiate gait with no hesitancy. Patient can expect consistent increase in strength and mobility by continuing with current exercise regimen.    P: Consider comorbid conditions when implementing and progressing POC. Continue current exercise regimen including skilled LE therapeutic exercises to improve gross strength and endurance, 2 x 10 reps; gait training with no cane in the home environment; balance and proprioceptive training emphasizing focus towards narrow base of support and vesticular challenges.    Changes made to POC/Focus for next 30 days: Progress with baseline activity level. Skilled therapeutic exercises as per the protocol.    Rehab potentials: Good.    Fall risk: None.    Interdisciplinary communication: PT 2 x 2 weeks.    Functional outcome comparison: MMT grossly increased theresa LE +1 grade and Tinetti 27. Patient will benefit in terms of transfers, ambulation, and maximizing independence in his ADL's with continued skilled care.

## 2025-02-24 ENCOUNTER — OFFICE VISIT (OUTPATIENT)
Dept: VASCULAR SURGERY | Facility: HOSPITAL | Age: 65
End: 2025-02-24
Payer: COMMERCIAL

## 2025-02-24 ENCOUNTER — HOSPITAL ENCOUNTER (OUTPATIENT)
Dept: VASCULAR MEDICINE | Facility: HOSPITAL | Age: 65
Discharge: HOME | End: 2025-02-24
Payer: COMMERCIAL

## 2025-02-24 VITALS
WEIGHT: 144.6 LBS | DIASTOLIC BLOOD PRESSURE: 73 MMHG | SYSTOLIC BLOOD PRESSURE: 149 MMHG | HEART RATE: 74 BPM | HEIGHT: 65 IN | OXYGEN SATURATION: 98 % | BODY MASS INDEX: 24.09 KG/M2

## 2025-02-24 DIAGNOSIS — I73.9 PERIPHERAL ARTERIAL DISEASE (CMS-HCC): ICD-10-CM

## 2025-02-24 DIAGNOSIS — I73.9 PAD (PERIPHERAL ARTERY DISEASE) (CMS-HCC): Primary | ICD-10-CM

## 2025-02-24 PROCEDURE — 93922 UPR/L XTREMITY ART 2 LEVELS: CPT | Performed by: SURGERY

## 2025-02-24 PROCEDURE — 3008F BODY MASS INDEX DOCD: CPT | Performed by: SURGERY

## 2025-02-24 PROCEDURE — 99211 OFF/OP EST MAY X REQ PHY/QHP: CPT | Performed by: SURGERY

## 2025-02-24 PROCEDURE — 3078F DIAST BP <80 MM HG: CPT | Performed by: SURGERY

## 2025-02-24 PROCEDURE — 93922 UPR/L XTREMITY ART 2 LEVELS: CPT

## 2025-02-24 PROCEDURE — 3075F SYST BP GE 130 - 139MM HG: CPT | Performed by: SURGERY

## 2025-02-24 ASSESSMENT — ENCOUNTER SYMPTOMS
LOSS OF SENSATION IN FEET: 0
DEPRESSION: 0
OCCASIONAL FEELINGS OF UNSTEADINESS: 1

## 2025-02-24 ASSESSMENT — PAIN SCALES - GENERAL: PAINLEVEL_OUTOF10: 3

## 2025-02-24 NOTE — PATIENT INSTRUCTIONS
- Followup in 3 months with OLENA  - OK to lift > 10 lbs  - Call office [phone] with any questions or concerns

## 2025-02-25 ENCOUNTER — DOCUMENTATION (OUTPATIENT)
Dept: GASTROENTEROLOGY | Facility: HOSPITAL | Age: 65
End: 2025-02-25
Payer: COMMERCIAL

## 2025-02-25 ENCOUNTER — HOME CARE VISIT (OUTPATIENT)
Dept: HOME HEALTH SERVICES | Facility: HOME HEALTH | Age: 65
End: 2025-02-25
Payer: COMMERCIAL

## 2025-02-25 DIAGNOSIS — B96.81 HELICOBACTER PYLORI GASTRITIS: Primary | ICD-10-CM

## 2025-02-25 DIAGNOSIS — K29.70 HELICOBACTER PYLORI GASTRITIS: Primary | ICD-10-CM

## 2025-02-25 PROCEDURE — G0151 HHCP-SERV OF PT,EA 15 MIN: HCPCS

## 2025-02-25 SDOH — HEALTH STABILITY: PHYSICAL HEALTH: RESISTANCE: AS TOLERATED

## 2025-02-25 SDOH — HEALTH STABILITY: PHYSICAL HEALTH: EXERCISE ACTIVITY: STS TRAINING

## 2025-02-25 SDOH — HEALTH STABILITY: PHYSICAL HEALTH: EXERCISE ACTIVITY: SKILLED THERAPEUTIC EXERCISES

## 2025-02-25 SDOH — HEALTH STABILITY: PHYSICAL HEALTH: EXERCISE ACTIVITY: STAIR TRAINING

## 2025-02-25 SDOH — HEALTH STABILITY: PHYSICAL HEALTH: PHYSICAL EXERCISE: SITTING, STANDING`

## 2025-02-25 SDOH — HEALTH STABILITY: PHYSICAL HEALTH: EXERCISE ACTIVITIES SETS: 2

## 2025-02-25 SDOH — HEALTH STABILITY: PHYSICAL HEALTH: PHYSICAL EXERCISE: 15

## 2025-02-25 SDOH — HEALTH STABILITY: PHYSICAL HEALTH: EXERCISE ACTIVITIES SETS: 1

## 2025-02-25 SDOH — HEALTH STABILITY: PHYSICAL HEALTH: EXERCISE ACTIVITY: GAIT TRAINING

## 2025-02-25 SDOH — HEALTH STABILITY: PHYSICAL HEALTH

## 2025-02-25 SDOH — HEALTH STABILITY: PHYSICAL HEALTH: PHYSICAL EXERCISE: 10

## 2025-02-25 SDOH — HEALTH STABILITY: PHYSICAL HEALTH: EXERCISE TYPE: SKILLED THERAPEUTIC EXERCISES

## 2025-02-25 ASSESSMENT — ENCOUNTER SYMPTOMS
DENIES PAIN: 1
PERSON REPORTING PAIN: PATIENT

## 2025-02-25 NOTE — PROGRESS NOTES
Spoke with pt this evening via telephone. Last year he completed treatment with omeprazole, levofloxacin, rifabutin and tindazole for H pylori gastritis as per ID recommendations. He feels well from a GI perspective.     He is not taking any PPIs or using bismuth at this time.     Will obtain H pylori breath test to check for eradication following treatment.

## 2025-02-26 DIAGNOSIS — R97.20 ELEVATED PSA: Primary | ICD-10-CM

## 2025-02-26 NOTE — HOME HEALTH
S: Patient seen for routine follow-up PT visit today. Patient reported no pain to RT leg.  O: Patient ambulated in the home using no AD. Performed skilled therapeutic exercises 2 sets x 10 reps; gait training with no AD x 30 FT, stair training with no support.   A: Patient tolerated the treatment well today and was able to complete the exercise regimen with pain and moderate gait and transfer difficulty. Patient demonstrated ability to perform safe STS transfer from chair with hand support and initiate gait with hestiancy. Patient can expect consistent increase in strength and mobility by continuing with current exercise regimen.   P: Consider comorbid conditions when implementing and progressing POC. Continue current exercise regimen including skilled LE therapeutic exercises to improve gross strength and endurance, 2 x 10 reps; gait training with walker/cane in the home environment; balance and proprioceptive training emphasizing focus towards narrow base of support and vesticular challenges.   Plan for next visit: Skilled therapeutic exercises, gait training, transfer training.

## 2025-02-27 ENCOUNTER — HOME CARE VISIT (OUTPATIENT)
Dept: HOME HEALTH SERVICES | Facility: HOME HEALTH | Age: 65
End: 2025-02-27
Payer: COMMERCIAL

## 2025-02-27 ENCOUNTER — PHARMACY VISIT (OUTPATIENT)
Dept: PHARMACY | Facility: CLINIC | Age: 65
End: 2025-02-27
Payer: COMMERCIAL

## 2025-02-27 LAB
PSA FREE MFR SERPL: 8 % (CALC)
PSA FREE SERPL-MCNC: 0.3 NG/ML
PSA SERPL-MCNC: 3.9 NG/ML
UREA BREATH TEST QL: NOT DETECTED

## 2025-02-27 PROCEDURE — G0151 HHCP-SERV OF PT,EA 15 MIN: HCPCS

## 2025-02-27 SDOH — HEALTH STABILITY: PHYSICAL HEALTH: PHYSICAL EXERCISE: 15

## 2025-02-27 SDOH — HEALTH STABILITY: PHYSICAL HEALTH: EXERCISE ACTIVITY: GAIT TRAINING

## 2025-02-27 SDOH — HEALTH STABILITY: PHYSICAL HEALTH: EXERCISE ACTIVITIES SETS: 2

## 2025-02-27 SDOH — HEALTH STABILITY: PHYSICAL HEALTH: EXERCISE ACTIVITY: STAIR TRAINING

## 2025-02-27 SDOH — HEALTH STABILITY: PHYSICAL HEALTH: EXERCISE ACTIVITIES SETS: 1

## 2025-02-27 SDOH — HEALTH STABILITY: PHYSICAL HEALTH: PHYSICAL EXERCISE: SITTING, STANDING

## 2025-02-27 SDOH — HEALTH STABILITY: PHYSICAL HEALTH: EXERCISE ACTIVITY: TRANSFER TRAINING

## 2025-02-27 SDOH — HEALTH STABILITY: PHYSICAL HEALTH

## 2025-02-27 SDOH — HEALTH STABILITY: PHYSICAL HEALTH: EXERCISE ACTIVITY: SKILLED THERAPEUTIC EXERCISES

## 2025-02-27 SDOH — HEALTH STABILITY: PHYSICAL HEALTH: EXERCISE ACTIVITY: ISOMETRIC EXERCISES

## 2025-02-27 SDOH — HEALTH STABILITY: PHYSICAL HEALTH: RESISTANCE: AS TOLERATED

## 2025-02-27 SDOH — HEALTH STABILITY: PHYSICAL HEALTH: EXERCISE TYPE: SKILLED THERAPEUTIC EXERCISES

## 2025-02-27 SDOH — HEALTH STABILITY: PHYSICAL HEALTH: PHYSICAL EXERCISE: 10

## 2025-02-27 ASSESSMENT — ENCOUNTER SYMPTOMS
DENIES PAIN: 1
PERSON REPORTING PAIN: PATIENT

## 2025-03-01 PROCEDURE — RXMED WILLOW AMBULATORY MEDICATION CHARGE

## 2025-03-01 NOTE — PROGRESS NOTES
Vascular Surgery Clinic Note    CC: Hx of BL LE CLTI 3     HPI:  Matthieu Solis is 64 y.o. male with history of BL LE CLTI 3 (L > R) s/p bilateral femoral endarterectomy and iliac stents on 1/21/25. He presents for postoperative followup. Claudication has improved, though still have incisional and surgical pain. He is ambulatory and eager to return to work. Incisions have healed well.    Past Vascular History:  1/21/25 ([facility]) - bilateral common femoral endarterectomies with bovine patch angioplasty, bilateral DANYELLE and EIA stents    Past Vascular Testing:  OLENA (2/24/25) - R 0.62 (0.34), L 0.78 (0.58)  CT a/p (10/26/24) - left DANYELLE occlusion  PVR (12/4/24) - R 0.42 (0.31), L 0.31 (0)       Medical History:  Patient Active Problem List   Diagnosis    Abdominal pain    Acute pharyngitis    Anxiety    Blurring of visual image    Cataract, nuclear sclerotic, both eyes    Change in vision    Cortical age-related cataract of left eye    Episcleritis of left eye    Essential hypertension    Gastroenteritis    Gout of right foot    Helicobacter positive gastritis    Hordeolum externum of left upper eyelid    Astigmatism of both eyes    Low back pain    Lumbar spondylosis    Lung nodules    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 organic origin    Rectal mass    Urethritis    Abdominal wall abscess    Sacroiliitis (CMS-HCC)    Right groin hernia    Bilateral inguinal hernia    Combined form of age-related cataract, right eye    Combined form of age-related cataract, left eye    Hyperopia, bilateral    Presbyopia    Nausea and vomiting    Well adult exam    Nondisplaced fracture of middle phalanx of right middle finger, initial encounter for open fracture    Acute on chronic combined systolic and diastolic heart failure    Acute respiratory failure with hypercapnia (Multi)    NICM (nonischemic cardiomyopathy) (Multi)    Nicotine use disorder    Nonsustained ventricular tachycardia (Multi)    Type 2 myocardial infarction (Multi)    Alcoholism  (Multi)    Hearing loss    Leg pain    Sciatica    Elevated PSA    Acute prostatitis    Leg weakness, bilateral    Cognitive change    Snoring    Peripheral vascular disease (CMS-HCC)    Atherosclerosis of native coronary artery of native heart without angina pectoris    BPH (benign prostatic hyperplasia)    Gout    HLD (hyperlipidemia)    Systolic congestive heart failure    Sleep-related breathing disorder        Meds:   Current Outpatient Medications on File Prior to Visit   Medication Sig Dispense Refill    acetaminophen (Tylenol) 325 mg tablet Take 2 tablets (650 mg) by mouth every 6 hours if needed for mild pain (1 - 3) or moderate pain (4 - 6).      aspirin 81 mg EC tablet Take 1 tablet (81 mg) by mouth once daily. 90 tablet 3    atorvastatin (Lipitor) 80 mg tablet TAKE 1 TABLET BY MOUTH ONCE DAILY. 90 tablet 3    carvedilol (Coreg) 12.5 mg tablet Take 1 tablet (12.5 mg) by mouth 2 times daily (morning and late afternoon). 60 tablet 11    clopidogrel (Plavix) 75 mg tablet Take 1 tablet (75 mg) by mouth once daily. 90 tablet 3    empagliflozin (Jardiance) 10 mg Take 1 tablet (10 mg) by mouth once daily. 90 tablet 3    nicotine polacrilex (Commit) 4 mg lozenge Use 1 lozenge (4 mg) in the mouth or throat every 2 hours if needed for smoking cessation. 100 lozenge 0    sacubitriL-valsartan (Entresto)  mg tablet Take 1 tablet by mouth 2 times a day. 180 tablet 3    spironolactone (Aldactone) 25 mg tablet Take 0.5 tablets (12.5 mg) by mouth once daily. 45 tablet 3     No current facility-administered medications on file prior to visit.        Allergies:   Allergies   Allergen Reactions    Cinnamon Unknown     Cinnamon    Doxycycline Swelling    Penicillins Unknown       SH:    Social Drivers of Health     Tobacco Use: High Risk (2/24/2025)    Patient History     Smoking Tobacco Use: Every Day     Smokeless Tobacco Use: Never     Passive Exposure: Not on file   Alcohol Use: Not At Risk (1/22/2025)    AUDIT-C      Frequency of Alcohol Consumption: Never     Average Number of Drinks: Patient does not drink     Frequency of Binge Drinking: Never   Financial Resource Strain: Medium Risk (1/22/2025)    Overall Financial Resource Strain (CARDIA)     Difficulty of Paying Living Expenses: Somewhat hard   Food Insecurity: Food Insecurity Present (1/22/2025)    Hunger Vital Sign     Worried About Running Out of Food in the Last Year: Sometimes true     Ran Out of Food in the Last Year: Never true   Transportation Needs: No Transportation Needs (1/26/2025)    OASIS : Transportation     Lack of Transportation (Medical): No     Lack of Transportation (Non-Medical): No     Patient Unable or Declines to Respond: No   Physical Activity: Inactive (1/22/2025)    Exercise Vital Sign     Days of Exercise per Week: 0 days     Minutes of Exercise per Session: 0 min   Stress: No Stress Concern Present (1/22/2025)    [facility] - Occupational Stress Questionnaire     Feeling of Stress : Not at all   Recent Concern: Stress - Stress Concern Present (11/8/2024)    [facility] - Occupational Stress Questionnaire     Feeling of Stress : Rather much   Social Connections: Feeling Somewhat Isolated (1/26/2025)    OASIS : Social Isolation     Frequency of experiencing loneliness or isolation: Sometimes   Intimate Partner Violence: Not At Risk (1/22/2025)    Humiliation, Afraid, Rape, and Kick questionnaire     Fear of Current or Ex-Partner: No     Emotionally Abused: No     Physically Abused: No     Sexually Abused: No   Depression: Not at risk (1/20/2025)    PHQ-2     PHQ-2 Score: 2   Recent Concern: Depression - At risk (12/23/2024)    Received from [facility]    PHQ-2     Patient Health Questionnaire-2 Score: 4   Housing Stability: Low Risk  (1/22/2025)    Housing Stability Vital Sign     Unable to Pay for Housing in the Last Year: No     Number of Times Moved in the Last Year: 0      Homeless in the Last Year: No   Recent Concern: Housing Stability - High Risk (11/12/2024)    Received from [facility] Health Risk Assessment (HRA)     Does your current living situation have any of the following problems? (CHOOSE ALL THAT APPLY): Mold,Smoke detectors missing or not working     Describe your current living situation.: I have a steady place to live   Utilities: Not At Risk (1/22/2025)    [facility]     Threatened with loss of utilities: No   Recent Concern: Utilities - At Risk (11/8/2024)    [facility]     Threatened with loss of utilities: Yes   Digital Equity: Not At Risk (12/18/2023)    [employer] Health Equity Screening     Access to device/internet: Desktop computer, laptop computer, or tablet with broadband internet connection     Requested support: None of these   Health Literacy: Inadequate Health Literacy (1/26/2025)    OASIS : Health Literacy     Frequency of needing help to read materials from doctor or pharmacy: Sometimes        FH:  Family History   Problem Relation Name Age of Onset    Other (CVA) Mother Lissett Younger ([place] name: Arnoldo)     Hypertension Mother Lissett Younger ([place] name: Arnoldo)     Other (CVA) Father Ceasar MANJURaul Will     Hypertension Father Ceasar MANJURaul Will     Glaucoma Neg Hx      Macular degeneration Neg Hx          ROS:  All systems were reviewed and are negative except as per HPI.    Objective:  Vitals:  Vitals:    02/24/25 1343   BP: 149/73   Pulse:    SpO2:         Exam:  Constitutional: normal, well appearing  HEENT: normocephalic  CV: regular rate  RESP: symmetric expansion, unlabored breathing  GI: soft, nontender, nondistended  MSK: normal ROM  INT: no lesions  PSYCH: appropriate mood  NEURO: no deficits  VASC: multiphasic pedal signals bilaterally. Well healed femoral incisions.    Assessment & Plan:  Matthieu Solis is 64 y.o. male with history of BL LE CLTI 3 (L > R) s/p bilateral femoral endarterectomy and iliac stents on 1/21/25.     - Doing well  from surgical standpoint and has relief of symptoms. Followup in 3 months with a repeat OLENA  - OK to return to work and lift > 10 lbs    Meds  - ASA 81 mg  - Plavix 75 mg  - Atorvastatin 80 mg    Screening/Surveillance  - OLENA (May 2025)    Next Followup  - 3 months    Josefina Wylie MD

## 2025-03-04 ENCOUNTER — TELEPHONE (OUTPATIENT)
Dept: PRIMARY CARE | Facility: CLINIC | Age: 65
End: 2025-03-04
Payer: COMMERCIAL

## 2025-03-04 ENCOUNTER — HOME CARE VISIT (OUTPATIENT)
Dept: HOME HEALTH SERVICES | Facility: HOME HEALTH | Age: 65
End: 2025-03-04
Payer: COMMERCIAL

## 2025-03-04 PROCEDURE — G0151 HHCP-SERV OF PT,EA 15 MIN: HCPCS

## 2025-03-04 NOTE — TELEPHONE ENCOUNTER
Pt is requesting letter to be taken off of work. advised pt that you are willing to give him one week, beyond that he would need to reach out to provider that excused him from work originally. Pt agreed to one week from you.

## 2025-03-04 NOTE — PROGRESS NOTES
Subjective   Patient ID: Matthieu Solis is a 64 y.o. male who presents for A F/U ON HIS H/O AN ELEVATED PSA.   PT RECENTLY HAD LOWER EXTREMITY VASCULAR SURGERY.    HPI:  Are you experiencing:  Burning on urination -- NO  Pain on urination  --NO  Urinary frequency --NO  Urinary urgency -- NO  Urge incontinence --NO  Urinary stress incontinence  -- NO  Number of pads used per day --NONE  Enuresis -- NO  Nocturia-- NONE  Hematuria -- NO  Hesitancy -- NO  Post void fullness -- NO  Strength of your stream-- GOOD    ROS:  General-- No C/O fever or chills  Head-- No C/O Dizziness  Eyes-- NO  C/O blurry or double vision  Ears-- No C/O hearing loss  Neck-- Supple  Chest-- No C/O pain or discomfort  Lungs-- No C/O shortness of breath  Abdomen-- No C/O  pain or discomfort, OCC nausea NO vomiting  Back-- No C/O back pain or discomfort  Extremities-- No C/O swelling or pain    OBJECTIVE  General-- well-developed, well-nourished in NAD  Head-- normal cephalic, atraumatic  Eyes-- PERRL, EOM'S FROM, no jaundice  Neck-- Supple, without masses  Chest-- Normal bony structure  Abdomen-- soft, non tender, liver spleen not palpable. No suprapubic masses. RECTUS DIASTASES   Back-- no flank masses palpable, no CVA tenderness on palpation or percussion  Lymph nodes-- No inguinal lymphadenopathy noted  Prostate-- 1+, firm, smooth, non-tender, without nodules  Testis-- both down, non-tender, without masses  Epididymis-- no masses palpable  Scrotum -- no hydrocele noted  Extremities -- Normal muscle mass and tone for the patients age  Neurological-- oriented times three    PSA:  2/26/2025-- 3.9-- FREE PSA-- 8%  10--- 10.53  9/9/2024-- 7.32  2/3/2024-- 4.37  A:  H/O OF AN ELEVATED PSA--  NOW NORMAL   NORMAL FEELING PROSTATE   PT S/P TRUSP BX ON 10-8-24 -- ALL BX'S BPH --PROSTATE SIZE -- 51 GMS    OLDER BROTHER PASSED AWAY FROM PROSTATE CANCER  P:  REASSURANCE, EDUCATION , SUPPORTIVE CARE RENDERED TO THE PT  WILL CONTINUE TO WATCH  CLOSELY OVER TIME  F/U IN 6 MONTHS FOR A TEJAS AND PSA RE CK      Aurelio Zuniga MD 03/04/25 12:48 PM

## 2025-03-05 ENCOUNTER — PHARMACY VISIT (OUTPATIENT)
Dept: PHARMACY | Facility: CLINIC | Age: 65
End: 2025-03-05
Payer: COMMERCIAL

## 2025-03-06 ENCOUNTER — HOME CARE VISIT (OUTPATIENT)
Dept: HOME HEALTH SERVICES | Facility: HOME HEALTH | Age: 65
End: 2025-03-06
Payer: COMMERCIAL

## 2025-03-06 ENCOUNTER — TELEPHONE (OUTPATIENT)
Dept: VASCULAR SURGERY | Facility: HOSPITAL | Age: 65
End: 2025-03-06

## 2025-03-06 PROCEDURE — G0151 HHCP-SERV OF PT,EA 15 MIN: HCPCS

## 2025-03-06 SDOH — HEALTH STABILITY: PHYSICAL HEALTH: EXERCISE TYPE: HEP

## 2025-03-06 ASSESSMENT — ACTIVITIES OF DAILY LIVING (ADL)
AMBULATION ASSISTANCE: STAND BY ASSIST
AMBULATION ASSISTANCE ON FLAT SURFACES: 1
OASIS_M1830: 00
CURRENT_FUNCTION: STAND BY ASSIST
HOME_HEALTH_OASIS: 00
AMBULATION_DISTANCE/DURATION_TOLERATED: 20 FT

## 2025-03-06 ASSESSMENT — ENCOUNTER SYMPTOMS
PERSON REPORTING PAIN: PATIENT
DENIES PAIN: 1

## 2025-03-06 NOTE — Clinical Note
Patient discharged from home health PT today. Patient would highly benefit from continuing PT in an outpatient rehab setting.

## 2025-03-06 NOTE — TELEPHONE ENCOUNTER
Patient had a vascular procedure that he is still recovering from.    Recommend no RTW until Monday March 17, 2025.

## 2025-03-07 ENCOUNTER — TELEPHONE (OUTPATIENT)
Dept: VASCULAR SURGERY | Facility: HOSPITAL | Age: 65
End: 2025-03-07
Payer: COMMERCIAL

## 2025-03-07 DIAGNOSIS — I73.9 PAD (PERIPHERAL ARTERY DISEASE) (CMS-HCC): Primary | ICD-10-CM

## 2025-03-07 NOTE — HOME HEALTH
S: Patient seen for home health PT discharge visit today. Patient reported no pain and has been compliant with HEP.    O: Patient ambulated in the home using no AD.    P: Patient discharged from home health PT today as patient has achieved PT goals and has been independent in performing the HEP in correct pattern. Patient in agreement with the plan.

## 2025-03-10 ENCOUNTER — APPOINTMENT (OUTPATIENT)
Dept: UROLOGY | Facility: CLINIC | Age: 65
End: 2025-03-10
Payer: COMMERCIAL

## 2025-03-10 VITALS
DIASTOLIC BLOOD PRESSURE: 68 MMHG | SYSTOLIC BLOOD PRESSURE: 134 MMHG | HEART RATE: 70 BPM | TEMPERATURE: 97.5 F | BODY MASS INDEX: 24.06 KG/M2 | WEIGHT: 144.38 LBS | HEIGHT: 65 IN

## 2025-03-10 DIAGNOSIS — R97.20 ELEVATED PSA: ICD-10-CM

## 2025-03-10 DIAGNOSIS — N40.0 BENIGN PROSTATIC HYPERPLASIA WITHOUT LOWER URINARY TRACT SYMPTOMS: Primary | ICD-10-CM

## 2025-03-10 DIAGNOSIS — I73.9 PAD (PERIPHERAL ARTERY DISEASE) (CMS-HCC): Primary | ICD-10-CM

## 2025-03-10 PROCEDURE — 3075F SYST BP GE 130 - 139MM HG: CPT | Performed by: UROLOGY

## 2025-03-10 PROCEDURE — 3078F DIAST BP <80 MM HG: CPT | Performed by: UROLOGY

## 2025-03-10 PROCEDURE — 99214 OFFICE O/P EST MOD 30 MIN: CPT | Performed by: UROLOGY

## 2025-03-10 PROCEDURE — 3008F BODY MASS INDEX DOCD: CPT | Performed by: UROLOGY

## 2025-03-10 ASSESSMENT — PAIN SCALES - GENERAL: PAINLEVEL_OUTOF10: 0-NO PAIN

## 2025-03-10 ASSESSMENT — PATIENT HEALTH QUESTIONNAIRE - PHQ9
1. LITTLE INTEREST OR PLEASURE IN DOING THINGS: NOT AT ALL
2. FEELING DOWN, DEPRESSED OR HOPELESS: NOT AT ALL
SUM OF ALL RESPONSES TO PHQ9 QUESTIONS 1 AND 2: 0

## 2025-03-10 ASSESSMENT — ENCOUNTER SYMPTOMS
DEPRESSION: 0
OCCASIONAL FEELINGS OF UNSTEADINESS: 0
LOSS OF SENSATION IN FEET: 0

## 2025-03-10 NOTE — LETTER
March 14, 2025     Claudio Maldonado DO  [id number]  [facility] [address]  [facility] [address]    Patient: Matthieu Solis   YOB: 1960   Date of Visit: 3/10/2025       Dear Dr. Claudio Maldonado DO:    Thank you for referring Matthieu Solis to me for evaluation. Below are my notes for this consultation.  If you have questions, please do not hesitate to call me. I look forward to following your patient along with you.       Sincerely,     Aurelio Zuniga MD      CC: No Recipients  ______________________________________________________________________________________    Subjective  Patient ID: Matthieu Solis is a 64 y.o. male who presents for A F/U ON HIS H/O AN ELEVATED PSA.   PT RECENTLY HAD LOWER EXTREMITY VASCULAR SURGERY.    HPI:  Are you experiencing:  Burning on urination -- NO  Pain on urination  --NO  Urinary frequency --NO  Urinary urgency -- NO  Urge incontinence --NO  Urinary stress incontinence  -- NO  Number of pads used per day --NONE  Enuresis -- NO  Nocturia-- NONE  Hematuria -- NO  Hesitancy -- NO  Post void fullness -- NO  Strength of your stream-- GOOD    ROS:  General-- No C/O fever or chills  Head-- No C/O Dizziness  Eyes-- NO  C/O blurry or double vision  Ears-- No C/O hearing loss  Neck-- Supple  Chest-- No C/O pain or discomfort  Lungs-- No C/O shortness of breath  Abdomen-- No C/O  pain or discomfort, OCC nausea NO vomiting  Back-- No C/O back pain or discomfort  Extremities-- No C/O swelling or pain    OBJECTIVE  General-- well-developed, well-nourished in NAD  Head-- normal cephalic, atraumatic  Eyes-- PERRL, EOM'S FROM, no jaundice  Neck-- Supple, without masses  Chest-- Normal bony structure  Abdomen-- soft, non tender, liver spleen not palpable. No suprapubic masses. RECTUS DIASTASES   Back-- no flank masses palpable, no CVA tenderness on palpation or percussion  Lymph nodes-- No inguinal lymphadenopathy noted  Prostate-- 1+, firm, smooth, non-tender, without  nodules  Testis-- both down, non-tender, without masses  Epididymis-- no masses palpable  Scrotum -- no hydrocele noted  Extremities -- Normal muscle mass and tone for the patients age  Neurological-- oriented times three    PSA:  2/26/2025-- 3.9-- FREE PSA-- 8%  10--- 10.53  9/9/2024-- 7.32  2/3/2024-- 4.37  A:  H/O OF AN ELEVATED PSA--  NOW NORMAL   NORMAL FEELING PROSTATE   PT S/P TRUSP BX ON 10-8-24 -- ALL BX'S BPH --PROSTATE SIZE -- 51 GMS    OLDER BROTHER PASSED AWAY FROM PROSTATE CANCER  P:  REASSURANCE, EDUCATION , SUPPORTIVE CARE RENDERED TO THE PT  WILL CONTINUE TO WATCH CLOSELY OVER TIME  F/U IN 6 MONTHS FOR A TEJAS AND PSA RE CK      Aurelio Zuniga MD 03/04/25 12:48 PM

## 2025-03-13 ENCOUNTER — OFFICE VISIT (OUTPATIENT)
Dept: GERIATRIC MEDICINE | Facility: CLINIC | Age: 65
End: 2025-03-13
Payer: COMMERCIAL

## 2025-03-13 VITALS
BODY MASS INDEX: 23.85 KG/M2 | SYSTOLIC BLOOD PRESSURE: 157 MMHG | TEMPERATURE: 98.1 F | HEART RATE: 76 BPM | DIASTOLIC BLOOD PRESSURE: 81 MMHG | WEIGHT: 143.3 LBS

## 2025-03-13 DIAGNOSIS — R41.89 COGNITIVE CHANGE: ICD-10-CM

## 2025-03-13 DIAGNOSIS — G47.30 SLEEP APNEA, UNSPECIFIED TYPE: ICD-10-CM

## 2025-03-13 DIAGNOSIS — I50.20 SYSTOLIC CONGESTIVE HEART FAILURE, UNSPECIFIED HF CHRONICITY: Primary | ICD-10-CM

## 2025-03-13 DIAGNOSIS — I73.9 PERIPHERAL VASCULAR DISEASE (CMS-HCC): ICD-10-CM

## 2025-03-13 PROCEDURE — 3079F DIAST BP 80-89 MM HG: CPT | Performed by: NURSE PRACTITIONER

## 2025-03-13 PROCEDURE — 99417 PROLNG OP E/M EACH 15 MIN: CPT | Performed by: NURSE PRACTITIONER

## 2025-03-13 PROCEDURE — 3077F SYST BP >= 140 MM HG: CPT | Performed by: NURSE PRACTITIONER

## 2025-03-13 PROCEDURE — 99215 OFFICE O/P EST HI 40 MIN: CPT | Performed by: NURSE PRACTITIONER

## 2025-03-13 ASSESSMENT — ENCOUNTER SYMPTOMS
DEPRESSION: 1
OCCASIONAL FEELINGS OF UNSTEADINESS: 0

## 2025-03-13 ASSESSMENT — PATIENT HEALTH QUESTIONNAIRE - PHQ9
9. THOUGHTS THAT YOU WOULD BE BETTER OFF DEAD, OR OF HURTING YOURSELF: NOT AT ALL
1. LITTLE INTEREST OR PLEASURE IN DOING THINGS: NOT AT ALL
2. FEELING DOWN, DEPRESSED OR HOPELESS: NEARLY EVERY DAY
6. FEELING BAD ABOUT YOURSELF - OR THAT YOU ARE A FAILURE OR HAVE LET YOURSELF OR YOUR FAMILY DOWN: NEARLY EVERY DAY
7. TROUBLE CONCENTRATING ON THINGS, SUCH AS READING THE NEWSPAPER OR WATCHING TELEVISION: NOT AT ALL
5. POOR APPETITE OR OVEREATING: NOT AT ALL
SUM OF ALL RESPONSES TO PHQ9 QUESTIONS 1 AND 2: 3
8. MOVING OR SPEAKING SO SLOWLY THAT OTHER PEOPLE COULD HAVE NOTICED. OR THE OPPOSITE, BEING SO FIGETY OR RESTLESS THAT YOU HAVE BEEN MOVING AROUND A LOT MORE THAN USUAL: NOT AT ALL
4. FEELING TIRED OR HAVING LITTLE ENERGY: NOT AT ALL
3. TROUBLE FALLING OR STAYING ASLEEP OR SLEEPING TOO MUCH: NOT AT ALL
SUM OF ALL RESPONSES TO PHQ QUESTIONS 1-9: 6

## 2025-03-13 ASSESSMENT — PAIN SCALES - GENERAL: PAINLEVEL_OUTOF10: 0-NO PAIN

## 2025-03-13 NOTE — PROGRESS NOTES
Subjective   Patient ID: Mtathieu Solis is a 64 y.o. male who presents for Follow-up and Apnea (Sleep apnea).  HPI    Review of Systems    Objective   Physical Exam    Assessment/Plan   {Assess/PlanSmartLinks:46752}         PAT Mohan-MICHAEL 03/13/25 1:58 PM    use disorder. Current smoker, reportedly weaning off. Having GI ulcer and bleeding issues. Eventually extubated. Started on carvedilol, lisinopril, Jardiance, Lasix, spironolactone. Works as a  a [employer] .     Experienced cognitive change came to [facility] to be assessed. MoCA 23/30. NP Arun's recommendation's included due to mild cognitive impairment and unknown etiology, send for neuropsych testing; suggest assessment of sleep apnea by Sleep Medicine.      LifeVest ordered for prevention of sudden cardiac death in light of severe cardiomyopathy    Nico Urbina nephpoonam, is healthcare surrogate    Current Outpatient Medications on File Prior to Visit   Medication Sig Dispense Refill    acetaminophen (Tylenol) 325 mg tablet Take 2 tablets (650 mg) by mouth every 6 hours if needed for mild pain (1 - 3) or moderate pain (4 - 6).      aspirin 81 mg EC tablet Take 1 tablet (81 mg) by mouth once daily. 90 tablet 3    atorvastatin (Lipitor) 80 mg tablet TAKE 1 TABLET BY MOUTH ONCE DAILY. 90 tablet 3    carvedilol (Coreg) 12.5 mg tablet Take 1 tablet (12.5 mg) by mouth 2 times daily (morning and late afternoon). 60 tablet 11    clopidogrel (Plavix) 75 mg tablet Take 1 tablet (75 mg) by mouth once daily. 90 tablet 3    empagliflozin (Jardiance) 10 mg tablet Take 1 tablet (10 mg) by mouth once daily. 90 tablet 3    nicotine polacrilex (Commit) 4 mg lozenge Use 1 lozenge (4 mg) in the mouth or throat every 2 hours if needed for smoking cessation. 100 lozenge 0    sacubitriL-valsartan (Entresto)  mg tablet Take 1 tablet by mouth 2 times a day. 180 tablet 3    spironolactone (Aldactone) 25 mg tablet Take 0.5 tablets (12.5 mg) by mouth once daily. 45 tablet 3    cholecalciferol (Vitamin D-3) 25 mcg (1000 units) tablet Take 1 tablet (1,000 Units) by mouth once daily. 30 tablet 1     No current facility-administered medications on file prior to visit.     Allergies   Allergen Reactions    Cinnamon Unknown      Cinnamon    Doxycycline Swelling    Penicillins Unknown      Past Medical History:   Diagnosis Date    Anxiety     Arthritis     Cataract     CHF (congestive heart failure)     chronic systolic HF    Depression     Elevated PSA     PSA 10.53 on 10/27/24    GERD (gastroesophageal reflux disease)     Gout     HL (hearing loss)     HTN (hypertension)     Hyperlipidemia     Non-ischemic cardiomyopathy (Multi)     s/p LifeVest 10/2024    Peptic ulceration     Peripheral vascular disease (CMS-HCC)     BL LE CLTI 3  L > R    Sleep apnea     suspected YOHANA, sleep med visit on 1/20/25    Syncope     near syncopal event 6/2024    Vision loss     wears corrective lens     Patient Active Problem List   Diagnosis    Abdominal pain    Acute pharyngitis    Anxiety    Blurring of visual image    Cataract, nuclear sclerotic, both eyes    Change in vision    Cortical age-related cataract of left eye    Episcleritis of left eye    Essential hypertension    Gastroenteritis    Gout of right foot    Helicobacter positive gastritis    Hordeolum externum of left upper eyelid    Astigmatism of both eyes    Low back pain    Lumbar spondylosis    Lung nodules    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 organic origin    Rectal mass    Urethritis    Abdominal wall abscess    Sacroiliitis (CMS-[facility])    Right groin hernia    Bilateral inguinal hernia    Combined form of age-related cataract, right eye    Combined form of age-related cataract, left eye    Hyperopia, bilateral    Presbyopia    Nausea and vomiting    Well adult exam    Nondisplaced fracture of middle phalanx of right middle finger, initial encounter for open fracture    Acute on chronic combined systolic and diastolic heart failure    Acute respiratory failure with hypercapnia    NICM (nonischemic cardiomyopathy) (Multi)    Nicotine use disorder    Nonsustained ventricular tachycardia (Multi)    Type 2 myocardial infarction (Multi)    Alcoholism (Multi)    Hearing loss    Leg pain    Sciatica     Elevated PSA    Acute prostatitis    Leg weakness, bilateral    Cognitive change    Snoring    Peripheral vascular disease (CMS-HCC)    Atherosclerosis of native coronary artery of native heart without angina pectoris    BPH (benign prostatic hyperplasia)    Gout    HLD (hyperlipidemia)    Systolic congestive heart failure    Sleep-related breathing disorder    Sleep apnea     Past Surgical History:   Procedure Laterality Date    COLONOSCOPY      ESOPHAGOGASTRODUODENOSCOPY      HERNIA REPAIR  2015    Inguinal    PROSTATE BIOPSY       Social Drivers of Health     Tobacco Use: High Risk (3/14/2025)    Patient History     Smoking Tobacco Use: Every Day     Smokeless Tobacco Use: Never     Passive Exposure: Not on file   Alcohol Use: Not At Risk (1/22/2025)    AUDIT-C     Frequency of Alcohol Consumption: Never     Average Number of Drinks: Patient does not drink     Frequency of Binge Drinking: Never   Financial Resource Strain: Medium Risk (1/22/2025)    Overall Financial Resource Strain (CARDIA)     Difficulty of Paying Living Expenses: Somewhat hard   Food Insecurity: Food Insecurity Present (1/22/2025)    Hunger Vital Sign     Worried About Running Out of Food in the Last Year: Sometimes true     Ran Out of Food in the Last Year: Never true   Transportation Needs: Low Risk  (3/16/2025)    Received from [facility] Health Risk Assessment (HRA)     In the past year, have you had trouble getting to medical appointments or getting things you need because of transportation?: No   Physical Activity: Inactive (1/22/2025)    Exercise Vital Sign     Days of Exercise per Week: 0 days     Minutes of Exercise per Session: 0 min   Stress: No Stress Concern Present (1/22/2025)    [place] [employer] - Occupational Stress Questionnaire     Feeling of Stress : Not at all   Recent Concern: Stress - Stress Concern Present (11/8/2024)    [place] [employer] - Occupational Stress  Questionnaire     Feeling of Stress : Rather much   Social Connections: Feeling Socially Integrated (3/6/2025)    OASIS : Social Isolation     Frequency of experiencing loneliness or isolation: Never   Recent Concern: Social Connections - Feeling Somewhat Isolated (1/26/2025)    OASIS : Social Isolation     Frequency of experiencing loneliness or isolation: Sometimes   Intimate Partner Violence: Not At Risk (1/22/2025)    Humiliation, Afraid, Rape, and Kick questionnaire     Fear of Current or Ex-Partner: No     Emotionally Abused: No     Physically Abused: No     Sexually Abused: No   Depression: At risk (3/13/2025)    PHQ-2     PHQ-2 Score: 3   Housing Stability: High Risk (3/16/2025)    Received from [facility] Health Risk Assessment (HRA)     Describe your current living situation.: I have a steady place to live     Does your current living situation have any of the following problems? (CHOOSE ALL THAT APPLY): Lead paint or pipes (homes built before 1978),Mold,Oven, stove, or refrigerator not working,Smoke...   Utilities: Not At Risk (1/22/2025)    [facility] Utilities     Threatened with loss of utilities: No   Recent Concern: Utilities - At Risk (11/8/2024)    [facility] Utilities     Threatened with loss of utilities: Yes   Digital Equity: Not At Risk (12/18/2023)    Digital Health Equity Screening     Access to device/internet: Desktop computer, laptop computer, or tablet with broadband internet connection     Requested support: None of these   Health Literacy: Adequate Health Literacy (3/6/2025)    OASIS : Health Literacy     Frequency of needing help to read materials from doctor or pharmacy: Never   Recent Concern: Health Literacy - Inadequate Health Literacy (1/26/2025)    [place]AS[place] : Health Literacy     Frequency of needing help to read materials from doctor or pharmacy: Sometimes     Family History   Problem Relation Name Age of Onset    Other (CVA) Mother Lissett Younger ([place] name: Arnoldo)      Hypertension Mother Lissett Younger ([place] name: Arnoldo)     Other (CVA) Father Ceasar Solis     Hypertension Father Ceasar Solis     Glaucoma Neg Hx      Macular degeneration Neg Hx         Review of Systems   Constitutional:  Negative for activity change, chills and fatigue.   Eyes:  Negative for pain, discharge and visual disturbance.   Endocrine: Negative for cold intolerance.   All other systems reviewed and are negative.      Objective   Visit Vitals  /81   Pulse 76   Temp 36.7 °C (98.1 °F) (Tympanic)   Wt 65 kg (143 lb 4.8 oz)   BMI 23.85 kg/m²   Smoking Status Every Day   BSA 1.73 m²       Physical Exam  Constitutional:       General: He is not in acute distress.     Appearance: Normal appearance. He is normal weight. He is not ill-appearing, toxic-appearing or diaphoretic.   HENT:      Head: Normocephalic and atraumatic.   Eyes:      Conjunctiva/sclera: Conjunctivae normal.   Pulmonary:      Effort: No respiratory distress.   Abdominal:      General: Abdomen is flat.   Neurological:      Mental Status: He is alert and oriented to person, place, and time.   Psychiatric:         Attention and Perception: Attention and perception normal.         Mood and Affect: Mood and affect normal.         Speech: Speech normal.         Behavior: Behavior normal. Behavior is cooperative.         Thought Content: Thought content normal.         Cognition and Memory: Cognition normal.         Judgment: Judgment normal.       RECENT LABS   Latest Reference Range & Units Most Recent   GLUCOSE 74 - 99 mg/dL 88  1/23/25 08:33   SODIUM 136 - 145 mmol/L 137  1/23/25 08:33   POTASSIUM 3.5 - 5.3 mmol/L 3.7  1/23/25 08:33   CHLORIDE 98 - 107 mmol/L 108 (H)  1/23/25 08:33   Bicarbonate 21 - 32 mmol/L 24  1/23/25 08:33   Anion Gap 10 - 20 mmol/L 9 (L)  1/23/25 08:33   Blood Urea Nitrogen 6 - 23 mg/dL 26 (H)  1/23/25 08:33   Creatinine 0.50 - 1.30 mg/dL 0.94  1/23/25 08:33   EGFR >60 mL/min/1.73m*2 >90  1/23/25 08:33   Calcium  8.6 - 10.6 mg/dL 8.2 (L)  1/23/25 08:33   PHOSPHORUS 2.5 - 4.9 mg/dL 2.8  1/23/25 08:33   Albumin 3.4 - 5.0 g/dL 3.7  1/23/25 08:33   Alkaline Phosphatase 33 - 136 U/L 89  10/26/24 17:43   ALT 10 - 52 U/L 26  10/26/24 17:43   AST 9 - 39 U/L 26  10/26/24 17:43   Bilirubin Total 0.0 - 1.2 mg/dL 1.1  10/26/24 17:43   Bilirubin, Direct 0.0 - 0.3 mg/dL 0.2  1/2/24 16:14   HDL CHOLESTEROL mg/dL 51.0  2/3/24 09:04   Cholesterol/HDL Ratio  3.1  2/3/24 09:04   LDL Calculated <=99 mg/dL 100 (H)  2/3/24 09:04   VLDL 0 - 40 mg/dL 8  2/3/24 09:04   TRIGLYCERIDES 0 - 149 mg/dL 39  2/3/24 09:04   Non HDL Cholesterol 0 - 149 mg/dL 108  2/3/24 09:04   Total Protein 6.4 - 8.2 g/dL 7.7  10/26/24 17:43   MAGNESIUM 1.60 - 2.40 mg/dL 2.26  1/23/25 08:33   CHOLESTEROL 0 - 199 mg/dL 159  2/3/24 09:04   Lactate 0.4 - 2.0 mmol/L 1.0  10/27/24 01:59   CRP, High Sensitivity mg/L 4.2 !  12/11/18 14:32   C-Reactive Protein <1.00 mg/dL 13.32 (H)  10/27/24 05:58   BNP 0 - 99 pg/mL 102 (H)  10/26/24 17:43   LIPASE 9 - 82 U/L 8 (L)  1/8/24 05:06   Vitamin B12 211 - 911 pg/mL 1,256 (H)  10/27/24 05:58   Hemoglobin A1C 4.3 - 5.6 % 5.1 (E)  6/19/24 13:13   Estimated Average Glucose mg/dL 100 (E)  6/19/24 13:13   T3, FREE 2.3 - 4.2 pg/mL 3.2  2/3/25 11:43   T4, FREE 0.8 - 1.8 ng/dL 1.0  2/3/25 11:43   Thyroid Stimulating Hormone 0.44 - 3.98 mIU/L 1.15  9/23/24 16:05   TSH 0.40 - 4.50 mIU/L 1.08  2/3/25 11:43   Vitamin D, 25-Hydroxy, Total 30 - 100 ng/mL 22 (L)  10/27/24 05:58   PSA <=4.00 ng/mL 10.53 (H)  10/27/24 05:58   PSA, TOTAL < OR = 4.0 ng/mL 3.9  2/26/25 15:53   PSA, FREE ng/mL 0.3  2/26/25 15:53   PSA, % FREE >25 % (calc) 8 (L)  2/26/25 15:53   H. PYLORI DRUG SUSCEPTIBILITY GENOTYPIC ASSAY  Rpt  4/4/24 07:55   WBC 4.4 - 11.3 x10*3/uL 12.7 (H)  1/23/25 08:33   nRBC 0.0 - 0.0 /100 WBCs 0.0  1/23/25 08:33   RBC 4.50 - 5.90 x10*6/uL 2.87 (L)  1/23/25 08:33   HEMOGLOBIN 13.5 - 17.5 g/dL 9.3 (L)  1/23/25 08:33   HEMATOCRIT 41.0 - 52.0 % 28.0  (L)  1/23/25 08:33   MCV 80 - 100 fL 98  1/23/25 08:33   MCH 26.0 - 34.0 pg 32.4  1/23/25 08:33   MCHC 32.0 - 36.0 g/dL 33.2  1/23/25 08:33   RED CELL DISTRIBUTION WIDTH 11.5 - 14.5 % 13.0  1/23/25 08:33   Platelets 150 - 450 x10*3/uL 229  1/23/25 08:33   Neutrophils % 40.0 - 80.0 % 64.2  1/23/25 08:33   Immature Granulocytes %, Automated 0.0 - 0.9 % 0.4  1/23/25 08:33   Lymphocytes % 13.0 - 44.0 % 18.6  1/23/25 08:33   Monocytes % 2.0 - 10.0 % 13.5  1/23/25 08:33   Eosinophils % 0.0 - 6.0 % 3.0  1/23/25 08:33   Basophils % 0.0 - 2.0 % 0.3  1/23/25 08:33   Neutrophils Absolute 1.20 - 7.70 x10*3/uL 8.12 (H)  1/23/25 08:33   Immature Granulocytes Absolute, Automated 0.00 - 0.70 x10*3/uL 0.05  1/23/25 08:33   Lymphocytes Absolute 1.20 - 4.80 x10*3/uL 2.36  1/23/25 08:33   Monocytes Absolute 0.10 - 1.00 x10*3/uL 1.71 (H)  1/23/25 08:33   Eosinophils Absolute 0.00 - 0.70 x10*3/uL 0.38  1/23/25 08:33   Basophils Absolute 0.00 - 0.10 x10*3/uL 0.04  1/23/25 08:33   (H): Data is abnormally high  (L): Data is abnormally low  !: Data is abnormal  (E): External lab result  ----------------------------------------------------------------  CT head wo IV contrast  Order: [id number]  Impression    IMPRESSION:    No acute intracranial abnormality.    Transcribed Using Voice Recognition  Transcribe Date/Time: Jun 26 2024 11:09P    Dictated by: TJ RIOS MD    This examination was interpreted and the report reviewed and  electronically signed by:  TJ RIOS MD on Jun 26 2024 11:11PM  EST  Narrative    * * *Final Report* * *    DATE OF EXAM: Jun 26 2024 10:34PM      [facility]   0504  -  CT BRAIN WO IVCON   / ACCESSION #  [id number]    PROCEDURE REASON: Head trauma, moderate-severe        * * * * Physician Interpretation * * * *    RESULT: EXAMINATION: CT BRAIN WO IVCON    CLINICAL HISTORY: Head trauma, moderate-severe.    TECHNIQUE:  Serial axial images without IV contrast were obtained from the vertex to the foramen magnum.  MQ:   "[id number]    CT Radiation dose: Integrated Dose-Length Product (DLP) for this visit =  835  mGy*cm  CT Dose Reduction Employed: Automated exposure control(AEC) and iterative recon    COMPARISON: CT brain 06/19/2024.    RESULT:    Post-operative change: None.    Acute change: No evidence of an acute infarct or other acute parenchymal process.    Hemorrhage: No evidence of acute intracranial hemorrhage. ECASS hemorrhagic transformation score: Not Applicable    Mass Lesion / Mass Effect: There is no evidence of an intracranial mass or extraaxial fluid collection. No significant mass effect.    Chronic change: None apparent.    Parenchyma: There is no significant volume loss. The brain parenchyma is otherwise within normal limits for age.    Ventricles: The ventricles are within normal limits of size and  configuration for age.    Paranasal sinuses and skull base: The visualized paranasal sinuses are grossly clear. Unchanged LEFT parietal scalp subcutaneous lesion.    Localizer images: No significant findings.  Exam End: 06/26/24 22:34    Specimen Collected: 06/26/24 22:34 Last Resulted: 06/26/24 23:13   Received From: [facility]  Result Received: 06/28/24 11:33     LAST EKG  ECG 12 Lead    Height: 1.651 m (5' 5\")   Weight: 59.4 kg (131 lb)   Blood Pressure: Not recorded    Date of Study: 10/27/24   Ordering Provider: Susan Jeter MD   Clinical Indications: hyperkalemia       Reading Physicians  Performing Staff   Cardiology: Karlos Duarte MD    No performing staff assigned to study.     Indications  Priority: Routine  hyperkalemia     Interpretation Summary  Show Result Comparison Poor data quality, interpretation may be adversely affected  Sinus rhythm with occasional Premature ventricular complexes  Left axis deviation  Minimal voltage criteria for LVH, may be normal variant  Nonspecific T wave abnormality  Prolonged QT  Abnormal ECG  When compared with ECG of 27-OCT-2024 15:59,  Premature ventricular " complexes are now Present  Questionable change in QRS duration  Confirmed by Karlos Duarte ([id number]) on 10/28/2024 9:43:54 AM  Measurements    P Axis -1 degrees         P Offset 194 ms         DE Interval 116 ms         Q Onset 213 ms         QTC Calculation(Bazett) 639 ms         QTC Fredericia 595 ms         R Axis -44 degrees          T Axis -6 degrees         Ventricular Rate 93 BPM         Atrial Rate 93 BPM         P Onset 155 ms          QRS Count 15 beats         QRS Duration 82 ms         QT Interval 514 ms         T Offset 470 ms           MoCA   Most Recent 4/16/23 - 4/14/25   Visuospatial/Executive 2 [1]  9/23/24 14:29   Naming 3  9/23/24 14:29   Memory (Score '0' as this is an Unscored Section) 0  9/23/24 14:29   Attention: Read List of Digits 2  9/23/24 14:29   Attention: Read List of Letters 1  9/23/24 14:29   Attention: Serial Sevens 3  9/23/24 14:29   Language: Repeat 1  9/23/24 14:29   Language: Fluency 0 [2]  9/23/24 14:29   Abstraction 2  9/23/24 14:29   Delayed Recall 3 [3]  9/23/24 14:29   Orientation 6  9/23/24 14:29   Add 1 Point if </=12 yr Education 0  9/23/24 14:29   MOCA Total Score 23  9/23/24 14:29   [1] patient used a credit card to draw strainght lines on the test  [2] 9 words  [3] MIS 12/15    Assessment/Plan   Problem List Items Addressed This Visit             ICD-10-CM    Cognitive change R41.89    Relevant Orders    Follow Up In Geriatrics    Systolic congestive heart failure - Primary I50.20    Relevant Orders    Referral to Physical Therapy    Referral to Adult Sleep Medicine    Follow Up In Geriatrics    Sleep apnea G47.30    Relevant Orders    Referral to Adult Sleep Medicine    Follow Up In Geriatrics    Peripheral vascular disease (CMS-HCC) I73.9    Relevant Orders    Referral to Physical Therapy    Follow Up In Geriatrics     Follow-up in 3 months    Time Spent  Prep time on day of patient encounter: 0 minutes  Time spent directly with patient, family or caregiver: 45  minutes  Additional Time Spent on Patient Care Activities: 0 minutes  Documentation Time: 20 minutes  Other Time Spent: 0 minutes  Total: 65 minutes      Charito Diaz, APRN-CNP

## 2025-03-21 ENCOUNTER — APPOINTMENT (OUTPATIENT)
Dept: PHYSICAL MEDICINE AND REHAB | Facility: CLINIC | Age: 65
End: 2025-03-21
Payer: COMMERCIAL

## 2025-03-21 DIAGNOSIS — M54.50 CHRONIC RIGHT-SIDED LOW BACK PAIN WITHOUT SCIATICA: Primary | ICD-10-CM

## 2025-03-21 DIAGNOSIS — G89.29 CHRONIC RIGHT-SIDED LOW BACK PAIN WITHOUT SCIATICA: Primary | ICD-10-CM

## 2025-03-21 PROCEDURE — 99214 OFFICE O/P EST MOD 30 MIN: CPT | Performed by: PHYSICAL MEDICINE & REHABILITATION

## 2025-03-21 NOTE — PROGRESS NOTES
Virtual or Telephone Consent    An interactive audio and video telecommunication system which permits real time communications between the patient (at the originating site) and provider (at the distant site) was utilized to provide this telehealth service.   Verbal consent was requested and obtained from Matthieu Solis on this date, 03/21/25 for a telehealth visit.    Physical Medicine and Rehabilitation [facility]   03/21/25     Chief Complaint:  Low back pain     HPI:  Matthieu Solis is a  64 y.o. male with PMHx of CHF and chronic low back pain who presents to the clinic today for evaluation of low back pain. Patient was last seen by PCP Dr. Maldonado on 9/13/24 and discussed chronic low back pain. Ordered PT, a lumbar Xray, and referred to PMR. Did not get Lumbar Xray and does not have an appt made with PT yet. Previously prescribed a TENs unit which has helped. No weakness or saddle anesthesia.     Onset: years ago  Location: low back  Radiation: both legs anteriorly  Quality: ache; sharp  Duration: intermittent  Aggravating factors:  walking  Alleviating factors: rest  Severity: 3  Numbness/Tingling: Yes - feet, legs  Bowel or bladder incontinence: No  Pt's current medication regimen includes:   Tylenol 250mg PRN  Ibuprofen 500mg PRN     Treatment to date:  Physical therapy: No  Prior injections: No       Occupation: ; currently not working bc of the newly diagnosed HF and not being in the condition to work    He was last seen in January 2025. At that time we discussed:  Continue PT and HEP  Lumbar spondylosis on xray  refill Meloxicam 15mg daily PRN; Do not take NSAIDs within 24 hours; discussed he might be placed on a blood thinner after his stent procedure and in that case should avoid nsaids  Can consider repeat MRI if fails conservative management and talk about potential STEFFI   Follow-up in 10 weeks    Patient reports he had a vascular surgery performed on his bilateral legs for PAD in January. Reports  his leg pain has largely resolved since that procedure. Has his PT evaluation on April 11th. No change in low back pain since last appointment. Has not yet tried the Meloxicam, but is now not able to take due to being placed on DAPT. Currently taking the Tylenol and utilizing Lidocaine patches. Worse when he gets up in the morning, has not helped that he has been sleeping on an air mattress. Reports bilateral lower extremity numbness and tingling has resolved since vascular surgery.      Imaging  Reviewed 03/21/25    Xr L spine 10/2024  Facet arthrosis at L4-5 with a 5 mm anterolisthesis.      Minimal degenerative changes at other levels.      No evidence of fracture.      IMPRESSION:      Facet arthrosis at L4-5 with a 5 mm anterolisthesis.      Minimal degenerative changes at other levels.  9/28/21 MRI of Lumbar spine reviewed:  At L3-L4 mild degenerative changes noted causing mild bilateral  lateral recess and neural foramina narrowing.     At L4-L5 mild degenerative changes noted causing mild-to-moderate  bilateral lateral recess and neural foramina narrowing, left greater  than the right    3/12/21 Lumbar Xray reviewed:  Mild lumbar degenerative change L3-S1. This has progressed from the  prior exam.     Past Medical History:   Diagnosis Date    Anxiety     Arthritis     Cataract     CHF (congestive heart failure)     chronic systolic HF    Depression     Elevated PSA     PSA 10.53 on 10/27/24    GERD (gastroesophageal reflux disease)     Gout     HL (hearing loss)     HTN (hypertension)     Hyperlipidemia     Non-ischemic cardiomyopathy (Multi)     s/p LifeVest 10/2024    Peptic ulceration     Peripheral vascular disease (CMS-HCC)     BL LE CLTI 3  L > R    Sleep apnea     suspected YOHANA, sleep med visit on 1/20/25    Syncope     near syncopal event 6/2024    Vision loss     wears corrective lens        Past Surgical History:   Procedure Laterality Date    COLONOSCOPY      ESOPHAGOGASTRODUODENOSCOPY      HERNIA  REPAIR  2015    Inguinal    PROSTATE BIOPSY          Patient Active Problem List    Diagnosis Date Noted    Sleep-related breathing disorder 01/20/2025    Atherosclerosis of native coronary artery of native heart without angina pectoris 12/20/2024    BPH (benign prostatic hyperplasia) 12/20/2024    Gout 12/20/2024    HLD (hyperlipidemia) 12/20/2024    Systolic congestive heart failure 12/20/2024    Cognitive change 12/12/2024    Snoring 12/12/2024    Leg weakness, bilateral 11/20/2024    Acute prostatitis 10/27/2024    Nicotine use disorder 06/26/2024    NICM (nonischemic cardiomyopathy) (Multi) 06/25/2024    Acute on chronic combined systolic and diastolic heart failure 06/22/2024    Nonsustained ventricular tachycardia (Multi) 06/22/2024    Type 2 myocardial infarction (Multi) 06/22/2024    Acute respiratory failure with hypercapnia (Multi) 06/19/2024    Nondisplaced fracture of middle phalanx of right middle finger, initial encounter for open fracture 04/05/2024    Well adult exam 01/23/2024    Nausea and vomiting 01/03/2024    Combined form of age-related cataract, right eye 12/19/2023    Combined form of age-related cataract, left eye 12/19/2023    Hyperopia, bilateral 12/19/2023    Presbyopia 12/19/2023    Abdominal pain 12/18/2023    Acute pharyngitis 12/18/2023    Anxiety 12/18/2023    Blurring of visual image 12/18/2023    Cataract, nuclear sclerotic, both eyes 12/18/2023    Change in vision 12/18/2023    Cortical age-related cataract of left eye 12/18/2023    Episcleritis of left eye 12/18/2023    Essential hypertension 12/18/2023    Gastroenteritis 12/18/2023    Gout of right foot 12/18/2023    Helicobacter positive gastritis 12/18/2023    Hordeolum externum of left upper eyelid 12/18/2023    Astigmatism of both eyes 12/18/2023    Low back pain 12/18/2023    Lumbar spondylosis 12/18/2023    Lung nodules 12/18/2023    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 organic origin 12/18/2023    Rectal mass 12/18/2023     Urethritis 12/18/2023    Abdominal wall abscess 12/18/2023    Sacroiliitis (CMS-HCC) 12/18/2023    Right groin hernia 12/18/2023    Bilateral inguinal hernia 12/18/2023    Alcoholism (Multi) 12/12/2012    Hearing loss 12/12/2012    Sciatica 11/05/2012    Leg pain 10/04/2012    Peripheral vascular disease (CMS-HCC) 01/02/2025    Elevated PSA 09/10/2024        Family History   Problem Relation Name Age of Onset    Other (CVA) Mother Lissett Younger ([place] name: Arnoldo)     Hypertension Mother Lissett Younger ([place] name: Arnoldo)     Other (CVA) Father Ceasar JOLENE Solis     Hypertension Father Ceasar JOLENE Solis     Glaucoma Neg Hx      Macular degeneration Neg Hx          Current Outpatient Medications   Medication Sig Dispense Refill    acetaminophen (Tylenol) 325 mg tablet Take 2 tablets (650 mg) by mouth every 6 hours if needed for mild pain (1 - 3) or moderate pain (4 - 6).      aspirin 81 mg EC tablet Take 1 tablet (81 mg) by mouth once daily. 90 tablet 3    atorvastatin (Lipitor) 80 mg tablet TAKE 1 TABLET BY MOUTH ONCE DAILY. 90 tablet 3    carvedilol (Coreg) 12.5 mg tablet Take 1 tablet (12.5 mg) by mouth 2 times daily (morning and late afternoon). 60 tablet 11    clopidogrel (Plavix) 75 mg tablet Take 1 tablet (75 mg) by mouth once daily. 90 tablet 3    empagliflozin (Jardiance) 10 mg Take 1 tablet (10 mg) by mouth once daily. 90 tablet 3    nicotine polacrilex (Commit) 4 mg lozenge Use 1 lozenge (4 mg) in the mouth or throat every 2 hours if needed for smoking cessation. 100 lozenge 0    sacubitriL-valsartan (Entresto)  mg tablet Take 1 tablet by mouth 2 times a day. 180 tablet 3    spironolactone (Aldactone) 25 mg tablet Take 0.5 tablets (12.5 mg) by mouth once daily. 45 tablet 3     No current facility-administered medications for this visit.        Allergies   Allergen Reactions    Cinnamon Unknown     Cinnamon    Doxycycline Swelling    Penicillins Unknown        Social History     Socioeconomic History     Marital status: Significant Other   Tobacco Use    Smoking status: Every Day     Current packs/day: 1.00     Average packs/day: 1 pack/day for 52.2 years (51.5 ttl pk-yrs)     Types: Cigarettes     Start date: 1974    Smokeless tobacco: Never    Tobacco comments:     Trying to quit 2-5 cigarettes /day    Vaping Use    Vaping status: Never Used   Substance and Sexual Activity    Alcohol use: Not Currently    Drug use: Never    Sexual activity: Defer     Social Drivers of Health     Financial Resource Strain: Medium Risk (1/22/2025)    Overall Financial Resource Strain (CARDIA)     Difficulty of Paying Living Expenses: Somewhat hard   Food Insecurity: Food Insecurity Present (1/22/2025)    Hunger Vital Sign     Worried About Running Out of Food in the Last Year: Sometimes true     Ran Out of Food in the Last Year: Never true   Transportation Needs: No Transportation Needs (3/6/2025)    OASIS : Transportation     Lack of Transportation (Medical): No     Lack of Transportation (Non-Medical): No     Patient Unable or Declines to Respond: No   Physical Activity: Inactive (1/22/2025)    Exercise Vital Sign     Days of Exercise per Week: 0 days     Minutes of Exercise per Session: 0 min   Stress: No Stress Concern Present (1/22/2025)    [employer] [place] [employer] - Occupational Stress Questionnaire     Feeling of Stress : Not at all   Recent Concern: Stress - Stress Concern Present (11/8/2024)    [employer] [place] [employer] - Occupational Stress Questionnaire     Feeling of Stress : Rather much   Social Connections: Feeling Socially Integrated (3/6/2025)    OASIS : Social Isolation     Frequency of experiencing loneliness or isolation: Never   Recent Concern: Social Connections - Feeling Somewhat Isolated (1/26/2025)    OASIS : Social Isolation     Frequency of experiencing loneliness or isolation: Sometimes   Intimate Partner Violence: Not At Risk (1/22/2025)    Humiliation,  Afraid, Rape, and Kick questionnaire     Fear of Current or Ex-Partner: No     Emotionally Abused: No     Physically Abused: No     Sexually Abused: No   Housing Stability: Low Risk  (1/22/2025)    Housing Stability Vital Sign     Unable to Pay for Housing in the Last Year: No     Number of Times Moved in the Last Year: 0     Homeless in the Last Year: No   Recent Concern: Housing Stability - High Risk (11/12/2024)    Received from [facility] Health Risk Assessment (HRA)     Does your current living situation have any of the following problems? (CHOOSE ALL THAT APPLY): Mold,Smoke detectors missing or not working     Describe your current living situation.: I have a steady place to live        Review of Systems:  Constitutional:  Denies fever or chills, malaise, weight changes.   Eyes:  Denies change in visual acuity   HENT:  Denies nasal congestion or sore throat   Respiratory:  Denies cough or shortness of breath   Cardiovascular:  Denies chest pain or edema   GI:  Denies abdominal pain, nausea, vomiting, bloody stools or diarrhea   :  Denies dysuria   Integument:  Denies rash   Neurologic:  As per HPI  MSK: Per above HPI  Endocrine:  Denies polyuria or polydipsia   Lymphatic:  Denies swollen glands   Psychiatric:  Denies depression or anxiety            PHYSICAL EXAM: Limited due to virtual visit.  Below was performed with my instruction.    GENERAL APPEARANCE:  Well nourished, well developed, and no apparent distress.  NEURO PSYCH: Patient oriented to person, place, Mood pleasant. Benign affect.  CARDIOVASCULAR: No edema noted. No varicosities.   PULMONARY: Symmetric chest expansion, no accessory muscle use  EYE: EOMI, Pupils equal  SKIN: No appreciated rash, lesion or ulcer on the head/neck, bilateral UE, or bilateral LE.    MUSCULOSKELETAL and NEUROLOGICAL       VISUAL INSPECTION           LUMBAR: WNL  SPINE ROM:   LUMBAR ROM: full with some reproduction of axial low back pain  MUSCLE BULK: Normal and  symmetrical in the upper & lower extremities.  MOTOR: Strength appears functionally full in bilat LE during ROM testing  Self directed slump testing: Negative      Impression: Matthieu Solis is a 64 y.o.  male with PMHx of CHF and chronic low back pain who presents to the clinic today for evaluation of chronic low back pain. Overall symptoms stable w PT however. Symptoms thought secondary to neurogenic claudication resolved with vascular procedure in January 2025. Now with remaining axial low back pain.      Plan:  Functional Capacity Evaluation for return to work ordered  Proceed with PT evaluation on April 11th  Consider MRI in the future if considering injections  No oral nsaids since now on plavix,   Continue Tylenol (not to exceed 3,000mg per day) and Lidocaine patches as needed  Follow-up in 10 weeks    Chela Scruggs DO   PM&R PGY4    Patient seen and examined with Dr. Sierra Kauffman who agrees with assessment and plan.     ** Dictated with voice recognition software, please forgive any errors in grammar and/or spelling **       Kristin Kauffman MD     Division of PM&R

## 2025-03-31 PROCEDURE — RXMED WILLOW AMBULATORY MEDICATION CHARGE

## 2025-04-01 ENCOUNTER — PHARMACY VISIT (OUTPATIENT)
Dept: PHARMACY | Facility: CLINIC | Age: 65
End: 2025-04-01
Payer: COMMERCIAL

## 2025-04-11 ENCOUNTER — EVALUATION (OUTPATIENT)
Dept: PHYSICAL THERAPY | Facility: CLINIC | Age: 65
End: 2025-04-11
Payer: COMMERCIAL

## 2025-04-11 DIAGNOSIS — M54.50 ACUTE RIGHT-SIDED LOW BACK PAIN, UNSPECIFIED WHETHER SCIATICA PRESENT: ICD-10-CM

## 2025-04-11 PROCEDURE — 97161 PT EVAL LOW COMPLEX 20 MIN: CPT | Mod: GP | Performed by: PHYSICAL THERAPIST

## 2025-04-11 PROCEDURE — 97110 THERAPEUTIC EXERCISES: CPT | Mod: GP | Performed by: PHYSICAL THERAPIST

## 2025-04-11 NOTE — PROGRESS NOTES
Physical Therapy    Physical Therapy Evaluation and Treatment      Patient Name: Matthieu Solis  MRN: [id number]  Today's Date: 4/14/2025    Time Entry:   Time Calculation  Start Time: 0910  Stop Time: 1000  Time Calculation (min): 50 min  PT Evaluation Time Entry  PT Evaluation (Low) Time Entry: 20  PT Therapeutic Procedures Time Entry  Therapeutic Exercise Time Entry: 25        Visit: 1  [place] [facility]  Auth: No  18 visits left 2025     Assessment:  Pt presents to clinic with chief complaint of low back pain that has been ongoing for multiple months. Pt recently underwent surgery for PAD on 1/21/25. He reports leg pain has improved since surgery, however, he continues to have mod to severe L sided low back pain. Pt demonstrates reduced lumbar AROM, reduced hip AROM, reduced LE strength, and mild postural deficits. He will benefit from skilled therapy to address current impairments for improved ability to return to regular activity with reduced pain     Plan:  OP PT Plan  Treatment/Interventions: Cryotherapy, Education/ Instruction, Electrical stimulation, Hot pack, Manual therapy, Neuromuscular re-education, Self care/ home management, Therapeutic activities, Therapeutic exercises  PT Plan: Skilled PT  PT Frequency: 1 time per week  Duration: 4-6 weeks  Onset Date: 01/21/25  Rehab Potential: Fair  Plan of Care Agreement: Patient    Current Problem:   1. Acute right-sided low back pain, unspecified whether sciatica present  Referral to Physical Therapy    Follow Up In Physical Therapy          Subjective    General:  General  Reason for Referral: Low back pain  Referred By: Ruth Schneider CNP  Preferred Learning Style: verbal, visual, written  Precautions:  Precautions  STEADI Fall Risk Score (The score of 4 or more indicates an increased risk of falling): 1  Precautions Comment: None    Chief complaints:   Pt presents to clinic with chief complaint of low back pain   He underwent surgery for PAD on 1/21/25; his leg  pain with walking has improved significantly   He continues to have L sided low back pain most notable with transitional movements and first thing in the AM   Onset/Surgery Date: 1/21/25  Mechanism of Injury: no specific MATTHEW   Previous History: yes     Pain: 2/10      Location: Low back pain     Type: achy, sharp    Aggravators: long periods of sitting, walking, standing long periods of time, worse in the AM     Alleviators: movement, PM hrs     Function:    Prior Level: fully functional     Current limitations: see aggravators     Condition: Worsening since surgery       Home Situation:    Pt lives with significant other    Multiple level home      Sleep:     Disturbed: No     Preferred position(s):       Goals for Therapy:    Decrease pain        Objective      Observation/Posture:    Reduced lumbar flexion    Hypertrophy bilat lumbar paraspinals    Tenderness to palpation PSIS R       ROM/Flexibility:    Lumbar  Flexion: 70 deg; limited by HS length       Extension: 10 deg; pain       Sidebend R / L: 10 deg / 20 deg; stretch L side       Rotation R / L: 50% / 25%, pain R rotation      Hip R / L Flexion: WNL bilat      Ext Rot: mild decrease      Int Rot: mild decrease      Strength R / L:     Hip Flexion:     3+ / 4- pain R     Hip Abduction:    5 / 5    Hip Adduction:    5 / 5    Hip ER:      4+ / 4+ pain R     Hip IR:       4+ / 4+    Knee Extension:   5 / 5    Knee Flexion:       5 / 5      Ankle DF:             5 / 5    Ankle PF:              5 / 5     Neurological: Sensation is intact and symmetrical in BLEs.      Gait: no significant gait deficits noted        Special Tests:     Slump R / L : - / -     SLR R / L : - / -     Long sit: WNL     Outcome Measure:    LEFS: 25/80        TREATMENT  Therapeutic exercise:   PPT x 10 with 3 hold  Lumbar rot x 10   Hip add iso x 20  Hip abd (blue LF) x 20  LAQ 2 x 10   NuStep x 5 min      Goals:  Active       PT Problem       Pt will report 50% reduction in low back  pain for improved ability to perform transfers and return to work        Start:  04/14/25    Expected End:  07/10/25            Pt will demonstrate pain free lumbar AROM for improved ability to return to work        Start:  04/14/25    Expected End:  07/10/25            Pt will increase bilat LE strength by 1 MMT grade for all weakened muscle groups for improved lumbar and hip stability        Start:  04/14/25    Expected End:  07/10/25            Pt will be independent in HEP for home maintenance        Start:  04/14/25    Expected End:  07/10/25            Pt will increase LEFS score by 10 points for improved ability to perform daily activities with reduced pain        Start:  04/14/25    Expected End:  07/10/25

## 2025-04-14 PROBLEM — G47.30 SLEEP APNEA: Status: ACTIVE | Noted: 2025-04-14

## 2025-04-14 ASSESSMENT — ENCOUNTER SYMPTOMS
OCCASIONAL FEELINGS OF UNSTEADINESS: 0
LOSS OF SENSATION IN FEET: 1
CHILLS: 0
EYE PAIN: 0
EYE DISCHARGE: 0
DEPRESSION: 0
FATIGUE: 0
ACTIVITY CHANGE: 0

## 2025-04-18 ENCOUNTER — APPOINTMENT (OUTPATIENT)
Dept: PHARMACY | Facility: HOSPITAL | Age: 65
End: 2025-04-18
Payer: COMMERCIAL

## 2025-04-18 DIAGNOSIS — I42.8 NICM (NONISCHEMIC CARDIOMYOPATHY) (MULTI): ICD-10-CM

## 2025-04-18 PROCEDURE — RXMED WILLOW AMBULATORY MEDICATION CHARGE

## 2025-04-18 RX ORDER — SACUBITRIL AND VALSARTAN 97; 103 MG/1; MG/1
1 TABLET, FILM COATED ORAL 2 TIMES DAILY
Qty: 180 TABLET | Refills: 3 | Status: SHIPPED | OUTPATIENT
Start: 2025-04-18

## 2025-04-18 NOTE — ASSESSMENT & PLAN NOTE
Patient currently on 4 of 4 GDMT. Unable to assess home BP, therefore plan to continue current regimen.     Renal function and potassium level appropriate to continue current regimen.   Labs (01/15/2025): Scr-0.94 eGFR >90 K-4.9

## 2025-04-18 NOTE — PROGRESS NOTES
Pharmacist Clinic: Cardiology Management    Matthieu Solis is a 64 y.o. male was referred to Clinical Pharmacy Team for heart failure management.     Referring Provider: Tomás Donaldson MD    THIS IS A FOLLOW UP PATIENT APPOINTMENT. AT LAST VISIT ON 01/17/2025 WITH PHARMACIST (Rahel Waldron).    Appointment was completed by Matthieu who was reached at primary number.    REVIEW OF PAST APPNT (IF APPLICABLE):   Patient states he is tolerating his heart failure regimen well reporting adherence, no adverse effects, and denies s/s of worsening heart failure. Patient has not taken BP at home recently- he states he has a lot going on with upcoming surgery. He states BP has been elevated at the past few OV due to stress of the surgery. At most recent visit with cardiologist, Entresto was increased to 97/103mg twice daily, [facility] to send new rx to [facility] so medication is PAP eligible.       Allergies   Allergen Reactions    Cinnamon Unknown     Cinnamon    Doxycycline Swelling    Penicillins Unknown       Past Medical History:   Diagnosis Date    Anxiety     Arthritis     Cataract     CHF (congestive heart failure)     chronic systolic HF    Depression     Elevated PSA     PSA 10.53 on 10/27/24    GERD (gastroesophageal reflux disease)     Gout     HL (hearing loss)     HTN (hypertension)     Hyperlipidemia     Non-ischemic cardiomyopathy (Multi)     s/p LifeVest 10/2024    Peptic ulceration     Peripheral vascular disease (CMS-HCC)     BL LE CLTI 3  L > R    Sleep apnea     suspected YOHANA, sleep med visit on 1/20/25    Syncope     near syncopal event 6/2024    Vision loss     wears corrective lens       Current Outpatient Medications on File Prior to Visit   Medication Sig Dispense Refill    acetaminophen (Tylenol) 325 mg tablet Take 2 tablets (650 mg) by mouth every 6 hours if needed for mild pain (1 - 3) or moderate pain (4 - 6).      aspirin 81 mg EC tablet Take 1 tablet (81 mg) by mouth once daily. 90 tablet 3     "atorvastatin (Lipitor) 80 mg tablet TAKE 1 TABLET BY MOUTH ONCE DAILY. 90 tablet 3    carvedilol (Coreg) 12.5 mg tablet Take 1 tablet (12.5 mg) by mouth 2 times daily (morning and late afternoon). 60 tablet 11    cholecalciferol (Vitamin D-3) 25 mcg (1000 units) tablet Take 1 tablet (1,000 Units) by mouth once daily. 30 tablet 1    clopidogrel (Plavix) 75 mg tablet Take 1 tablet (75 mg) by mouth once daily. 90 tablet 3    empagliflozin (Jardiance) 10 mg tablet Take 1 tablet (10 mg) by mouth once daily. 90 tablet 3    nicotine polacrilex (Commit) 4 mg lozenge Use 1 lozenge (4 mg) in the mouth or throat every 2 hours if needed for smoking cessation. 100 lozenge 0    spironolactone (Aldactone) 25 mg tablet Take 0.5 tablets (12.5 mg) by mouth once daily. 45 tablet 3    [DISCONTINUED] sacubitriL-valsartan (Entresto)  mg tablet Take 1 tablet by mouth 2 times a day. 180 tablet 3     No current facility-administered medications on file prior to visit.         RELEVANT LAB RESULTS:  Lab Results   Component Value Date    BILITOT 1.1 10/26/2024    CALCIUM 8.2 (L) 01/23/2025    CO2 24 01/23/2025     (H) 01/23/2025    CREATININE 0.94 01/23/2025    GLUCOSE 88 01/23/2025    ALKPHOS 89 10/26/2024    K 3.7 01/23/2025    PROT 7.7 10/26/2024     01/23/2025    AST 26 10/26/2024    ALT 26 10/26/2024    BUN 26 (H) 01/23/2025    ANIONGAP 9 (L) 01/23/2025    MG 2.26 01/23/2025    PHOS 2.8 01/23/2025    ALBUMIN 3.7 01/23/2025    LIPASE 8 (L) 01/08/2024    GFRMALE >90 03/13/2023     Lab Results   Component Value Date    TRIG 39 02/03/2024    CHOL 159 02/03/2024    LDLCALC 100 (H) 02/03/2024    HDL 51.0 02/03/2024     No results found for: \"BMCBC\", \"CBCDIF\"     PHARMACEUTICAL ASSESSMENT:    MEDICATION RECONCILIATION:      Medication Documentation Review Audit       Reviewed by Aurelio Zuniga MD (Physician) on 03/14/25 at 1304      Medication Order Taking? Sig Documenting Provider Last Dose Status   acetaminophen (Tylenol) 325 " mg tablet [id number] Yes Take 2 tablets (650 mg) by mouth every 6 hours if needed for mild pain (1 - 3) or moderate pain (4 - 6). HAILEE Roldan  Active   aspirin 81 mg EC tablet [id number] Yes Take 1 tablet (81 mg) by mouth once daily. Kathy Hanna MD  Active   atorvastatin (Lipitor) 80 mg tablet [id number] Yes TAKE 1 TABLET BY MOUTH ONCE DAILY. Tomás Donaldson MD  Active   carvedilol (Coreg) 12.5 mg tablet [id number] Yes Take 1 tablet (12.5 mg) by mouth 2 times daily (morning and late afternoon). Tomás Donaldson MD  Active   clopidogrel (Plavix) 75 mg tablet [id number] Yes Take 1 tablet (75 mg) by mouth once daily. HAILEE Roldan  Active   empagliflozin (Jardiance) 10 mg [id number] Yes Take 1 tablet (10 mg) by mouth once daily. Tomás Donaldson MD  Active   nicotine polacrilex (Commit) 4 mg lozenge [id number] Yes Use 1 lozenge (4 mg) in the mouth or throat every 2 hours if needed for smoking cessation. Que Hicks MD  Active   sacubitriL-valsartan (Entresto)  mg tablet [id number] Yes Take 1 tablet by mouth 2 times a day. Tomás Donaldson MD  Active   spironolactone (Aldactone) 25 mg tablet [id number] Yes Take 0.5 tablets (12.5 mg) by mouth once daily. Tomás Donaldson MD  Active                    DISEASE MANAGEMENT ASSESSMENT:     CHF ASSESSMENT     Symptom/Staging:  -Most recent ejection fraction: 30-35%    Results for orders placed during the hospital encounter of 10/01/24    Transthoracic Echo (TTE) Complete    Narrative  [facility], [address] [address]  Tel [phone] and Fax [phone]    TRANSTHORACIC ECHOCARDIOGRAM REPORT      Patient Name:      VIDAL Hadley Physician:    [id number] Donaldson MD  Study Date:        10/1/2024            Ordering Provider:    Obdulio DONALDSON  MRN/PID:           [id number]             Fellow:  Accession#:        [id number]         Nurse:  Date of Birth/Age: 1960 / 64 years   Sonographer:          Pat GALVAN  Gender:            M                    Additional Staff:  Height:            165.10 cm            Admit Date:  Weight:            61.23 kg             Admission Status:     Outpatient  BSA / BMI:         1.67 m2 / 22.46      Encounter#:           [id number]  kg/m2  Blood Pressure:    154/86 mmHg          Department Location:  [facility]    Study Type:    TRANSTHORACIC ECHO (TTE) COMPLETE  Diagnosis/ICD: Other cardiomyopathies-I42.8  Indication:    Cardiomyopathy, low EF%  CPT Code:      Echo Complete w Full [id number]    Patient History:  Pertinent History: Cardiomyopathy, h/o low EF%, patient wear LifeVest, smoker.    Study Detail: The following Echo studies were performed: 2D, M-Mode, Doppler and  color flow.      PHYSICIAN INTERPRETATION:  Left Ventricle: The left ventricular systolic function is moderately decreased, with a visually estimated ejection fraction of 30-35%. There is global hypokinesis of the left ventricle with minor regional variations. The left ventricular cavity size is normal. There is mild concentric left ventricular hypertrophy. Spectral Doppler shows an impaired relaxation pattern of left ventricular diastolic filling.  Left Atrium: The left atrium is normal in size.  Right Ventricle: The right ventricle is normal in size. There is normal right ventriclar wall thickness. There is normal right ventricular global systolic function.  Right Atrium: The right atrium is normal in size.  Aortic Valve: The aortic valve is trileaflet. There is no aortic valve cusp calcification noted. There is no evidence of reduced aortic valve leaflet excursion. There is trace to mild aortic valve regurgitation. The peak instantaneous gradient of the aortic valve is 5.5 mmHg.  Mitral Valve: The mitral valve is normal in structure. There is normal mitral valve leaflet mobility. There is trace mitral valve regurgitation.  Tricuspid Valve: The tricuspid valve is  structurally normal. There is normal tricuspid valve leaflet mobility. There is trace tricuspid regurgitation.  Pulmonic Valve: The pulmonic valve is structurally normal. There is mild pulmonic valve regurgitation.  Pericardium: There is no pericardial effusion noted.  Aorta: The aortic root is normal. The Ao Sinus is 3.00 cm. The Asc Ao is 2.80 cm. The thoracic aorta diam is 2.2 cm. There is no dilatation of the aortic arch. There is no dilatation of the ascending aorta. There is no dilatation of the aortic root.  Pulmonary Artery: The pulmonary artery is normal in size. The tricuspid regurgitant velocity is 2.45 m/s, and with an estimated right atrial pressure of 3 mmHg, the estimated pulmonary artery pressure is normal with the RVSP at 26.9 mmHg. The estimated PASP is 27 mmHg.  Systemic Veins: The inferior vena cava appears normal in size, with IVC inspiratory collapse greater than 50%.  In comparison to the previous echocardiogram(s): There are no prior studies on this patient for comparison purposes.      CONCLUSIONS:  1. The left ventricular systolic function is moderately decreased, with a visually estimated ejection fraction of 30-35%.  2. There is global hypokinesis of the left ventricle with minor regional variations.  3. Spectral Doppler shows an impaired relaxation pattern of left ventricular diastolic filling.  4. There is normal right ventricular global systolic function.    QUANTITATIVE DATA SUMMARY:    2D MEASUREMENTS:          Normal Ranges:  Ao Root d:       3.10 cm  (2.0-3.7cm)  LAs:             3.47 cm  (2.7-4.0cm)  IVSd:            1.25 cm  (0.6-1.1cm)  LVPWd:           1.17 cm  (0.6-1.1cm)  LVIDd:           5.02 cm  (3.9-5.9cm)  LVIDs:           4.55 cm  LV Mass Index:   142 g/m2  LVEDV Index:     77 ml/m2  LV % FS          9.4 %      LA VOLUME:                    Normal Ranges:  LA Vol A4C:        53.0 ml    (22+/-6mL/m2)  LA Vol A2C:        65.4 ml  LA Vol BP:         58.8 ml  LA Vol Index  A4C:  31.6 ml/m2  LA Vol Index A2C:  39.1 ml/m2  LA Vol Index BP:   35.2 ml/m2  LA Area A4C:       18.0 cm2  LA Area A2C:       20.0 cm2  LA Major Axis A4C: 5.2 cm  LA Major Axis A2C: 5.2 cm  LA Volume Index:   36.0 ml/m2  LA Vol A4C:        50.1 ml  LA Vol A2C:        63.1 ml  LA Vol Index BSA:  33.8 ml/m2      RA VOLUME BY A/L METHOD:            Normal Ranges:  RA Vol A4C:              54.0 ml    (8.3-19.5ml)  RA Vol Index A4C:        32.3 ml/m2  RA Area A4C:             18.0 cm2  RA Major Axis A4C:       5.1 cm      M-MODE MEASUREMENTS:         Normal Ranges:  Ao Root:             3.20 cm (2.0-3.7cm)  LAs:                 3.43 cm (2.7-4.0cm)      AORTA MEASUREMENTS:         Normal Ranges:  Ao Sinus, d:        3.00 cm (2.1-3.5cm)  Asc Ao, d:          2.80 cm (2.1-3.4cm)  Ao Arch:            2.20 cm (2.0-3.6cm)      LV SYSTOLIC FUNCTION BY 2D PLANIMETRY (MOD):  Normal Ranges:  EF-A4C View:    39 % (>=55%)  EF-A2C View:    41 %  EF-Biplane:     39 %  EF-Visual:      33 %  LV EF Reported: 33 %      LV DIASTOLIC FUNCTION:             Normal Ranges:  MV Peak E:             0.59 m/s    (0.7-1.2 m/s)  MV Peak A:             0.90 m/s    (0.42-0.7 m/s)  E/A Ratio:             0.66        (1.0-2.2)  MV e'                  0.083 m/s   (>8.0)  MV lateral e'          0.08 m/s  MV medial e'           0.09 m/s  MV A Dur:              95.15 msec  E/e' Ratio:            7.11        (<8.0)  a'                     0.10 m/s  PulmV Sys Deangelo:         44.38 cm/s  PulmV Mann Deangelo:        34.71 cm/s  PulmV S/D Deangelo:         1.28  PulmV A Revs Deangelo:      31.10 cm/s  PulmV A Revs Dur:      108.47 msec      MITRAL VALVE:          Normal Ranges:  MV DT:        259 msec (150-240msec)      AORTIC VALVE:           Normal Ranges:  AoV Vmax:      1.17 m/s (<=1.7m/s)  AoV Peak P.5 mmHg (<20mmHg)  LVOT Max Deangelo:  0.63 m/s (<=1.1m/s)  LVOT VTI:      13.31 cm  LVOT Diameter: 2.22 cm  (1.8-2.4cm)  AoV Area,Vmax: 2.10 cm2 (2.5-4.5cm2)      AORTIC  INSUFFICIENCY:  AI Vmax:       3.46 m/s  AI Half-time:  719 msec  AI Decel Time: 2479 msec  AI Decel Rate: 148.54 cm/s2      RIGHT VENTRICLE:  RV Basal 3.50 cm  RV Mid   1.70 cm  RV Major 8.7 cm  TAPSE:   19.5 mm  RV s'    0.15 m/s      TRICUSPID VALVE/RVSP:          Normal Ranges:  Peak TR Velocity:     2.45 m/s  RV Syst Pressure:     27 mmHg  (< 30mmHg)  IVC Diam:             1.60 cm      PULMONIC VALVE:          Normal Ranges:  PV Accel Time:  138 msec (>120ms)  PV Max Deangelo:     0.9 m/s  (0.6-0.9m/s)  PV Max PG:      3.3 mmHg      Pulmonary Veins:  PulmV A Revs Dur: 108.47 msec  PulmV A Revs Deangelo: 31.10 cm/s  PulmV Mann Deangelo:   34.71 cm/s  PulmV S/D Deangelo:    1.28  PulmV Sys Deangelo:    44.38 cm/s      [id number] Tomás Donaldson MD  Electronically signed on 10/1/2024 at 5:48:03 PM        ** Final **      Guideline-Directed Medical Therapy:  -ARNI: Yes, describe: Entresto 97/103mg twice daily  -Beta Blocker: Yes, describe: Carvedilol 12.5mg twice daily  -MRA: Yes, describe: Spironolactone 12.5mg daily  -SGLT2i: Yes, describe: Jardiance 10mg daily    Secondary Prevention:  -The ASCVD Risk score (Senthil KAPOOR, et al., 2019) failed to calculate for the following reasons:    Risk score cannot be calculated because patient has a medical history suggesting prior/existing ASCVD   -Aspirin 81mg? yes  -Statin?: Yes, describe: Atorvastatin 80mg daily  -HTN?: Yes, describe: elevated at last office visit    CURRENT PHARMACOTHERAPY:   Entresto 97/103mg twice daily  Carvedilol 12.5mg twice daily  Spironolactone 12.5mg daily  Jardiance 10mg daily    Affordability:  PAP  Adherence/Compliance: reports adherence  Adverse Effects: none reported    Monitoring Weights at Home: Yes- denies weight fluctuation  Home Weight Recordings: 144lbs     Past In Office Weight Readings:   Wt Readings from Last 6 Encounters:   03/13/25 65 kg (143 lb 4.8 oz)   03/10/25 65.5 kg (144 lb 6 oz)   02/24/25 65.6 kg (144 lb 9.6 oz)   02/03/25 65.1 kg (143 lb 9.6 oz)    01/23/25 65.3 kg (143 lb 15.4 oz)   01/20/25 67.6 kg (149 lb)       Monitoring Blood Pressure at Home: No- busy with kids/grand kids  Home BP Recordings: unable to assess    Past In Office BP Readings:   BP Readings from Last 6 Encounters:   03/13/25 157/81   03/10/25 134/68   02/24/25 149/73   02/03/25 136/60   01/28/25 144/84   01/26/25 154/65       HEALTH MANAGEMENT    Maintaining fluid restriction (<2 L/day): Yes  Edema/swelling: No  Shortness of breath: No  Trouble sleeping/lying down: No  Dry/hacking cough: No- smokers cough down to 2-5 cigarettes daily  Recent Hospitalizations: Yes, describe: Planned vascular procedure    EDUCATION/COUNSELING:   - Counseled patient on MOA, expectations, duration of therapy, contraindications, administration, and monitoring parameters  - Counseled patient on lifestyle modifications that can decrease your risk of having complications (smoking cessation, losing weight, daily weights, vaccines)  - Counseled patient on fluid intake and weight management. Recommended to not consume more than 2 liters of fliuids per day. If they have gained more than 2-3 pounds within a 24 hours period (or 5 pounds in a week), contact their cardiologist  - Answered all patient questions and concerns       DISCUSSION/NOTES:   Patient states he is tolerating his heart failure regimen well reporting adherence, no adverse effects, and denies s/s of worsening heart failure. Patient has not taken BP at home recently due to being busy with kids/grandkids. Re-educated patient on importance of measuring BP to optimize GDMT. Patient verbalized understanding.   Unable to assess home BP therefore plan to continue current regimen.     ASSESSMENT:    Assessment/Plan   Problem List Items Addressed This Visit       NICM (nonischemic cardiomyopathy) (Multi)    Patient currently on 4 of 4 GDMT. Unable to assess home BP, therefore plan to continue current regimen.     Renal function and potassium level appropriate  to continue current regimen.   Labs (01/15/2025): Scr-0.94 eGFR >90 K-4.9            Relevant Orders    Referral to Clinical Pharmacy         RECOMMENDATIONS/PLAN:    CONTINUE  Entresto 97/103mg twice daily  Carvedilol 12.5mg twice daily  Spironolactone 12.5mg daily  Jardiance 10mg daily    Last Appnt with Referring Provider: 01/07/2025  Next Appnt with Referring Provider: needs scheduled  Clinical Pharmacist follow up: 3 months  VAF/Application Expiration: Yes    Date: 10/08/2025  Type of Encounter: Arya Waldron PharmD    Verbal consent to manage patient's drug therapy was obtained from the patient . They were informed they may decline to participate or withdraw from participation in pharmacy services at any time.    Continue all meds under the continuation of care with the referring provider and clinical pharmacy team.

## 2025-04-18 NOTE — Clinical Note
Dandy Donaldson,  Today I spoke with Matthieu about his heart medications. Patient states he is tolerating his heart failure regimen well reporting adherence, no adverse effects, and denies s/s of worsening heart failure. Patient has not taken BP at home recently due to being busy with kids/grandkids. Re-educated patient on importance of measuring BP to optimize GDMT. Plan to continue current regimen and follow up in 3 months. Thank you!

## 2025-04-21 ENCOUNTER — PHARMACY VISIT (OUTPATIENT)
Dept: PHARMACY | Facility: CLINIC | Age: 65
End: 2025-04-21
Payer: COMMERCIAL

## 2025-04-22 ENCOUNTER — TREATMENT (OUTPATIENT)
Dept: PHYSICAL THERAPY | Facility: CLINIC | Age: 65
End: 2025-04-22
Payer: COMMERCIAL

## 2025-04-22 DIAGNOSIS — M54.50 ACUTE RIGHT-SIDED LOW BACK PAIN, UNSPECIFIED WHETHER SCIATICA PRESENT: ICD-10-CM

## 2025-04-22 PROCEDURE — 97110 THERAPEUTIC EXERCISES: CPT | Mod: GP | Performed by: PHYSICAL THERAPIST

## 2025-04-22 NOTE — PROGRESS NOTES
"Physical Therapy    Physical Therapy Treatment      Patient Name: Matthieu Solis  MRN: [id number]  Today's Date: 4/22/2025    Time Entry:   Time Calculation  Start Time: 0705  Stop Time: 0750  Time Calculation (min): 45 min     PT Therapeutic Procedures Time Entry  Therapeutic Exercise Time Entry: 38        Visit: 2  Insurance: O  Auth: No  18 visits left 2025     Assessment:  Was able to progress with core strengthening. Increased R sided low back pain from supine to standing; improved with lumbar seated flexion       Plan:  Step up with march  [facility] on physio  Attempt bridges  Continue with core progression       Current Problem:   1. Acute right-sided low back pain, unspecified whether sciatica present  Follow Up In Physical Therapy          Subjective    Pt reports he hasn't been able to get regular exercises in  Also helped to lay floors over the weekend  \"It's still the right side\"        Objective       ROM/Flexibility:    Lumbar  Flexion: 70 deg; limited by HS length       Extension: 10 deg; pain       Sidebend R / L: 10 deg / 20 deg; stretch L side       Rotation R / L: 50% / 25%, pain R rotation      Hip R / L Flexion: WNL bilat      Ext Rot: mild decrease      Int Rot: mild decrease      Strength R / L:     Hip Flexion:     3+ / 4- pain R     Hip Abduction:    5 / 5    Hip Adduction:    5 / 5    Hip ER:      4+ / 4+ pain R     Hip IR:       4+ / 4+    Knee Extension:   5 / 5    Knee Flexion:       5 / 5      Ankle DF:             5 / 5    Ankle PF:              5 / 5      TREATMENT  Therapeutic exercise:   PPT x 10 with 3 hold  Lumbar rot x 10   SKTC x 10 with 3 hold ea   Hip add iso x 20  Hip abd (blue LF) x 20  TA with SLR x 10 ea   LAQ 2 x 10   Seated lumbar flexion stretch x 10 with 5 hold   NuStep x 10 min        Goals:  Active       PT Problem       Pt will report 50% reduction in low back pain for improved ability to perform transfers and return to work        Start:  04/14/25    Expected End:  " 07/10/25            Pt will demonstrate pain free lumbar AROM for improved ability to return to work        Start:  04/14/25    Expected End:  07/10/25            Pt will increase bilat LE strength by 1 MMT grade for all weakened muscle groups for improved lumbar and hip stability        Start:  04/14/25    Expected End:  07/10/25            Pt will be independent in HEP for home maintenance        Start:  04/14/25    Expected End:  07/10/25            Pt will increase LEFS score by 10 points for improved ability to perform daily activities with reduced pain        Start:  04/14/25    Expected End:  07/10/25

## 2025-04-23 RX ORDER — FUROSEMIDE 20 MG/1
20 TABLET ORAL DAILY PRN
COMMUNITY

## 2025-04-28 PROCEDURE — RXMED WILLOW AMBULATORY MEDICATION CHARGE

## 2025-04-30 ENCOUNTER — OFFICE VISIT (OUTPATIENT)
Dept: PSYCHOLOGY | Facility: CLINIC | Age: 65
End: 2025-04-30
Payer: COMMERCIAL

## 2025-04-30 DIAGNOSIS — R41.89 COGNITIVE CHANGE: Primary | ICD-10-CM

## 2025-04-30 DIAGNOSIS — F43.23 SITUATIONAL MIXED ANXIETY AND DEPRESSIVE DISORDER: ICD-10-CM

## 2025-04-30 DIAGNOSIS — R41.9 COGNITIVE COMPLAINTS WITH NORMAL NEUROPSYCHOLOGICAL EXAM: ICD-10-CM

## 2025-04-30 PROCEDURE — 96136 PSYCL/NRPSYC TST PHY/QHP 1ST: CPT | Performed by: CLINICAL NEUROPSYCHOLOGIST

## 2025-04-30 PROCEDURE — 96116 NUBHVL XM PHYS/QHP 1ST HR: CPT | Performed by: CLINICAL NEUROPSYCHOLOGIST

## 2025-04-30 PROCEDURE — 96137 PSYCL/NRPSYC TST PHY/QHP EA: CPT | Performed by: CLINICAL NEUROPSYCHOLOGIST

## 2025-04-30 PROCEDURE — 96133 NRPSYC TST EVAL PHYS/QHP EA: CPT | Performed by: CLINICAL NEUROPSYCHOLOGIST

## 2025-04-30 PROCEDURE — 96132 NRPSYC TST EVAL PHYS/QHP 1ST: CPT | Performed by: CLINICAL NEUROPSYCHOLOGIST

## 2025-04-30 NOTE — LETTER
May 6, 2025     Hali Dias, APRN-CNP  [address] [place]  [facility] [address]    Patient: Matthieu Solis   YOB: 1960   Date of Visit: 2025       Dear Dr. Hali Dias, APRN-CNP:    Thank you for referring Matthieu Solis to me for evaluation. Below are my notes for this consultation.  If you have questions, please do not hesitate to call me. I look forward to following your patient along with you.       Sincerely,     Francia Ramsay, PhD      CC: No Recipients  ______________________________________________________________________________________    Neuropsychology Evaluation    Name: Matthieu Solis  : 1960  MRN: [id number]  Referring Provider: Hali STEWART-MICHAEL/Charito TSEWART-CNP/[facility]  Date of Service: 2025     Reason for Referral:  Mr. Solis is a 64 year old, right handed gentleman who presented for neuropsychological testing as part of ongoing geriatric assessment for evaluation of cognitive changes, for input on differential diagnosis. This evaluation is intended for clinical purposes only and is NOT intended for legal/forensic or disability purposes. Verbal consent for neuropsychological services was obtained.     Summary/Impression:   Results of neuropsychological testing revealed a normal limits cognitive profile with average to above average functioning across the major cognitive domains of memory, language, attention/executive functioning, and visuospatial abilities.  His current cognitive screening score is MOCA=30/30 an improvement from initial MOCA=23/30 in 2024 suggesting improvement as he recovered from acute respiratory failure and acute on chronic CHF.  Assessment of mood was indicative of a mild to moderate degree of anxiety and depression with psychosocial stressors of health issues.     These neurocognitive findings are not suggestive of an incipient neurodegenerative process at this time.  There is likely multi factorial  etiology for subjective cognitive decline including cardiomyopathy, CHF, HTN, YOHANA, anxiety, depression in addition to remote history of alcohol and cocaine abuse. It is of note that he exhibits remarkable cognitive resilience given multiple risk factors for cognitive impairment.  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  This evaluation can serve as a comparative baseline should there be future concerns regarding cognitive decline.      - Encourage close follow up with medical team to optimize cognitive health including follow up with sleep medicine for treatment of sleep apnea, management of chronic health issues, palliative care, mental health counselor  - Encourage pursuit of community college courses pending return to work as this is something he enjoys and can be good for brain health     Plan: This neuropsychological assessment is part of a multidisciplinary evaluation. The results will be communicated to the referring provider via shared electronic medical record and reviewed with the patient by referring MD in context of complete medical history and evaluation.    Billing Note: In addition to time spent directly with the patient conducting neurobehavioral exam and face to face neuropsychological testing by a neuropsychologist, the total time billed for also included time spent for record review, test selection, scoring of tests, interpretation of tests, integration of findings, and report/note preparation. Specific billing codes are as follows:   1 hour unit 62243  neurobehavioral status exam  1 hour unit [id number]  neuropsychologist evaluation services (interpretation, integration, report preparation)  1 hour unit [id number]  neuropsychologist evaluation services (interpretation, integration, report  "preparation)   1 30 minute unit session [id number]  face to face testing by neuropsychologist  4 30 min units [id number]  face to face testing and scoring by neuropsychologist    Results of the assessment were conveyed to the patient, caregiver, and/or provider within 14 days of completion.     History of Presenting Illness/Relevant Background:   The following summary of history and presenting issue(s) was obtained through review of EMR notes detailing medical and/or neurological evaluation to date, medical history, diagnostic studies and clinical interview.  Initially presented for geriatric assessment in September 2024 due to concerns about cognitive changes following June 2024 hospitalization for acute respiratory failure with intubation with severe cardiomyopathy.  He reported he fell at work with question of concussion striking the back of his head and reported \"my heart stopped\" and reported he was in a coma for several days.     Cognitive changes:  Cognitive changes following hospitalization for respiratory failure with CHF, severe cardiomyopathy. Main changes are memory loss with forgetfulness.  He notes a trend of improvement since his initial geriatric assessment in September 2024 as his health has improved. 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      Mood/Behavioral Changes:  history of anxiety and depression he describes as secondary to health issues.  His level of anxiety has increased with health stressors, being off work, financial stressors. He reported he has a mental health counselor. He is also followed by palliative care for heart failure. He has chronic sleep issues with a history of shift work and he also has ongoing evaluation with sleep medicine with recent diagnosis of sleep apnea, CPAP treatment pending.  He is thinking of enrolling in courses at [employer] through a program for seniors.     Functional " Changes/Activities of Daily Living: he resides with his significant other he described as common law wife in single family home.  He reported he is independent in all activities of daily living.      Comorbid medical issues:   Medical conditions potentially affecting cognition include severe cardiomyopathy, CHF, HTN, HLD, YOHANA, PVD, chronic back pain, anxiety, depression. Current cigarette smoked 50+ years. Occasional marijuana use. Remote history of alcohol and cocaine abuse.     Relevant Family History:  no known family history of later life dementia     Psychosocial history:  History relevant to cognitive functioning includes normal birth and early development.  His mother had a stroke at a young age when he was 8 years old. He completed education in [employer]. No early learning problems but he noted he was never strong in reading and did better with hands on subjects like drafting and was good at math.  He reported he had behavioral problems. He left school in high school and later obtained GED. 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  He has been on medical leave since June 2024 and is hoping to return to work.      Neurological/medical evaluation to date:  Geriatrics assessment and follow up [facility] since June 2024    Cognitive screening -   MOCA=23/30 Sept 2024 with errors in executive functioning, fluency, and delayed recall.     Neuroimaging -  CT head wo IV contrast  Result Date: 6/26/2024  No evidence of an acute infarct or other acute parenchymal process. Hemorrhage: No evidence of acute intracranial hemorrhage.  There is no evidence of an intracranial mass or extraaxial fluid collection. No significant mass  effect. Chronic change: None apparent. Parenchyma: There is no significant volume loss. The brain parenchyma is otherwise within normal limits for age. Ventricles: The ventricles are within normal limits of size and configuration for age.     Diagnostic Evaluation/Impression to date:   Geriatric Assessment to date raised question of possible Mild Cognitive Impairment (MCI) r/o early neurodegenerative process vs. Underlying sleep apnea vs. Medical etiology with heart failure and cardiomyopathy.  Referral for more in depth testing for cognitive profile     Tests/Data Sources: This neuropsychological evaluation consisted of a review of available medical records, interview with the patient,  neurobehavioral examination, and the following standardized neuropsychological tests: MOCA Test; Dot Counting Test; Reading subtest (WRAT3); Digit Span, Coding, Block Design, Vocabulary,  Similarities subtests of Wechsler Adult Intelligence Scale-4th Edition (WAIS-IV); Word List Learning and Memory Test from the Alzheimers Disease Assessment Scale-Cognitive (ADAS-Cog), CERAD version; Garner Figures Constructional Praxis Test (copy and recall), RSDT-Hjp-SXLAM version; Logical Memory subtests of the Wechsler Memory Scale-4th Edition (WMS-IV); Edward Complex Figure Test (copy and recall); [place] Naming Test; Category Fluency Test (animals); Controlled Oral Word Association Test (F,A,S); Trailmaking Test (Parts A and B); Stroop Color and Word Test; Key Search Test from the Behavioural Assessment of the Dysexecutive Syndrome (BADS); Line Orientation subtest of the Repeatable Battery for the Assessment of Neuropsychological Status (RBANS); Grooved Pegboard Test; PHQ9 Patient Health Questionnaire; GAD7 Generalized Anxiety Disorders questionnaire    Cognitive testing was administered and results were used to inform counseling on safety and potential patient risks.  Cognitive testing was administered and interpretation of results included  consideration of appropriate and relevant cultural-linguistic and demographic factors.  Cognitive testing was administered and results of assessment informed the determination of diagnosis or further clarified etiological factors of cognitive impairment or complaints.    Behavioral Observations:  Well groomed in coordinated attire of suit, tie.  Alert and oriented. Ambulated independently with unremarkable gait on casual inspection. Conversational speech was fluent, normal rate, prosody, and volume.  Mildly loquacious but responded well to redirection.  Comprehension of instructions was good. Affect was full, mood was  euthymic.  Cooperative, sociable, fully engaged with testing, good effort, steady work habits.  He was very fastidious wanting to use his credit card as a straight edge for drawing tasks. Adequate auditory and visual perception of test stimuli.  Valid representation of current level of cognitive functioning.      Testing Interpretation Guidelines: Tests listed in Tests section were interpreted using normative data for age, education, ethnicity, and gender yielding scaled scores (Mean=10+-3), z scores (Mean=0), or T scores (Mean=50+-10).  A z score of less than -1.5, a scaled score of less than 7, and/or a T score of less than 35 is suggestive of impairment.   Findings are based on the interpretation of test scores, clinical interview, and behavioral observations.  The following section provides a summary of normal/abnormal findings by cognitive domain.  A detailed list of test scores is not part of this note.     Test Results by Cognitive Domain     Orientation and mental status:     Obtained a cognitive screening test score MOCA=30/30. Fully oriented to person, time, and place. Able to name the current and past presidents, and discuss current events accurately and in depth.      Premorbid Functioning:    premorbid intellectual functioning within the average range based on educational and occupational  history, fund of general knowledge, vocabulary test correlated with overall IQ, and high school reading level     Language:  Normal  average  Object/confrontation naming was within the high average range for age and education with a score of 57/60 (T=63, Mean T=50+/-10).  Semantic/category fluency (i.e., rapidly generating exemplars to the category animals in one minute) was within the high average range (26 animals, T=67).  Phonemic/letter fluency (i.e., the ability to rapidly generate words beginning with the letters F,A,S in 3 minutes)  was within the average range   (38  FAS words, T=53).  Receptive language was intact as evidenced by the ability to follow test instructions and complete simple two- and three-stage commands.     Verbal/auditory Memory:  Normal   above average   High average learning curve in the ability to learn a list of 10 words over 3 learning trials (5,9,9/10) with high average delayed recall of the list (9/10 words, +1SD). Recognition memory for the words was preserved.   Learning and recall of verbal material presented in a story format, was average/high average for immediate story recall  (scaled score=13,Mean scaled score=10 +-3) and delayed story recall was within the high average range (scaled score=14). Delayed recognition was intact.       Visual Memory:  Normal     above average   High average immediate recall of 4 geometric shapes drawn several minutes earlier (4/4).  Immediate recall of a complex visual design was superior  (T>80). Delayed recall of this complex figure after a 30 minute interval was superior (T=80).      Attention/Executive Functioning:  Normal   average/high average   On a test of basic attention and working memory requiring the repetition of strings of numbers forwards and backwards, performance was average (Digit Span scaled score=11).  Aspects of executive functioning involving complex attention, mental flexibility, and speeded information processing, were  "average. Specifically, on a relatively simple task of information processing involving the sequential connection of randomly placed numbers (Ragan Making Test, Part A), performance was  average (T=46).  On a more difficult task requiring the connection of randomly placed numbers and letters in a 1-A-2-B-3-C…etc. sequence (Trail Making Test, Part B), performance was within the average/high average range (T=57).  Ability to maintain the alternating “number/letter” set demands of the task was well preserved.   On a coding task, involving the speeded matching of numbers/symbols, performance was average/high average  (Coding scaled score=13).  On a test of executive functioning involving divided and directed attention, and filtering out of distractions, without a motor component, performance was average for the challenging task of reading incongruent color-words (e.g. the word \"red\" printed in blue ink)  (T=54).  Aspects of frontal/executive functioning involving verbal reasoning and abstraction were average for ability to explain how 2 things were alike or related (Similarities scaled score=9)   On a nonverbal test of reasoning/judgment involving the generation of a search strategy for a lost key in a large field, score was within the average/high average range with an efficient and systematic search pattern.    Visuospatial/Visuoconstruction:  Normal     above average   Copies of geometric figures of increased difficulty were precise and accurate for a Resighini, josh, overlapping rectangles, and cube with an overall score within the high average range (11/11, +1SD).   Copy of a complex visual design was very precise and accurate (35/36 elements, > 75th percentile). Ability to reproduce visual designs using blocks was average/high average  (Block Design scaled score=13). Spatial orientation/judgment was high average on a judgment of line orientation test (19/20)    Motor: Normal  average/low average   Testing of fine " motor speed/coordination (timed test of placing pegs into a pegboard) revealed average/low average bilateral fine motor speed.     Assessment of Mood: Abnormal  Depression screen was positive on a self-report measure (PHQ9=11/27, moderate depression range).  Anxiety screen was positive on a self-report measure (GAD7=11/21, moderate anxiety).  He endorsed symptoms as secondary to multiple health issues. Being off work, financial stressors. No suicidal ideation. He reported variable sleep  with recent diagnosis of sleep apnea, CPAP pending. He reported a good appetite.  Perceives adequate social support in network of family and friends. Ongoing psychosocial stressors related to health issues, work, finances.

## 2025-05-01 ENCOUNTER — PHARMACY VISIT (OUTPATIENT)
Dept: PHARMACY | Facility: CLINIC | Age: 65
End: 2025-05-01
Payer: MEDICAID

## 2025-05-05 ENCOUNTER — TREATMENT (OUTPATIENT)
Dept: PHYSICAL THERAPY | Facility: CLINIC | Age: 65
End: 2025-05-05
Payer: COMMERCIAL

## 2025-05-05 DIAGNOSIS — M54.50 ACUTE RIGHT-SIDED LOW BACK PAIN, UNSPECIFIED WHETHER SCIATICA PRESENT: ICD-10-CM

## 2025-05-05 PROCEDURE — 97110 THERAPEUTIC EXERCISES: CPT | Mod: GP,CQ

## 2025-05-05 NOTE — PROGRESS NOTES
"Physical Therapy    Physical Therapy Treatment      Patient Name: Matthieu Solis  MRN: [id number]  Today's Date: 5/5/2025    Time Entry:   Time Calculation  Start Time: 1137  Stop Time: 1220  Time Calculation (min): 43 min     PT Therapeutic Procedures Time Entry  Therapeutic Exercise Time Entry: 40        Visit: 3  Insurance: O  Auth: No  18 visits left 2025     Assessment:  R LBP w/ HSC attempt, therefore did not  perform. Bilat LE fatigue throughout rx,, carissa. W/ Nu Step today.  Pt feels this is related to work.      Plan:  Step up with march  [facility] on physio  Attempt bridges  Continue with core progression       Current Problem:   1. Acute right-sided low back pain, unspecified whether sciatica present  Follow Up In Physical Therapy          Subjective    \"R LB tight 2` working 3rd shift last night.\"   4/10, R LB    Objective    AROM lumbar flexion 90` EOS     Strength R / L:     Hip Flexion:     3+ / 4- pain R     Hip ER:      4+ / 4+ pain R     Hip IR:       4+ / 4+    TREATMENT  Therapeutic exercise:   PPT x 10 with 3 hold  Lumbar rot x 10   SKTC x 10 with 3 hold ea   Hip add iso x 20  Hip abd (blue LF) x 20  TA with SLR x 10 ea   Med ball torso pullovers x 10  LAQ 2 x 10, not today  Seated lumbar flexion stretch x 10 with 5 hold  Pball pelvic mobs x 20 ea, cw/ccw  NuStep x 7 min, 1.5       Goals:  Active       PT Problem       Pt will report 50% reduction in low back pain for improved ability to perform transfers and return to work        Start:  04/14/25    Expected End:  07/10/25            Pt will demonstrate pain free lumbar AROM for improved ability to return to work        Start:  04/14/25    Expected End:  07/10/25            Pt will increase bilat LE strength by 1 MMT grade for all weakened muscle groups for improved lumbar and hip stability        Start:  04/14/25    Expected End:  07/10/25            Pt will be independent in [facility] for home maintenance        Start:  04/14/25    Expected End:  07/10/25 "            Pt will increase LEFS score by 10 points for improved ability to perform daily activities with reduced pain        Start:  04/14/25    Expected End:  07/10/25

## 2025-05-06 NOTE — PROGRESS NOTES
Neuropsychology Evaluation    Name: Matthieu Solis  : 1960  MRN: [id number]  Referring Provider: Hali STEPHEN/Charito STEPHEN/[facility]  Date of Service: 2025     Reason for Referral:  Mr. Solis is a 64 year old, right handed gentleman who presented for neuropsychological testing as part of ongoing geriatric assessment for evaluation of cognitive changes, for input on differential diagnosis. This evaluation is intended for clinical purposes only and is NOT intended for legal/forensic or disability purposes. Verbal consent for neuropsychological services was obtained.     Summary/Impression:   Results of neuropsychological testing revealed a normal limits cognitive profile with average to above average functioning across the major cognitive domains of memory, language, attention/executive functioning, and visuospatial abilities.  His current cognitive screening score is MOCA=30/30 an improvement from initial MOCA=23/30 in 2024 suggesting improvement as he recovered from acute respiratory failure and acute on chronic CHF.  Assessment of mood was indicative of a mild to moderate degree of anxiety and depression with psychosocial stressors of health issues.     These neurocognitive findings are not suggestive of an incipient neurodegenerative process at this time.  There is likely multi factorial etiology for subjective cognitive decline including cardiomyopathy, CHF, HTN, YOHANA, anxiety, depression in addition to remote history of alcohol and cocaine abuse. It is of note that he exhibits remarkable cognitive resilience given multiple risk factors for cognitive impairment.  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  This evaluation can serve as a comparative baseline  should there be future concerns regarding cognitive decline.      - Encourage close follow up with medical team to optimize cognitive health including follow up with sleep medicine for treatment of sleep apnea, management of chronic health issues, palliative care, mental health counselor  - Encourage pursuit of community college courses pending return to work as this is something he enjoys and can be good for brain health     Plan: This neuropsychological assessment is part of a multidisciplinary evaluation. The results will be communicated to the referring provider via shared electronic medical record and reviewed with the patient by referring MD in context of complete medical history and evaluation.    Billing Note: In addition to time spent directly with the patient conducting neurobehavioral exam and face to face neuropsychological testing by a neuropsychologist, the total time billed for also included time spent for record review, test selection, scoring of tests, interpretation of tests, integration of findings, and report/note preparation. Specific billing codes are as follows:   1 hour unit 58588  neurobehavioral status exam  1 hour unit 09630  neuropsychologist evaluation services (interpretation, integration, report preparation)  1 hour unit 58569  neuropsychologist evaluation services (interpretation, integration, report preparation)   1 30 minute unit session 09537  face to face testing by neuropsychologist  4 30 min units [id number]  face to face testing and scoring by neuropsychologist    Results of the assessment were conveyed to the patient, caregiver, and/or provider within 14 days of completion.     History of Presenting Illness/Relevant Background:   The following summary of history and presenting issue(s) was obtained through review of EMR notes detailing medical and/or neurological evaluation to date, medical history, diagnostic studies and clinical interview.  Initially presented for geriatric  "assessment in September 2024 due to concerns about cognitive changes following June 2024 hospitalization for acute respiratory failure with intubation with severe cardiomyopathy.  He reported he fell at work with question of concussion striking the back of his head and reported \"my heart stopped\" and reported he was in a coma for several days.     Cognitive changes:  Cognitive changes following hospitalization for respiratory failure with CHF, severe cardiomyopathy. Main changes are memory loss with forgetfulness.  He notes a trend of improvement since his initial geriatric assessment in September 2024 as his health has improved. 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      Mood/Behavioral Changes:  history of anxiety and depression he describes as secondary to health issues.  His level of anxiety has increased with health stressors, being off work, financial stressors. He reported he has a mental health counselor. He is also followed by palliative care for heart failure. He has chronic sleep issues with a history of shift work and he also has ongoing evaluation with sleep medicine with recent diagnosis of sleep apnea, CPAP treatment pending.  He is thinking of enrolling in courses at [employer] through a program for seniors.     Functional Changes/Activities of Daily Living: he resides with his significant other he described as common law wife in single family home.  He reported he is independent in all activities of daily living.      Comorbid medical issues:   Medical conditions potentially affecting cognition include severe cardiomyopathy, CHF, HTN, HLD, YOHANA, PVD, chronic back pain, anxiety, depression. Current cigarette smoked 50+ years. Occasional marijuana use. Remote history of alcohol and cocaine abuse.     Relevant Family History:  no known family history of later life dementia     Psychosocial history:  History " relevant to cognitive functioning includes normal birth and early development.  His mother had a stroke at a young age when he was 8 years old. He completed education in [employer] schools. No early learning problems but he noted he was never strong in reading and did better with hands on subjects like drafting and was good at math.  He reported he had behavioral problems. He left school in high school and later obtained GED. 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  He has been on medical leave since June 2024 and is hoping to return to work.      Neurological/medical evaluation to date:  Geriatrics assessment and follow up [facility] since June 2024    Cognitive screening -   MOCA=23/30 Sept 2024 with errors in executive functioning, fluency, and delayed recall.     Neuroimaging -  CT head wo IV contrast  Result Date: 6/26/2024  No evidence of an acute infarct or other acute parenchymal process. Hemorrhage: No evidence of acute intracranial hemorrhage.  There is no evidence of an intracranial mass or extraaxial fluid collection. No significant mass effect. Chronic change: None apparent. Parenchyma: There is no significant volume loss. The brain parenchyma is otherwise within normal limits for age. Ventricles: The ventricles are within normal limits of size and configuration for age.     Diagnostic Evaluation/Impression to date:   Geriatric Assessment to date raised question of possible Mild Cognitive Impairment (MCI) r/o early neurodegenerative process vs. Underlying sleep apnea vs. Medical etiology with heart failure and cardiomyopathy.  Referral for more in depth testing for cognitive profile     Tests/Data Sources: This  neuropsychological evaluation consisted of a review of available medical records, interview with the patient,  neurobehavioral examination, and the following standardized neuropsychological tests: MOCA Test; Dot Counting Test; Reading subtest (WRAT3); Digit Span, Coding, Block Design, Vocabulary,  Similarities subtests of Wechsler Adult Intelligence Scale-4th Edition (WAIS-IV); Word List Learning and Memory Test from the Alzheimers Disease Assessment Scale-Cognitive (ADAS-Cog), CERAD version; Garner Figures Constructional Praxis Test (copy and recall), ASCC-Bqp-SVPYF version; Logical Memory subtests of the Wechsler Memory Scale-4th Edition (WMS-IV); Edward Complex Figure Test (copy and recall); [place] Naming Test; Category Fluency Test (animals); Controlled Oral Word Association Test (F,A,S); Trailmaking Test (Parts A and B); Stroop Color and Word Test; Key Search Test from the Behavioural Assessment of the Dysexecutive Syndrome (BADS); Line Orientation subtest of the Repeatable Battery for the Assessment of Neuropsychological Status (RBANS); Grooved Pegboard Test; PHQ9 Patient Health Questionnaire; GAD7 Generalized Anxiety Disorders questionnaire    Cognitive testing was administered and results were used to inform counseling on safety and potential patient risks.  Cognitive testing was administered and interpretation of results included consideration of appropriate and relevant cultural-linguistic and demographic factors.  Cognitive testing was administered and results of assessment informed the determination of diagnosis or further clarified etiological factors of cognitive impairment or complaints.    Behavioral Observations:  Well groomed in coordinated attire of suit, tie.  Alert and oriented. Ambulated independently with unremarkable gait on casual inspection. Conversational speech was fluent, normal rate, prosody, and volume.  Mildly loquacious but responded well to redirection.  Comprehension of instructions  was good. Affect was full, mood was  euthymic.  Cooperative, sociable, fully engaged with testing, good effort, steady work habits.  He was very fastidious wanting to use his credit card as a straight edge for drawing tasks. Adequate auditory and visual perception of test stimuli.  Valid representation of current level of cognitive functioning.      Testing Interpretation Guidelines: Tests listed in Tests section were interpreted using normative data for age, education, ethnicity, and gender yielding scaled scores (Mean=10+-3), z scores (Mean=0), or T scores (Mean=50+-10).  A z score of less than -1.5, a scaled score of less than 7, and/or a T score of less than 35 is suggestive of impairment.   Findings are based on the interpretation of test scores, clinical interview, and behavioral observations.  The following section provides a summary of normal/abnormal findings by cognitive domain.  A detailed list of test scores is not part of this note.     Test Results by Cognitive Domain     Orientation and mental status:     Obtained a cognitive screening test score MOCA=30/30. Fully oriented to person, time, and place. Able to name the current and past presidents, and discuss current events accurately and in depth.      Premorbid Functioning:    premorbid intellectual functioning within the average range based on educational and occupational history, fund of general knowledge, vocabulary test correlated with overall IQ, and high school reading level     Language:  Normal  average  Object/confrontation naming was within the high average range for age and education with a score of 57/60 (T=63, Mean T=50+/-10).  Semantic/category fluency (i.e., rapidly generating exemplars to the category animals in one minute) was within the high average range (26 animals, T=67).  Phonemic/letter fluency (i.e., the ability to rapidly generate words beginning with the letters F,A,S in 3 minutes)  was within the average range   (38  FAS  words, T=53).  Receptive language was intact as evidenced by the ability to follow test instructions and complete simple two- and three-stage commands.     Verbal/auditory Memory:  Normal   above average   High average learning curve in the ability to learn a list of 10 words over 3 learning trials (5,9,9/10) with high average delayed recall of the list (9/10 words, +1SD). Recognition memory for the words was preserved.   Learning and recall of verbal material presented in a story format, was average/high average for immediate story recall  (scaled score=13,Mean scaled score=10 +-3) and delayed story recall was within the high average range (scaled score=14). Delayed recognition was intact.       Visual Memory:  Normal     above average   High average immediate recall of 4 geometric shapes drawn several minutes earlier (4/4).  Immediate recall of a complex visual design was superior  (T>80). Delayed recall of this complex figure after a 30 minute interval was superior (T=80).      Attention/Executive Functioning:  Normal   average/high average   On a test of basic attention and working memory requiring the repetition of strings of numbers forwards and backwards, performance was average (Digit Span scaled score=11).  Aspects of executive functioning involving complex attention, mental flexibility, and speeded information processing, were average. Specifically, on a relatively simple task of information processing involving the sequential connection of randomly placed numbers (Cohoes Making Test, Part A), performance was  average (T=46).  On a more difficult task requiring the connection of randomly placed numbers and letters in a 1-A-2-B-3-C…etc. sequence (Trail Making Test, Part B), performance was within the average/high average range (T=57).  Ability to maintain the alternating “number/letter” set demands of the task was well preserved.   On a coding task, involving the speeded matching of numbers/symbols,  "performance was average/high average  (Coding scaled score=13).  On a test of executive functioning involving divided and directed attention, and filtering out of distractions, without a motor component, performance was average for the challenging task of reading incongruent color-words (e.g. the word \"red\" printed in blue ink)  (T=54).  Aspects of frontal/executive functioning involving verbal reasoning and abstraction were average for ability to explain how 2 things were alike or related (Similarities scaled score=9)   On a nonverbal test of reasoning/judgment involving the generation of a search strategy for a lost key in a large field, score was within the average/high average range with an efficient and systematic search pattern.    Visuospatial/Visuoconstruction:  Normal     above average   Copies of geometric figures of increased difficulty were precise and accurate for a [place], josh, overlapping rectangles, and cube with an overall score within the high average range (11/11, +1SD).   Copy of a complex visual design was very precise and accurate (35/36 elements, > 75th percentile). Ability to reproduce visual designs using blocks was average/high average  (Block Design scaled score=13). Spatial orientation/judgment was high average on a judgment of line orientation test (19/20)    Motor: Normal  average/low average   Testing of fine motor speed/coordination (timed test of placing pegs into a pegboard) revealed average/low average bilateral fine motor speed.     Assessment of Mood: Abnormal  Depression screen was positive on a self-report measure (PHQ9=11/27, moderate depression range).  Anxiety screen was positive on a self-report measure (GAD7=11/21, moderate anxiety).  He endorsed symptoms as secondary to multiple health issues. Being off work, financial stressors. No suicidal ideation. He reported variable sleep  with recent diagnosis of sleep apnea, CPAP pending. He reported a good appetite.  " Perceives adequate social support in network of family and friends. Ongoing psychosocial stressors related to health issues, work, finances.

## 2025-05-09 ENCOUNTER — CLINICAL SUPPORT (OUTPATIENT)
Dept: SLEEP MEDICINE | Facility: HOSPITAL | Age: 65
End: 2025-05-09
Payer: COMMERCIAL

## 2025-05-09 DIAGNOSIS — G47.30 SLEEP-RELATED BREATHING DISORDER: ICD-10-CM

## 2025-05-09 NOTE — PROGRESS NOTES
Type of Study: HOME SLEEP STUDY - NOMAD     The patient received equipment and instructions for use of the [employer] [facility] Nomad HSAT device. The patient was instructed how to apply the effort belts, cannula, thermistor. It was also explained how the Nomad and oximeter components work.  The patient was asked to record their sleep for an 8-hour period.     The patient was informed of their responsibility for the device and acknowledged this by signing the HSAT device contract. The patient was asked to return the device on 5/12/2025 between the hours of 0946-3418 to the [facility].     The patient was instructed to call 911 as usual for any medical- emergencies while at home.  The patient was also given a phone number for troubleshooting when using the device in case there were additional questions.

## 2025-05-13 NOTE — PROGRESS NOTES
Subjective   Patient ID: Matthieu Solis is a 65 y.o. male who presents for    A F/U ON HIS H/O AN ELEVATED PSA.   PT FEELS WELL. NO NEW PAIN OR DISCOMFORT.   HPI:  Are you experiencing:  Burning on urination -- NO  Pain on urination  --NO  Urinary frequency -- ONLY WITH DIURETIC THERAPY   Urinary urgency -- ONLY WITH DIURETIC THERAPY  Urge incontinence --NO  Urinary stress incontinence  -- NO  Number of pads used per day --NONE  Enuresis -- NO  Nocturia-- NO  Hematuria -- NO  Hesitancy -- NO  Post void fullness -- NO  Strength of your stream-- GOOD     ROS:  General-- No C/O fever or chills  Head-- No C/O Dizziness  Eyes-- NO  C/O blurry or double vision  Ears-- No C/O hearing loss  Neck-- Supple  Chest-- No C/O pain or discomfort  Lungs-- No C/O shortness of breath  Abdomen-- No C/O  pain or discomfort, OCC nausea NO vomiting  Back-- No C/O back pain or discomfort  Extremities-- No C/O swelling or pain     OBJECTIVE  General-- well-developed, well-nourished in NAD  Head-- normal cephalic, atraumatic  Eyes-- PERRL, EOM'S FROM, no jaundice  Neck-- Supple, without masses  Chest-- Normal bony structure  Abdomen-- soft, non tender, liver spleen not palpable. No suprapubic masses. RECTUS DIASTASES , UMBILICAL HERNIA   Back-- no flank masses palpable, no CVA tenderness on palpation or percussion  Lymph nodes-- No inguinal lymphadenopathy noted  Prostate-- 1+, firm, smooth, non-tender, without nodules  Testis-- both down, non-tender, without masses  Epididymis-- no masses palpable  Scrotum -- no hydrocele noted  Extremities -- Normal muscle mass and tone for the patients age  Neurological-- oriented times three     PSA:  2/26/2025-- 3.9-- FREE PSA-- 8%  10--- 10.53  9/9/2024-- 7.32  2/3/2024-- 4.37    A:  H/O OF AN ELEVATED PSA--  NOW NORMAL   NORMAL FEELING PROSTATE   PT S/P TRUSP BX ON 10-8-24 -- ALL BX'S BPH --PROSTATE SIZE -- 51 GMS     OLDER BROTHER PASSED AWAY FROM PROSTATE CANCER  P:  REASSURANCE, EDUCATION  , SUPPORTIVE CARE RENDERED TO THE PT  WILL CONTINUE TO WATCH CLOSELY OVER TIME  F/U IN 6 MONTHS FOR A TEJAS AND PSA RE CK      Aurelio Zuniga MD 05/13/25 10:24 AM

## 2025-05-14 ENCOUNTER — APPOINTMENT (OUTPATIENT)
Age: 65
End: 2025-05-14
Payer: COMMERCIAL

## 2025-05-14 ENCOUNTER — TREATMENT (OUTPATIENT)
Dept: PHYSICAL THERAPY | Facility: CLINIC | Age: 65
End: 2025-05-14
Payer: COMMERCIAL

## 2025-05-14 VITALS — SYSTOLIC BLOOD PRESSURE: 113 MMHG | DIASTOLIC BLOOD PRESSURE: 56 MMHG | HEART RATE: 72 BPM

## 2025-05-14 DIAGNOSIS — Z80.42 FAMILY HISTORY OF PROSTATE CANCER: ICD-10-CM

## 2025-05-14 DIAGNOSIS — R97.20 ELEVATED PSA: ICD-10-CM

## 2025-05-14 DIAGNOSIS — M54.50 ACUTE RIGHT-SIDED LOW BACK PAIN, UNSPECIFIED WHETHER SCIATICA PRESENT: ICD-10-CM

## 2025-05-14 DIAGNOSIS — R39.15 URGENCY OF MICTURITION: ICD-10-CM

## 2025-05-14 DIAGNOSIS — N40.0 BENIGN PROSTATIC HYPERPLASIA WITHOUT LOWER URINARY TRACT SYMPTOMS: ICD-10-CM

## 2025-05-14 DIAGNOSIS — R35.0 FREQUENCY OF MICTURITION: Primary | ICD-10-CM

## 2025-05-14 PROCEDURE — 97110 THERAPEUTIC EXERCISES: CPT | Mod: GP,CQ

## 2025-05-14 NOTE — PROGRESS NOTES
"Physical Therapy    Physical Therapy Treatment      Patient Name: Matthieu Solis  MRN: [id number]  Today's Date: 5/14/2025    Time Entry:   Time Calculation  Start Time: 0841              Visit: 4  [place] [facility] [place]  Auth: No  15 visits left 2025     Assessment:  Decreased HEP in effort to improve compliance to HEP, pt reported decreased pain, increased flexibility EOS.    Plan:  Step up with march HSC on physio  Attempt bridges  Continue with core progression       Current Problem:   1. Acute right-sided low back pain, unspecified whether sciatica present  Follow Up In Physical Therapy          Subjective    \"Tired today. Bad at work last week and this week, I have to wear steel toed boots and that makes it worse.  I am so busy, that I have a hard time finding time to do the HEP.\"       Objective    AROM lumbar flexion 85` w/ pain upon return from flexion     Strength R / L:     Hip Flexion:     3+ / 4- pain R     Hip ER:      4+ / 4+ pain R     Hip IR:       4+ / 4+    TREATMENT  Therapeutic exercise:   Nu Step attempted, stopped 2` pain  HP to LB w/ ex's  PPT x 10  SKTC x 10 ea  LTR x 10  Supine march x 10 ea, L then R  Seated Paloff press x 20 ea, RTB  Seated pball roll out for lumbar stretch x 20    Not today:  PPT x 10 with 3 hold  Lumbar rot x 10   SKTC x 10 with 3 hold ea   Hip add iso x 20  Hip abd (blue LF) x 20  TA with SLR x 10 ea   Med ball torso pullovers x 10  LAQ 2 x 10, not today  Seated lumbar flexion stretch x 10 with 5 hold  Pball pelvic mobs x 20 ea, cw/ccw  NuStep x 7 min, 1.5       Goals:  Active       PT Problem       Pt will report 50% reduction in low back pain for improved ability to perform transfers and return to work        Start:  04/14/25    Expected End:  07/10/25            Pt will demonstrate pain free lumbar AROM for improved ability to return to work        Start:  04/14/25    Expected End:  07/10/25            Pt will increase bilat LE strength by 1 MMT grade for all weakened " muscle groups for improved lumbar and hip stability        Start:  04/14/25    Expected End:  07/10/25            Pt will be independent in HEP for home maintenance        Start:  04/14/25    Expected End:  07/10/25            Pt will increase LEFS score by 10 points for improved ability to perform daily activities with reduced pain        Start:  04/14/25    Expected End:  07/10/25

## 2025-05-14 NOTE — LETTER
May 17, 2025     Claudio Maldonado DO  [address]  [facility] [address]  [facility] [address]    Patient: Matthieu Solis   YOB: 1960   Date of Visit: 5/14/2025       Dear Dr. Claudio Maldonado DO:    Thank you for referring Matthieu Solis to me for evaluation. Below are my notes for this consultation.  If you have questions, please do not hesitate to call me. I look forward to following your patient along with you.       Sincerely,     Aurelio Zuniga MD      CC: No Recipients  ______________________________________________________________________________________    Subjective  Patient ID: Matthieu Solis is a 65 y.o. male who presents for    A F/U ON HIS H/O AN ELEVATED PSA.   PT FEELS WELL. NO NEW PAIN OR DISCOMFORT.   HPI:  Are you experiencing:  Burning on urination -- NO  Pain on urination  --NO  Urinary frequency -- ONLY WITH DIURETIC THERAPY   Urinary urgency -- ONLY WITH DIURETIC THERAPY  Urge incontinence --NO  Urinary stress incontinence  -- NO  Number of pads used per day --NONE  Enuresis -- NO  Nocturia-- NO  Hematuria -- NO  Hesitancy -- NO  Post void fullness -- NO  Strength of your stream-- GOOD     ROS:  General-- No C/O fever or chills  Head-- No C/O Dizziness  Eyes-- NO  C/O blurry or double vision  Ears-- No C/O hearing loss  Neck-- Supple  Chest-- No C/O pain or discomfort  Lungs-- No C/O shortness of breath  Abdomen-- No C/O  pain or discomfort, OCC nausea NO vomiting  Back-- No C/O back pain or discomfort  Extremities-- No C/O swelling or pain     OBJECTIVE  General-- well-developed, well-nourished in NAD  Head-- normal cephalic, atraumatic  Eyes-- PERRL, EOM'S FROM, no jaundice  Neck-- Supple, without masses  Chest-- Normal bony structure  Abdomen-- soft, non tender, liver spleen not palpable. No suprapubic masses. RECTUS DIASTASES , UMBILICAL HERNIA   Back-- no flank masses palpable, no CVA tenderness on palpation or percussion  Lymph nodes-- No inguinal lymphadenopathy  noted  Prostate-- 1+, firm, smooth, non-tender, without nodules  Testis-- both down, non-tender, without masses  Epididymis-- no masses palpable  Scrotum -- no hydrocele noted  Extremities -- Normal muscle mass and tone for the patients age  Neurological-- oriented times three     PSA:  2/26/2025-- 3.9-- FREE PSA-- 8%  10--- 10.53  9/9/2024-- 7.32  2/3/2024-- 4.37    A:  H/O OF AN ELEVATED PSA--  NOW NORMAL   NORMAL FEELING PROSTATE   PT S/P TRUSP BX ON 10-8-24 -- ALL BX'S BPH --PROSTATE SIZE -- 51 GMS     OLDER BROTHER PASSED AWAY FROM PROSTATE CANCER  P:  REASSURANCE, EDUCATION , SUPPORTIVE CARE RENDERED TO THE PT  WILL CONTINUE TO WATCH CLOSELY OVER TIME  F/U IN 6 MONTHS FOR A TEJAS AND PSA RE CK      Aurelio Zuniga MD 05/13/25 10:24 AM

## 2025-05-16 ENCOUNTER — APPOINTMENT (OUTPATIENT)
Facility: CLINIC | Age: 65
End: 2025-05-16
Payer: COMMERCIAL

## 2025-05-16 PROCEDURE — RXMED WILLOW AMBULATORY MEDICATION CHARGE

## 2025-05-17 ENCOUNTER — PHARMACY VISIT (OUTPATIENT)
Dept: PHARMACY | Facility: CLINIC | Age: 65
End: 2025-05-17
Payer: COMMERCIAL

## 2025-05-21 ENCOUNTER — TREATMENT (OUTPATIENT)
Dept: PHYSICAL THERAPY | Facility: CLINIC | Age: 65
End: 2025-05-21
Payer: COMMERCIAL

## 2025-05-21 DIAGNOSIS — M54.50 ACUTE RIGHT-SIDED LOW BACK PAIN, UNSPECIFIED WHETHER SCIATICA PRESENT: ICD-10-CM

## 2025-05-21 PROCEDURE — 97110 THERAPEUTIC EXERCISES: CPT | Mod: GP | Performed by: PHYSICAL THERAPIST

## 2025-05-21 NOTE — PROGRESS NOTES
"Physical Therapy    Physical Therapy Treatment      Patient Name: Matthieu Solis  MRN: [id number]  Today's Date: 5/21/2025    Time Entry:   Time Calculation  Start Time: 0810  Stop Time: 0850  Time Calculation (min): 40 min     PT Therapeutic Procedures Time Entry  Therapeutic Exercise Time Entry: 30        Visit: 5  Insurance: MMO  Auth: No  15 visits left 2025     Assessment:  Pt was able to tolerate flexibility work today; any active or passive hip flexion aggravated symptoms into the low back, R side   Unable to tolerate even grade I mobilizations R unilateral PA lumbar facet joints d/t 8/10 pain    Unsure if any significant carryover with PT       Plan:  Step up with march HSC on physio  Attempt bridges  Continue with core progression       Current Problem:   1. Acute right-sided low back pain, unspecified whether sciatica present  Follow Up In Physical Therapy          Subjective    \"I'm tired, I just got off work but I made it here\"        Objective     Significant tenderness to palpation 8/10 pain per pt with grade I unilateral lumbar facet mobilizations     TREATMENT  Therapeutic exercise:   PPT x 10  SKTC x 10 ea  LTR x 10  Seated pball roll out for lumbar stretch x 20  Standing pallof press x 10   Hip add iso x 20  Hip abd (blue LF) x 20  NuStep x 5 min     Not today:  TA with SLR x 10 ea   Med ball torso pullovers x 10  LAQ 2 x 10, not today  Pball pelvic mobs x 20 ea, cw/ccw       Goals:  Active       PT Problem       Pt will report 50% reduction in low back pain for improved ability to perform transfers and return to work        Start:  04/14/25    Expected End:  07/10/25            Pt will demonstrate pain free lumbar AROM for improved ability to return to work        Start:  04/14/25    Expected End:  07/10/25            Pt will increase bilat LE strength by 1 MMT grade for all weakened muscle groups for improved lumbar and hip stability        Start:  04/14/25    Expected End:  07/10/25            " Pt will be independent in HEP for home maintenance        Start:  04/14/25    Expected End:  07/10/25            Pt will increase LEFS score by 10 points for improved ability to perform daily activities with reduced pain        Start:  04/14/25    Expected End:  07/10/25

## 2025-05-23 ENCOUNTER — HOSPITAL ENCOUNTER (OUTPATIENT)
Dept: VASCULAR MEDICINE | Facility: HOSPITAL | Age: 65
Discharge: HOME | End: 2025-05-23
Payer: COMMERCIAL

## 2025-05-23 DIAGNOSIS — I73.9 PAD (PERIPHERAL ARTERY DISEASE): ICD-10-CM

## 2025-05-23 PROCEDURE — 93922 UPR/L XTREMITY ART 2 LEVELS: CPT | Performed by: INTERNAL MEDICINE

## 2025-05-23 PROCEDURE — 93922 UPR/L XTREMITY ART 2 LEVELS: CPT

## 2025-05-28 ENCOUNTER — TREATMENT (OUTPATIENT)
Dept: PHYSICAL THERAPY | Facility: CLINIC | Age: 65
End: 2025-05-28
Payer: COMMERCIAL

## 2025-05-28 DIAGNOSIS — M54.50 ACUTE RIGHT-SIDED LOW BACK PAIN, UNSPECIFIED WHETHER SCIATICA PRESENT: ICD-10-CM

## 2025-05-28 PROCEDURE — 97110 THERAPEUTIC EXERCISES: CPT | Mod: GP | Performed by: PHYSICAL THERAPIST

## 2025-05-28 NOTE — PROGRESS NOTES
Physical Therapy    Physical Therapy Treatment      Patient Name: Matthieu Solis  MRN: [id number]  Today's Date: 5/28/2025    Time Entry:   Time Calculation  Start Time: 0810  Stop Time: 0900  Time Calculation (min): 50 min     PT Therapeutic Procedures Time Entry  Therapeutic Exercise Time Entry: 40        Visit: 6  Insurance: MMO  Auth: No  15 visits left 2025    Assessment:  Pt was able to perform SKTC today without significant pain into R low back, which is improvement from last session   Increased pain EOS vs start of session; but no significant pain with performance during exercise     Did discuss at this point having pt follow up with physical medicine and rehab MD or with orthopedic to assess       Plan:  Attempt bridges  [place]       Current Problem:   1. Acute right-sided low back pain, unspecified whether sciatica present  Follow Up In Physical Therapy          Subjective    Pt reports back is still about the same  Wearing Lidocaine patches at work for relief        Objective     Significant tenderness to palpation 8/10 pain per pt with grade I unilateral lumbar facet mobilizations       TREATMENT  Therapeutic exercise:   PPT x 20   SKTC x 10 ea  LTR x 10  TA with SLR x 10 ea   HSC on physio x 20   Seated pball roll out for lumbar stretch x 20  NuStep x 6 min   Standing lateral walk out (green tubing) x 5 ea direction   Step up with march (6 in) x 10 ea   Sit to stand (red chair) 2 x 10        Goals:  Active       PT Problem       Pt will report 50% reduction in low back pain for improved ability to perform transfers and return to work        Start:  04/14/25    Expected End:  07/10/25            Pt will demonstrate pain free lumbar AROM for improved ability to return to work        Start:  04/14/25    Expected End:  07/10/25            Pt will increase bilat LE strength by 1 MMT grade for all weakened muscle groups for improved lumbar and hip stability        Start:  04/14/25    Expected End:   07/10/25            Pt will be independent in HEP for home maintenance        Start:  04/14/25    Expected End:  07/10/25            Pt will increase LEFS score by 10 points for improved ability to perform daily activities with reduced pain        Start:  04/14/25    Expected End:  07/10/25

## 2025-06-02 ENCOUNTER — OFFICE VISIT (OUTPATIENT)
Dept: VASCULAR SURGERY | Facility: HOSPITAL | Age: 65
End: 2025-06-02
Payer: COMMERCIAL

## 2025-06-02 VITALS
HEIGHT: 65 IN | WEIGHT: 144.6 LBS | HEART RATE: 84 BPM | BODY MASS INDEX: 24.09 KG/M2 | SYSTOLIC BLOOD PRESSURE: 127 MMHG | OXYGEN SATURATION: 98 % | DIASTOLIC BLOOD PRESSURE: 68 MMHG

## 2025-06-02 DIAGNOSIS — I73.9 PAD (PERIPHERAL ARTERY DISEASE): Primary | ICD-10-CM

## 2025-06-02 PROCEDURE — 3008F BODY MASS INDEX DOCD: CPT | Performed by: SURGERY

## 2025-06-02 PROCEDURE — 99214 OFFICE O/P EST MOD 30 MIN: CPT | Performed by: SURGERY

## 2025-06-02 PROCEDURE — 3074F SYST BP LT 130 MM HG: CPT | Performed by: SURGERY

## 2025-06-02 PROCEDURE — 3078F DIAST BP <80 MM HG: CPT | Performed by: SURGERY

## 2025-06-02 ASSESSMENT — PAIN SCALES - GENERAL: PAINLEVEL_OUTOF10: 3
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
## 2025-06-02 NOTE — PATIENT INSTRUCTIONS
- Right leg claudication but not currently lifestyle limiting  - Continue current medications  - Call if right leg pain worsens or you develop foot wounds  - Followup in 6 months with [facility]  - Call office [phone] with any questions or concerns

## 2025-06-06 PROCEDURE — RXMED WILLOW AMBULATORY MEDICATION CHARGE

## 2025-06-10 ENCOUNTER — PHARMACY VISIT (OUTPATIENT)
Dept: PHARMACY | Facility: CLINIC | Age: 65
End: 2025-06-10
Payer: COMMERCIAL

## 2025-06-11 ENCOUNTER — HOSPITAL ENCOUNTER (EMERGENCY)
Facility: HOSPITAL | Age: 65
Discharge: HOME | End: 2025-06-11
Attending: EMERGENCY MEDICINE
Payer: COMMERCIAL

## 2025-06-11 ENCOUNTER — APPOINTMENT (OUTPATIENT)
Dept: RADIOLOGY | Facility: HOSPITAL | Age: 65
End: 2025-06-11
Payer: COMMERCIAL

## 2025-06-11 VITALS
HEIGHT: 65 IN | TEMPERATURE: 96.9 F | HEART RATE: 68 BPM | WEIGHT: 151 LBS | DIASTOLIC BLOOD PRESSURE: 93 MMHG | SYSTOLIC BLOOD PRESSURE: 183 MMHG | BODY MASS INDEX: 25.16 KG/M2 | OXYGEN SATURATION: 96 % | RESPIRATION RATE: 16 BRPM

## 2025-06-11 DIAGNOSIS — S16.1XXA CERVICAL STRAIN, ACUTE, INITIAL ENCOUNTER: ICD-10-CM
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
## 2025-06-11 LAB
ALBUMIN SERPL BCP-MCNC: 4.1 G/DL (ref 3.4–5)
ALP SERPL-CCNC: 107 U/L (ref 33–136)
ALT SERPL W P-5'-P-CCNC: 12 U/L (ref 10–52)
ANION GAP SERPL CALC-SCNC: 11 MMOL/L (ref 10–20)
AST SERPL W P-5'-P-CCNC: 13 U/L (ref 9–39)
BASOPHILS # BLD AUTO: 0.05 X10*3/UL (ref 0–0.1)
BASOPHILS NFR BLD AUTO: 0.8 %
BILIRUB SERPL-MCNC: 0.4 MG/DL (ref 0–1.2)
BUN SERPL-MCNC: 15 MG/DL (ref 6–23)
CALCIUM SERPL-MCNC: 8.9 MG/DL (ref 8.6–10.6)
CHLORIDE SERPL-SCNC: 109 MMOL/L (ref 98–107)
CO2 SERPL-SCNC: 24 MMOL/L (ref 21–32)
CREAT SERPL-MCNC: 1.01 MG/DL (ref 0.5–1.3)
EGFRCR SERPLBLD CKD-EPI 2021: 84 ML/MIN/1.73M*2
EOSINOPHIL # BLD AUTO: 0.3 X10*3/UL (ref 0–0.7)
EOSINOPHIL NFR BLD AUTO: 4.6 %
ERYTHROCYTE [DISTWIDTH] IN BLOOD BY AUTOMATED COUNT: 17 % (ref 11.5–14.5)
GLUCOSE SERPL-MCNC: 105 MG/DL (ref 74–99)
HCT VFR BLD AUTO: 31.9 % (ref 41–52)
HGB BLD-MCNC: 10.7 G/DL (ref 13.5–17.5)
IMM GRANULOCYTES # BLD AUTO: 0.01 X10*3/UL (ref 0–0.7)
IMM GRANULOCYTES NFR BLD AUTO: 0.2 % (ref 0–0.9)
LYMPHOCYTES # BLD AUTO: 1.88 X10*3/UL (ref 1.2–4.8)
LYMPHOCYTES NFR BLD AUTO: 28.7 %
MCH RBC QN AUTO: 28.8 PG (ref 26–34)
MCHC RBC AUTO-ENTMCNC: 33.5 G/DL (ref 32–36)
MCV RBC AUTO: 86 FL (ref 80–100)
MONOCYTES # BLD AUTO: 0.78 X10*3/UL (ref 0.1–1)
MONOCYTES NFR BLD AUTO: 11.9 %
NEUTROPHILS # BLD AUTO: 3.52 X10*3/UL (ref 1.2–7.7)
NEUTROPHILS NFR BLD AUTO: 53.8 %
NRBC BLD-RTO: 0 /100 WBCS (ref 0–0)
PLATELET # BLD AUTO: 377 X10*3/UL (ref 150–450)
POTASSIUM SERPL-SCNC: 3.9 MMOL/L (ref 3.5–5.3)
PROT SERPL-MCNC: 7.3 G/DL (ref 6.4–8.2)
RBC # BLD AUTO: 3.72 X10*6/UL (ref 4.5–5.9)
SODIUM SERPL-SCNC: 140 MMOL/L (ref 136–145)
WBC # BLD AUTO: 6.5 X10*3/UL (ref 4.4–11.3)

## 2025-06-11 PROCEDURE — 71046 X-RAY EXAM CHEST 2 VIEWS: CPT

## 2025-06-11 PROCEDURE — 73552 X-RAY EXAM OF FEMUR 2/>: CPT | Mod: RT

## 2025-06-11 PROCEDURE — 36415 COLL VENOUS BLD VENIPUNCTURE: CPT | Performed by: PHYSICIAN ASSISTANT

## 2025-06-11 PROCEDURE — 2550000001 HC RX 255 CONTRASTS: Performed by: EMERGENCY MEDICINE

## 2025-06-11 PROCEDURE — 71260 CT THORAX DX C+: CPT

## 2025-06-11 PROCEDURE — 72125 CT NECK SPINE W/O DYE: CPT

## 2025-06-11 PROCEDURE — 73564 X-RAY EXAM KNEE 4 OR MORE: CPT | Mod: RT

## 2025-06-11 PROCEDURE — 96374 THER/PROPH/DIAG INJ IV PUSH: CPT | Mod: 59

## 2025-06-11 PROCEDURE — 74177 CT ABD & PELVIS W/CONTRAST: CPT

## 2025-06-11 PROCEDURE — 72170 X-RAY EXAM OF PELVIS: CPT

## 2025-06-11 PROCEDURE — 2500000004 HC RX 250 GENERAL PHARMACY W/ HCPCS (ALT 636 FOR OP/ED): Performed by: PHYSICIAN ASSISTANT

## 2025-06-11 PROCEDURE — 73630 X-RAY EXAM OF FOOT: CPT | Mod: RT

## 2025-06-11 PROCEDURE — 85025 COMPLETE CBC W/AUTO DIFF WBC: CPT | Performed by: PHYSICIAN ASSISTANT

## 2025-06-11 PROCEDURE — 70450 CT HEAD/BRAIN W/O DYE: CPT

## 2025-06-11 PROCEDURE — 99285 EMERGENCY DEPT VISIT HI MDM: CPT | Mod: 25 | Performed by: EMERGENCY MEDICINE

## 2025-06-11 PROCEDURE — 84075 ASSAY ALKALINE PHOSPHATASE: CPT | Performed by: PHYSICIAN ASSISTANT

## 2025-06-11 RX ORDER — IBUPROFEN 600 MG/1
600 TABLET, FILM COATED ORAL EVERY 6 HOURS PRN
Qty: 30 TABLET | Refills: 0 | Status: SHIPPED | OUTPATIENT
Start: 2025-06-11

## 2025-06-11 RX ORDER — ACETAMINOPHEN 325 MG/1
650 TABLET ORAL EVERY 6 HOURS PRN
Qty: 30 TABLET | Refills: 0 | Status: SHIPPED | OUTPATIENT
Start: 2025-06-11

## 2025-06-11 RX ORDER — MORPHINE SULFATE 4 MG/ML
4 INJECTION INTRAVENOUS ONCE
Status: COMPLETED | OUTPATIENT
Start: 2025-06-11 | End: 2025-06-11

## 2025-06-11 RX ADMIN — MORPHINE SULFATE 4 MG: 4 INJECTION INTRAVENOUS at 08:37

## 2025-06-11 RX ADMIN — IOHEXOL 100 ML: 350 INJECTION, SOLUTION INTRAVENOUS at 11:05

## 2025-06-11 ASSESSMENT — PAIN - FUNCTIONAL ASSESSMENT
PAIN_FUNCTIONAL_ASSESSMENT: 0-10
PAIN_FUNCTIONAL_ASSESSMENT: 0-10

## 2025-06-11 ASSESSMENT — PAIN SCALES - GENERAL
PAINLEVEL_OUTOF10: 8
PAINLEVEL_OUTOF10: 5 - MODERATE PAIN

## 2025-06-11 NOTE — DISCHARGE INSTRUCTIONS
Follow-up with podiatry for your foot/toe pain Call [phone] to schedule an appointment.    Take the Tylenol/ibuprofen for pain relief.  Get plenty rest.  Follow-up with your PCP for persistent pains

## 2025-06-11 NOTE — Clinical Note
Matthieu Solis was seen and treated in our emergency department on 6/11/2025.  He may return to work on 06/14/2025.       If you have any questions or concerns, please don't hesitate to call.      Nayla Mead MD
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
## 2025-06-11 NOTE — Clinical Note
Jose De Jesus Jacques was seen and treated in our emergency department on 6/11/2025.  He may return to work on 06/14/2025.       If you have any questions or concerns, please don't hesitate to call.      Nayla Mead MD

## 2025-06-12 LAB
25(OH)D3 SERPL-MCNC: 20 NG/ML (ref 30–100)
25(OH)D3 SERPL-MCNC: 22 NG/ML (ref 30–100)
ACT BLD: 152 SEC (ref 83–199)
ACT BLD: 152 SEC (ref 83–199)
ACT BLD: 244 SEC (ref 83–199)
ACT BLD: 250 SEC (ref 83–199)
ACT BLD: 263 SEC (ref 83–199)
ACT BLD: 268 SEC (ref 83–199)
ACT BLD: 281 SEC (ref 83–199)
ACT BLD: 290 SEC (ref 83–199)
ACT BLD: 295 SEC (ref 83–199)
ALBUMIN SERPL BCP-MCNC: 3.3 G/DL (ref 3.4–5)
ALBUMIN SERPL BCP-MCNC: 3.5 G/DL (ref 3.4–5)
ALBUMIN SERPL BCP-MCNC: 3.6 G/DL (ref 3.4–5)
ALBUMIN SERPL BCP-MCNC: 3.7 G/DL (ref 3.4–5)
ALBUMIN SERPL BCP-MCNC: 4 G/DL (ref 3.4–5)
ALBUMIN SERPL BCP-MCNC: 4.1 G/DL (ref 3.4–5)
ALBUMIN SERPL BCP-MCNC: 4.3 G/DL (ref 3.4–5)
ALBUMIN SERPL BCP-MCNC: 4.5 G/DL (ref 3.4–5)
ALP SERPL-CCNC: 102 U/L (ref 33–136)
ALP SERPL-CCNC: 89 U/L (ref 33–136)
ALP SERPL-CCNC: 92 U/L (ref 33–136)
ALP SERPL-CCNC: 94 U/L (ref 33–136)
ALT SERPL W P-5'-P-CCNC: 14 U/L (ref 10–52)
ALT SERPL W P-5'-P-CCNC: 16 U/L (ref 10–52)
ALT SERPL W P-5'-P-CCNC: 26 U/L (ref 10–52)
ALT SERPL W P-5'-P-CCNC: 51 U/L (ref 10–52)
AMPHETAMINES UR QL SCN: ABNORMAL
AMPHETAMINES UR QL SCN: ABNORMAL
ANION GAP BLDA CALCULATED.4IONS-SCNC: 13 MMO/L (ref 10–25)
ANION GAP BLDA CALCULATED.4IONS-SCNC: 14 MMO/L (ref 10–25)
ANION GAP BLDA CALCULATED.4IONS-SCNC: 9 MMO/L (ref 10–25)
ANION GAP BLDV CALCULATED.4IONS-SCNC: 12 MMOL/L (ref 10–25)
ANION GAP SERPL CALC-SCNC: 10 MMOL/L (ref 10–20)
ANION GAP SERPL CALC-SCNC: 12 MMOL/L
ANION GAP SERPL CALC-SCNC: 13 MMOL/L (ref 10–20)
ANION GAP SERPL CALC-SCNC: 13 MMOL/L (ref 10–20)
ANION GAP SERPL CALC-SCNC: 14 MMOL/L
ANION GAP SERPL CALC-SCNC: 14 MMOL/L (ref 10–20)
ANION GAP SERPL CALC-SCNC: 14 MMOL/L (ref 10–20)
ANION GAP SERPL CALC-SCNC: 16 MMOL/L
ANION GAP SERPL CALC-SCNC: 16 MMOL/L (ref 10–20)
ANION GAP SERPL CALC-SCNC: 17 MMOL/L (ref 10–20)
ANION GAP SERPL CALC-SCNC: 9 MMOL/L (ref 10–20)
APPEARANCE UR: CLEAR
AST SERPL W P-5'-P-CCNC: 14 U/L (ref 9–39)
AST SERPL W P-5'-P-CCNC: 16 U/L (ref 9–39)
AST SERPL W P-5'-P-CCNC: 26 U/L (ref 9–39)
AST SERPL W P-5'-P-CCNC: 40 U/L (ref 9–39)
BACTERIA #/AREA URNS AUTO: ABNORMAL /HPF
BACTERIA BLD CULT: NORMAL
BACTERIA BLD CULT: NORMAL
BARBITURATES UR QL SCN: ABNORMAL
BARBITURATES UR QL SCN: ABNORMAL
BASE EXCESS BLDA CALC-SCNC: -3 MMOL/L (ref -2–3)
BASE EXCESS BLDA CALC-SCNC: -4 MMOL/L (ref -2–3)
BASE EXCESS BLDA CALC-SCNC: -4.1 MMOL/L (ref -2–3)
BASE EXCESS BLDV CALC-SCNC: 0.7 MMOL/L (ref -2–3)
BASOPHILS # BLD AUTO: 0.04 X10*3/UL (ref 0–0.1)
BASOPHILS # BLD AUTO: 0.05 X10*3/UL (ref 0–0.1)
BASOPHILS # BLD AUTO: 0.06 X10*3/UL (ref 0–0.1)
BASOPHILS # BLD AUTO: 0.07 X10*3/UL (ref 0–0.1)
BASOPHILS NFR BLD AUTO: 0.3 %
BASOPHILS NFR BLD AUTO: 0.4 %
BASOPHILS NFR BLD AUTO: 0.5 %
BASOPHILS NFR BLD AUTO: 0.5 %
BENZODIAZ UR QL SCN: ABNORMAL
BENZODIAZ UR QL SCN: ABNORMAL
BILIRUB DIRECT SERPL-MCNC: 0.2 MG/DL (ref 0–0.3)
BILIRUB SERPL-MCNC: 0.4 MG/DL (ref 0–1.2)
BILIRUB SERPL-MCNC: 0.8 MG/DL (ref 0–1.2)
BILIRUB SERPL-MCNC: 1 MG/DL (ref 0–1.2)
BILIRUB SERPL-MCNC: 1.1 MG/DL (ref 0–1.2)
BILIRUB UR STRIP.AUTO-MCNC: NEGATIVE MG/DL
BNP SERPL-MCNC: 102 PG/ML (ref 0–99)
BODY TEMPERATURE: 37 DEGREES CELSIUS
BUN SERPL-MCNC: 11 MG/DL (ref 6–23)
BUN SERPL-MCNC: 11 MG/DL (ref 6–23)
BUN SERPL-MCNC: 12 MG/DL (ref 6–23)
BUN SERPL-MCNC: 13 MG/DL (ref 6–23)
BUN SERPL-MCNC: 15 MG/DL (ref 6–23)
BUN SERPL-MCNC: 17 MG/DL (ref 6–23)
BUN SERPL-MCNC: 18 MG/DL (ref 6–23)
BUN SERPL-MCNC: 26 MG/DL (ref 6–23)
BUN SERPL-MCNC: 7 MG/DL (ref 6–23)
BUN SERPL-MCNC: 9 MG/DL (ref 6–23)
BZE UR QL SCN: ABNORMAL
BZE UR QL SCN: ABNORMAL
C COLI+JEJ+UPSA DNA STL QL NAA+PROBE: NOT DETECTED
C COLI+JEJ+UPSA DNA STL QL NAA+PROBE: NOT DETECTED
C DIF TOX TCDA+TCDB STL QL NAA+PROBE: NOT DETECTED
C TRACH RRNA SPEC QL NAA+PROBE: NEGATIVE
C TRACH RRNA SPEC QL NAA+PROBE: NEGATIVE
CA-I BLDA-SCNC: 1.18 MMOL/L (ref 1.1–1.33)
CA-I BLDA-SCNC: 1.18 MMOL/L (ref 1.1–1.33)
CA-I BLDA-SCNC: 1.19 MMOL/L (ref 1.1–1.33)
CA-I BLDV-SCNC: 1.21 MMOL/L (ref 1.1–1.33)
CALCIUM SERPL-MCNC: 10.2 MG/DL (ref 8.6–10.3)
CALCIUM SERPL-MCNC: 10.3 MG/DL (ref 8.6–10.6)
CALCIUM SERPL-MCNC: 8.2 MG/DL (ref 8.6–10.6)
CALCIUM SERPL-MCNC: 8.6 MG/DL (ref 8.6–10.3)
CALCIUM SERPL-MCNC: 8.6 MG/DL (ref 8.6–10.6)
CALCIUM SERPL-MCNC: 8.6 MG/DL (ref 8.6–10.6)
CALCIUM SERPL-MCNC: 8.7 MG/DL (ref 8.6–10.3)
CALCIUM SERPL-MCNC: 8.7 MG/DL (ref 8.6–10.6)
CALCIUM SERPL-MCNC: 8.9 MG/DL (ref 8.6–10.6)
CALCIUM SERPL-MCNC: 9 MG/DL (ref 8.6–10.3)
CALCIUM SERPL-MCNC: 9.1 MG/DL (ref 8.6–10.6)
CALCIUM SERPL-MCNC: 9.2 MG/DL (ref 8.6–10.6)
CALCIUM SERPL-MCNC: 9.4 MG/DL (ref 8.6–10.3)
CALCIUM SERPL-MCNC: 9.6 MG/DL (ref 8.6–10.6)
CANNABINOIDS UR QL SCN: ABNORMAL
CANNABINOIDS UR QL SCN: ABNORMAL
CAOX CRY #/AREA UR COMP ASSIST: ABNORMAL /HPF
CARDIAC TROPONIN I PNL SERPL HS: 16 NG/L (ref 0–20)
CARDIAC TROPONIN I PNL SERPL HS: 8 NG/L (ref 0–53)
CHLORIDE BLDA-SCNC: 107 MMOL/L (ref 98–107)
CHLORIDE BLDA-SCNC: 107 MMOL/L (ref 98–107)
CHLORIDE BLDA-SCNC: 109 MMOL/L (ref 98–107)
CHLORIDE BLDV-SCNC: 102 MMOL/L (ref 98–107)
CHLORIDE SERPL-SCNC: 101 MMOL/L (ref 98–107)
CHLORIDE SERPL-SCNC: 102 MMOL/L (ref 98–107)
CHLORIDE SERPL-SCNC: 102 MMOL/L (ref 98–107)
CHLORIDE SERPL-SCNC: 103 MMOL/L (ref 98–107)
CHLORIDE SERPL-SCNC: 103 MMOL/L (ref 98–107)
CHLORIDE SERPL-SCNC: 104 MMOL/L (ref 98–107)
CHLORIDE SERPL-SCNC: 105 MMOL/L (ref 98–107)
CHLORIDE SERPL-SCNC: 105 MMOL/L (ref 98–107)
CHLORIDE SERPL-SCNC: 106 MMOL/L (ref 98–107)
CHLORIDE SERPL-SCNC: 106 MMOL/L (ref 98–107)
CHLORIDE SERPL-SCNC: 108 MMOL/L (ref 98–107)
CHLORIDE SERPL-SCNC: 97 MMOL/L (ref 98–107)
CHOLEST SERPL-MCNC: 159 MG/DL (ref 0–199)
CHOLESTEROL/HDL RATIO: 3.1
CO2 SERPL-SCNC: 20 MMOL/L (ref 21–32)
CO2 SERPL-SCNC: 20 MMOL/L (ref 21–32)
CO2 SERPL-SCNC: 21 MMOL/L (ref 21–32)
CO2 SERPL-SCNC: 22 MMOL/L (ref 21–32)
CO2 SERPL-SCNC: 23 MMOL/L (ref 21–32)
CO2 SERPL-SCNC: 23 MMOL/L (ref 21–32)
CO2 SERPL-SCNC: 24 MMOL/L (ref 21–32)
CO2 SERPL-SCNC: 26 MMOL/L (ref 21–32)
CO2 SERPL-SCNC: 26 MMOL/L (ref 21–32)
CO2 SERPL-SCNC: 30 MMOL/L (ref 21–32)
COLOR UR: ABNORMAL
COLOR UR: NORMAL
COLOR UR: YELLOW
CREAT SERPL-MCNC: 0.69 MG/DL (ref 0.5–1.3)
CREAT SERPL-MCNC: 0.8 MG/DL (ref 0.5–1.3)
CREAT SERPL-MCNC: 0.82 MG/DL (ref 0.5–1.3)
CREAT SERPL-MCNC: 0.85 MG/DL (ref 0.5–1.3)
CREAT SERPL-MCNC: 0.86 MG/DL (ref 0.5–1.3)
CREAT SERPL-MCNC: 0.87 MG/DL (ref 0.5–1.3)
CREAT SERPL-MCNC: 0.9 MG/DL (ref 0.5–1.3)
CREAT SERPL-MCNC: 0.91 MG/DL (ref 0.5–1.3)
CREAT SERPL-MCNC: 0.92 MG/DL (ref 0.5–1.3)
CREAT SERPL-MCNC: 0.93 MG/DL (ref 0.5–1.3)
CREAT SERPL-MCNC: 0.94 MG/DL (ref 0.5–1.3)
CREAT SERPL-MCNC: 0.94 MG/DL (ref 0.5–1.3)
CREAT SERPL-MCNC: 1.05 MG/DL (ref 0.5–1.3)
CREAT SERPL-MCNC: 1.08 MG/DL (ref 0.5–1.3)
CRP SERPL-MCNC: 13.32 MG/DL
EC STX1 GENE STL QL NAA+PROBE: NOT DETECTED
EC STX1 GENE STL QL NAA+PROBE: NOT DETECTED
EC STX2 GENE STL QL NAA+PROBE: NOT DETECTED
EC STX2 GENE STL QL NAA+PROBE: NOT DETECTED
EGFRCR SERPLBLD CKD-EPI 2021: 78 ML/MIN/1.73M*2
EGFRCR SERPLBLD CKD-EPI 2021: 80 ML/MIN/1.73M*2
EGFRCR SERPLBLD CKD-EPI 2021: >90 ML/MIN/1.73M*2
EOSINOPHIL # BLD AUTO: 0.01 X10*3/UL (ref 0–0.7)
EOSINOPHIL # BLD AUTO: 0.04 X10*3/UL (ref 0–0.7)
EOSINOPHIL # BLD AUTO: 0.11 X10*3/UL (ref 0–0.7)
EOSINOPHIL # BLD AUTO: 0.15 X10*3/UL (ref 0–0.7)
EOSINOPHIL # BLD AUTO: 0.38 X10*3/UL (ref 0–0.7)
EOSINOPHIL # BLD AUTO: 0.41 X10*3/UL (ref 0–0.7)
EOSINOPHIL NFR BLD AUTO: 0.1 %
EOSINOPHIL NFR BLD AUTO: 0.3 %
EOSINOPHIL NFR BLD AUTO: 0.6 %
EOSINOPHIL NFR BLD AUTO: 1.2 %
EOSINOPHIL NFR BLD AUTO: 3 %
EOSINOPHIL NFR BLD AUTO: 3.6 %
ERYTHROCYTE [DISTWIDTH] IN BLOOD BY AUTOMATED COUNT: 13 % (ref 11.5–14.5)
ERYTHROCYTE [DISTWIDTH] IN BLOOD BY AUTOMATED COUNT: 13.2 % (ref 11.5–14.5)
ERYTHROCYTE [DISTWIDTH] IN BLOOD BY AUTOMATED COUNT: 13.5 % (ref 11.5–14.5)
ERYTHROCYTE [DISTWIDTH] IN BLOOD BY AUTOMATED COUNT: 13.7 % (ref 11.5–14.5)
ERYTHROCYTE [DISTWIDTH] IN BLOOD BY AUTOMATED COUNT: 13.8 % (ref 11.5–14.5)
ERYTHROCYTE [DISTWIDTH] IN BLOOD BY AUTOMATED COUNT: 14 % (ref 11.5–14.5)
ERYTHROCYTE [DISTWIDTH] IN BLOOD BY AUTOMATED COUNT: 14.1 % (ref 11.5–14.5)
EST. AVERAGE GLUCOSE BLD GHB EST-MCNC: 103 MG/DL
FENTANYL+NORFENTANYL UR QL SCN: ABNORMAL
FENTANYL+NORFENTANYL UR QL SCN: ABNORMAL
FLUAV RNA RESP QL NAA+PROBE: NOT DETECTED
FLUAV RNA RESP QL NAA+PROBE: NOT DETECTED
FLUBV RNA RESP QL NAA+PROBE: NOT DETECTED
FLUBV RNA RESP QL NAA+PROBE: NOT DETECTED
GLUCOSE BLD MANUAL STRIP-MCNC: 134 MG/DL (ref 74–99)
GLUCOSE BLD MANUAL STRIP-MCNC: 77 MG/DL (ref 74–99)
GLUCOSE BLDA-MCNC: 107 MG/DL (ref 74–99)
GLUCOSE BLDA-MCNC: 112 MG/DL (ref 74–99)
GLUCOSE BLDA-MCNC: 89 MG/DL (ref 74–99)
GLUCOSE BLDV-MCNC: 91 MG/DL (ref 74–99)
GLUCOSE SERPL-MCNC: 100 MG/DL (ref 74–99)
GLUCOSE SERPL-MCNC: 122 MG/DL (ref 74–99)
GLUCOSE SERPL-MCNC: 136 MG/DL (ref 74–99)
GLUCOSE SERPL-MCNC: 70 MG/DL (ref 74–99)
GLUCOSE SERPL-MCNC: 73 MG/DL (ref 74–99)
GLUCOSE SERPL-MCNC: 81 MG/DL (ref 74–99)
GLUCOSE SERPL-MCNC: 82 MG/DL (ref 74–99)
GLUCOSE SERPL-MCNC: 88 MG/DL (ref 74–99)
GLUCOSE SERPL-MCNC: 92 MG/DL (ref 74–99)
GLUCOSE SERPL-MCNC: 94 MG/DL (ref 74–99)
GLUCOSE SERPL-MCNC: 94 MG/DL (ref 74–99)
GLUCOSE SERPL-MCNC: 95 MG/DL (ref 74–99)
GLUCOSE UR STRIP.AUTO-MCNC: ABNORMAL MG/DL
GLUCOSE UR STRIP.AUTO-MCNC: ABNORMAL MG/DL
GLUCOSE UR STRIP.AUTO-MCNC: NEGATIVE MG/DL
GLUCOSE UR STRIP.AUTO-MCNC: NEGATIVE MG/DL
GLUCOSE UR STRIP.AUTO-MCNC: NORMAL MG/DL
HBA1C MFR BLD: 5.2 % (ref ?–5.7)
HCO3 BLDA-SCNC: 20.9 MMOL/L (ref 22–26)
HCO3 BLDA-SCNC: 22.2 MMOL/L (ref 22–26)
HCO3 BLDA-SCNC: 22.7 MMOL/L (ref 22–26)
HCO3 BLDV-SCNC: 25.4 MMOL/L (ref 22–26)
HCT VFR BLD AUTO: 28 % (ref 41–52)
HCT VFR BLD AUTO: 33 % (ref 41–52)
HCT VFR BLD AUTO: 34.9 % (ref 41–52)
HCT VFR BLD AUTO: 36.3 % (ref 41–52)
HCT VFR BLD AUTO: 37.8 % (ref 41–52)
HCT VFR BLD AUTO: 41.1 % (ref 41–52)
HCT VFR BLD AUTO: 41.2 % (ref 41–52)
HCT VFR BLD AUTO: 41.9 % (ref 41–52)
HCT VFR BLD AUTO: 44.9 % (ref 41–52)
HCT VFR BLD AUTO: 45.2 % (ref 41–52)
HCT VFR BLD AUTO: 45.7 % (ref 41–52)
HCT VFR BLD EST: 31 % (ref 41–52)
HCT VFR BLD EST: 37 % (ref 41–52)
HCT VFR BLD EST: 37 % (ref 41–52)
HCT VFR BLD EST: 43 % (ref 41–52)
HDLC SERPL-MCNC: 51 MG/DL
HGB BLD-MCNC: 11.2 G/DL (ref 13.5–17.5)
HGB BLD-MCNC: 11.5 G/DL (ref 13.5–17.5)
HGB BLD-MCNC: 11.8 G/DL (ref 13.5–17.5)
HGB BLD-MCNC: 11.9 G/DL (ref 13.5–17.5)
HGB BLD-MCNC: 13.6 G/DL (ref 13.5–17.5)
HGB BLD-MCNC: 13.7 G/DL (ref 13.5–17.5)
HGB BLD-MCNC: 13.9 G/DL (ref 13.5–17.5)
HGB BLD-MCNC: 14.6 G/DL (ref 13.5–17.5)
HGB BLD-MCNC: 14.6 G/DL (ref 13.5–17.5)
HGB BLD-MCNC: 15 G/DL (ref 13.5–17.5)
HGB BLD-MCNC: 9.3 G/DL (ref 13.5–17.5)
HGB BLDA-MCNC: 10.3 G/DL (ref 13.5–17.5)
HGB BLDA-MCNC: 12.2 G/DL (ref 13.5–17.5)
HGB BLDA-MCNC: 12.3 G/DL (ref 13.5–17.5)
HGB BLDV-MCNC: 14.4 G/DL (ref 13.5–17.5)
HIV 1+2 AB+HIV1 P24 AG SERPL QL IA: NONREACTIVE
IMM GRANULOCYTES # BLD AUTO: 0.03 X10*3/UL (ref 0–0.7)
IMM GRANULOCYTES # BLD AUTO: 0.05 X10*3/UL (ref 0–0.7)
IMM GRANULOCYTES # BLD AUTO: 0.07 X10*3/UL (ref 0–0.7)
IMM GRANULOCYTES # BLD AUTO: 0.08 X10*3/UL (ref 0–0.7)
IMM GRANULOCYTES # BLD AUTO: 0.12 X10*3/UL (ref 0–0.7)
IMM GRANULOCYTES # BLD AUTO: 0.31 X10*3/UL (ref 0–0.7)
IMM GRANULOCYTES NFR BLD AUTO: 0.2 % (ref 0–0.9)
IMM GRANULOCYTES NFR BLD AUTO: 0.4 % (ref 0–0.9)
IMM GRANULOCYTES NFR BLD AUTO: 0.5 % (ref 0–0.9)
IMM GRANULOCYTES NFR BLD AUTO: 0.5 % (ref 0–0.9)
IMM GRANULOCYTES NFR BLD AUTO: 1.1 % (ref 0–0.9)
IMM GRANULOCYTES NFR BLD AUTO: 1.8 % (ref 0–0.9)
INHALED O2 CONCENTRATION: 21 %
INHALED O2 CONCENTRATION: 50 %
KETONES UR STRIP.AUTO-MCNC: ABNORMAL MG/DL
KETONES UR STRIP.AUTO-MCNC: ABNORMAL MG/DL
KETONES UR STRIP.AUTO-MCNC: NEGATIVE MG/DL
LACTATE BLDA-SCNC: 1.1 MMOL/L (ref 0.4–2)
LACTATE BLDA-SCNC: 1.4 MMOL/L (ref 0.4–2)
LACTATE BLDA-SCNC: 1.6 MMOL/L (ref 0.4–2)
LACTATE BLDV-SCNC: 1.1 MMOL/L (ref 0.4–2)
LACTATE SERPL-SCNC: 0.8 MMOL/L (ref 0.4–2)
LACTATE SERPL-SCNC: 0.9 MMOL/L (ref 0.4–2)
LACTATE SERPL-SCNC: 1 MMOL/L (ref 0.4–2)
LACTATE SERPL-SCNC: 1 MMOL/L (ref 0.4–2)
LDLC SERPL CALC-MCNC: 100 MG/DL
LEUKOCYTE ESTERASE UR QL STRIP.AUTO: NEGATIVE
LIPASE SERPL-CCNC: 4 U/L (ref 9–82)
LIPASE SERPL-CCNC: 8 U/L (ref 9–82)
LYMPHOCYTES # BLD AUTO: 1.46 X10*3/UL (ref 1.2–4.8)
LYMPHOCYTES # BLD AUTO: 1.54 X10*3/UL (ref 1.2–4.8)
LYMPHOCYTES # BLD AUTO: 1.69 X10*3/UL (ref 1.2–4.8)
LYMPHOCYTES # BLD AUTO: 1.84 X10*3/UL (ref 1.2–4.8)
LYMPHOCYTES # BLD AUTO: 1.92 X10*3/UL (ref 1.2–4.8)
LYMPHOCYTES # BLD AUTO: 2.36 X10*3/UL (ref 1.2–4.8)
LYMPHOCYTES NFR BLD AUTO: 10.7 %
LYMPHOCYTES NFR BLD AUTO: 13.1 %
LYMPHOCYTES NFR BLD AUTO: 13.5 %
LYMPHOCYTES NFR BLD AUTO: 15.5 %
LYMPHOCYTES NFR BLD AUTO: 18.6 %
LYMPHOCYTES NFR BLD AUTO: 9.4 %
MAGNESIUM SERPL-MCNC: 2 MG/DL (ref 1.6–2.4)
MAGNESIUM SERPL-MCNC: 2.26 MG/DL (ref 1.6–2.4)
MAGNESIUM SERPL-MCNC: 2.29 MG/DL (ref 1.6–2.4)
MAGNESIUM SERPL-MCNC: 2.33 MG/DL (ref 1.6–2.4)
MAGNESIUM SERPL-MCNC: 2.62 MG/DL (ref 1.6–2.4)
MCH RBC QN AUTO: 29.9 PG (ref 26–34)
MCH RBC QN AUTO: 30.2 PG (ref 26–34)
MCH RBC QN AUTO: 30.4 PG (ref 26–34)
MCH RBC QN AUTO: 30.4 PG (ref 26–34)
MCH RBC QN AUTO: 30.9 PG (ref 26–34)
MCH RBC QN AUTO: 31.1 PG (ref 26–34)
MCH RBC QN AUTO: 32 PG (ref 26–34)
MCH RBC QN AUTO: 32.4 PG (ref 26–34)
MCHC RBC AUTO-ENTMCNC: 31.5 G/DL (ref 32–36)
MCHC RBC AUTO-ENTMCNC: 31.7 G/DL (ref 32–36)
MCHC RBC AUTO-ENTMCNC: 32.3 G/DL (ref 32–36)
MCHC RBC AUTO-ENTMCNC: 32.5 G/DL (ref 32–36)
MCHC RBC AUTO-ENTMCNC: 32.8 G/DL (ref 32–36)
MCHC RBC AUTO-ENTMCNC: 33.1 G/DL (ref 32–36)
MCHC RBC AUTO-ENTMCNC: 33.2 G/DL (ref 32–36)
MCHC RBC AUTO-ENTMCNC: 33.2 G/DL (ref 32–36)
MCHC RBC AUTO-ENTMCNC: 33.3 G/DL (ref 32–36)
MCHC RBC AUTO-ENTMCNC: 33.8 G/DL (ref 32–36)
MCHC RBC AUTO-ENTMCNC: 33.9 G/DL (ref 32–36)
MCV RBC AUTO: 91 FL (ref 80–100)
MCV RBC AUTO: 92 FL (ref 80–100)
MCV RBC AUTO: 92 FL (ref 80–100)
MCV RBC AUTO: 93 FL (ref 80–100)
MCV RBC AUTO: 94 FL (ref 80–100)
MCV RBC AUTO: 94 FL (ref 80–100)
MCV RBC AUTO: 96 FL (ref 80–100)
MCV RBC AUTO: 98 FL (ref 80–100)
MONOCYTES # BLD AUTO: 0.16 X10*3/UL (ref 0.1–1)
MONOCYTES # BLD AUTO: 0.87 X10*3/UL (ref 0.1–1)
MONOCYTES # BLD AUTO: 1.54 X10*3/UL (ref 0.1–1)
MONOCYTES # BLD AUTO: 1.55 X10*3/UL (ref 0.1–1)
MONOCYTES # BLD AUTO: 1.69 X10*3/UL (ref 0.1–1)
MONOCYTES # BLD AUTO: 1.71 X10*3/UL (ref 0.1–1)
MONOCYTES NFR BLD AUTO: 1 %
MONOCYTES NFR BLD AUTO: 12.4 %
MONOCYTES NFR BLD AUTO: 13.5 %
MONOCYTES NFR BLD AUTO: 13.6 %
MONOCYTES NFR BLD AUTO: 6.7 %
MONOCYTES NFR BLD AUTO: 9.8 %
MUCOUS THREADS #/AREA URNS AUTO: ABNORMAL /LPF
MUCOUS THREADS #/AREA URNS AUTO: ABNORMAL /LPF
MUCOUS THREADS #/AREA URNS AUTO: NORMAL /LPF
N GONORRHOEA DNA SPEC QL PROBE+SIG AMP: NEGATIVE
N GONORRHOEA DNA SPEC QL PROBE+SIG AMP: NEGATIVE
NEUTROPHILS # BLD AUTO: 10.17 X10*3/UL (ref 1.2–7.7)
NEUTROPHILS # BLD AUTO: 13.27 X10*3/UL (ref 1.2–7.7)
NEUTROPHILS # BLD AUTO: 13.84 X10*3/UL (ref 1.2–7.7)
NEUTROPHILS # BLD AUTO: 7.69 X10*3/UL (ref 1.2–7.7)
NEUTROPHILS # BLD AUTO: 8.12 X10*3/UL (ref 1.2–7.7)
NEUTROPHILS # BLD AUTO: 8.68 X10*3/UL (ref 1.2–7.7)
NEUTROPHILS NFR BLD AUTO: 64.2 %
NEUTROPHILS NFR BLD AUTO: 67.7 %
NEUTROPHILS NFR BLD AUTO: 70.3 %
NEUTROPHILS NFR BLD AUTO: 76.8 %
NEUTROPHILS NFR BLD AUTO: 78.9 %
NEUTROPHILS NFR BLD AUTO: 88.7 %
NITRITE UR QL STRIP.AUTO: NEGATIVE
NON HDL CHOLESTEROL: 108 MG/DL (ref 0–149)
NOROVIRUS GI + GII RNA STL NAA+PROBE: NOT DETECTED
NOROVIRUS GI + GII RNA STL NAA+PROBE: NOT DETECTED
NRBC BLD-RTO: 0 /100 WBCS (ref 0–0)
OPIATES UR QL SCN: ABNORMAL
OPIATES UR QL SCN: ABNORMAL
OXYCODONE+OXYMORPHONE UR QL SCN: ABNORMAL
OXYCODONE+OXYMORPHONE UR QL SCN: ABNORMAL
OXYHGB MFR BLDA: 96 % (ref 94–98)
OXYHGB MFR BLDA: 96.4 % (ref 94–98)
OXYHGB MFR BLDA: 96.5 % (ref 94–98)
OXYHGB MFR BLDV: 70.9 % (ref 45–75)
PCO2 BLDA: 37 MM HG (ref 38–42)
PCO2 BLDA: 42 MM HG (ref 38–42)
PCO2 BLDA: 44 MM HG (ref 38–42)
PCO2 BLDV: 40 MM HG (ref 41–51)
PCP UR QL SCN: ABNORMAL
PCP UR QL SCN: ABNORMAL
PH BLDA: 7.31 PH (ref 7.38–7.42)
PH BLDA: 7.34 PH (ref 7.38–7.42)
PH BLDA: 7.36 PH (ref 7.38–7.42)
PH BLDV: 7.41 PH (ref 7.33–7.43)
PH UR STRIP.AUTO: 5 [PH]
PH UR STRIP.AUTO: 5.5 [PH]
PH UR STRIP.AUTO: 6 [PH]
PH UR STRIP.AUTO: 6.5 [PH]
PH UR STRIP.AUTO: 8 [PH]
PHOSPHATE SERPL-MCNC: 2.8 MG/DL (ref 2.5–4.9)
PHOSPHATE SERPL-MCNC: 3.2 MG/DL (ref 2.5–4.9)
PHOSPHATE SERPL-MCNC: 3.2 MG/DL (ref 2.5–4.9)
PHOSPHATE SERPL-MCNC: 3.3 MG/DL (ref 2.5–4.9)
PHOSPHATE SERPL-MCNC: 4 MG/DL (ref 2.5–4.9)
PHOSPHATE SERPL-MCNC: 4.1 MG/DL (ref 2.5–4.9)
PLATELET # BLD AUTO: 227 X10*3/UL (ref 150–450)
PLATELET # BLD AUTO: 229 X10*3/UL (ref 150–450)
PLATELET # BLD AUTO: 298 X10*3/UL (ref 150–450)
PLATELET # BLD AUTO: 342 X10*3/UL (ref 150–450)
PLATELET # BLD AUTO: 376 X10*3/UL (ref 150–450)
PLATELET # BLD AUTO: 378 X10*3/UL (ref 150–450)
PLATELET # BLD AUTO: 379 X10*3/UL (ref 150–450)
PLATELET # BLD AUTO: 383 X10*3/UL (ref 150–450)
PLATELET # BLD AUTO: 386 X10*3/UL (ref 150–450)
PLATELET # BLD AUTO: 466 X10*3/UL (ref 150–450)
PLATELET # BLD AUTO: 497 X10*3/UL (ref 150–450)
PO2 BLDA: 158 MM HG (ref 85–95)
PO2 BLDA: 185 MM HG (ref 85–95)
PO2 BLDA: 199 MM HG (ref 85–95)
PO2 BLDV: 48 MM HG (ref 35–45)
POTASSIUM BLDA-SCNC: 3.9 MMOL/L (ref 3.5–5.3)
POTASSIUM BLDA-SCNC: 4.3 MMOL/L (ref 3.5–5.3)
POTASSIUM BLDA-SCNC: 4.8 MMOL/L (ref 3.5–5.3)
POTASSIUM BLDV-SCNC: 4.1 MMOL/L (ref 3.5–5.3)
POTASSIUM SERPL-SCNC: 3.5 MMOL/L (ref 3.5–5.3)
POTASSIUM SERPL-SCNC: 3.5 MMOL/L (ref 3.5–5.3)
POTASSIUM SERPL-SCNC: 3.6 MMOL/L (ref 3.5–5.3)
POTASSIUM SERPL-SCNC: 3.7 MMOL/L (ref 3.5–5.3)
POTASSIUM SERPL-SCNC: 3.7 MMOL/L (ref 3.5–5.3)
POTASSIUM SERPL-SCNC: 3.8 MMOL/L (ref 3.5–5.3)
POTASSIUM SERPL-SCNC: 3.9 MMOL/L (ref 3.5–5.3)
POTASSIUM SERPL-SCNC: 3.9 MMOL/L (ref 3.5–5.3)
POTASSIUM SERPL-SCNC: 4 MMOL/L (ref 3.5–5.3)
POTASSIUM SERPL-SCNC: 4.1 MMOL/L (ref 3.5–5.3)
POTASSIUM SERPL-SCNC: 4.2 MMOL/L (ref 3.5–5.3)
POTASSIUM SERPL-SCNC: 4.9 MMOL/L (ref 3.5–5.3)
POTASSIUM SERPL-SCNC: 5 MMOL/L (ref 3.5–5.3)
POTASSIUM SERPL-SCNC: 6 MMOL/L (ref 3.5–5.3)
PROT SERPL-MCNC: 6.8 G/DL (ref 6.4–8.2)
PROT SERPL-MCNC: 7.6 G/DL (ref 6.4–8.2)
PROT SERPL-MCNC: 7.7 G/DL (ref 6.4–8.2)
PROT SERPL-MCNC: 8.2 G/DL (ref 6.4–8.2)
PROT UR STRIP.AUTO-MCNC: ABNORMAL MG/DL
PROT UR STRIP.AUTO-MCNC: NEGATIVE MG/DL
PROT UR STRIP.AUTO-MCNC: NORMAL MG/DL
PSA SERPL-MCNC: 10.53 NG/ML
PSA SERPL-MCNC: 4.37 NG/ML
PSA SERPL-MCNC: 7.32 NG/ML
RBC # BLD AUTO: 2.87 X10*6/UL (ref 4.5–5.9)
RBC # BLD AUTO: 3.5 X10*6/UL (ref 4.5–5.9)
RBC # BLD AUTO: 3.78 X10*6/UL (ref 4.5–5.9)
RBC # BLD AUTO: 3.79 X10*6/UL (ref 4.5–5.9)
RBC # BLD AUTO: 3.94 X10*6/UL (ref 4.5–5.9)
RBC # BLD AUTO: 4.38 X10*6/UL (ref 4.5–5.9)
RBC # BLD AUTO: 4.5 X10*6/UL (ref 4.5–5.9)
RBC # BLD AUTO: 4.5 X10*6/UL (ref 4.5–5.9)
RBC # BLD AUTO: 4.69 X10*6/UL (ref 4.5–5.9)
RBC # BLD AUTO: 4.7 X10*6/UL (ref 4.5–5.9)
RBC # BLD AUTO: 5.01 X10*6/UL (ref 4.5–5.9)
RBC # UR STRIP.AUTO: ABNORMAL /UL
RBC # UR STRIP.AUTO: ABNORMAL /UL
RBC # UR STRIP.AUTO: NEGATIVE /UL
RBC #/AREA URNS AUTO: ABNORMAL /HPF
RBC #/AREA URNS AUTO: ABNORMAL /HPF
RBC #/AREA URNS AUTO: NORMAL /HPF
RBC #/AREA URNS AUTO: NORMAL /HPF
RSV RNA RESP QL NAA+PROBE: NOT DETECTED
RV RNA STL NAA+PROBE: NOT DETECTED
RV RNA STL NAA+PROBE: NOT DETECTED
SALMONELLA DNA STL QL NAA+PROBE: NOT DETECTED
SALMONELLA DNA STL QL NAA+PROBE: NOT DETECTED
SAO2 % BLDA: 100 % (ref 94–100)
SAO2 % BLDA: 100 % (ref 94–100)
SAO2 % BLDA: 99 % (ref 94–100)
SAO2 % BLDV: 74 % (ref 45–75)
SARS-COV-2 RNA RESP QL NAA+PROBE: NOT DETECTED
SHIGELLA DNA SPEC QL NAA+PROBE: NOT DETECTED
SHIGELLA DNA SPEC QL NAA+PROBE: NOT DETECTED
SODIUM BLDA-SCNC: 135 MMOL/L (ref 136–145)
SODIUM BLDA-SCNC: 138 MMOL/L (ref 136–145)
SODIUM BLDA-SCNC: 139 MMOL/L (ref 136–145)
SODIUM BLDV-SCNC: 135 MMOL/L (ref 136–145)
SODIUM SERPL-SCNC: 134 MMOL/L (ref 136–145)
SODIUM SERPL-SCNC: 135 MMOL/L (ref 136–145)
SODIUM SERPL-SCNC: 136 MMOL/L (ref 136–145)
SODIUM SERPL-SCNC: 137 MMOL/L (ref 136–145)
SODIUM SERPL-SCNC: 138 MMOL/L (ref 136–145)
SODIUM SERPL-SCNC: 139 MMOL/L (ref 136–145)
SODIUM SERPL-SCNC: 140 MMOL/L (ref 136–145)
SODIUM SERPL-SCNC: 140 MMOL/L (ref 136–145)
SP GR UR STRIP.AUTO: 1.02
SP GR UR STRIP.AUTO: 1.03
SP GR UR STRIP.AUTO: 1.03
SP GR UR STRIP.AUTO: >1.05
SP GR UR STRIP.AUTO: >1.06
STAPHYLOCOCCUS SPEC CULT: NORMAL
TRIGL SERPL-MCNC: 39 MG/DL (ref 0–149)
TSH SERPL-ACNC: 0.34 MIU/L (ref 0.44–3.98)
TSH SERPL-ACNC: 1.15 MIU/L (ref 0.44–3.98)
UROBILINOGEN UR STRIP.AUTO-MCNC: <2 MG/DL
UROBILINOGEN UR STRIP.AUTO-MCNC: <2 MG/DL
UROBILINOGEN UR STRIP.AUTO-MCNC: NORMAL MG/DL
V CHOLERAE DNA STL QL NAA+PROBE: NOT DETECTED
V CHOLERAE DNA STL QL NAA+PROBE: NOT DETECTED
VANCOMYCIN SERPL-MCNC: 5.6 UG/ML (ref 5–20)
VIT B12 SERPL-MCNC: 1256 PG/ML (ref 211–911)
VLDL: 8 MG/DL (ref 0–40)
WBC # BLD AUTO: 11.4 X10*3/UL (ref 4.4–11.3)
WBC # BLD AUTO: 12.4 X10*3/UL (ref 4.4–11.3)
WBC # BLD AUTO: 12.7 X10*3/UL (ref 4.4–11.3)
WBC # BLD AUTO: 12.9 X10*3/UL (ref 4.4–11.3)
WBC # BLD AUTO: 13.1 X10*3/UL (ref 4.4–11.3)
WBC # BLD AUTO: 13.7 X10*3/UL (ref 4.4–11.3)
WBC # BLD AUTO: 15.6 X10*3/UL (ref 4.4–11.3)
WBC # BLD AUTO: 17.3 X10*3/UL (ref 4.4–11.3)
WBC # BLD AUTO: 8.2 X10*3/UL (ref 4.4–11.3)
WBC # BLD AUTO: 8.3 X10*3/UL (ref 4.4–11.3)
WBC # BLD AUTO: 9.7 X10*3/UL (ref 4.4–11.3)
WBC #/AREA URNS AUTO: ABNORMAL /HPF
WBC #/AREA URNS AUTO: ABNORMAL /HPF
WBC #/AREA URNS AUTO: NORMAL /HPF
WBC #/AREA URNS AUTO: NORMAL /HPF
Y ENTEROCOL DNA STL QL NAA+PROBE: NOT DETECTED
Y ENTEROCOL DNA STL QL NAA+PROBE: NOT DETECTED

## 2025-06-13 ENCOUNTER — OFFICE VISIT (OUTPATIENT)
Dept: GERIATRIC MEDICINE | Facility: CLINIC | Age: 65
End: 2025-06-13
Payer: COMMERCIAL

## 2025-06-13 VITALS
SYSTOLIC BLOOD PRESSURE: 126 MMHG | HEART RATE: 75 BPM | WEIGHT: 148.7 LBS | RESPIRATION RATE: 18 BRPM | TEMPERATURE: 97.5 F | BODY MASS INDEX: 24.74 KG/M2 | DIASTOLIC BLOOD PRESSURE: 75 MMHG
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
## 2025-06-13 DIAGNOSIS — I50.20 SYSTOLIC CONGESTIVE HEART FAILURE, UNSPECIFIED HF CHRONICITY: ICD-10-CM

## 2025-06-13 DIAGNOSIS — G47.30 SLEEP APNEA, UNSPECIFIED TYPE: ICD-10-CM

## 2025-06-13 DIAGNOSIS — I73.9 PERIPHERAL VASCULAR DISEASE: ICD-10-CM

## 2025-06-13 DIAGNOSIS — R41.89 COGNITIVE CHANGE: ICD-10-CM

## 2025-06-13 PROCEDURE — 99417 PROLNG OP E/M EACH 15 MIN: CPT | Performed by: NURSE PRACTITIONER

## 2025-06-13 PROCEDURE — 3078F DIAST BP <80 MM HG: CPT | Performed by: NURSE PRACTITIONER

## 2025-06-13 PROCEDURE — 99215 OFFICE O/P EST HI 40 MIN: CPT | Performed by: NURSE PRACTITIONER

## 2025-06-13 PROCEDURE — 3074F SYST BP LT 130 MM HG: CPT | Performed by: NURSE PRACTITIONER

## 2025-06-13 ASSESSMENT — PATIENT HEALTH QUESTIONNAIRE - PHQ9
SUM OF ALL RESPONSES TO PHQ9 QUESTIONS 1 AND 2: 2
1. LITTLE INTEREST OR PLEASURE IN DOING THINGS: SEVERAL DAYS
2. FEELING DOWN, DEPRESSED OR HOPELESS: SEVERAL DAYS

## 2025-06-13 ASSESSMENT — ENCOUNTER SYMPTOMS: OCCASIONAL FEELINGS OF UNSTEADINESS: 0

## 2025-06-13 ASSESSMENT — PAIN SCALES - GENERAL: PAINLEVEL_OUTOF10: 0-NO PAIN
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
## 2025-06-17 ENCOUNTER — APPOINTMENT (OUTPATIENT)
Dept: SLEEP MEDICINE | Facility: HOSPITAL | Age: 65
End: 2025-06-17
Payer: COMMERCIAL

## 2025-06-17 ENCOUNTER — TREATMENT (OUTPATIENT)
Dept: PHYSICAL THERAPY | Facility: CLINIC | Age: 65
End: 2025-06-17
Payer: COMMERCIAL

## 2025-06-17 DIAGNOSIS — M54.50 ACUTE RIGHT-SIDED LOW BACK PAIN, UNSPECIFIED WHETHER SCIATICA PRESENT: ICD-10-CM

## 2025-06-17 PROCEDURE — 97110 THERAPEUTIC EXERCISES: CPT | Mod: GP,CQ

## 2025-06-17 NOTE — PROGRESS NOTES
"Physical Therapy    Physical Therapy Treatment      Patient Name: Matthieu Solis  MRN: [id number]  Today's Date: 6/17/2025    Time Entry:   Time Calculation  Start Time: 0941  Stop Time: 1029  Time Calculation (min): 48 min     PT Therapeutic Procedures Time Entry  Therapeutic Exercise Time Entry: 38        Visit: 6  Insurance: MMO  Auth: No  15 visits left 2025    Assessment:  Pt reported less stiffness EOS    Did discuss at this point having pt follow up with physical medicine and rehab MD or with orthopedic to assess       Plan:  [place], resume walkouts      Current Problem:   1. Acute right-sided low back pain, unspecified whether sciatica present  Follow Up In Physical Therapy          Subjective    \"More stiff lately.  I stand all day at work.  I have done the ex's 2-3x since last session.  I have not scheduled anything with pain management yet.\"       Objective   LBP 3/10  Significant tenderness to palpation 8/10 pain per pt with grade I unilateral lumbar facet mobilizations       TREATMENT  Therapeutic exercise:   PPT x 20   SKTC x 10 ea  LTR x 10  TA with SLR x 10 ea   HSC on physio x 20   Bridges x 10  Seated pball roll out for lumbar stretch x 20  NuStep x 6 min   Standing lateral walk out (green tubing) x 5 ea dir,  not today  Step up with march (6 in) x 10 ea   Sit to stand (red chair) 2 x 10   Seated HP EOS x 10 min     Goals:  Active       PT Problem       Pt will report 50% reduction in low back pain for improved ability to perform transfers and return to work        Start:  04/14/25    Expected End:  07/10/25            Pt will demonstrate pain free lumbar AROM for improved ability to return to work        Start:  04/14/25    Expected End:  07/10/25            Pt will increase bilat LE strength by 1 MMT grade for all weakened muscle groups for improved lumbar and hip stability        Start:  04/14/25    Expected End:  07/10/25            Pt will be independent in [facility] for home maintenance        " Start:  04/14/25    Expected End:  07/10/25            Pt will increase LEFS score by 10 points for improved ability to perform daily activities with reduced pain        Start:  04/14/25    Expected End:  07/10/25

## 2025-06-22 PROCEDURE — RXMED WILLOW AMBULATORY MEDICATION CHARGE

## 2025-06-23 ENCOUNTER — TREATMENT (OUTPATIENT)
Dept: PHYSICAL THERAPY | Facility: CLINIC | Age: 65
End: 2025-06-23
Payer: COMMERCIAL

## 2025-06-23 DIAGNOSIS — M54.50 ACUTE RIGHT-SIDED LOW BACK PAIN, UNSPECIFIED WHETHER SCIATICA PRESENT: ICD-10-CM

## 2025-06-23 PROCEDURE — 97110 THERAPEUTIC EXERCISES: CPT | Mod: GP | Performed by: PHYSICAL THERAPIST

## 2025-06-23 NOTE — PROGRESS NOTES
"Physical Therapy    Physical Therapy Treatment and DISCHARGE     Patient Name: Matthieu Solis  MRN: [id number]  Today's Date: 6/23/2025    Time Entry:   Time Calculation  Start Time: 1055  Stop Time: 1140  Time Calculation (min): 45 min     PT Therapeutic Procedures Time Entry  Therapeutic Exercise Time Entry: 38        Visit: 7  Insurance: MMO  Auth: No  15 visits left 2025    Assessment:  At this time, progress has plateaued. Instructed pt to follow up with PCP or discussed following up with physical medicine and rehab MD for additional assessment.   Overall, mild improvement in pain symptoms but continued to have difficulty with forward flexion and complained of LE fatigue    Plan:  Discharge to [facility]  Instructed pt to call with any additional concerns       Current Problem:   1. Acute right-sided low back pain, unspecified whether sciatica present  Follow Up In Physical Therapy          Subjective    Pt reports back is still stiff; mild improvement overall   \"There's no pain right at this moment\"        Objective     Observation/Posture:               Hypertrophy bilat lumbar paraspinals               Tenderness to palpation PSIS R                             ROM/Flexibility:                          Lumbar            Flexion: 70 deg; limited by HS length; pain end range lumbar spine                                                   Extension: 10 deg; pain                                                   Sidebend R / L: 10 deg / 20 deg; stretch L side                                                   Rotation R / L: 50% / 25%, pain R rotation                             Hip R / LFlexion: WNL bilat                                                  Ext Rot: mild decrease                                                  Int Rot: mild decrease                  Strength R / L:                           Hip Flexion:          4+ / 4+                          Hip Abduction:    5 / 5                          Hip Adduction:  "   5 / 5                          Hip ER:                 4+ / 4+                           Hip IR:                  4+ / 4+                          Knee Extension:   5 / 5                          Knee Flexion:       5 / 5                             Ankle DF:             5 / 5                          Ankle PF:              5 / 5                 Neurological: Sensation is intact and symmetrical in BLEs.                  Gait: no significant gait deficits noted                     Special Tests:                           Slump R / L : - / -                           SLR R / L : - / -                           Long sit: WNL                 Outcome Measure:                          LEFS: 25/80    TREATMENT  Therapeutic exercise:   PPT x 20   LTR x 10  SKTC x 10 ea  TA with SLR x 10 ea   Bridges x 10  HSC on physio x 20   Seated pball roll out for lumbar stretch x 20  NuStep x 6 min   Step up with march (6 in) x 10 ea   Sit to stand (red chair) 2 x 10     Seated HP EOS x 5 min EOS       Goals:  Resolved       PT Problem       Pt will report 50% reduction in low back pain for improved ability to perform transfers and return to work  (Not met)       Start:  04/14/25    Expected End:  07/10/25    Resolved:  06/23/25         Pt will demonstrate pain free lumbar AROM for improved ability to return to work  (Not met)       Start:  04/14/25    Expected End:  07/10/25    Resolved:  06/23/25         Pt will increase bilat LE strength by 1 MMT grade for all weakened muscle groups for improved lumbar and hip stability  (Adequate for Discharge)       Start:  04/14/25    Expected End:  07/10/25    Resolved:  06/23/25         Pt will be independent in [facility] for home maintenance  (Met)       Start:  04/14/25    Expected End:  07/10/25    Resolved:  06/23/25         Pt will increase LEFS score by 10 points for improved ability to perform daily activities with reduced pain  (Not met)       Start:  04/14/25    Expected End:  07/10/25     Resolved:  06/23/25

## 2025-06-25 ENCOUNTER — PHARMACY VISIT (OUTPATIENT)
Dept: PHARMACY | Facility: CLINIC | Age: 65
End: 2025-06-25
Payer: COMMERCIAL

## 2025-06-30 ENCOUNTER — APPOINTMENT (OUTPATIENT)
Dept: PHYSICAL THERAPY | Facility: CLINIC | Age: 65
End: 2025-06-30
Payer: COMMERCIAL

## 2025-07-02 PROCEDURE — RXMED WILLOW AMBULATORY MEDICATION CHARGE

## 2025-07-02 ASSESSMENT — VISUAL ACUITY: OU: 1

## 2025-07-03 ENCOUNTER — PHARMACY VISIT (OUTPATIENT)
Dept: PHARMACY | Facility: CLINIC | Age: 65
End: 2025-07-03
Payer: COMMERCIAL

## 2025-07-18 ENCOUNTER — APPOINTMENT (OUTPATIENT)
Dept: PHARMACY | Facility: HOSPITAL | Age: 65
End: 2025-07-18
Payer: COMMERCIAL

## 2025-07-20 ENCOUNTER — CLINICAL SUPPORT (OUTPATIENT)
Dept: EMERGENCY MEDICINE | Facility: HOSPITAL | Age: 65
End: 2025-07-20
Payer: COMMERCIAL

## 2025-07-20 ENCOUNTER — HOSPITAL ENCOUNTER (EMERGENCY)
Facility: HOSPITAL | Age: 65
Discharge: HOME | End: 2025-07-21
Attending: EMERGENCY MEDICINE
Payer: COMMERCIAL

## 2025-07-20 DIAGNOSIS — S29.012A STRAIN OF MUSCLE AND TENDON OF BACK WALL OF THORAX, INITIAL ENCOUNTER: ICD-10-CM

## 2025-07-20 DIAGNOSIS — M54.89 OTHER ACUTE BACK PAIN: ICD-10-CM

## 2025-07-20 DIAGNOSIS — T14.8XXA MUSCLE STRAIN: Primary | ICD-10-CM

## 2025-07-20 LAB
ATRIAL RATE: 73 BPM
P AXIS: 79 DEGREES
P OFFSET: 191 MS
P ONSET: 154 MS
PR INTERVAL: 136 MS
Q ONSET: 222 MS
QRS COUNT: 12 BEATS
QRS DURATION: 86 MS
QT INTERVAL: 370 MS
QTC CALCULATION(BAZETT): 407 MS
QTC FREDERICIA: 395 MS
R AXIS: 97 DEGREES
T AXIS: 78 DEGREES
T OFFSET: 407 MS
VENTRICULAR RATE: 73 BPM

## 2025-07-20 PROCEDURE — 99284 EMERGENCY DEPT VISIT MOD MDM: CPT | Performed by: EMERGENCY MEDICINE

## 2025-07-20 PROCEDURE — 93005 ELECTROCARDIOGRAM TRACING: CPT

## 2025-07-20 NOTE — Clinical Note
Matthieu Solis was seen and treated in our emergency department on 7/20/2025.  He may return to work on 07/22/2025.       If you have any questions or concerns, please don't hesitate to call.      Gregory Wilhelm MD

## 2025-07-21 ENCOUNTER — APPOINTMENT (OUTPATIENT)
Dept: RADIOLOGY | Facility: HOSPITAL | Age: 65
End: 2025-07-21
Payer: COMMERCIAL

## 2025-07-21 ENCOUNTER — CLINICAL SUPPORT (OUTPATIENT)
Dept: EMERGENCY MEDICINE | Facility: HOSPITAL | Age: 65
End: 2025-07-21
Payer: COMMERCIAL

## 2025-07-21 VITALS
WEIGHT: 148.59 LBS | HEIGHT: 65 IN | SYSTOLIC BLOOD PRESSURE: 125 MMHG | HEART RATE: 80 BPM | BODY MASS INDEX: 24.76 KG/M2 | DIASTOLIC BLOOD PRESSURE: 68 MMHG | RESPIRATION RATE: 16 BRPM | OXYGEN SATURATION: 97 % | TEMPERATURE: 97.9 F

## 2025-07-21 PROCEDURE — 71046 X-RAY EXAM CHEST 2 VIEWS: CPT

## 2025-07-21 PROCEDURE — 2500000001 HC RX 250 WO HCPCS SELF ADMINISTERED DRUGS (ALT 637 FOR MEDICARE OP)

## 2025-07-21 PROCEDURE — 71046 X-RAY EXAM CHEST 2 VIEWS: CPT | Performed by: RADIOLOGY

## 2025-07-21 PROCEDURE — 93005 ELECTROCARDIOGRAM TRACING: CPT

## 2025-07-21 PROCEDURE — 2500000005 HC RX 250 GENERAL PHARMACY W/O HCPCS

## 2025-07-21 PROCEDURE — 96372 THER/PROPH/DIAG INJ SC/IM: CPT

## 2025-07-21 PROCEDURE — 2500000004 HC RX 250 GENERAL PHARMACY W/ HCPCS (ALT 636 FOR OP/ED)

## 2025-07-21 RX ORDER — ORPHENADRINE CITRATE 30 MG/ML
60 INJECTION INTRAMUSCULAR; INTRAVENOUS ONCE
Status: COMPLETED | OUTPATIENT
Start: 2025-07-21 | End: 2025-07-21

## 2025-07-21 RX ORDER — CYCLOBENZAPRINE HCL 10 MG
10 TABLET ORAL 3 TIMES DAILY PRN
Qty: 30 TABLET | Refills: 0 | Status: SHIPPED | OUTPATIENT
Start: 2025-07-21 | End: 2025-07-31

## 2025-07-21 RX ORDER — LIDOCAINE 560 MG/1
1 PATCH PERCUTANEOUS; TOPICAL; TRANSDERMAL ONCE
Status: DISCONTINUED | OUTPATIENT
Start: 2025-07-21 | End: 2025-07-21 | Stop reason: HOSPADM

## 2025-07-21 RX ORDER — ACETAMINOPHEN 325 MG/1
975 TABLET ORAL ONCE
Status: COMPLETED | OUTPATIENT
Start: 2025-07-21 | End: 2025-07-21

## 2025-07-21 RX ORDER — KETOROLAC TROMETHAMINE 30 MG/ML
30 INJECTION, SOLUTION INTRAMUSCULAR; INTRAVENOUS ONCE
Status: COMPLETED | OUTPATIENT
Start: 2025-07-21 | End: 2025-07-21

## 2025-07-21 RX ORDER — LIDOCAINE 560 MG/1
1 PATCH PERCUTANEOUS; TOPICAL; TRANSDERMAL DAILY
Qty: 20 PATCH | Refills: 0 | Status: SHIPPED | OUTPATIENT
Start: 2025-07-21

## 2025-07-21 RX ADMIN — LIDOCAINE 1 PATCH: 4 PATCH TOPICAL at 02:49

## 2025-07-21 RX ADMIN — ORPHENADRINE CITRATE 60 MG: 60 INJECTION INTRAMUSCULAR; INTRAVENOUS at 02:49

## 2025-07-21 RX ADMIN — KETOROLAC TROMETHAMINE 30 MG: 30 INJECTION, SOLUTION INTRAMUSCULAR; INTRAVENOUS at 02:49

## 2025-07-21 RX ADMIN — ACETAMINOPHEN 975 MG: 325 TABLET ORAL at 02:47

## 2025-07-21 NOTE — ED TRIAGE NOTES
Pt coming in with posterior right shoulder and back pain  started about 6 hours ago, denies any injury that he is aware of hx of CHF, HTN
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
## 2025-07-21 NOTE — DISCHARGE INSTRUCTIONS
You were evaluated today for left shoulder blade pain. Based on your exam, imaging, and tests including a chest X-ray and EKG, serious causes such as heart attack, pneumonia, pneumothorax, or aortic dissection have been ruled out. Your symptoms are most consistent with a pulled muscle (muscle strain).  Treatment Plan:  Flexeril (Cyclobenzaprine): Take as prescribed for muscle relaxation. Do not drive or operate heavy machinery while taking this medication.  Lidocaine Patch: Apply to the affected area as directed for localized pain relief.  Tylenol (Acetaminophen) and Ibuprofen: Alternate these medications every 3 hours as needed for pain. Example:  12 PM - Tylenol  3 PM - Ibuprofen  6 PM - Tylenol, and so on  (Do not exceed daily dosage limits: Max 3,000 mg/day of Tylenol; Max 2,400 mg/day of Ibuprofen unless otherwise instructed.)  Additional Instructions:  Rest the shoulder as much as possible.  Apply heat or ice to the area for 15-20 minutes a few times per day to reduce pain and inflammation.  Avoid heavy lifting or strenuous activity until symptoms improve.  Follow-Up:  If pain worsens, becomes sharp or severe, or if you develop new symptoms such as chest pain, shortness of breath, or fever, return to the ED or contact your primary care provider.

## 2025-08-05 PROBLEM — R06.83 SNORING: Status: RESOLVED | Noted: 2024-12-12 | Resolved: 2025-08-05

## 2025-08-05 PROBLEM — G47.30 SLEEP-RELATED BREATHING DISORDER: Status: RESOLVED | Noted: 2025-01-20 | Resolved: 2025-08-05

## 2025-08-06 DIAGNOSIS — I42.8 NICM (NONISCHEMIC CARDIOMYOPATHY) (MULTI): ICD-10-CM

## 2025-08-06 RX ORDER — CARVEDILOL 12.5 MG/1
TABLET ORAL
Qty: 60 TABLET | Refills: 4 | Status: SHIPPED | OUTPATIENT
Start: 2025-08-06

## 2025-08-11 ENCOUNTER — APPOINTMENT (OUTPATIENT)
Dept: OPHTHALMOLOGY | Facility: CLINIC | Age: 65
End: 2025-08-11
Payer: COMMERCIAL

## 2025-08-12 PROCEDURE — RXMED WILLOW AMBULATORY MEDICATION CHARGE

## 2025-08-13 ENCOUNTER — APPOINTMENT (OUTPATIENT)
Dept: SLEEP MEDICINE | Facility: HOSPITAL | Age: 65
End: 2025-08-13
Payer: COMMERCIAL

## 2025-08-13 VITALS
OXYGEN SATURATION: 94 % | TEMPERATURE: 97.6 F | BODY MASS INDEX: 24.13 KG/M2 | SYSTOLIC BLOOD PRESSURE: 136 MMHG | DIASTOLIC BLOOD PRESSURE: 80 MMHG | WEIGHT: 145 LBS | HEART RATE: 92 BPM

## 2025-08-13 DIAGNOSIS — I73.9 PERIPHERAL VASCULAR DISEASE: ICD-10-CM

## 2025-08-13 DIAGNOSIS — G47.33 OBSTRUCTIVE SLEEP APNEA (ADULT) (PEDIATRIC): ICD-10-CM

## 2025-08-13 DIAGNOSIS — I50.20 SYSTOLIC CONGESTIVE HEART FAILURE, UNSPECIFIED HF CHRONICITY: ICD-10-CM

## 2025-08-13 DIAGNOSIS — G47.33 OSA (OBSTRUCTIVE SLEEP APNEA): Primary | ICD-10-CM

## 2025-08-13 DIAGNOSIS — F17.200 NICOTINE USE DISORDER: ICD-10-CM

## 2025-08-13 DIAGNOSIS — G47.30 SLEEP-RELATED BREATHING DISORDER: ICD-10-CM

## 2025-08-13 DIAGNOSIS — I10 ESSENTIAL HYPERTENSION: ICD-10-CM

## 2025-08-13 PROCEDURE — 99213 OFFICE O/P EST LOW 20 MIN: CPT

## 2025-08-13 PROCEDURE — 3075F SYST BP GE 130 - 139MM HG: CPT

## 2025-08-13 PROCEDURE — 3079F DIAST BP 80-89 MM HG: CPT

## 2025-08-13 ASSESSMENT — SLEEP AND FATIGUE QUESTIONNAIRES
HOW LIKELY ARE YOU TO NOD OFF OR FALL ASLEEP WHILE LYING DOWN TO REST IN THE AFTERNOON WHEN CIRCUMSTANCES PERMIT: MODERATE CHANCE OF DOZING
HOW LIKELY ARE YOU TO NOD OFF OR FALL ASLEEP WHILE WATCHING TV: HIGH CHANCE OF DOZING
HOW LIKELY ARE YOU TO NOD OFF OR FALL ASLEEP WHILE SITTING AND TALKING TO SOMEONE: WOULD NEVER DOZE
HOW LIKELY ARE YOU TO NOD OFF OR FALL ASLEEP WHILE SITTING AND READING: MODERATE CHANCE OF DOZING
ESS-CHAD TOTAL SCORE: 8
HOW LIKELY ARE YOU TO NOD OFF OR FALL ASLEEP IN A CAR, WHILE STOPPED FOR A FEW MINUTES IN TRAFFIC: WOULD NEVER DOZE
HOW LIKELY ARE YOU TO NOD OFF OR FALL ASLEEP WHILE SITTING QUIETLY AFTER LUNCH WITHOUT ALCOHOL: SLIGHT CHANCE OF DOZING
HOW LIKELY ARE YOU TO NOD OFF OR FALL ASLEEP WHEN YOU ARE A PASSENGER IN A CAR FOR AN HOUR WITHOUT A BREAK: WOULD NEVER DOZE
SITING INACTIVE IN A PUBLIC PLACE LIKE A CLASS ROOM OR A MOVIE THEATER: WOULD NEVER DOZE

## 2025-08-13 ASSESSMENT — PAIN SCALES - GENERAL: PAINLEVEL_OUTOF10: 0-NO PAIN

## 2025-08-14 ENCOUNTER — PHARMACY VISIT (OUTPATIENT)
Dept: PHARMACY | Facility: CLINIC | Age: 65
End: 2025-08-14
Payer: MEDICAID

## 2025-08-17 ENCOUNTER — PATIENT OUTREACH (OUTPATIENT)
Dept: CARE COORDINATION | Age: 65
End: 2025-08-17
Payer: COMMERCIAL

## 2025-08-18 ENCOUNTER — APPOINTMENT (OUTPATIENT)
Dept: PHARMACY | Facility: HOSPITAL | Age: 65
End: 2025-08-18
Payer: COMMERCIAL

## 2025-08-18 DIAGNOSIS — I42.8 NICM (NONISCHEMIC CARDIOMYOPATHY) (MULTI): ICD-10-CM

## 2025-08-21 ENCOUNTER — TELEPHONE (OUTPATIENT)
Dept: CARE COORDINATION | Age: 65
End: 2025-08-21
Payer: COMMERCIAL

## 2025-08-25 ENCOUNTER — TELEPHONE (OUTPATIENT)
Dept: CARE COORDINATION | Age: 65
End: 2025-08-25
Payer: COMMERCIAL

## 2025-08-26 ENCOUNTER — OFFICE VISIT (OUTPATIENT)
Dept: CARDIOLOGY | Facility: CLINIC | Age: 65
End: 2025-08-26
Payer: COMMERCIAL

## 2025-08-26 VITALS
SYSTOLIC BLOOD PRESSURE: 148 MMHG | WEIGHT: 143.6 LBS | HEART RATE: 66 BPM | OXYGEN SATURATION: 99 % | DIASTOLIC BLOOD PRESSURE: 79 MMHG | HEIGHT: 65 IN | BODY MASS INDEX: 23.93 KG/M2

## 2025-08-26 DIAGNOSIS — I10 ESSENTIAL HYPERTENSION: ICD-10-CM

## 2025-08-26 DIAGNOSIS — I50.9 CONGESTIVE HEART FAILURE, UNSPECIFIED HF CHRONICITY, UNSPECIFIED HEART FAILURE TYPE: Primary | ICD-10-CM

## 2025-08-26 PROCEDURE — 3008F BODY MASS INDEX DOCD: CPT | Performed by: NURSE PRACTITIONER

## 2025-08-26 PROCEDURE — 3078F DIAST BP <80 MM HG: CPT | Performed by: NURSE PRACTITIONER

## 2025-08-26 PROCEDURE — 99212 OFFICE O/P EST SF 10 MIN: CPT

## 2025-08-26 PROCEDURE — 99214 OFFICE O/P EST MOD 30 MIN: CPT | Performed by: NURSE PRACTITIONER

## 2025-08-26 PROCEDURE — 3077F SYST BP >= 140 MM HG: CPT | Performed by: NURSE PRACTITIONER

## 2025-08-26 ASSESSMENT — ENCOUNTER SYMPTOMS
NEUROLOGICAL NEGATIVE: 1
CONSTITUTIONAL NEGATIVE: 1
CARDIOVASCULAR NEGATIVE: 1
GASTROINTESTINAL NEGATIVE: 1
MUSCULOSKELETAL NEGATIVE: 1
RESPIRATORY NEGATIVE: 1

## 2025-08-26 ASSESSMENT — PAIN SCALES - GENERAL: PAINLEVEL_OUTOF10: 0-NO PAIN

## 2025-09-02 DIAGNOSIS — I42.8 NICM (NONISCHEMIC CARDIOMYOPATHY) (MULTI): ICD-10-CM

## 2025-09-02 RX ORDER — SPIRONOLACTONE 25 MG/1
12.5 TABLET ORAL
Qty: 15 TABLET | Refills: 14 | Status: SHIPPED | OUTPATIENT
Start: 2025-09-02

## 2025-09-08 ENCOUNTER — APPOINTMENT (OUTPATIENT)
Dept: VASCULAR SURGERY | Facility: HOSPITAL | Age: 65
End: 2025-09-08
Payer: COMMERCIAL

## 2025-09-08 ENCOUNTER — APPOINTMENT (OUTPATIENT)
Dept: OPHTHALMOLOGY | Facility: CLINIC | Age: 65
End: 2025-09-08
Payer: COMMERCIAL

## 2025-09-10 ENCOUNTER — APPOINTMENT (OUTPATIENT)
Age: 65
End: 2025-09-10
Payer: COMMERCIAL

## 2025-10-13 ENCOUNTER — APPOINTMENT (OUTPATIENT)
Dept: PHARMACY | Facility: HOSPITAL | Age: 65
End: 2025-10-13
Payer: COMMERCIAL

## 2025-11-19 ENCOUNTER — APPOINTMENT (OUTPATIENT)
Age: 65
End: 2025-11-19
Payer: COMMERCIAL

## (undated) DEVICE — DRAPE, INSTRUMENT, W/POUCH, STERI DRAPE, 7 X 11 IN, DISPOSABLE, STERILE

## (undated) DEVICE — GOWN, SURGICAL, SMARTGOWN, XLARGE, STERILE

## (undated) DEVICE — SHEATH, INTRODUCER, 25CM, 8FR, R/O RADIOPAQUE MARKER, 8FR DIA, 2.5 CM DILATOR.

## (undated) DEVICE — Device

## (undated) DEVICE — NEEDLE, BIOPSY, MAX CORE, 18G

## (undated) DEVICE — DRAPE, MAGENTIC INSTRUMENT, 12X16

## (undated) DEVICE — DRAPE, SHEET, FAN FOLDED, HALF, 44 X 58 IN, DISPOSABLE, LF, STERILE

## (undated) DEVICE — COVER, CART, 45 X 27 X 48 IN, CLEAR

## (undated) DEVICE — CATHETER, BALLOON, EVERCROSS, 8MM X 40MM X 80CM, OTW PTA

## (undated) DEVICE — WOUND SYSTEM, DEBRIDEMENT & CLEANING, O.R DUOPAK

## (undated) DEVICE — STOCKINETTE, DOUBLE PLY, 6 X 48 IN, STERILE

## (undated) DEVICE — INTRODUCER SET, MICROPUNCT, STIFF, 4FR 10CM,PLATINUM TIP,NITINOLWIRE

## (undated) DEVICE — STRAP, CIRCUMFERENTIAL, 2 X 76""

## (undated) DEVICE — GUIDEWIRE, .035 X 180 BENTSON STR

## (undated) DEVICE — COVER, TABLE, 44 X 75 IN, DISPOSABLE, LF, STERILE

## (undated) DEVICE — GUIDEWIRE, ANGLE TIP,  .035 DIA, 260 CM, 3 CM TIP"

## (undated) DEVICE — DRAPE, INCISE, ANTIMICROBIAL, IOBAN 2, LARGE, 17 X 23 IN, DISPOSABLE, STERILE

## (undated) DEVICE — DRAPE, TIBURON, SPLIT SHEET, REINF ADH STRIP, 77X122

## (undated) DEVICE — CATHETER, QUICKCROSS SUPPORT .035 X 90CM

## (undated) DEVICE — GEL, ULTRASOUND, AQUASONIC 100, 20 GM, STERILE

## (undated) DEVICE — DRESSING, ISLAND, ADHESIVE, TELFA, 4 X 8 IN

## (undated) DEVICE — SUTURE, PROLENE, 6-0, 30 IN, BV-1 BV-1

## (undated) DEVICE — PACK, UNIVERSAL

## (undated) DEVICE — TORQUE DEVICE, ACCOMODATES WIRES W/DIA .010 TO .038"."

## (undated) DEVICE — SPONGE, HEMOSTATIC, GELATIN, SURGIFOAM, 8 X 12.5 CM X 10 MM

## (undated) DEVICE — PROTECTOR, NERVE, ULNAR, PINK

## (undated) DEVICE — GUIDEWIRE, .035 X 260 BENTSON STR

## (undated) DEVICE — NEEDLE, HYPODERMIC, SAFETYGLIDE, SHIELDING, REGULAR WALL, REGULAR BEVEL, 22 G X 1.5 IN, BLACK HUB

## (undated) DEVICE — SHEATH, PINNACLE, 10 CM,  8FR INTRODUCER, 8FR DIA, 2.5 CM DIALATOR

## (undated) DEVICE — MANIFOLD, 4 PORT NEPTUNE STANDARD

## (undated) DEVICE — DRESSING, TELFA, 3X4

## (undated) DEVICE — CATHETER, ANGIOGRAPHIC, OMNI-FLUSH, 0.038 IN, 5FR X 70 CM

## (undated) DEVICE — SHEATH, PINNACLE, 10 CM,  6FR INTRODUCER, 6FR DIA, 2.5 CM DIALATOR

## (undated) DEVICE — SUTURE, VICRYL, 3-0, 27 IN, SH

## (undated) DEVICE — DRAPE, PAD, PREP, W/ 9 IN CUFF, 24 X 41, LF, NS

## (undated) DEVICE — APPLIER, LIGACLIP, MULTIPLE, SMALL 9-3/8IN

## (undated) DEVICE — LOOP, VESSEL, MINI, WHITE

## (undated) DEVICE — SEALANT, HEMOSTATIC, FLOSEAL, 10 ML

## (undated) DEVICE — SHEATH, PINNACLE, 10 CM,  5FR INTRODUCER, 5FR DIA, 2.5 CM DIALATOR

## (undated) DEVICE — COVER PROBE, SOFT FLEX W/ GEL, 5 X 48 IN (13X122CM)

## (undated) DEVICE — CATHETER TRAY, SURESTEP, 16FR, URINE METER W/STATLOCK

## (undated) DEVICE — GLOVE, SURGICAL, PROTEXIS PI MICRO, 5.5, PF, LF

## (undated) DEVICE — SPONGE, HEMOSTAT, SURGICEL FIBRILLAR, ABS, 4 X 4, LF

## (undated) DEVICE — SUTURE, VICRYL, 3-0, 27 IN, CT-1, VIOLET

## (undated) DEVICE — SNARE RETRIEVAL, ENSNARE 3-LOOP 18MM X 30MM

## (undated) DEVICE — SUTURE, PROLENE, 5-0, 36 IN, C-1, CV-11, BLUE

## (undated) DEVICE — DEVICE, INFLATION, ENCORE 20